# Patient Record
Sex: FEMALE | Race: WHITE | Employment: UNEMPLOYED | URBAN - METROPOLITAN AREA
[De-identification: names, ages, dates, MRNs, and addresses within clinical notes are randomized per-mention and may not be internally consistent; named-entity substitution may affect disease eponyms.]

---

## 2019-10-31 ENCOUNTER — OFFICE VISIT (OUTPATIENT)
Dept: INTERNAL MEDICINE CLINIC | Age: 39
End: 2019-10-31
Payer: MEDICAID

## 2019-10-31 VITALS
BODY MASS INDEX: 38.93 KG/M2 | DIASTOLIC BLOOD PRESSURE: 88 MMHG | SYSTOLIC BLOOD PRESSURE: 182 MMHG | RESPIRATION RATE: 16 BRPM | WEIGHT: 228 LBS | HEART RATE: 76 BPM | HEIGHT: 64 IN

## 2019-10-31 DIAGNOSIS — Z90.722 H/O OF HYSTERECTOMY WITH BILATERAL OOPHORECTOMY: ICD-10-CM

## 2019-10-31 DIAGNOSIS — G47.9 SLEEP DISTURBANCE: ICD-10-CM

## 2019-10-31 DIAGNOSIS — M99.08 SOMATIC DYSFUNCTION OF RIB: ICD-10-CM

## 2019-10-31 DIAGNOSIS — R03.0 ELEVATED BLOOD PRESSURE READING: ICD-10-CM

## 2019-10-31 DIAGNOSIS — M99.00 SOMATIC DYSFUNCTION OF HEAD REGION: ICD-10-CM

## 2019-10-31 DIAGNOSIS — F40.01 PANIC DISORDER WITH AGORAPHOBIA: ICD-10-CM

## 2019-10-31 DIAGNOSIS — Z90.710 H/O OF HYSTERECTOMY WITH BILATERAL OOPHORECTOMY: ICD-10-CM

## 2019-10-31 DIAGNOSIS — M99.01 SOMATIC DYSFUNCTION OF CERVICAL REGION: ICD-10-CM

## 2019-10-31 DIAGNOSIS — Z98.890 H/O BILATERAL BREAST REDUCTION SURGERY: ICD-10-CM

## 2019-10-31 DIAGNOSIS — E28.319 EARLY MENOPAUSE: ICD-10-CM

## 2019-10-31 DIAGNOSIS — B96.89 ACUTE BACTERIAL SINUSITIS: ICD-10-CM

## 2019-10-31 DIAGNOSIS — J01.90 ACUTE BACTERIAL SINUSITIS: ICD-10-CM

## 2019-10-31 DIAGNOSIS — Z76.89 ENCOUNTER TO ESTABLISH CARE WITH NEW DOCTOR: ICD-10-CM

## 2019-10-31 PROCEDURE — 36415 COLL VENOUS BLD VENIPUNCTURE: CPT | Performed by: FAMILY MEDICINE

## 2019-10-31 PROCEDURE — 99203 OFFICE O/P NEW LOW 30 MIN: CPT | Performed by: FAMILY MEDICINE

## 2019-10-31 PROCEDURE — 98926 OSTEOPATH MANJ 3-4 REGIONS: CPT | Performed by: FAMILY MEDICINE

## 2019-10-31 PROCEDURE — 81003 URINALYSIS AUTO W/O SCOPE: CPT | Performed by: FAMILY MEDICINE

## 2019-10-31 RX ORDER — AMOXICILLIN AND CLAVULANATE POTASSIUM 875; 125 MG/1; MG/1
1 TABLET, FILM COATED ORAL 2 TIMES DAILY
Qty: 20 TABLET | Refills: 0 | Status: SHIPPED | OUTPATIENT
Start: 2019-10-31 | End: 2019-11-10

## 2019-10-31 RX ORDER — LANOLIN ALCOHOL/MO/W.PET/CERES
1000 CREAM (GRAM) TOPICAL DAILY
Qty: 30 TABLET | Refills: 3 | Status: SHIPPED | OUTPATIENT
Start: 2019-10-31 | End: 2020-01-20 | Stop reason: SDUPTHER

## 2019-10-31 RX ORDER — AZELASTINE 1 MG/ML
1 SPRAY, METERED NASAL 2 TIMES DAILY
Qty: 1 BOTTLE | Refills: 1 | Status: SHIPPED | OUTPATIENT
Start: 2019-10-31 | End: 2020-06-12

## 2019-10-31 RX ORDER — CHOLECALCIFEROL (VITAMIN D3) 125 MCG
CAPSULE ORAL
Qty: 30 TABLET | Refills: 3 | Status: SHIPPED | OUTPATIENT
Start: 2019-10-31 | End: 2020-01-20 | Stop reason: SDUPTHER

## 2019-10-31 RX ORDER — PHENOL 1.4 %
1 AEROSOL, SPRAY (ML) MUCOUS MEMBRANE DAILY
Qty: 30 TABLET | Refills: 3 | Status: SHIPPED | OUTPATIENT
Start: 2019-10-31 | End: 2020-06-12

## 2019-10-31 RX ORDER — PREDNISONE 10 MG/1
10 TABLET ORAL DAILY
Qty: 10 TABLET | Refills: 0 | Status: SHIPPED | OUTPATIENT
Start: 2019-10-31 | End: 2019-11-10

## 2019-10-31 ASSESSMENT — ENCOUNTER SYMPTOMS
NAUSEA: 0
RHINORRHEA: 1
WHEEZING: 0
DIARRHEA: 0
ABDOMINAL DISTENTION: 0
SINUS PAIN: 1
EYE ITCHING: 0
EYE REDNESS: 0
CHEST TIGHTNESS: 0
TROUBLE SWALLOWING: 0
VOMITING: 0
ABDOMINAL PAIN: 0
SHORTNESS OF BREATH: 0
COUGH: 1
SORE THROAT: 1
CONSTIPATION: 0
BACK PAIN: 0

## 2019-10-31 ASSESSMENT — PATIENT HEALTH QUESTIONNAIRE - PHQ9
2. FEELING DOWN, DEPRESSED OR HOPELESS: 1
1. LITTLE INTEREST OR PLEASURE IN DOING THINGS: 1
SUM OF ALL RESPONSES TO PHQ QUESTIONS 1-9: 2
SUM OF ALL RESPONSES TO PHQ9 QUESTIONS 1 & 2: 2
SUM OF ALL RESPONSES TO PHQ QUESTIONS 1-9: 2

## 2019-11-01 ENCOUNTER — TELEPHONE (OUTPATIENT)
Dept: INTERNAL MEDICINE CLINIC | Age: 39
End: 2019-11-01

## 2019-11-01 DIAGNOSIS — J01.90 ACUTE BACTERIAL SINUSITIS: Primary | ICD-10-CM

## 2019-11-01 DIAGNOSIS — B96.89 ACUTE BACTERIAL SINUSITIS: Primary | ICD-10-CM

## 2019-11-01 LAB
A/G RATIO: 1.9 (ref 1.1–2.2)
ALBUMIN SERPL-MCNC: 4.6 G/DL (ref 3.4–5)
ALP BLD-CCNC: 86 U/L (ref 40–129)
ALT SERPL-CCNC: 17 U/L (ref 10–40)
AMORPHOUS: ABNORMAL /HPF
ANION GAP SERPL CALCULATED.3IONS-SCNC: 13 MMOL/L (ref 3–16)
AST SERPL-CCNC: 23 U/L (ref 15–37)
BACTERIA: ABNORMAL /HPF
BASOPHILS ABSOLUTE: 0 K/UL (ref 0–0.2)
BASOPHILS RELATIVE PERCENT: 0.4 %
BILIRUB SERPL-MCNC: 0.5 MG/DL (ref 0–1)
BILIRUBIN URINE: NEGATIVE
BLOOD, URINE: ABNORMAL
BUN BLDV-MCNC: 11 MG/DL (ref 7–20)
CALCIUM SERPL-MCNC: 8.8 MG/DL (ref 8.3–10.6)
CASTS: ABNORMAL /LPF
CHLORIDE BLD-SCNC: 103 MMOL/L (ref 99–110)
CHOLESTEROL, FASTING: 170 MG/DL (ref 0–199)
CLARITY: CLEAR
CO2: 24 MMOL/L (ref 21–32)
COLOR: YELLOW
CREAT SERPL-MCNC: 0.9 MG/DL (ref 0.6–1.1)
CRYSTALS, UA: ABNORMAL /HPF
EOSINOPHILS ABSOLUTE: 0.4 K/UL (ref 0–0.6)
EOSINOPHILS RELATIVE PERCENT: 3.8 %
EPITHELIAL CELLS, UA: ABNORMAL /HPF
ESTIMATED AVERAGE GLUCOSE: 99.7 MG/DL
GFR AFRICAN AMERICAN: >60
GFR NON-AFRICAN AMERICAN: >60
GLOBULIN: 2.4 G/DL
GLUCOSE BLD-MCNC: 95 MG/DL (ref 70–99)
GLUCOSE URINE: NEGATIVE MG/DL
HBA1C MFR BLD: 5.1 %
HCT VFR BLD CALC: 39.5 % (ref 36–48)
HDLC SERPL-MCNC: 36 MG/DL (ref 40–60)
HEMOGLOBIN: 13.1 G/DL (ref 12–16)
KETONES, URINE: NEGATIVE MG/DL
LDL CHOLESTEROL CALCULATED: 100 MG/DL
LEUKOCYTE ESTERASE, URINE: ABNORMAL
LYMPHOCYTES ABSOLUTE: 3.2 K/UL (ref 1–5.1)
LYMPHOCYTES RELATIVE PERCENT: 29.6 %
MCH RBC QN AUTO: 30.1 PG (ref 26–34)
MCHC RBC AUTO-ENTMCNC: 33.3 G/DL (ref 31–36)
MCV RBC AUTO: 90.3 FL (ref 80–100)
MICROSCOPIC EXAMINATION: YES
MONOCYTES ABSOLUTE: 0.5 K/UL (ref 0–1.3)
MONOCYTES RELATIVE PERCENT: 4.8 %
NEUTROPHILS ABSOLUTE: 6.7 K/UL (ref 1.7–7.7)
NEUTROPHILS RELATIVE PERCENT: 61.4 %
NITRITE, URINE: NEGATIVE
PDW BLD-RTO: 14.6 % (ref 12.4–15.4)
PH UA: 8 (ref 5–8)
PLATELET # BLD: 278 K/UL (ref 135–450)
PMV BLD AUTO: 8.3 FL (ref 5–10.5)
POTASSIUM SERPL-SCNC: 4.2 MMOL/L (ref 3.5–5.1)
PROTEIN UA: NEGATIVE MG/DL
RBC # BLD: 4.37 M/UL (ref 4–5.2)
RBC UA: ABNORMAL /HPF (ref 0–2)
SODIUM BLD-SCNC: 140 MMOL/L (ref 136–145)
SPECIFIC GRAVITY UA: 1.01 (ref 1–1.03)
T4 FREE: 1.1 NG/DL (ref 0.9–1.8)
TOTAL PROTEIN: 7 G/DL (ref 6.4–8.2)
TRIGLYCERIDE, FASTING: 168 MG/DL (ref 0–150)
TSH SERPL DL<=0.05 MIU/L-ACNC: 1.02 UIU/ML (ref 0.27–4.2)
URINE REFLEX TO CULTURE: YES
URINE TYPE: ABNORMAL
UROBILINOGEN, URINE: 0.2 E.U./DL
VLDLC SERPL CALC-MCNC: 34 MG/DL
WBC # BLD: 11 K/UL (ref 4–11)
WBC UA: ABNORMAL /HPF (ref 0–5)

## 2019-11-01 RX ORDER — BENZONATATE 100 MG/1
100 CAPSULE ORAL 3 TIMES DAILY PRN
Qty: 30 CAPSULE | Refills: 0 | Status: SHIPPED | OUTPATIENT
Start: 2019-11-01 | End: 2019-11-08

## 2019-11-02 LAB — URINE CULTURE, ROUTINE: NORMAL

## 2019-11-07 ENCOUNTER — OFFICE VISIT (OUTPATIENT)
Dept: INTERNAL MEDICINE CLINIC | Age: 39
End: 2019-11-07
Payer: COMMERCIAL

## 2019-11-07 VITALS — HEART RATE: 92 BPM | RESPIRATION RATE: 16 BRPM | SYSTOLIC BLOOD PRESSURE: 126 MMHG | DIASTOLIC BLOOD PRESSURE: 82 MMHG

## 2019-11-07 DIAGNOSIS — F40.01 PANIC DISORDER WITH AGORAPHOBIA: ICD-10-CM

## 2019-11-07 DIAGNOSIS — M99.09 SOMATIC DYSFUNCTION OF ABDOMINAL REGION: ICD-10-CM

## 2019-11-07 DIAGNOSIS — R03.0 ELEVATED BLOOD PRESSURE READING: ICD-10-CM

## 2019-11-07 DIAGNOSIS — B96.89 ACUTE BACTERIAL SINUSITIS: ICD-10-CM

## 2019-11-07 DIAGNOSIS — M99.00 SOMATIC DYSFUNCTION OF HEAD REGION: ICD-10-CM

## 2019-11-07 DIAGNOSIS — M99.09 SOMATIC DYSFUNCTION OF BACK: ICD-10-CM

## 2019-11-07 DIAGNOSIS — G47.9 SLEEP DISTURBANCE: ICD-10-CM

## 2019-11-07 DIAGNOSIS — M99.01 SOMATIC DYSFUNCTION OF CERVICAL REGION: ICD-10-CM

## 2019-11-07 DIAGNOSIS — J01.90 ACUTE BACTERIAL SINUSITIS: ICD-10-CM

## 2019-11-07 DIAGNOSIS — M99.05 SOMATIC DYSFUNCTION OF PELVIC REGION: ICD-10-CM

## 2019-11-07 DIAGNOSIS — M79.18 MYOFASCIAL PAIN SYNDROME: ICD-10-CM

## 2019-11-07 PROBLEM — G89.29 CHRONIC MYOFASCIAL PAIN: Status: ACTIVE | Noted: 2019-11-07

## 2019-11-07 PROCEDURE — 99213 OFFICE O/P EST LOW 20 MIN: CPT | Performed by: FAMILY MEDICINE

## 2019-11-07 PROCEDURE — 98927 OSTEOPATH MANJ 5-6 REGIONS: CPT | Performed by: FAMILY MEDICINE

## 2019-11-07 RX ORDER — CYCLOBENZAPRINE HCL 5 MG
5 TABLET ORAL NIGHTLY
Qty: 30 TABLET | Refills: 1 | Status: SHIPPED | OUTPATIENT
Start: 2019-11-07 | End: 2019-12-07

## 2019-11-07 ASSESSMENT — ENCOUNTER SYMPTOMS
DIARRHEA: 0
BACK PAIN: 1
CHEST TIGHTNESS: 0
EYE REDNESS: 0
WHEEZING: 0
EYE ITCHING: 0
SINUS PAIN: 0
VOMITING: 0
ABDOMINAL PAIN: 0
NAUSEA: 0
TROUBLE SWALLOWING: 0
ABDOMINAL DISTENTION: 0
COUGH: 1
SHORTNESS OF BREATH: 0
SORE THROAT: 0
CONSTIPATION: 0

## 2019-11-21 ENCOUNTER — OFFICE VISIT (OUTPATIENT)
Dept: INTERNAL MEDICINE CLINIC | Age: 39
End: 2019-11-21
Payer: COMMERCIAL

## 2019-11-21 VITALS — RESPIRATION RATE: 16 BRPM | SYSTOLIC BLOOD PRESSURE: 136 MMHG | DIASTOLIC BLOOD PRESSURE: 76 MMHG | HEART RATE: 72 BPM

## 2019-11-21 DIAGNOSIS — M99.09 SOMATIC DYSFUNCTION OF BACK: ICD-10-CM

## 2019-11-21 DIAGNOSIS — M99.01 SOMATIC DYSFUNCTION OF CERVICAL REGION: ICD-10-CM

## 2019-11-21 DIAGNOSIS — M99.00 SOMATIC DYSFUNCTION OF HEAD REGION: ICD-10-CM

## 2019-11-21 DIAGNOSIS — M79.18 MYOFASCIAL PAIN SYNDROME: ICD-10-CM

## 2019-11-21 DIAGNOSIS — M99.09 SOMATIC DYSFUNCTION OF ABDOMINAL REGION: ICD-10-CM

## 2019-11-21 DIAGNOSIS — M99.05 SOMATIC DYSFUNCTION OF PELVIC REGION: ICD-10-CM

## 2019-11-21 DIAGNOSIS — F40.01 PANIC DISORDER WITH AGORAPHOBIA: ICD-10-CM

## 2019-11-21 DIAGNOSIS — G47.9 SLEEP DISTURBANCE: ICD-10-CM

## 2019-11-21 PROCEDURE — 99213 OFFICE O/P EST LOW 20 MIN: CPT | Performed by: FAMILY MEDICINE

## 2019-11-21 PROCEDURE — 98927 OSTEOPATH MANJ 5-6 REGIONS: CPT | Performed by: FAMILY MEDICINE

## 2019-11-21 RX ORDER — QUETIAPINE FUMARATE 25 MG/1
25 TABLET, FILM COATED ORAL DAILY
Qty: 30 TABLET | Refills: 0 | Status: SHIPPED | OUTPATIENT
Start: 2019-11-21 | End: 2020-01-07

## 2019-11-21 ASSESSMENT — ENCOUNTER SYMPTOMS
WHEEZING: 0
EYE REDNESS: 0
EYE ITCHING: 0
BACK PAIN: 1
TROUBLE SWALLOWING: 0
NAUSEA: 0
ABDOMINAL PAIN: 0
DIARRHEA: 0
SORE THROAT: 0
COUGH: 0
VOMITING: 0
ABDOMINAL DISTENTION: 0
SINUS PAIN: 0
CHEST TIGHTNESS: 0
SHORTNESS OF BREATH: 0
CONSTIPATION: 0

## 2019-12-03 ENCOUNTER — OFFICE VISIT (OUTPATIENT)
Dept: INTERNAL MEDICINE CLINIC | Age: 39
End: 2019-12-03
Payer: MEDICAID

## 2019-12-03 VITALS — DIASTOLIC BLOOD PRESSURE: 70 MMHG | RESPIRATION RATE: 16 BRPM | SYSTOLIC BLOOD PRESSURE: 116 MMHG | HEART RATE: 72 BPM

## 2019-12-03 DIAGNOSIS — M99.01 SOMATIC DYSFUNCTION OF CERVICAL REGION: ICD-10-CM

## 2019-12-03 DIAGNOSIS — M99.00 SOMATIC DYSFUNCTION OF HEAD REGION: ICD-10-CM

## 2019-12-03 DIAGNOSIS — M99.05 SOMATIC DYSFUNCTION OF PELVIC REGION: ICD-10-CM

## 2019-12-03 DIAGNOSIS — M99.09 SOMATIC DYSFUNCTION OF BACK: ICD-10-CM

## 2019-12-03 DIAGNOSIS — M79.18 MYOFASCIAL PAIN SYNDROME: Primary | ICD-10-CM

## 2019-12-03 DIAGNOSIS — M99.09 SOMATIC DYSFUNCTION OF ABDOMINAL REGION: ICD-10-CM

## 2019-12-03 DIAGNOSIS — G47.9 SLEEP DISTURBANCE: ICD-10-CM

## 2019-12-03 DIAGNOSIS — F40.01 PANIC DISORDER WITH AGORAPHOBIA: ICD-10-CM

## 2019-12-03 PROCEDURE — 98927 OSTEOPATH MANJ 5-6 REGIONS: CPT | Performed by: FAMILY MEDICINE

## 2019-12-03 PROCEDURE — 99213 OFFICE O/P EST LOW 20 MIN: CPT | Performed by: FAMILY MEDICINE

## 2019-12-04 ENCOUNTER — HOSPITAL ENCOUNTER (EMERGENCY)
Age: 39
Discharge: HOME | End: 2019-12-05
Attending: EMERGENCY MEDICINE
Payer: MEDICAID

## 2019-12-04 DIAGNOSIS — R19.7 DIARRHEA, UNSPECIFIED TYPE: ICD-10-CM

## 2019-12-04 DIAGNOSIS — R11.2 NON-INTRACTABLE VOMITING WITH NAUSEA, UNSPECIFIED VOMITING TYPE: Primary | ICD-10-CM

## 2019-12-04 PROCEDURE — 93005 ELECTROCARDIOGRAM TRACING: CPT | Performed by: EMERGENCY MEDICINE

## 2019-12-04 PROCEDURE — 99284 EMERGENCY DEPT VISIT MOD MDM: CPT

## 2019-12-04 ASSESSMENT — PAIN DESCRIPTION - ONSET: ONSET: ON-GOING

## 2019-12-04 ASSESSMENT — PAIN DESCRIPTION - FREQUENCY: FREQUENCY: CONTINUOUS

## 2019-12-04 ASSESSMENT — PAIN SCALES - GENERAL: PAINLEVEL_OUTOF10: 9

## 2019-12-04 ASSESSMENT — PAIN DESCRIPTION - LOCATION: LOCATION: HEAD;CHEST;ABDOMEN

## 2019-12-04 ASSESSMENT — PAIN DESCRIPTION - PAIN TYPE: TYPE: ACUTE PAIN

## 2019-12-05 VITALS
TEMPERATURE: 97.3 F | WEIGHT: 225 LBS | RESPIRATION RATE: 20 BRPM | DIASTOLIC BLOOD PRESSURE: 74 MMHG | HEART RATE: 99 BPM | HEIGHT: 63 IN | BODY MASS INDEX: 39.87 KG/M2 | OXYGEN SATURATION: 97 % | SYSTOLIC BLOOD PRESSURE: 112 MMHG

## 2019-12-05 LAB
ALBUMIN SERPL-MCNC: 3.9 GM/DL (ref 3.4–5)
ALP BLD-CCNC: 80 IU/L (ref 40–129)
ALT SERPL-CCNC: 29 U/L (ref 10–40)
ANION GAP SERPL CALCULATED.3IONS-SCNC: 14 MMOL/L (ref 4–16)
AST SERPL-CCNC: 43 IU/L (ref 15–37)
BASOPHILS ABSOLUTE: 0 K/CU MM
BASOPHILS RELATIVE PERCENT: 0.3 % (ref 0–1)
BILIRUB SERPL-MCNC: 1.1 MG/DL (ref 0–1)
BUN BLDV-MCNC: 12 MG/DL (ref 6–23)
CALCIUM SERPL-MCNC: 8.9 MG/DL (ref 8.3–10.6)
CHLORIDE BLD-SCNC: 100 MMOL/L (ref 99–110)
CO2: 22 MMOL/L (ref 21–32)
CREAT SERPL-MCNC: 1 MG/DL (ref 0.6–1.1)
DIFFERENTIAL TYPE: ABNORMAL
EOSINOPHILS ABSOLUTE: 0 K/CU MM
EOSINOPHILS RELATIVE PERCENT: 0.3 % (ref 0–3)
GFR AFRICAN AMERICAN: >60 ML/MIN/1.73M2
GFR NON-AFRICAN AMERICAN: >60 ML/MIN/1.73M2
GLUCOSE BLD-MCNC: 126 MG/DL (ref 70–99)
GONADOTROPIN, CHORIONIC (HCG) QUANT: NORMAL UIU/ML
HCT VFR BLD CALC: 43.4 % (ref 37–47)
HEMOGLOBIN: 14.3 GM/DL (ref 12.5–16)
IMMATURE NEUTROPHIL %: 0.4 % (ref 0–0.43)
LIPASE: 21 IU/L (ref 13–60)
LYMPHOCYTES ABSOLUTE: 0.8 K/CU MM
LYMPHOCYTES RELATIVE PERCENT: 10.6 % (ref 24–44)
MCH RBC QN AUTO: 29.5 PG (ref 27–31)
MCHC RBC AUTO-ENTMCNC: 32.9 % (ref 32–36)
MCV RBC AUTO: 89.5 FL (ref 78–100)
MONOCYTES ABSOLUTE: 0.3 K/CU MM
MONOCYTES RELATIVE PERCENT: 4 % (ref 0–4)
NUCLEATED RBC %: 0 %
PDW BLD-RTO: 13 % (ref 11.7–14.9)
PLATELET # BLD: 236 K/CU MM (ref 140–440)
PMV BLD AUTO: 9.2 FL (ref 7.5–11.1)
POTASSIUM SERPL-SCNC: 3.8 MMOL/L (ref 3.5–5.1)
RBC # BLD: 4.85 M/CU MM (ref 4.2–5.4)
SEGMENTED NEUTROPHILS ABSOLUTE COUNT: 6.6 K/CU MM
SEGMENTED NEUTROPHILS RELATIVE PERCENT: 84.4 % (ref 36–66)
SODIUM BLD-SCNC: 136 MMOL/L (ref 135–145)
TOTAL IMMATURE NEUTOROPHIL: 0.03 K/CU MM
TOTAL NUCLEATED RBC: 0 K/CU MM
TOTAL PROTEIN: 7.6 GM/DL (ref 6.4–8.2)
WBC # BLD: 7.8 K/CU MM (ref 4–10.5)

## 2019-12-05 PROCEDURE — 2580000003 HC RX 258: Performed by: EMERGENCY MEDICINE

## 2019-12-05 PROCEDURE — 6360000002 HC RX W HCPCS: Performed by: EMERGENCY MEDICINE

## 2019-12-05 PROCEDURE — 84702 CHORIONIC GONADOTROPIN TEST: CPT

## 2019-12-05 PROCEDURE — 80053 COMPREHEN METABOLIC PANEL: CPT

## 2019-12-05 PROCEDURE — 85025 COMPLETE CBC W/AUTO DIFF WBC: CPT

## 2019-12-05 PROCEDURE — 83690 ASSAY OF LIPASE: CPT

## 2019-12-05 RX ORDER — ONDANSETRON 2 MG/ML
4 INJECTION INTRAMUSCULAR; INTRAVENOUS EVERY 6 HOURS PRN
Status: DISCONTINUED | OUTPATIENT
Start: 2019-12-05 | End: 2019-12-05 | Stop reason: HOSPADM

## 2019-12-05 RX ORDER — ONDANSETRON 4 MG/1
4 TABLET, FILM COATED ORAL EVERY 8 HOURS PRN
Qty: 30 TABLET | Refills: 0 | Status: SHIPPED | OUTPATIENT
Start: 2019-12-05 | End: 2020-06-12

## 2019-12-05 RX ORDER — 0.9 % SODIUM CHLORIDE 0.9 %
1000 INTRAVENOUS SOLUTION INTRAVENOUS ONCE
Status: COMPLETED | OUTPATIENT
Start: 2019-12-05 | End: 2019-12-05

## 2019-12-05 RX ORDER — FENTANYL CITRATE 50 UG/ML
50 INJECTION, SOLUTION INTRAMUSCULAR; INTRAVENOUS ONCE
Status: COMPLETED | OUTPATIENT
Start: 2019-12-05 | End: 2019-12-05

## 2019-12-05 RX ADMIN — FENTANYL CITRATE 50 MCG: 50 INJECTION, SOLUTION INTRAMUSCULAR; INTRAVENOUS at 02:02

## 2019-12-05 RX ADMIN — SODIUM CHLORIDE 1000 ML: 9 INJECTION, SOLUTION INTRAVENOUS at 02:02

## 2019-12-05 RX ADMIN — ONDANSETRON 4 MG: 2 INJECTION INTRAMUSCULAR; INTRAVENOUS at 02:02

## 2019-12-05 ASSESSMENT — PAIN SCALES - GENERAL: PAINLEVEL_OUTOF10: 8

## 2019-12-06 PROCEDURE — 93010 ELECTROCARDIOGRAM REPORT: CPT | Performed by: INTERNAL MEDICINE

## 2019-12-07 ASSESSMENT — ENCOUNTER SYMPTOMS
CONSTIPATION: 0
CHEST TIGHTNESS: 0
SORE THROAT: 0
ABDOMINAL PAIN: 0
WHEEZING: 0
SHORTNESS OF BREATH: 0
VOMITING: 0
EYE ITCHING: 0
ABDOMINAL DISTENTION: 0
EYE REDNESS: 0
NAUSEA: 0
TROUBLE SWALLOWING: 0
DIARRHEA: 0
COUGH: 0
SINUS PAIN: 0
BACK PAIN: 1

## 2019-12-08 LAB
EKG ATRIAL RATE: 101 BPM
EKG DIAGNOSIS: NORMAL
EKG P AXIS: 17 DEGREES
EKG P-R INTERVAL: 146 MS
EKG Q-T INTERVAL: 362 MS
EKG QRS DURATION: 84 MS
EKG QTC CALCULATION (BAZETT): 469 MS
EKG R AXIS: 12 DEGREES
EKG T AXIS: 22 DEGREES
EKG VENTRICULAR RATE: 101 BPM

## 2019-12-20 ENCOUNTER — HOSPITAL ENCOUNTER (OUTPATIENT)
Dept: SLEEP CENTER | Age: 39
Discharge: HOME | End: 2019-12-20
Payer: MEDICAID

## 2019-12-20 VITALS
HEIGHT: 63 IN | HEART RATE: 85 BPM | BODY MASS INDEX: 40.72 KG/M2 | DIASTOLIC BLOOD PRESSURE: 83 MMHG | OXYGEN SATURATION: 96 % | SYSTOLIC BLOOD PRESSURE: 125 MMHG | WEIGHT: 229.8 LBS

## 2019-12-20 DIAGNOSIS — G47.10 HYPERSOMNOLENCE: Primary | ICD-10-CM

## 2019-12-20 DIAGNOSIS — R06.83 SNORING: ICD-10-CM

## 2019-12-20 PROCEDURE — 99211 OFF/OP EST MAY X REQ PHY/QHP: CPT | Performed by: INTERNAL MEDICINE

## 2019-12-20 ASSESSMENT — SLEEP AND FATIGUE QUESTIONNAIRES
HOW LIKELY ARE YOU TO NOD OFF OR FALL ASLEEP WHILE SITTING AND TALKING TO SOMEONE: 0
HOW LIKELY ARE YOU TO NOD OFF OR FALL ASLEEP WHILE SITTING AND READING: 3
HOW LIKELY ARE YOU TO NOD OFF OR FALL ASLEEP WHEN YOU ARE A PASSENGER IN A CAR FOR AN HOUR WITHOUT A BREAK: 3
HOW LIKELY ARE YOU TO NOD OFF OR FALL ASLEEP WHILE LYING DOWN TO REST IN THE AFTERNOON WHEN CIRCUMSTANCES PERMIT: 3
HOW LIKELY ARE YOU TO NOD OFF OR FALL ASLEEP WHILE WATCHING TV: 3
HOW LIKELY ARE YOU TO NOD OFF OR FALL ASLEEP IN A CAR, WHILE STOPPED FOR A FEW MINUTES IN TRAFFIC: 0
HOW LIKELY ARE YOU TO NOD OFF OR FALL ASLEEP WHILE SITTING QUIETLY AFTER LUNCH WITHOUT ALCOHOL: 2
HOW LIKELY ARE YOU TO NOD OFF OR FALL ASLEEP WHILE SITTING INACTIVE IN A PUBLIC PLACE: 1
ESS TOTAL SCORE: 15

## 2020-01-04 ENCOUNTER — HOSPITAL ENCOUNTER (EMERGENCY)
Age: 40
Discharge: HOME | End: 2020-01-04
Payer: MEDICAID

## 2020-01-04 VITALS
SYSTOLIC BLOOD PRESSURE: 137 MMHG | WEIGHT: 230 LBS | TEMPERATURE: 97.8 F | DIASTOLIC BLOOD PRESSURE: 97 MMHG | RESPIRATION RATE: 16 BRPM | BODY MASS INDEX: 39.27 KG/M2 | HEART RATE: 76 BPM | HEIGHT: 64 IN | OXYGEN SATURATION: 96 %

## 2020-01-04 PROCEDURE — 6370000000 HC RX 637 (ALT 250 FOR IP): Performed by: PHYSICIAN ASSISTANT

## 2020-01-04 PROCEDURE — 99282 EMERGENCY DEPT VISIT SF MDM: CPT

## 2020-01-04 RX ORDER — NEOMYCIN SULFATE, POLYMYXIN B SULFATE AND HYDROCORTISONE 10; 3.5; 1 MG/ML; MG/ML; [USP'U]/ML
4 SUSPENSION/ DROPS AURICULAR (OTIC) 4 TIMES DAILY
Qty: 1 BOTTLE | Refills: 0 | Status: SHIPPED | OUTPATIENT
Start: 2020-01-04 | End: 2020-01-11

## 2020-01-04 RX ORDER — NEOMYCIN SULFATE, POLYMYXIN B SULFATE AND HYDROCORTISONE 10; 3.5; 1 MG/ML; MG/ML; [USP'U]/ML
4 SUSPENSION/ DROPS AURICULAR (OTIC) ONCE
Status: COMPLETED | OUTPATIENT
Start: 2020-01-04 | End: 2020-01-04

## 2020-01-04 RX ADMIN — NEOMYCIN SULFATE, POLYMYXIN B SULFATE AND HYDROCORTISONE 4 DROP: 3.5; 10000; 1 SUSPENSION AURICULAR (OTIC) at 23:31

## 2020-01-04 ASSESSMENT — PAIN DESCRIPTION - PROGRESSION: CLINICAL_PROGRESSION: GRADUALLY WORSENING

## 2020-01-04 ASSESSMENT — PAIN DESCRIPTION - LOCATION: LOCATION: EAR

## 2020-01-04 ASSESSMENT — PAIN DESCRIPTION - DESCRIPTORS: DESCRIPTORS: SHARP

## 2020-01-04 ASSESSMENT — PAIN DESCRIPTION - PAIN TYPE: TYPE: ACUTE PAIN

## 2020-01-04 ASSESSMENT — PAIN DESCRIPTION - ORIENTATION: ORIENTATION: RIGHT

## 2020-01-04 ASSESSMENT — PAIN DESCRIPTION - FREQUENCY: FREQUENCY: CONTINUOUS

## 2020-01-04 ASSESSMENT — PAIN DESCRIPTION - ONSET: ONSET: PROGRESSIVE

## 2020-01-04 ASSESSMENT — PAIN SCALES - GENERAL: PAINLEVEL_OUTOF10: 10

## 2020-01-05 NOTE — ED PROVIDER NOTES
EMERGENCY DEPARTMENT ENCOUNTER      PCP: Godwin Keenan, [facility]    Chief Complaint   Patient presents with    Otalgia       This patient was not evaluated by the attending physician. I have independently evaluated this patient. HPI    Jeimy Bunch is a 35 y.o. female who presents with Right ear pain. Onset was 4 days ago. The duration has been increasing since onset. The context is states she started having right ear pain 4 days ago which is gradually progressed. Patient states she is having trouble hearing from the ear. Patient states she does occasionally use Q-tips, denies any obvious injury. Patient denies any sore throat, nasal congestion, fever, abdominal pain, vomiting or diarrhea. No known aggravating or alleviating factors. REVIEW OF SYSTEMS    General: No fevers or syncope  ENT:  See HPI. No sorethroat, sinus pressure. Pulmonary: No cough  GI: No abdominal pain, vomiting or diarrhea  Neurologic: No dizziness, lightheadedness, numbness/tingling, confusion.     All other review of systems are negative  See HPI and nursing notes for additional information     PAST MEDICAL AND SURGICAL HISTORY    Past Medical History:   Diagnosis Date    Migraine     Pulmonic stenosis      Past Surgical History:   Procedure Laterality Date    BREAST REDUCTION SURGERY      BREAST SURGERY      CHOLECYSTECTOMY      CYST REMOVAL      HAND SURGERY Right     4/2014    HYSTERECTOMY      INNER EAR SURGERY      OTHER SURGICAL HISTORY  4/16/2012    Repair vaginal cuff    PARTIAL NEPHRECTOMY      TOTAL NEPHRECTOMY      TUBAL LIGATION       CURRENT MEDICATIONS    Current Outpatient Rx   Medication Sig Dispense Refill    ondansetron (ZOFRAN) 4 MG tablet Take 1 tablet by mouth every 8 hours as needed for Nausea or Vomiting 30 tablet 0    QUEtiapine (SEROQUEL) 25 MG tablet Take 1 tablet by mouth daily 30 tablet 0    calcium carbonate (CALCIUM 600) 600 MG TABS tablet Take 1 tablet by mouth daily 30 tablet 3    Cholecalciferol (VITAMIN D3) 50 MCG (2000 UT) TABS Take one tablet by mouth once a day 30 tablet 3    vitamin B-12 (CYANOCOBALAMIN) 1000 MCG tablet Take 1 tablet by mouth daily 30 tablet 3    azelastine (ASTELIN) 0.1 % nasal spray 1 spray by Nasal route 2 times daily Use in each nostril as directed (Patient not taking: Reported on 12/20/2019) 1 Bottle 1     ALLERGIES    Allergies   Allergen Reactions    Codeine Anaphylaxis    Dilaudid [Hydromorphone Hcl] Anaphylaxis     FAMILY AND SOCIAL HISTORY    Family History   Problem Relation Age of Onset    Cancer Mother     Migraines Mother     Cancer Maternal Grandmother     Diabetes Maternal Grandmother     Allergies Son     Allergies Daughter     Bleeding Prob Daughter     Bleeding Prob Son     Asthma Daughter     Asthma Son      Social History     Socioeconomic History    Marital status:      Spouse name: None    Number of children: None    Years of education: None    Highest education level: None   Occupational History    None   Social Needs    Financial resource strain: None    Food insecurity:     Worry: None     Inability: None    Transportation needs:     Medical: None     Non-medical: None   Tobacco Use    Smoking status: Never Smoker    Smokeless tobacco: Never Used   Substance and Sexual Activity    Alcohol use: No    Drug use: No    Sexual activity: None   Lifestyle    Physical activity:     Days per week: None     Minutes per session: None    Stress: None   Relationships    Social connections:     Talks on phone: None     Gets together: None     Attends Yazidi service: None     Active member of club or organization: None     Attends meetings of clubs or organizations: None     Relationship status: None    Intimate partner violence:     Fear of current or ex partner: None     Emotionally abused: None     Physically abused: None     Forced sexual activity: None   Other Topics Concern    None   Social

## 2020-01-06 ENCOUNTER — HOSPITAL ENCOUNTER (OUTPATIENT)
Dept: SLEEP CENTER | Age: 40
Discharge: HOME | End: 2020-01-06
Payer: MEDICAID

## 2020-01-06 PROCEDURE — 95810 POLYSOM 6/> YRS 4/> PARAM: CPT

## 2020-01-07 ENCOUNTER — OFFICE VISIT (OUTPATIENT)
Dept: INTERNAL MEDICINE CLINIC | Age: 40
End: 2020-01-07
Payer: MEDICAID

## 2020-01-07 VITALS
SYSTOLIC BLOOD PRESSURE: 120 MMHG | WEIGHT: 228 LBS | BODY MASS INDEX: 39.75 KG/M2 | HEART RATE: 82 BPM | DIASTOLIC BLOOD PRESSURE: 76 MMHG

## 2020-01-07 PROCEDURE — 98926 OSTEOPATH MANJ 3-4 REGIONS: CPT | Performed by: FAMILY MEDICINE

## 2020-01-07 PROCEDURE — 99213 OFFICE O/P EST LOW 20 MIN: CPT | Performed by: FAMILY MEDICINE

## 2020-01-07 ASSESSMENT — PATIENT HEALTH QUESTIONNAIRE - PHQ9
SUM OF ALL RESPONSES TO PHQ QUESTIONS 1-9: 0
1. LITTLE INTEREST OR PLEASURE IN DOING THINGS: 0
SUM OF ALL RESPONSES TO PHQ QUESTIONS 1-9: 0
SUM OF ALL RESPONSES TO PHQ9 QUESTIONS 1 & 2: 0
2. FEELING DOWN, DEPRESSED OR HOPELESS: 0

## 2020-01-07 NOTE — PROGRESS NOTES
Behavior: Behavior normal.         Thought Content: Thought content normal.         Judgment: Judgment normal.       Somatic Findings:   Multiple sutural Restrictions. Tender occiput  Bilateral paracervical spinal tenderness worst on right side  Multiple mobile tender-points along cervical spine  Right- deltoid, trapezius, medial scapular  border tender points       Assessment / Plan:      1. Myofascial pain syndrome  - OMT resulted in pain relief. OMT session every 4 to 6-week. - ND OSTEOPATHIC MANIP,3-4 BODY REGN    Procedure Note:  The patient verbally consented to the application of OMT. Patient was positioned appropriately for the techniques and repositioned as needed. Appropriate hand positioning and monitoring technique was performed. Reassessment of the effectiveness of the techniques was performed.     2. Somatic dysfunction of head region  - V-spread technique is used to reduce skull sutural restriction.  - Occipital decompression for tender occiput.  - Reassessment: Improved and less tense occiput.    - Need f/u OMT  . Drink plenty of fluid and home stretching exercises.   - ND OSTEOPATHIC MANIP,3-4 BODY REGN    3. Somatic dysfunction of cervical region  - First Myofascial technique followed by  counterstrain techniques was applied to tender points along both side of posterior neck. - Reassessment:  Reduce neck tenderness.  Improvement and reduction in active tender points  - Need f/u OMT  . Drink plenty of fluid and home stretching exercise  - ND OSTEOPATHIC MANIP,3-4 BODY REGN    4. Somatic dysfunction of both upper extremities  -Myofascial followed by trigger point release was performed on multiple tender point.  - Reassessment: Resolution of shoulder pain  - Need f/u OMT for further improvement. Drink plenty of fluid and home stretching exercises. - ND OSTEOPATHIC MANIP,3-4 BODY REGN    5.  Panic disorder with agoraphobia  - Patient stopped taking medication, Seroquel, due to excessive

## 2020-01-10 LAB — STATUS: NORMAL

## 2020-01-12 PROBLEM — M99.07 SOMATIC DYSFUNCTION OF BOTH UPPER EXTREMITIES: Status: ACTIVE | Noted: 2020-01-12

## 2020-01-12 PROBLEM — G47.33 OBSTRUCTIVE SLEEP APNEA ON CPAP: Status: ACTIVE | Noted: 2020-01-12

## 2020-01-12 PROBLEM — Z99.89 OBSTRUCTIVE SLEEP APNEA ON CPAP: Status: ACTIVE | Noted: 2020-01-12

## 2020-01-12 ASSESSMENT — ENCOUNTER SYMPTOMS
SHORTNESS OF BREATH: 0
ABDOMINAL DISTENTION: 0
ABDOMINAL PAIN: 0
WHEEZING: 0
NAUSEA: 0
CONSTIPATION: 0
EYE ITCHING: 0
BACK PAIN: 1
COUGH: 0
EYE REDNESS: 0
TROUBLE SWALLOWING: 0
SORE THROAT: 0
VOMITING: 0
DIARRHEA: 0
CHEST TIGHTNESS: 0
SINUS PAIN: 0

## 2020-01-20 RX ORDER — LANOLIN ALCOHOL/MO/W.PET/CERES
1000 CREAM (GRAM) TOPICAL DAILY
Qty: 30 TABLET | Refills: 3 | Status: SHIPPED | OUTPATIENT
Start: 2020-01-20 | End: 2020-03-09 | Stop reason: SDUPTHER

## 2020-01-20 RX ORDER — CHOLECALCIFEROL (VITAMIN D3) 125 MCG
CAPSULE ORAL
Qty: 30 TABLET | Refills: 3 | Status: SHIPPED | OUTPATIENT
Start: 2020-01-20 | End: 2020-03-09 | Stop reason: SDUPTHER

## 2020-01-24 ENCOUNTER — HOSPITAL ENCOUNTER (OUTPATIENT)
Dept: SLEEP CENTER | Age: 40
Discharge: HOME | End: 2020-01-24
Payer: MEDICAID

## 2020-01-24 PROCEDURE — 9990000010 HC NO CHARGE VISIT: Performed by: INTERNAL MEDICINE

## 2020-01-24 NOTE — PROGRESS NOTES
results found for: Frank Castro  No results found for: [facility]  Radiology Review:  Pertinent images / reports were reviewed as a part of this visit. Assessment:     Patient Active Problem List   Diagnosis    Vaginal bleeding    H/O of hysterectomy with bilateral oophorectomy    BMI 39.0-39.9,adult    Elevated blood pressure reading    Panic disorder with agoraphobia    Early menopause    Sleep disturbance    H/O bilateral breast reduction surgery    Myofascial pain syndrome    Somatic dysfunction of head region    Somatic dysfunction of cervical region    Somatic dysfunction of back    Somatic dysfunction of abdominal region    Somatic dysfunction of pelvic region    Snoring    Obstructive sleep apnea on CPAP    Somatic dysfunction of both upper extremities       Plan:   1. Cpap titration. 2. RTO 2 mths.       Omar Shoemaker MD  1/24/2020  3:20 PM

## 2020-02-11 ENCOUNTER — HOSPITAL ENCOUNTER (OUTPATIENT)
Dept: SLEEP CENTER | Age: 40
Discharge: HOME | End: 2020-02-11
Payer: MEDICAID

## 2020-02-11 PROCEDURE — 95811 POLYSOM 6/>YRS CPAP 4/> PARM: CPT

## 2020-02-12 ENCOUNTER — HOSPITAL ENCOUNTER (EMERGENCY)
Age: 40
Discharge: HOME | End: 2020-02-12
Payer: MEDICAID

## 2020-02-12 ENCOUNTER — APPOINTMENT (OUTPATIENT)
Dept: GENERAL RADIOLOGY | Age: 40
End: 2020-02-12
Payer: MEDICAID

## 2020-02-12 VITALS
DIASTOLIC BLOOD PRESSURE: 100 MMHG | HEART RATE: 88 BPM | HEIGHT: 63 IN | WEIGHT: 216 LBS | SYSTOLIC BLOOD PRESSURE: 144 MMHG | BODY MASS INDEX: 38.27 KG/M2 | OXYGEN SATURATION: 95 % | RESPIRATION RATE: 18 BRPM | TEMPERATURE: 98.4 F

## 2020-02-12 PROCEDURE — 73610 X-RAY EXAM OF ANKLE: CPT

## 2020-02-12 PROCEDURE — 6360000002 HC RX W HCPCS: Performed by: PHYSICIAN ASSISTANT

## 2020-02-12 PROCEDURE — 96372 THER/PROPH/DIAG INJ SC/IM: CPT

## 2020-02-12 PROCEDURE — 99283 EMERGENCY DEPT VISIT LOW MDM: CPT

## 2020-02-12 RX ORDER — KETOROLAC TROMETHAMINE 30 MG/ML
30 INJECTION, SOLUTION INTRAMUSCULAR; INTRAVENOUS ONCE
Status: COMPLETED | OUTPATIENT
Start: 2020-02-12 | End: 2020-02-12

## 2020-02-12 RX ADMIN — KETOROLAC TROMETHAMINE 30 MG: 30 INJECTION, SOLUTION INTRAMUSCULAR; INTRAVENOUS at 20:33

## 2020-02-12 ASSESSMENT — PAIN SCALES - GENERAL
PAINLEVEL_OUTOF10: 10
PAINLEVEL_OUTOF10: 2
PAINLEVEL_OUTOF10: 10

## 2020-02-12 ASSESSMENT — PAIN DESCRIPTION - LOCATION: LOCATION: ANKLE

## 2020-02-12 ASSESSMENT — PAIN DESCRIPTION - ORIENTATION: ORIENTATION: LEFT

## 2020-02-12 ASSESSMENT — PAIN DESCRIPTION - PAIN TYPE: TYPE: ACUTE PAIN

## 2020-02-13 NOTE — ED PROVIDER NOTES
 Bleeding Prob Daughter     Bleeding Prob Son     Asthma Daughter     Asthma Son          PHYSICAL EXAM    VITAL SIGNS: BP (!) 144/100   Pulse 88   Temp 98.4 °F (36.9 °C) (Oral)   Resp 18   Ht 5' 3\" (1.6 m)   Wt 216 lb (98 kg)   LMP 02/15/2012   SpO2 95%   BMI 38.26 kg/m²    Constitutional:  Well developed, Well nourished  HENT:  Normocephalic, Atraumatic, PERRL. EOMI. Sclera clear. Conjunctiva normal, No discharge. Neck/Lymphatics: supple, no JVD, no swollen nodes  Cardiovascular: Regular rate and rhythm, no murmurs/rubs/gallops. Respiratory:  Nonlabored breathing. Normal breath sounds, No wheezing  Abdomen: Bowel sounds normal, Soft, No tenderness, no masses. Musculoskeletal:    There is no edema, asymmetry, or calf / thigh tenderness bilaterally. No cyanosis. No cool or pale-appearing limb. Distal cap refill and pulses intact bilateral upper and lower extremities  Bilateral upper  extremity ROM intact without pain or obvious deficit. Left ankle with tenderness to palpation. Right lower extremity ROM intact without pain or obvious defect  Integument:  Warm, Dry  Neurologic: Alert & oriented , No focal deficits noted. Cranial nerves II through XII grossly intact. Normal gross motor coordination & motor strength bilateral upper and lower extremities  Sensation intact. Psychiatric:  Affect normal, Mood normal.       Labs:        EKG        RADIOLOGY      XR ANKLE LEFT (MIN 3 VIEWS)   Final Result   No acute osseous abnormality. ED COURSE & MEDICAL DECISION MAKING       Condition throughout her ED course. X-ray of the left ankle revealed no acute osseous abnormality. She is an excellent candidate for discharge at this time. She is to [facility] in 2 to 3 days. She will be placed in ankle splint and given crutches. I discussed discharge plans with the patient she understood and agreed.       Patient agrees to return emergency department if symptoms worsen or any new symptoms develop. Vital signs and nursing notes reviewed during ED course. Clinical  IMPRESSION    1. Acute left ankle pain              Comment: Please note this report has been produced using speech recognition software and may contain errors related to that system including errors in grammar, punctuation, and spelling, as well as words and phrases that may be inappropriate. If there are any questions or concerns please feel free to contact the dictating provider for clarification.        Yady Zhu  02/12/20 5525

## 2020-02-14 LAB — STATUS: NORMAL

## 2020-02-16 ENCOUNTER — APPOINTMENT (OUTPATIENT)
Dept: GENERAL RADIOLOGY | Age: 40
End: 2020-02-16
Payer: MEDICAID

## 2020-02-16 ENCOUNTER — HOSPITAL ENCOUNTER (EMERGENCY)
Age: 40
Discharge: HOME | End: 2020-02-16
Payer: MEDICAID

## 2020-02-16 VITALS
SYSTOLIC BLOOD PRESSURE: 140 MMHG | RESPIRATION RATE: 18 BRPM | WEIGHT: 216 LBS | DIASTOLIC BLOOD PRESSURE: 107 MMHG | BODY MASS INDEX: 38.27 KG/M2 | HEART RATE: 90 BPM | HEIGHT: 63 IN | TEMPERATURE: 98.5 F | OXYGEN SATURATION: 96 %

## 2020-02-16 PROCEDURE — 6370000000 HC RX 637 (ALT 250 FOR IP): Performed by: PHYSICIAN ASSISTANT

## 2020-02-16 PROCEDURE — 99283 EMERGENCY DEPT VISIT LOW MDM: CPT

## 2020-02-16 PROCEDURE — 73630 X-RAY EXAM OF FOOT: CPT

## 2020-02-16 RX ORDER — IBUPROFEN 800 MG/1
800 TABLET ORAL ONCE
Status: COMPLETED | OUTPATIENT
Start: 2020-02-16 | End: 2020-02-16

## 2020-02-16 RX ORDER — IBUPROFEN 800 MG/1
800 TABLET ORAL EVERY 6 HOURS PRN
Qty: 30 TABLET | Refills: 0 | Status: SHIPPED | OUTPATIENT
Start: 2020-02-16 | End: 2020-06-12

## 2020-02-16 RX ADMIN — IBUPROFEN 800 MG: 800 TABLET, FILM COATED ORAL at 21:46

## 2020-02-16 ASSESSMENT — PAIN DESCRIPTION - FREQUENCY: FREQUENCY: CONTINUOUS

## 2020-02-16 ASSESSMENT — PAIN SCALES - GENERAL
PAINLEVEL_OUTOF10: 10
PAINLEVEL_OUTOF10: 10

## 2020-02-16 ASSESSMENT — PAIN DESCRIPTION - LOCATION: LOCATION: FOOT

## 2020-02-16 ASSESSMENT — PAIN DESCRIPTION - ORIENTATION: ORIENTATION: RIGHT

## 2020-02-16 ASSESSMENT — PAIN DESCRIPTION - PAIN TYPE: TYPE: ACUTE PAIN

## 2020-02-17 NOTE — ED PROVIDER NOTES
As provider-in-triage, I performed a medical screening history and physical exam on this patient. HISTORY OF PRESENT ILLNESS  María Reza is a 35 y.o. female who presents for left foot pin for the past 5 days after injury at work. PHYSICAL EXAM  BP (!) 140/107   Pulse 90   Temp 98.5 °F (36.9 °C) (Oral)   Resp 18   Ht 5' 3\" (1.6 m)   Wt 216 lb (98 kg)   LMP 02/15/2012   SpO2 96%   BMI 38.26 kg/m²     On exam, the patient appears well-hydrated, well-nourished, and in no acute distress. Mucous membranes are moist. Speech is clear. Breathing is unlabored. Skin is dry. Mental status is normal. Moves all extremities, and is without facial droop. Comment: Please note this report has been produced using speech recognition software and may contain errors related to that system including errors in grammar, punctuation, and spelling, as well as words and phrases that may be inappropriate. If there are any questions or concerns please feel free to contact the dictating provider for clarification.        Qi Grossman PA-C  02/16/20 2008

## 2020-02-17 NOTE — ED NOTES
Discharge instructions reviewed. Pt verbalized understanding.        Denver Silvius, RN  02/16/20 4723

## 2020-02-17 NOTE — ED PROVIDER NOTES
eMERGENCY dEPARTMENT eNCOUnter        [address] [facility]    Chief Complaint   Patient presents with    Foot Injury     right foot injured while working and now pain continues, worsened and foot is cool to the touch       HPI    Dottie Castano is a 35 y.o. female who presents with left foot pain. Onset was about 5 days ago. Context:  Patient states she was at work and was standing with her foot in between the gap from the floor to a skid and as she turned, her foot caught and she twisted it. She was seen here following the injury and had negative XRs. [facility] and they want to schedule her for an MRI. She states the pain is worsening, so she came back to the ED. Pain is localized to the the lateral ankle and dorsum of the foot. Aching. Worse with palpation, weightbearing. Severity 10/10. Has been using crutches to assist with weightbearing. Has not been taking anything for the pain at home. She is concerned because the foot feels cool to touch at times. REVIEW OF SYSTEMS    See HPI for further details. Review of systems otherwise negative. Musculoskeletal:  + foot pain. Integument:  Denies skin changes. Neurologic:  Denies numbness, tingling.       PAST MEDICAL HISTORY    Past Medical History:   Diagnosis Date    Migraine     Pulmonic stenosis     Sleep apnea        SURGICAL HISTORY    Past Surgical History:   Procedure Laterality Date    BREAST REDUCTION SURGERY      BREAST SURGERY      CHOLECYSTECTOMY      CYST REMOVAL      HAND SURGERY Right     4/2014    HYSTERECTOMY      INNER EAR SURGERY      OTHER SURGICAL HISTORY  4/16/2012    Repair vaginal cuff    PARTIAL NEPHRECTOMY      TOTAL NEPHRECTOMY      TUBAL LIGATION         CURRENT MEDICATIONS    Current Outpatient Rx   Medication Sig Dispense Refill    ibuprofen (ADVIL;MOTRIN) 800 MG tablet Take 1 tablet by mouth every 6 hours as needed for Pain 30 tablet 0    Cholecalciferol (VITAMIN D3) 50 MCG (2000 UT) TABS Attends meetings of clubs or organizations: Not on file     Relationship status: Not on file    Intimate partner violence:     Fear of current or ex partner: Not on file     Emotionally abused: Not on file     Physically abused: Not on file     Forced sexual activity: Not on file   Other Topics Concern    Not on file   Social History Narrative    ** Merged History Encounter **            PHYSICAL EXAM    VITAL SIGNS: BP (!) 140/107   Pulse 90   Temp 98.5 °F (36.9 °C) (Oral)   Resp 18   Ht 5' 3\" (1.6 m)   Wt 216 lb (98 kg)   LMP 02/15/2012   SpO2 96%   BMI 38.26 kg/m²   Constitutional:  Well developed, well nourished, no acute distress, non-toxic appearance   HENT:  NC/AT. Ears, nose, mouth normal.  Respiratory:  Normal respiratory effort. Musculoskeletal:  Mild soft tissue swelling to the dorsal left foot with diffuse tenderness to palpation. No gross deformity. Able to wiggle toes. Skin is warm and dry. No cyanosis. Calf is soft, non-tender. Dorsalis pedis and posterior tib pulses intact. Integument:  Warm and dry. Neurologic:  Sensation intact. Jj Sanchez RADIOLOGY/PROCEDURES    Xr Ankle Left (min 3 Views)    Result Date: 2/12/2020  EXAMINATION: THREE XRAY VIEWS OF THE LEFT ANKLE 2/12/2020 7:26 pm COMPARISON: None HISTORY: ORDERING SYSTEM PROVIDED HISTORY: trauma TECHNOLOGIST PROVIDED HISTORY: Reason for exam:->trauma Reason for Exam: pain Acuity: Acute Type of Exam: Initial Mechanism of Injury: twisted ankle Relevant Medical/Surgical History: none FINDINGS: No fracture or dislocation is present. There is moderate non-specific soft tissue swelling. No evidence of osteochondral lesion. Joint alignment and joint spaces are maintained. No erosions are present. There is mild calcaneal enthesopathy at the Achilles tendon insertion. No acute osseous abnormality. Xr Foot Left (min 3 Views)    Result Date: 2/16/2020  EXAMINATION: THREE XRAY VIEWS OF THE LEFT FOOT 2/16/2020 8:10 pm COMPARISON: None. HISTORY: ORDERING SYSTEM PROVIDED HISTORY: pain TECHNOLOGIST PROVIDED HISTORY: Reason for exam:->pain Reason for Exam: pain Acuity: Acute Type of Exam: Initial Mechanism of Injury: pain Relevant Medical/Surgical History: pain FINDINGS: Three views of the left foot were reviewed. No acute fracture or dislocation is identified. Mild-to-moderate osteoarthritic changes of the midfoot articulations. Small posterior and plantar calcaneal enthesophytes. Nonspecific soft tissue edema. 1. No acute fracture identified. 2. Mild-to-moderate degenerative changes of the midfoot. 3. Nonspecific soft tissue edema. ED COURSE & MEDICAL DECISION MAKING    Pertinent Labs & Imaging studies reviewed. (See chart for details)  -  Patient seen and evaluated in the emergency department. -  Triage and nursing notes reviewed and incorporated. -  Old chart records reviewed and incorporated. -  Supervising physician was Dr. Janis Howard. Patient was seen independently. -  Differential diagnosis includes: fracture, dislocation, sprain, osteoarthritis, gout, tendonitis, others  -  Work-up included: see above  -  Patient treated with Ibuprofen in the ED.  -  Results discussed with patient. Repeat XR was performed from triage. Still no bony abnormality. Skin is warm, dry, no cyanosis. We will ACE wrap, continue crutches PRN, RICE. Rx Ibuprofen. Continue FU with [address], return here as needed. She is agreeable with plan of care and disposition.   -  Patient dc home. FINAL IMPRESSION    1.  Left foot pain                  Bryan Hernandez PA-C  02/16/20 5898

## 2020-02-20 ENCOUNTER — HOSPITAL ENCOUNTER (OUTPATIENT)
Dept: MRI IMAGING | Age: 40
Discharge: HOME | End: 2020-02-20
Payer: COMMERCIAL

## 2020-02-20 PROCEDURE — 73721 MRI JNT OF LWR EXTRE W/O DYE: CPT

## 2020-03-06 ENCOUNTER — HOSPITAL ENCOUNTER (OUTPATIENT)
Dept: PHYSICAL THERAPY | Age: 40
Setting detail: THERAPIES SERIES
Discharge: HOME | End: 2020-03-06
Payer: COMMERCIAL

## 2020-03-06 PROCEDURE — 97110 THERAPEUTIC EXERCISES: CPT

## 2020-03-06 PROCEDURE — 97161 PT EVAL LOW COMPLEX 20 MIN: CPT

## 2020-03-06 PROCEDURE — 97530 THERAPEUTIC ACTIVITIES: CPT

## 2020-03-06 ASSESSMENT — PAIN SCALES - GENERAL: PAINLEVEL_OUTOF10: 7

## 2020-03-06 NOTE — FLOWSHEET NOTE
Outpatient Physical Therapy  [place]           [x] Phone: [phone]   Fax: [phone]  [place]           [] Phone: [phone]   Fax: [phone]        Physical Therapy Daily Treatment Note  Date:  3/6/2020    Patient Name:  Ria Arriola    :  1980  MRN: [id number]  Restrictions/Precautions: Other position/activity restrictions: WBAT  Diagnosis:   Lt ankle sprain  Date of Injury/Surgery: 2020  Treatment Diagnosis: : L lateral ankle pain    Insurance/Certification information: [facility]- 6 visits approved   Referring Physician:   Susan Echavarria DO  Next Doctor Visit:  n/a  Plan of care signed (Y/N):    Outcome Measure: LEFS   Visit# / total visits:    Pain level: 5-9/10   Goals:       Short term goals  Time Frame for Short term goals: 6 visits  Short term goal 1: Pt will demo full AROM L ankle with pain < 3/10. Short term goal 2: Pt will ambulate without AD x IND with pain < 3/10 x 10 minutes. Short term goal 3: Pt will demo improved function evidenced by LEFS of > 40/80. Short term goal 4: Pt will report 80% PLOF  Short term goal 5: IND in HEP     Assessment: Pt presents with L ankle pain with minimal edema noted about the inferior border of lateral malleolus following fall at work 2020 in which she twisted her ankle. AROM of L ankle WNL with patient reporting increased pain with end range eversion and dorsiflexion. Antalgic gait noted, able to ambulate with unilateral crutch however shifts most weight to RLE. Gait without AD increased L ankle pain from 5/10 at baseline to 7/10. PLOF: IND with gait unlimited distances. CLOF: gait x crutches short distances due to pain. Pain likely to improve as a result of skilled PT with progression from isometric to eccentric to concentric exercises in closed and open chain, based upon MRI findings of \"Mild tenosynovitis of the peroneus longus and brevis tendons and flexor digitorum longus tendon.  3. No acute ankle sprain findings of \"Mild tenosynovitis of the peroneus longus and brevis tendons and flexor digitorum longus tendon. 3. No acute ankle sprain identified. \"       Plan for Next Session:  review HEP; advance as indicated; gait training       Time In / Time Out:     [id number]= 40 minutes; 23' timed tx      If BWC Please Indicate Time In/Out  CPT Code Time in Time out    [phone] (TE)  [phone] (TA)  [id number]                                    Timed Code/Total Treatment Minutes:  10' TE, 13' TA      Next Progress Note due: 6th visit      Plan of Care Interventions:  [x] Therapeutic Exercise  [] Modalities:  [x] Therapeutic Activity     [] Ultrasound  [] Estim  [x] Gait Training      [] Cervical Traction [] Lumbar Traction  [x] Neuromuscular Re-education    [] Cold/hotpack [] Iontophoresis   [x] Instruction in HEP      [x] Vasopneumatic   [] Dry Needling    [x] Manual Therapy               [] Aquatic Therapy              Electronically signed by:  Adriano Sargent, MOISES 3/6/2020, 3:26 PM

## 2020-03-06 NOTE — PLAN OF CARE
[]? Modalities:  [x]? Therapeutic Activity                                   []? Ultrasound              []? Estim  [x]? Gait Training                                              []? Cervical Traction    []? Lumbar Traction  [x]? Neuromuscular Re-education                    []? Cold/hotpack          []? Iontophoresis           [x]? Instruction in HEP                                      [x]? Vasopneumatic      []? Dry Needling    [x]? Manual Therapy                                                  []? Aquatic Therapy          ? Frequency/Duration: Approved for 6 visits from [address] [place] [address] at this time    # Days per week: [x] 1 day # Weeks: [] 1 week [] 5 weeks     [x] 2 days? [] 2 weeks [] 6 weeks     [] 3 days   [] 3 weeks [] 7 weeks     [] 4 days   [x] 4 weeks [] 8 weeks         [] 9 weeks [] 10 weeks         [] 11 weeks [] 12 weeks    Rehab Potential/Progress: [] Excellent [x] Good [] Fair  [] Poor     Goals:      Short term goals  Time Frame for Short term goals: 6 visits  Short term goal 1: Pt will demo full AROM L ankle with pain < 3/10. Short term goal 2: Pt will ambulate without AD x IND with pain < 3/10 x 10 minutes. Short term goal 3: Pt will demo improved function evidenced by LEFS of > 40/80. Short term goal 4: Pt will report 80% PLOF  Short term goal 5: IND in HEP         Electronically signed by:  Kalina Barraza PT, 3/6/2020, 3:32 PM        If you have any questions or concerns, please don't hesitate to call.   Thank you for your referral.      Physician Signature:________________________________Date:_________ TIME: _____  By signing above, therapists plan is approved by physician

## 2020-03-06 NOTE — PROGRESS NOTES
Physical Therapy  Initial Assessment  Date: 3/6/2020  Patient Name: Gael Rodriguez  MRN: [id number]  : 1980     Treatment Diagnosis: L lateral ankle pain    Restrictions  Position Activity Restriction  Other position/activity restrictions: WBAT    Subjective   General  Chart Reviewed: Yes  Patient assessed for rehabilitation services?: Yes  Referring Practitioner: Kassie Shi DO  Diagnosis: Lt ankle sprain  PT Visit Information  PT Insurance Information: [facility]- 6 visits approved  Subjective  Subjective: Pt reports improving symptoms since initial injury 2020 while twisting ankle at work. 2 children. Patient has ambulating with ELEANOR crutches in the community, with one crutch in the home. Pain preventing patient from ambulating PLOF distances and limits standing tolerance.    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, 1 turkey, 1 goat    Objective     Observation/Palpation  Posture: Fair  Palpation: TTP lateral L ankle     PROM RLE (degrees)  RLE PROM: WNL  AROM RLE (degrees)  RLE AROM: WNL  PROM LLE (degrees)  LLE PROM: WNL  AROM LLE (degrees)  LLE AROM : WNL    Strength RLE  Strength RLE: WNL  Strength LLE  Strength LLE: Exception  L Ankle Dorsiflexion: 3+/5  L Ankle Plantar Flexion: 4-/5  L Ankle Inversion: 4-/5  L Ankle Eversion: 3+/5       Ambulation  Ambulation?: Yes  WB Status: WBAT  Ambulation 1  Quality of Gait: antalgic        Assessment   Conditions Requiring Skilled Therapeutic Intervention  Body structures, Functions, Activity limitations: Decreased functional mobility \"To get my foot back together\". \"To get off these crutches\".         Therapy Time   Individual Concurrent Group Co-treatment   Time In 1435         Time Out 1515         Minutes 40         Timed Code Treatment Minutes: 451 Angel Verdugo, PT

## 2020-03-09 ENCOUNTER — OFFICE VISIT (OUTPATIENT)
Dept: INTERNAL MEDICINE CLINIC | Age: 40
End: 2020-03-09
Payer: MEDICAID

## 2020-03-09 VITALS
OXYGEN SATURATION: 96 % | SYSTOLIC BLOOD PRESSURE: 130 MMHG | BODY MASS INDEX: 38.26 KG/M2 | HEART RATE: 91 BPM | WEIGHT: 216 LBS | DIASTOLIC BLOOD PRESSURE: 98 MMHG

## 2020-03-09 PROCEDURE — G8427 DOCREV CUR MEDS BY ELIG CLIN: HCPCS | Performed by: FAMILY MEDICINE

## 2020-03-09 PROCEDURE — G8417 CALC BMI ABV UP PARAM F/U: HCPCS | Performed by: FAMILY MEDICINE

## 2020-03-09 PROCEDURE — 99213 OFFICE O/P EST LOW 20 MIN: CPT | Performed by: FAMILY MEDICINE

## 2020-03-09 PROCEDURE — 1036F TOBACCO NON-USER: CPT | Performed by: FAMILY MEDICINE

## 2020-03-09 PROCEDURE — G8484 FLU IMMUNIZE NO ADMIN: HCPCS | Performed by: FAMILY MEDICINE

## 2020-03-09 RX ORDER — LANOLIN ALCOHOL/MO/W.PET/CERES
1000 CREAM (GRAM) TOPICAL DAILY
Qty: 30 TABLET | Refills: 3 | Status: SHIPPED | OUTPATIENT
Start: 2020-03-09 | End: 2020-06-12

## 2020-03-09 RX ORDER — CYCLOBENZAPRINE HCL 5 MG
5 TABLET ORAL NIGHTLY PRN
Qty: 30 TABLET | Refills: 3 | Status: SHIPPED | OUTPATIENT
Start: 2020-03-09 | End: 2020-08-19 | Stop reason: SDUPTHER

## 2020-03-09 RX ORDER — CHOLECALCIFEROL (VITAMIN D3) 125 MCG
CAPSULE ORAL
Qty: 30 TABLET | Refills: 3 | Status: SHIPPED | OUTPATIENT
Start: 2020-03-09 | End: 2020-06-12

## 2020-03-09 ASSESSMENT — ENCOUNTER SYMPTOMS
SORE THROAT: 0
VOMITING: 0
SHORTNESS OF BREATH: 0
ABDOMINAL PAIN: 0
BACK PAIN: 0
WHEEZING: 0
TROUBLE SWALLOWING: 0
SINUS PAIN: 0
EYE REDNESS: 0
CONSTIPATION: 0
EYE ITCHING: 0
COUGH: 0
NAUSEA: 0
DIARRHEA: 0
ABDOMINAL DISTENTION: 0
CHEST TIGHTNESS: 0

## 2020-03-09 NOTE — PROGRESS NOTES
Subjective:      Chief Complaint: Follow-up on pain syndrome    HPI:  Arcelia Felix is a 35 y.o. female who presents today for 2 months follow-up. Myofascial pain syndrome: Stable and has only mild aching like pain in different parts of the body. No longer need OMT at this time as pain is fairly under control. Sleep apnea: Sleep apnea study was completed in 2/11/2020 and CPAP was suggested. Patient has moderate sleep apnea and CPAP machine was suggested. Anxiety: Patient stopped taking medication Seroquel. Patient Active Problem List   Diagnosis    Vaginal bleeding    H/O of hysterectomy with bilateral oophorectomy    BMI 39.0-39.9,adult    Elevated blood pressure reading    Panic disorder with agoraphobia    Early menopause    Sleep disturbance    H/O bilateral breast reduction surgery    Myofascial pain syndrome    Somatic dysfunction of head region    Somatic dysfunction of cervical region    Somatic dysfunction of back    Somatic dysfunction of abdominal region    Somatic dysfunction of pelvic region    Snoring    Obstructive sleep apnea on CPAP    Somatic dysfunction of both upper extremities       Past Medical History:   Diagnosis Date    Migraine     Pulmonic stenosis     Sleep apnea         Social History     Tobacco Use    Smoking status: Never Smoker    Smokeless tobacco: Never Used   Substance Use Topics    Alcohol use: No        Family History   Problem Relation Age of Onset    Cancer Mother     Migraines Mother     Cancer Maternal Grandmother     Diabetes Maternal Grandmother     Allergies Son     Allergies Daughter     Bleeding Prob Daughter     Bleeding Prob Son     Asthma Daughter     Asthma Son        Review of Systems   Constitutional: Negative for appetite change, chills, fatigue, fever and unexpected weight change. HENT: Negative for congestion, ear pain, sinus pain, sore throat and trouble swallowing. Eyes: Negative for redness and itching. Respiratory: Negative for cough, chest tightness, shortness of breath and wheezing. Cardiovascular: Negative for chest pain and palpitations. Gastrointestinal: Negative for abdominal distention, abdominal pain, constipation, diarrhea, nausea and vomiting. Endocrine: Negative for polyuria. Genitourinary: Negative for difficulty urinating, dysuria, frequency and urgency. Musculoskeletal: Positive for myalgias. Negative for back pain. Skin: Negative for rash. Neurological: Negative for dizziness and headaches. Psychiatric/Behavioral: Positive for sleep disturbance. Negative for behavioral problems, confusion and suicidal ideas. The patient is nervous/anxious. Objective:      BP (!) 130/98   Pulse 91   Wt 216 lb (98 kg)   LMP 02/15/2012   SpO2 96%   BMI 38.26 kg/m²      Physical Exam  Vitals signs reviewed. Constitutional:       Appearance: Normal appearance. She is well-developed. HENT:      Head: Normocephalic and atraumatic. Right Ear: External ear normal.      Left Ear: External ear normal.      Mouth/Throat:      Mouth: Mucous membranes are moist.      Pharynx: Oropharynx is clear. Eyes:      Extraocular Movements: Extraocular movements intact. Conjunctiva/sclera: Conjunctivae normal.      Pupils: Pupils are equal, round, and reactive to light. Neck:      Musculoskeletal: Normal range of motion and neck supple. Thyroid: No thyromegaly or thyroid tenderness. Vascular: No carotid bruit. Cardiovascular:      Rate and Rhythm: Normal rate and regular rhythm. Pulses: Normal pulses. Heart sounds: Normal heart sounds, S1 normal and S2 normal. No murmur. Pulmonary:      Effort: Pulmonary effort is normal.      Breath sounds: Normal breath sounds. Abdominal:      General: Bowel sounds are normal. There is no distension. Palpations: Abdomen is soft. Tenderness: There is no abdominal tenderness. There is no guarding.       Hernia: No hernia is present. Comments: Soft, no hepatosplenomegaly   Musculoskeletal: Normal range of motion. Right lower leg: No edema. Left lower leg: No edema. Comments: 5-5 muscular strength throughout and bilateral.   Lymphadenopathy:      Cervical: No cervical adenopathy. Skin:     General: Skin is warm and dry. Findings: No rash. Neurological:      General: No focal deficit present. Mental Status: She is alert and oriented to person, place, and time. Sensory: No sensory deficit. Motor: No weakness. Psychiatric:         Mood and Affect: Mood normal.         Behavior: Behavior normal.         Thought Content: Thought content normal.         Judgment: Judgment normal.            Assessment / Plan:      1. Myofascial pain syndrome  -Stable and does not require OMT treatment at this time. 2. Obstructive sleep apnea on CPAP  -Patient has moderate sleep apnea and CPAP was recommended. Sleep Study was completed on 2/11/2020    3. Panic disorder with agoraphobia  - Patient has stopped taking Seroquel.    - vitamin B-12 (CYANOCOBALAMIN) 1000 MCG tablet; Take 1 tablet by mouth daily  Dispense: 30 tablet; Refill: 3    4. Early menopause  - Continue taking multivitamins.  - Cholecalciferol (VITAMIN D3) 50 MCG (2000 UT) TABS; Take one tablet by mouth once a day  Dispense: 30 tablet; Refill: 3          Note:   Patient understand the assessment and agreed to the plan. Medication side effects was discussed during the encounter. Before discharging the patient, all questions and concern were addressed. After visit summary was provided. Advice to call clinic or me for any further questions or concern.        Godwin Billy DO

## 2020-03-11 ENCOUNTER — HOSPITAL ENCOUNTER (OUTPATIENT)
Dept: PHYSICAL THERAPY | Age: 40
Setting detail: THERAPIES SERIES
Discharge: HOME | End: 2020-03-11
Payer: COMMERCIAL

## 2020-03-11 PROCEDURE — 97016 VASOPNEUMATIC DEVICE THERAPY: CPT

## 2020-03-11 PROCEDURE — 97140 MANUAL THERAPY 1/> REGIONS: CPT

## 2020-03-11 PROCEDURE — 97110 THERAPEUTIC EXERCISES: CPT

## 2020-03-11 NOTE — FLOWSHEET NOTE
Outpatient Physical Therapy  [place]           [x] Phone: [phone]   Fax: [phone]  [facility]           [] Phone: [phone]   Fax: [phone]        Physical Therapy Daily Treatment Note  Date:  3/11/2020    Patient Name:  Lyndsey Knight    :  1980  MRN: [id number]  Restrictions/Precautions: Other position/activity restrictions: WBAT  Diagnosis:   Lt ankle sprain  Date of Injury/Surgery: 2020  Treatment Diagnosis: : L lateral ankle pain    Insurance/Certification information: [facility]- 6 visits approved   Referring Physician:   Marquis Hoda DO  Next Doctor Visit:  n/a  Plan of care signed (Y/N):    Outcome Measure: LEFS   Visit# / total visits:    Pain level: 8/10       Goals:       Short term goals  Time Frame for Short term goals: 6 visits  Short term goal 1: Pt will demo full AROM L ankle with pain < 3/10. Short term goal 2: Pt will ambulate without AD x IND with pain < 3/10 x 10 minutes. Short term goal 3: Pt will demo improved function evidenced by LEFS of > 40/80. Short term goal 4: Pt will report 80% PLOF  Short term goal 5: IND in HEP     Assessment: Pt presents with L ankle pain with minimal edema noted about the inferior border of lateral malleolus following fall at work 2020 in which she twisted her ankle. AROM of L ankle WNL with patient reporting increased pain with end range eversion and dorsiflexion. Antalgic gait noted, able to ambulate with unilateral crutch however shifts most weight to RLE. Gait without AD increased L ankle pain from 5/10 at baseline to 7/10. PLOF: IND with gait unlimited distances. CLOF: gait x crutches short distances due to pain. Pain likely to improve as a result of skilled PT with progression from isometric to eccentric to concentric exercises in closed and open chain, based upon MRI findings of \"Mild tenosynovitis of the peroneus longus and brevis tendons and flexor digitorum longus tendon.  3. No acute ankle sprain

## 2020-03-13 ENCOUNTER — HOSPITAL ENCOUNTER (OUTPATIENT)
Dept: PHYSICAL THERAPY | Age: 40
Setting detail: THERAPIES SERIES
Discharge: HOME | End: 2020-03-13
Payer: COMMERCIAL

## 2020-03-13 PROCEDURE — 97016 VASOPNEUMATIC DEVICE THERAPY: CPT

## 2020-03-13 PROCEDURE — 97110 THERAPEUTIC EXERCISES: CPT

## 2020-03-13 PROCEDURE — 97140 MANUAL THERAPY 1/> REGIONS: CPT

## 2020-03-13 NOTE — FLOWSHEET NOTE
Please Indicate Time In/Out  CPT Code Time in Time out    TE 1304 1325    Man 1325 1335    Vaso 1335 1350                                   Timed Code/Total Treatment Minutes:  31'/46' 1 vaso 13' 1 man 10' 1 TE 21'      Next Progress Note due: 6th visit      Plan of Care Interventions:  [x] Therapeutic Exercise  [] Modalities:  [x] Therapeutic Activity     [] Ultrasound  [] Estim  [x] Gait Training      [] Cervical Traction [] Lumbar Traction  [x] Neuromuscular Re-education    [] Cold/hotpack [] Iontophoresis   [x] Instruction in HEP      [x] Vasopneumatic   [] Dry Needling    [x] Manual Therapy               [] Aquatic Therapy              Electronically signed by:  Emily Parisi PTA     3/13/2020, 8:09 AM

## 2020-03-18 ENCOUNTER — HOSPITAL ENCOUNTER (OUTPATIENT)
Dept: PHYSICAL THERAPY | Age: 40
Setting detail: THERAPIES SERIES
Discharge: HOME | End: 2020-03-18
Payer: COMMERCIAL

## 2020-03-18 PROCEDURE — 97140 MANUAL THERAPY 1/> REGIONS: CPT

## 2020-03-18 PROCEDURE — 97110 THERAPEUTIC EXERCISES: CPT

## 2020-03-18 PROCEDURE — 97016 VASOPNEUMATIC DEVICE THERAPY: CPT

## 2020-03-18 NOTE — FLOWSHEET NOTE
sprain identified. \"         Subjective:  Jp Solis arrives to therapy stating that the ankle is okay, a little more painful and swollen. Went back to work Sunday, she was supposed to be sitting down but the carts were coming too fast and she ended up standing. Worked 4 hours and her foot got really swollen and painful. Went to see her worker's comp doc and he took her back off work and is sending her to see an orthopedic surgeon. Waiting for a call to schedule the appointment with the ortho doc. Had to use her crutch Sunday because her ankle felt so bad. [place] fine after her last session here. Any changes in Ambulatory Summary Sheet? None        Objective:   Ambulates with antalgic gait and decreased toe off. More painful with exercises this date, was unable to do a/p weight shifts due to pain and had to modify the ROM for some of the AROM activities. Painful with isometric eversion so discontinued.           Exercises: (No more than 4 columns)   Exercise/Equipment 3/11/2020 3/13/2020 3/18/2020           WARM UP      Nu-Step  L4 5' S7 no UE 5' 5'         TABLE      Ankle iso's all directions 10*5\" ea dir  10*5\" ea dir  10*5\" ea no eversion today    Ankle AROM all directions to tolerance  2*10 ea dir to yoly 20* ea 20* ea   Heel/Toe raises seated with feet on floor 2*10 20* ea 20* ea   BAPS a/p L2  20* ea  20* ea   AP heel slides for DF  20* 20*            STANDING      Weight shifts a/p, m/l  20* ea  20* m/l                                            PROPRIOCEPTION                                    MODALITIES                      Other Therapeutic Activities/Education:  None       Home Exercise Program:    Date: 03/06/2020   Prepared by: Markell Mayer     Exercises    Isometric Ankle Eversion at Wall - 10 reps - 3 sets - 2x daily - 7x weekly    Isometric Ankle Inversion at Wall - 10 reps - 3 sets - 2x daily - 7x weekly    Long Sitting Isometric Ankle Plantarflexion with Ball at [address] 10 reps - 3 sets - 2x daily - 7x weekly    Long Sitting Isometric Ankle Dorsiflexion with Ball at [place] - 10 reps - 3 sets - 2x daily - 7x weekly       Manual Treatments:  Edema massage and light massage to ATF ligament 10'      Modalities:  Vaso medium pressure to L ankle 15' with patient in long sit for pain and swelling       Communication with other providers:  None       Assessment: Umberto was in more pain today so held off on progressing exercises, and even had to hold on a few of the exercises that she has been tolerating thus far. She will continue to benefit from skilled PT services in order to gradually progress AROM/strengthening and stability exercises.      End session pain: 8/10    Plan for Next Session: review HEP; advance as indicated; gait training       Time In / Time Out:   1300/1355      If [facility] Please Indicate Time In/Out  CPT Code Time in Time out    TE 1300 1330    Man 1330 1340    Vaso 1340 1355                                   Timed Code/Total Treatment Minutes:  40'/55' 1 vaso 13' 1 man 10' 2 TE 30'      Next Progress Note due: 6th visit      Plan of Care Interventions:  [x] Therapeutic Exercise  [] Modalities:  [x] Therapeutic Activity     [] Ultrasound  [] Estim  [x] Gait Training      [] Cervical Traction [] Lumbar Traction  [x] Neuromuscular Re-education    [] Cold/hotpack [] Iontophoresis   [x] Instruction in HEP      [x] Vasopneumatic   [] Dry Needling    [x] Manual Therapy               [] Aquatic Therapy              Electronically signed by:  Easton Fink, BRADLEY     3/18/2020, 11:33 AM

## 2020-03-20 ENCOUNTER — HOSPITAL ENCOUNTER (OUTPATIENT)
Dept: PHYSICAL THERAPY | Age: 40
Discharge: HOME | End: 2020-03-20

## 2020-03-24 NOTE — FLOWSHEET NOTE
Patients Plan of Care was received and signed. Signed POC was scanned and placed in the patients chart.     Bianca Lawrence

## 2020-06-12 ENCOUNTER — OFFICE VISIT (OUTPATIENT)
Dept: FAMILY MEDICINE CLINIC | Age: 40
End: 2020-06-12
Payer: MEDICAID

## 2020-06-12 VITALS
WEIGHT: 238 LBS | BODY MASS INDEX: 42.16 KG/M2 | DIASTOLIC BLOOD PRESSURE: 68 MMHG | TEMPERATURE: 97.3 F | OXYGEN SATURATION: 98 % | HEART RATE: 71 BPM | SYSTOLIC BLOOD PRESSURE: 122 MMHG

## 2020-06-12 PROCEDURE — G8417 CALC BMI ABV UP PARAM F/U: HCPCS | Performed by: NURSE PRACTITIONER

## 2020-06-12 PROCEDURE — 99213 OFFICE O/P EST LOW 20 MIN: CPT | Performed by: NURSE PRACTITIONER

## 2020-06-12 PROCEDURE — G8427 DOCREV CUR MEDS BY ELIG CLIN: HCPCS | Performed by: NURSE PRACTITIONER

## 2020-06-12 PROCEDURE — 1036F TOBACCO NON-USER: CPT | Performed by: NURSE PRACTITIONER

## 2020-06-12 ASSESSMENT — ENCOUNTER SYMPTOMS
NAUSEA: 0
SHORTNESS OF BREATH: 0
VOMITING: 0
WHEEZING: 0
DIARRHEA: 0
COUGH: 0
CHEST TIGHTNESS: 0

## 2020-06-15 ENCOUNTER — HOSPITAL ENCOUNTER (OUTPATIENT)
Dept: ULTRASOUND IMAGING | Age: 40
Discharge: HOME | End: 2020-06-15
Payer: MEDICAID

## 2020-06-15 PROCEDURE — 76882 US LMTD JT/FCL EVL NVASC XTR: CPT

## 2020-06-22 ENCOUNTER — TELEPHONE (OUTPATIENT)
Dept: FAMILY MEDICINE CLINIC | Age: 40
End: 2020-06-22

## 2020-07-02 ENCOUNTER — HOSPITAL ENCOUNTER (OUTPATIENT)
Dept: MRI IMAGING | Age: 40
Discharge: HOME | End: 2020-07-02
Payer: MEDICAID

## 2020-07-02 PROCEDURE — 73221 MRI JOINT UPR EXTREM W/O DYE: CPT

## 2020-07-07 ENCOUNTER — TELEPHONE (OUTPATIENT)
Dept: FAMILY MEDICINE CLINIC | Age: 40
End: 2020-07-07

## 2020-07-15 ENCOUNTER — OFFICE VISIT (OUTPATIENT)
Dept: ORTHOPEDIC SURGERY | Age: 40
End: 2020-07-15
Payer: MEDICAID

## 2020-07-15 VITALS
HEART RATE: 74 BPM | WEIGHT: 286 LBS | HEIGHT: 63 IN | RESPIRATION RATE: 16 BRPM | OXYGEN SATURATION: 96 % | BODY MASS INDEX: 50.68 KG/M2

## 2020-07-15 PROCEDURE — G8417 CALC BMI ABV UP PARAM F/U: HCPCS | Performed by: PHYSICIAN ASSISTANT

## 2020-07-15 PROCEDURE — G8427 DOCREV CUR MEDS BY ELIG CLIN: HCPCS | Performed by: PHYSICIAN ASSISTANT

## 2020-07-15 PROCEDURE — 99202 OFFICE O/P NEW SF 15 MIN: CPT | Performed by: PHYSICIAN ASSISTANT

## 2020-07-15 ASSESSMENT — ENCOUNTER SYMPTOMS
GASTROINTESTINAL NEGATIVE: 1
EYES NEGATIVE: 1
RESPIRATORY NEGATIVE: 1

## 2020-07-15 NOTE — PROGRESS NOTES
I reviewed and agree with the portions of the HPI, review of systems, vital documentation and plan performed by my staff and have added/addended where appropriate. Review of Systems   Constitutional: Negative. HENT: Negative. Eyes: Negative. Respiratory: Negative. Cardiovascular: Negative. Gastrointestinal: Negative. Genitourinary: Negative. Musculoskeletal: Positive for arthralgias. Skin: Negative. Negative for rash and wound. Neurological: Positive for numbness. Psychiatric/Behavioral: Negative. Marilyn Wood is a 35 y.o. female that presents the office today as a consult from primary care on a mass around her radial head that was seen on MRI. Patient states that she started noticing this 1 to 2 months ago and then it started getting bigger so she mentioned to family practitioner which ordered an MRI. She denies any injury to the elbow and states that it is not extremely painful but she does have some pain when she presses on the area. She does get occasional paresthesia into the fingers but not on a regular basis. She has not noticed any loss of motion or strength.     Patient was referred by VIOLET Welch - *  For left elbow mass  Past Medical History:   Diagnosis Date    Migraine     Pulmonic stenosis     Sleep apnea        Past Surgical History:   Procedure Laterality Date    BREAST REDUCTION SURGERY      BREAST SURGERY      CHOLECYSTECTOMY      CYST REMOVAL      HAND SURGERY Right     4/2014    HYSTERECTOMY      INNER EAR SURGERY      OTHER SURGICAL HISTORY  4/16/2012    Repair vaginal cuff    PARTIAL NEPHRECTOMY      TOTAL NEPHRECTOMY      TUBAL LIGATION         Family History   Problem Relation Age of Onset    Cancer Mother     Migraines Mother     Cancer Maternal Grandmother     Diabetes Maternal Grandmother     Allergies Son     Allergies Daughter     Bleeding Prob Daughter     Bleeding Prob Son     Asthma Daughter     Asthma Son Social History     Socioeconomic History    Marital status:      Spouse name: None    Number of children: None    Years of education: None    Highest education level: None   Occupational History    None   Social Needs    Financial resource strain: None    Food insecurity     Worry: None     Inability: None    Transportation needs     Medical: None     Non-medical: None   Tobacco Use    Smoking status: Never Smoker    Smokeless tobacco: Never Used   Substance and Sexual Activity    Alcohol use: No    Drug use: No    Sexual activity: None   Lifestyle    Physical activity     Days per week: None     Minutes per session: None    Stress: None   Relationships    Social connections     Talks on phone: None     Gets together: None     Attends L[employer] service: None     Active member of club or organization: None     Attends meetings of clubs or organizations: None     Relationship status: None    Intimate partner violence     Fear of current or ex partner: None     Emotionally abused: None     Physically abused: None     Forced sexual activity: None   Other Topics Concern    None   Social History Narrative    ** Merged History Encounter **            No current outpatient medications on file. No current facility-administered medications for this visit. Allergies   Allergen Reactions    Codeine Anaphylaxis    Dilaudid [Hydromorphone Hcl] Anaphylaxis    Hydromorphone Anaphylaxis       Review of Systems:  See above      Physical Exam:   Pulse 74   Resp 16   Ht 5' 3\" (1.6 m)   Wt 286 lb (129.7 kg)   LMP 02/15/2012   SpO2 96%   BMI 50.66 kg/m²        Gait is Normal.     Gen/Psych:Examination reveals a pleasant individual in no acute distress. The patient is oriented to time, place and person. The patient's mood and affect are appropriate. Patient appears well nourished.  Body habitus is obese    Head: Head is atraumatic normocephalic,  ears are symmetric,     Eyes: Eyes show equal pupils bilaterally, extraocular muscles intact    Ears:  Hearing is intact to normal voice at 5 feet    Nose: Nares are patent bilaterally, no epistaxis,no rhinorrhea     Lymph: no obvious lymphedema in bilateral upper extremities     Skin intact in bilateral upper extremities with no ulcerations, lesions, rash, erythema. Vascular: There are no varicosities in bilateral upper  extremities, sensation intact to light touch over bilateral upper extremities. elbow exam: Left  Inspection: No erythema, edema, or ecchymosis, no obvious soft tissue mass seen on visual exam   Carrying angle: 3 degrees valgus  Palpation: Mild tenderness to palpation along the extensor aspect of the proximal forearm around the radial head.   Range of motion:   Extension: 0 degrees   Flexion: 150 degrees   Supination: 80 degrees   Pronation: 80 degrees    Imaging studies:  MRI elbow: 7/2/2020  FINDINGS:    MEDIAL EPICONDYLE:  The common flexor origin from the medial epicondyle is    normal in appearance.  No evidence of tendinosis or tear to suggest medial    epicondylitis.         LATERAL EPICONDYLE:  The common extensor origin from the lateral epicondyle    is normal in appearance.  No evidence of tendinosis or tear to suggest    lateral epicondylitis.         MEDIAL COLLATERAL LIGAMENT:  The ulnar collateral ligament, including the    anterior and posterior bands, is intact and unremarkable in appearance.         LATERAL COLLATERAL LIGAMENT:  The lateral collateral ligaments including the    lateral ulnar collateral ligament are intact and unremarkable in appearance.         MUSCLES / TENDONS:  The biceps and brachialis tendon insertions are normal in    appearance.  No evidence of tear or strain.  The triceps tendon insertion on    the olecranon is also unremarkable in appearance.         ULNAR NERVE:  The ulnar nerve is normally located in the ulnar sulcus and is    unremarkable in appearance.         JOINT SPACES: Fusion.  No subchondral change or osteochondral lesion.  No    osteophytosis. Lesleigh Hans is a locular fluid signal lesion along the anterior    aspect of the radial head and neck measuring approximately 11 x 8 x 5 mm on    image 12 of the sagittal T2 fat sat sequence and image 9 of the axial T2 fat    sat sequence.         BONE MARROW:  Bone marrow is normal in signal without evidence of fracture,    marrow contusion or marrow occupying lesion.         SOFT TISSUES: A fiducial marker is present over the anterior radial aspect of    the elbow.  The underlying skin and subcutaneous fat are normal in appearance.              Impression    1. Small 11 x 8 x 5 mm locular fluid signal lesion along the anterior aspect    of the radial head and neck likely representing a ganglion. 2. No significant degenerative changes.  No other soft tissue mass or fluid    collection. Impression: Ganglion cyst of elbow (possibly coming from the joint capsule)      Plan:    The most likely impression, expected course, diagnostic and treatment options were discussed, will proceed with:  Natural history and expected course discussed. Questions answered. Patient Instructions   Follow up as needed.   Call the office with any concerns

## 2020-08-19 ENCOUNTER — OFFICE VISIT (OUTPATIENT)
Dept: INTERNAL MEDICINE CLINIC | Age: 40
End: 2020-08-19
Payer: MEDICAID

## 2020-08-19 VITALS
SYSTOLIC BLOOD PRESSURE: 126 MMHG | TEMPERATURE: 97.2 F | OXYGEN SATURATION: 97 % | DIASTOLIC BLOOD PRESSURE: 84 MMHG | HEART RATE: 71 BPM

## 2020-08-19 PROBLEM — E66.01 MORBIDLY OBESE (HCC): Status: ACTIVE | Noted: 2020-08-19

## 2020-08-19 LAB
A/G RATIO: 1.4 (ref 1.1–2.2)
ALBUMIN SERPL-MCNC: 4.2 G/DL (ref 3.4–5)
ALP BLD-CCNC: 80 U/L (ref 40–129)
ALT SERPL-CCNC: 27 U/L (ref 10–40)
ANION GAP SERPL CALCULATED.3IONS-SCNC: 13 MMOL/L (ref 3–16)
AST SERPL-CCNC: 36 U/L (ref 15–37)
BACTERIA: ABNORMAL /HPF
BASOPHILS ABSOLUTE: 0.1 K/UL (ref 0–0.2)
BASOPHILS RELATIVE PERCENT: 1 %
BILIRUB SERPL-MCNC: 0.7 MG/DL (ref 0–1)
BILIRUBIN URINE: NEGATIVE
BLOOD, URINE: NEGATIVE
BUN BLDV-MCNC: 10 MG/DL (ref 7–20)
CALCIUM SERPL-MCNC: 9 MG/DL (ref 8.3–10.6)
CHLORIDE BLD-SCNC: 104 MMOL/L (ref 99–110)
CHOLESTEROL, FASTING: 196 MG/DL (ref 0–199)
CLARITY: ABNORMAL
CO2: 24 MMOL/L (ref 21–32)
COLOR: YELLOW
CREAT SERPL-MCNC: 1.1 MG/DL (ref 0.6–1.1)
EOSINOPHILS ABSOLUTE: 0.4 K/UL (ref 0–0.6)
EOSINOPHILS RELATIVE PERCENT: 4.9 %
EPITHELIAL CELLS, UA: 10 /HPF (ref 0–5)
GFR AFRICAN AMERICAN: >60
GFR NON-AFRICAN AMERICAN: 55
GLOBULIN: 2.9 G/DL
GLUCOSE BLD-MCNC: 108 MG/DL (ref 70–99)
GLUCOSE URINE: NEGATIVE MG/DL
HCT VFR BLD CALC: 39.7 % (ref 36–48)
HDLC SERPL-MCNC: 43 MG/DL (ref 40–60)
HEMOGLOBIN: 13.4 G/DL (ref 12–16)
HYALINE CASTS: 8 /LPF (ref 0–8)
KETONES, URINE: NEGATIVE MG/DL
LDL CHOLESTEROL CALCULATED: 122 MG/DL
LEUKOCYTE ESTERASE, URINE: NEGATIVE
LYMPHOCYTES ABSOLUTE: 2.3 K/UL (ref 1–5.1)
LYMPHOCYTES RELATIVE PERCENT: 29.3 %
MCH RBC QN AUTO: 29.6 PG (ref 26–34)
MCHC RBC AUTO-ENTMCNC: 33.7 G/DL (ref 31–36)
MCV RBC AUTO: 87.8 FL (ref 80–100)
MICROSCOPIC EXAMINATION: YES
MONOCYTES ABSOLUTE: 0.3 K/UL (ref 0–1.3)
MONOCYTES RELATIVE PERCENT: 3.5 %
NEUTROPHILS ABSOLUTE: 4.8 K/UL (ref 1.7–7.7)
NEUTROPHILS RELATIVE PERCENT: 61.3 %
NITRITE, URINE: NEGATIVE
PDW BLD-RTO: 14.8 % (ref 12.4–15.4)
PH UA: 6.5 (ref 5–8)
PLATELET # BLD: 233 K/UL (ref 135–450)
PMV BLD AUTO: 8.5 FL (ref 5–10.5)
POTASSIUM SERPL-SCNC: 4.8 MMOL/L (ref 3.5–5.1)
PROTEIN UA: NEGATIVE MG/DL
RBC # BLD: 4.52 M/UL (ref 4–5.2)
RBC UA: 8 /HPF (ref 0–4)
SODIUM BLD-SCNC: 141 MMOL/L (ref 136–145)
SPECIFIC GRAVITY UA: 1.02 (ref 1–1.03)
T4 FREE: 1 NG/DL (ref 0.9–1.8)
TOTAL PROTEIN: 7.1 G/DL (ref 6.4–8.2)
TRIGLYCERIDE, FASTING: 153 MG/DL (ref 0–150)
TSH SERPL DL<=0.05 MIU/L-ACNC: 1.33 UIU/ML (ref 0.27–4.2)
URINE REFLEX TO CULTURE: ABNORMAL
URINE TYPE: ABNORMAL
UROBILINOGEN, URINE: 0.2 E.U./DL
VLDLC SERPL CALC-MCNC: 31 MG/DL
WBC # BLD: 7.8 K/UL (ref 4–11)
WBC UA: 5 /HPF (ref 0–5)

## 2020-08-19 PROCEDURE — 36415 COLL VENOUS BLD VENIPUNCTURE: CPT | Performed by: FAMILY MEDICINE

## 2020-08-19 PROCEDURE — 99396 PREV VISIT EST AGE 40-64: CPT | Performed by: FAMILY MEDICINE

## 2020-08-19 PROCEDURE — 93000 ELECTROCARDIOGRAM COMPLETE: CPT | Performed by: FAMILY MEDICINE

## 2020-08-19 RX ORDER — ONDANSETRON 4 MG/1
4 TABLET, FILM COATED ORAL EVERY 8 HOURS PRN
Qty: 15 TABLET | Refills: 0 | Status: SHIPPED | OUTPATIENT
Start: 2020-08-19 | End: 2021-01-27

## 2020-08-19 RX ORDER — CYCLOBENZAPRINE HCL 5 MG
5 TABLET ORAL NIGHTLY PRN
Qty: 30 TABLET | Refills: 3 | Status: SHIPPED | OUTPATIENT
Start: 2020-08-19 | End: 2020-09-18

## 2020-08-19 ASSESSMENT — ENCOUNTER SYMPTOMS
SINUS PAIN: 0
NAUSEA: 0
CONSTIPATION: 0
SHORTNESS OF BREATH: 0
VOMITING: 0
SORE THROAT: 0
BACK PAIN: 0
ABDOMINAL DISTENTION: 0
DIARRHEA: 0
WHEEZING: 0
TROUBLE SWALLOWING: 0
EYE REDNESS: 0
COUGH: 0
EYE ITCHING: 0
CHEST TIGHTNESS: 0
ABDOMINAL PAIN: 0

## 2020-08-19 NOTE — PROGRESS NOTES
Subjective:      Chief Complaint: Annual physical examination    HPI:  Jet Caban is a 38 y.o. female who presents today for annual physical exam.  Patient has a history of myofascial syndrome and currently stable. Screenin. Breast cancer screening: Order breast mammogram    2. Bone density screening: Not applicable at this time    3. Colon cancer screening: Mother was diagnosed with colon cancer at age 48. Ordering colonoscopy for the patient. 4.  Pap-Cervical cancer screening: Completed 2 years ago. Due next year. 5. Prostate cancer screening: Not applicable    6. Lung cancer screening: Non-smoker. Not applicable    7. Abdominal aortic aneurysm screening: Not applicable    8.  Stroke-carotid Doppler screening: No carotid bruit      Patient Active Problem List   Diagnosis    Vaginal bleeding    H/O of hysterectomy with bilateral oophorectomy    BMI 39.0-39.9,adult    Elevated blood pressure reading    Panic disorder with agoraphobia    Early menopause    Sleep disturbance    H/O bilateral breast reduction surgery    Myofascial pain syndrome    Somatic dysfunction of head region    Somatic dysfunction of cervical region    Somatic dysfunction of back    Somatic dysfunction of abdominal region    Somatic dysfunction of pelvic region    Snoring    Obstructive sleep apnea on CPAP    Somatic dysfunction of both upper extremities    Morbidly obese (HCC)       Past Medical History:   Diagnosis Date    Migraine     Pulmonic stenosis     Sleep apnea         Social History     Tobacco Use    Smoking status: Never Smoker    Smokeless tobacco: Never Used   Substance Use Topics    Alcohol use: No        Family History   Problem Relation Age of Onset    Cancer Mother     Migraines Mother     Cancer Maternal Grandmother     Diabetes Maternal Grandmother     Allergies Son     Allergies Daughter     Bleeding Prob Daughter     Bleeding Prob Son     Asthma Daughter     Asthma Son        Review of Systems   Constitutional: Negative for appetite change, chills, fatigue, fever and unexpected weight change. HENT: Negative for congestion, ear pain, sinus pain, sore throat and trouble swallowing. Eyes: Negative for redness and itching. Respiratory: Negative for cough, chest tightness, shortness of breath and wheezing. Cardiovascular: Negative for chest pain and palpitations. Gastrointestinal: Negative for abdominal distention, abdominal pain, constipation, diarrhea, nausea and vomiting. Endocrine: Negative for polyuria. Genitourinary: Negative for difficulty urinating, dysuria, frequency and urgency. Musculoskeletal: Positive for myalgias. Negative for arthralgias and back pain. Skin: Negative for rash. Neurological: Negative for dizziness and headaches. Psychiatric/Behavioral: Negative for behavioral problems, confusion and suicidal ideas. Objective:      /84   Pulse 71   Temp 97.2 °F (36.2 °C)   LMP 02/15/2012   SpO2 97%      Physical Exam  Vitals signs reviewed. Constitutional:       Appearance: Normal appearance. She is well-developed. HENT:      Head: Normocephalic and atraumatic. Right Ear: External ear normal.      Left Ear: External ear normal.      Mouth/Throat:      Mouth: Mucous membranes are moist.      Pharynx: Oropharynx is clear. Eyes:      Extraocular Movements: Extraocular movements intact. Conjunctiva/sclera: Conjunctivae normal.      Pupils: Pupils are equal, round, and reactive to light. Neck:      Musculoskeletal: Normal range of motion and neck supple. Thyroid: No thyromegaly or thyroid tenderness. Vascular: No carotid bruit. Cardiovascular:      Rate and Rhythm: Normal rate and regular rhythm. Pulses: Normal pulses. Heart sounds: Normal heart sounds, S1 normal and S2 normal. No murmur. Pulmonary:      Effort: Pulmonary effort is normal.      Breath sounds: Normal breath sounds.    Chest: Breasts:         Right: Inverted nipple present. No swelling, bleeding, mass, nipple discharge, skin change or tenderness. Left: Inverted nipple present. No swelling, bleeding, mass or tenderness. Comments: Bilateral surgical reduction of  Abdominal:      General: Bowel sounds are normal. There is no distension. Palpations: Abdomen is soft. Tenderness: There is no abdominal tenderness. There is no guarding. Hernia: No hernia is present. Comments: Soft, no hepatosplenomegaly   Musculoskeletal: Normal range of motion. Right lower leg: No edema. Left lower leg: No edema. Comments: 5-5 muscular strength throughout and bilateral.   Lymphadenopathy:      Cervical: No cervical adenopathy. Upper Body:      Right upper body: No supraclavicular, axillary or pectoral adenopathy. Left upper body: No supraclavicular, axillary or pectoral adenopathy. Skin:     General: Skin is warm and dry. Findings: No rash. Neurological:      General: No focal deficit present. Mental Status: She is alert and oriented to person, place, and time. Sensory: No sensory deficit. Motor: No weakness. Psychiatric:         Mood and Affect: Mood normal.         Behavior: Behavior normal.         Thought Content: Thought content normal.         Judgment: Judgment normal.            Assessment / Plan:      1. Myofascial pain syndrome  -Stable. - cyclobenzaprine (FLEXERIL) 5 MG tablet; Take 1 tablet by mouth nightly as needed for Muscle spasms  Dispense: 30 tablet; Refill: 3  - ondansetron (ZOFRAN) 4 MG tablet; Take 1 tablet by mouth every 8 hours as needed for Nausea or Vomiting  Dispense: 15 tablet; Refill: 0    2. Fatigue, unspecified type  - CBC Auto Differential  - T4, Free  - TSH without Reflex    3. Mixed hyperlipidemia  - Lipid, Fasting    4. Vitamin D deficiency  - Vitamin D 25 Hydroxy    5. Morbidly obese (HCC)  - Recommended keto diet    6.  Breast cancer screening  - ZAID Screening Bilateral    7. Colon cancer screening  - Patient mother was diagnosed with colon cancer at age 48. - Lida Christina CNP, Gastroenterology, [facility]    8. Encounter for annual physical exam  - Patient normal physical examination without any abnormal finding. Patient most important screening is colon cancer due to strong family history. - Comprehensive Metabolic Panel  - Hemoglobin A1C  - Urinalysis Reflex to Culture  - EKG 12 Lead          Note:   Patient understand the assessment and agreed to the plan. Medication side effects was discussed during the encounter. Before discharging the patient, all questions and concern were addressed. After visit summary was provided. Advice to call clinic or me for any further questions or concern.        Godwin Patiño, DO

## 2020-08-20 LAB
ESTIMATED AVERAGE GLUCOSE: 111.2 MG/DL
HBA1C MFR BLD: 5.5 %
VITAMIN D 25-HYDROXY: 25 NG/ML

## 2020-09-09 ENCOUNTER — OFFICE VISIT (OUTPATIENT)
Dept: INTERNAL MEDICINE CLINIC | Age: 40
End: 2020-09-09
Payer: MEDICAID

## 2020-09-09 VITALS
OXYGEN SATURATION: 99 % | DIASTOLIC BLOOD PRESSURE: 74 MMHG | HEART RATE: 78 BPM | WEIGHT: 252 LBS | TEMPERATURE: 97.8 F | SYSTOLIC BLOOD PRESSURE: 130 MMHG | BODY MASS INDEX: 44.64 KG/M2

## 2020-09-09 PROCEDURE — 1036F TOBACCO NON-USER: CPT | Performed by: FAMILY MEDICINE

## 2020-09-09 PROCEDURE — 95992 CANALITH REPOSITIONING PROC: CPT | Performed by: FAMILY MEDICINE

## 2020-09-09 PROCEDURE — G8427 DOCREV CUR MEDS BY ELIG CLIN: HCPCS | Performed by: FAMILY MEDICINE

## 2020-09-09 PROCEDURE — 99214 OFFICE O/P EST MOD 30 MIN: CPT | Performed by: FAMILY MEDICINE

## 2020-09-09 PROCEDURE — G8417 CALC BMI ABV UP PARAM F/U: HCPCS | Performed by: FAMILY MEDICINE

## 2020-09-09 RX ORDER — PREDNISONE 10 MG/1
10 TABLET ORAL DAILY
Qty: 10 TABLET | Refills: 0 | Status: SHIPPED | OUTPATIENT
Start: 2020-09-09 | End: 2020-09-19

## 2020-09-09 RX ORDER — LANOLIN ALCOHOL/MO/W.PET/CERES
1000 CREAM (GRAM) TOPICAL DAILY
Qty: 30 TABLET | Refills: 3 | Status: SHIPPED | OUTPATIENT
Start: 2020-09-09 | End: 2021-01-27

## 2020-09-09 RX ORDER — CHOLECALCIFEROL (VITAMIN D3) 125 MCG
CAPSULE ORAL
Qty: 30 TABLET | Refills: 3 | Status: SHIPPED | OUTPATIENT
Start: 2020-09-09 | End: 2020-11-19

## 2020-09-09 ASSESSMENT — ENCOUNTER SYMPTOMS
CONSTIPATION: 0
NAUSEA: 0
SHORTNESS OF BREATH: 0
ABDOMINAL PAIN: 0
TROUBLE SWALLOWING: 0
CHEST TIGHTNESS: 0
COUGH: 0
WHEEZING: 0
ABDOMINAL DISTENTION: 0
SINUS PAIN: 0
EYE REDNESS: 0
EYE ITCHING: 0
BACK PAIN: 0
SORE THROAT: 0
VOMITING: 0
DIARRHEA: 0

## 2020-09-09 NOTE — PROGRESS NOTES
Subjective:      Chief Complaint: Right-sided chest pain and dizziness    HPI:  Praveen Arceo is a 38 y.o. female who presents today for below complaint:    Left-sided chest wall pain: Patient presented with  3 to 4-day history of left-sided chest wall pain near her breast.  Pain started suddenly without any injury. Pain is mild, sharp, localized to left chest anterior chest wall, reproducible, worse with deep inspiration and cough. Patient denies have any sweating. Patient also refused EKG. Dizziness: Patient presented with 2 weeks history of dizziness from standing to sitting position. Dizziness usually resolve within few seconds. Patient also has problem in focusing. Dizziness also. When she lays down in the bed. Dizziness is worse with turning of head. Patient also sees stars sometimes.   Patient denies having any nausea or vomiting or recent upper respiratory infection or cough      Patient Active Problem List   Diagnosis    Vaginal bleeding    H/O of hysterectomy with bilateral oophorectomy    BMI 39.0-39.9,adult    Elevated blood pressure reading    Panic disorder with agoraphobia    Early menopause    Sleep disturbance    H/O bilateral breast reduction surgery    Myofascial pain syndrome    Somatic dysfunction of head region    Somatic dysfunction of cervical region    Somatic dysfunction of back    Somatic dysfunction of abdominal region    Somatic dysfunction of pelvic region    Snoring    Obstructive sleep apnea on CPAP    Somatic dysfunction of both upper extremities    Morbidly obese (HCC)       Past Medical History:   Diagnosis Date    Migraine     Pulmonic stenosis     Sleep apnea         Social History     Tobacco Use    Smoking status: Never Smoker    Smokeless tobacco: Never Used   Substance Use Topics    Alcohol use: No        Family History   Problem Relation Age of Onset    Cancer Mother     Migraines Mother     Cancer Maternal Grandmother     Diabetes Maternal Grandmother     Allergies Son     Allergies Daughter     Bleeding Prob Daughter     Bleeding Prob Son     Asthma Daughter     Asthma Son        Review of Systems   Constitutional: Negative for appetite change, chills, fatigue, fever and unexpected weight change. HENT: Negative for congestion, ear pain, sinus pain, sore throat and trouble swallowing. Eyes: Negative for redness and itching. Respiratory: Negative for cough, chest tightness, shortness of breath and wheezing. Cardiovascular: Positive for chest pain. Negative for palpitations. Gastrointestinal: Negative for abdominal distention, abdominal pain, constipation, diarrhea, nausea and vomiting. Endocrine: Negative for polyuria. Genitourinary: Negative for difficulty urinating, dysuria, frequency and urgency. Musculoskeletal: Positive for myalgias. Negative for arthralgias and back pain. Skin: Negative for rash. Neurological: Positive for dizziness. Negative for headaches. Psychiatric/Behavioral: Negative for behavioral problems, confusion and suicidal ideas. Objective:      /74 (Site: Left Upper Arm, Position: Sitting, Cuff Size: Large Adult)   Pulse 78   Temp 97.8 °F (36.6 °C)   Wt 252 lb (114.3 kg)   LMP 02/15/2012   SpO2 99%   BMI 44.64 kg/m²      Physical Exam  Vitals signs reviewed. Constitutional:       Appearance: Normal appearance. She is well-developed. HENT:      Head: Normocephalic and atraumatic. Comments: Bilateral horizontal nystagmus     Right Ear: External ear normal.      Left Ear: External ear normal.      Mouth/Throat:      Mouth: Mucous membranes are moist.      Pharynx: Oropharynx is clear. Eyes:      Extraocular Movements: Extraocular movements intact. Conjunctiva/sclera: Conjunctivae normal.      Pupils: Pupils are equal, round, and reactive to light. Neck:      Musculoskeletal: Normal range of motion and neck supple.       Thyroid: No thyromegaly or thyroid tenderness. Vascular: No carotid bruit. Cardiovascular:      Rate and Rhythm: Normal rate and regular rhythm. Pulses: Normal pulses. Heart sounds: Normal heart sounds, S1 normal and S2 normal. No murmur. Pulmonary:      Effort: Pulmonary effort is normal.      Breath sounds: Normal breath sounds. Chest:      Chest wall: Tenderness present. Abdominal:      General: Bowel sounds are normal. There is no distension. Palpations: Abdomen is soft. Tenderness: There is no abdominal tenderness. There is no guarding. Hernia: No hernia is present. Comments: Soft, no hepatosplenomegaly   Musculoskeletal: Normal range of motion. Right lower leg: No edema. Left lower leg: No edema. Comments: 5-5 muscular strength throughout and bilateral.   Lymphadenopathy:      Cervical: No cervical adenopathy. Skin:     General: Skin is warm and dry. Findings: No rash. Neurological:      General: No focal deficit present. Mental Status: She is alert and oriented to person, place, and time. Sensory: No sensory deficit. Motor: No weakness. Psychiatric:         Mood and Affect: Mood normal.         Behavior: Behavior normal.         Thought Content: Thought content normal.         Judgment: Judgment normal.            Assessment / Plan:      1. Left-sided chest wall pain  - Stable. Patient has tenderness on anterior chest wall on left side. Chest pain most prominent on rib 3, 4 and 5 near costochondral junction.  - Recommended patient to apply nonsteroidal cream Voltaren, ice massage, and short course of oral prednisone.    - diclofenac sodium (VOLTAREN) 1 % GEL; Apply 2 g topically 4 times daily  Dispense: 1 Tube; Refill: 5  - predniSONE (DELTASONE) 10 MG tablet; Take 1 tablet by mouth daily for 10 days  Dispense: 10 tablet; Refill: 0    2.  Benign paroxysmal positional vertigo due to bilateral vestibular disorder  - - Based on patient history and physical examination patient has positive bilateral nystagmus with positive Anatoly-Hallpike maneuver. I offered Epley's maneuver or Canalith repositioning procedure to the patient to correct nystagmus. Patient accepted the offer and verbal consent was received for the procedure. Procedure was explained to the patient. Procedure was performed bilaterally at least 3 times on each side. Post Epley's maneuver, patient has minimal bilateral nystagmus.  - Patient was advised to use 2 pillows at nighttime for sleep. - Patient was advised to look downward when walking.  - If symptoms worsen, return return to clinic or call. - [place] CANALITH REPOSITIONING PROCEDURE, PER DAY          Note:   Patient understand the assessment and agreed to the plan. Medication side effects was discussed during the encounter. Before discharging the patient, all questions and concern were addressed. After visit summary was provided. Advice to call clinic or me for any further questions or concern.        Godwin Mcdonald, DO

## 2020-09-11 ENCOUNTER — OFFICE VISIT (OUTPATIENT)
Dept: GASTROENTEROLOGY | Age: 40
End: 2020-09-11

## 2020-09-11 ENCOUNTER — PREP FOR PROCEDURE (OUTPATIENT)
Dept: GASTROENTEROLOGY | Age: 40
End: 2020-09-11

## 2020-09-11 VITALS
TEMPERATURE: 97.8 F | WEIGHT: 244.6 LBS | SYSTOLIC BLOOD PRESSURE: 128 MMHG | HEART RATE: 78 BPM | HEIGHT: 63 IN | BODY MASS INDEX: 43.34 KG/M2 | DIASTOLIC BLOOD PRESSURE: 76 MMHG

## 2020-09-11 DIAGNOSIS — Z01.818 PREOP TESTING: Primary | ICD-10-CM

## 2020-09-11 PROCEDURE — 99999 PR OFFICE/OUTPT VISIT,PROCEDURE ONLY: CPT | Performed by: NURSE PRACTITIONER

## 2020-09-11 RX ORDER — SODIUM CHLORIDE 0.9 % (FLUSH) 0.9 %
10 SYRINGE (ML) INJECTION EVERY 12 HOURS SCHEDULED
Status: CANCELLED | OUTPATIENT
Start: 2020-09-11

## 2020-09-11 RX ORDER — SODIUM CHLORIDE 0.9 % (FLUSH) 0.9 %
10 SYRINGE (ML) INJECTION PRN
Status: CANCELLED | OUTPATIENT
Start: 2020-09-11

## 2020-09-11 RX ORDER — SODIUM CHLORIDE, SODIUM LACTATE, POTASSIUM CHLORIDE, CALCIUM CHLORIDE 600; 310; 30; 20 MG/100ML; MG/100ML; MG/100ML; MG/100ML
INJECTION, SOLUTION INTRAVENOUS CONTINUOUS
Status: CANCELLED | OUTPATIENT
Start: 2020-09-11

## 2020-09-11 RX ORDER — BISACODYL 5 MG
TABLET, DELAYED RELEASE (ENTERIC COATED) ORAL
Qty: 4 TABLET | Refills: 0 | Status: SHIPPED | OUTPATIENT
Start: 2020-09-11 | End: 2020-11-19

## 2020-09-11 RX ORDER — POLYETHYLENE GLYCOL 3350 17 G/17G
238 POWDER, FOR SOLUTION ORAL ONCE
Qty: 1 BOTTLE | Refills: 0 | Status: SHIPPED | OUTPATIENT
Start: 2020-09-11 | End: 2020-09-11

## 2020-09-11 ASSESSMENT — ENCOUNTER SYMPTOMS
ABDOMINAL PAIN: 0
COLOR CHANGE: 0
VOMITING: 0
SHORTNESS OF BREATH: 0
WHEEZING: 0
BLOOD IN STOOL: 0
NAUSEA: 0
BACK PAIN: 1
CONSTIPATION: 0
COUGH: 0
DIARRHEA: 0
EYE PAIN: 0
PHOTOPHOBIA: 0

## 2020-09-11 NOTE — PROGRESS NOTES
Umberto Hanson 38 y.o. female was seen by SINDHU Levin on 9/11/2020     Wt Readings from Last 3 Encounters:   09/11/20 244 lb 9.6 oz (110.9 kg)   09/09/20 252 lb (114.3 kg)   07/15/20 286 lb (129.7 kg)       HPI  Umberto Hanson is a pleasant 38 y.o.  female who presents today to schedule a colonoscopy. She has a past medical history of migraine, morbid obesity, pulmonic stenosis, and sleep apnea. She denies changes in her bowel pattern. Her typical bowel pattern is daily to every other day with soft brown formed stools. She has hard stools twice a week. Since her gallbladder was removed she will have occasional diarrhea once every couple weeks. No blood in her stools or black tarry stools. No excess belching or flatulence. Her appetite is good without early satiety. Her weight is stable. She is working on weight loss with eating less and plans to start Garcinia cambogia herbal diet pills. No abdominal pain, bloating or distention. No nausea or vomiting. No heartburn or acid reflux. No nocturnal awakenings with acid reflux. No dysphagia or pain with swallowing. Her mother had colon cancer at age 48. No family history of stomach cancer. ROS  Review of Systems   Constitutional: Negative for appetite change, chills, diaphoresis, fatigue, fever and unexpected weight change. HENT: Negative for ear pain, hearing loss and tinnitus. Eyes: Negative for photophobia, pain and visual disturbance. Respiratory: Negative for cough, shortness of breath and wheezing. Sleep apnea uses CPAP   Cardiovascular: Negative for chest pain, palpitations and leg swelling. Gastrointestinal: Negative for abdominal pain, blood in stool, constipation, diarrhea, nausea and vomiting. Endocrine: Negative for cold intolerance, heat intolerance and polydipsia. Genitourinary: Negative for dysuria, frequency and urgency. Musculoskeletal: Positive for back pain.  Negative for myalgias and neck pain.   Skin: Negative for color change, pallor and rash. Allergic/Immunologic: Negative for environmental allergies and food allergies. Neurological: Positive for headaches. Negative for dizziness, seizures and weakness. Hematological: Does not bruise/bleed easily. Psychiatric/Behavioral: Positive for dysphoric mood. Negative for sleep disturbance and suicidal ideas. The patient is not nervous/anxious. Allergies  Allergies   Allergen Reactions    Codeine Anaphylaxis    Dilaudid [Hydromorphone Hcl] Anaphylaxis    Hydromorphone Anaphylaxis       Medications  Current Outpatient Medications   Medication Sig Dispense Refill    Cholecalciferol (VITAMIN D3) 50 MCG (2000 UT) TABS Take one tablet by mouth once a day 30 tablet 3    vitamin B-12 (CYANOCOBALAMIN) 1000 MCG tablet Take 1 tablet by mouth daily 30 tablet 3    diclofenac sodium (VOLTAREN) 1 % GEL Apply 2 g topically 4 times daily 1 Tube 5    predniSONE (DELTASONE) 10 MG tablet Take 1 tablet by mouth daily for 10 days 10 tablet 0    cyclobenzaprine (FLEXERIL) 5 MG tablet Take 1 tablet by mouth nightly as needed for Muscle spasms 30 tablet 3    ondansetron (ZOFRAN) 4 MG tablet Take 1 tablet by mouth every 8 hours as needed for Nausea or Vomiting 15 tablet 0     No current facility-administered medications for this visit. Past medical history:   She has a past medical history of Migraine, Pulmonic stenosis, and Sleep apnea. Past surgical history:  She has a past surgical history that includes Inner ear surgery; total nephrectomy; Breast surgery; partial nephrectomy; Tubal ligation; Cholecystectomy; Breast reduction surgery; cyst removal; Hysterectomy; other surgical history (4/16/2012); and Hand surgery (Right). Social History:  She reports that she has never smoked. She has never used smokeless tobacco. She reports that she does not drink alcohol or use drugs.     Family history:  Her family history includes Allergies in her daughter and son; Asthma in her daughter and son; Bleeding Prob in her daughter and son; Cancer in her maternal grandmother and mother; Diabetes in her maternal grandmother; Migraines in her mother. Objective    Vitals:    09/11/20 0939   BP: 128/76   Pulse: 78   Temp: 97.8 °F (36.6 °C)        Physical exam    Physical Exam  Vitals signs reviewed. Constitutional:       General: She is not in acute distress. Appearance: She is well-developed. She is obese. She is not ill-appearing, toxic-appearing or diaphoretic. HENT:      Head: Normocephalic and atraumatic. Eyes:      Conjunctiva/sclera: Conjunctivae normal.      Pupils: Pupils are equal, round, and reactive to light. Neck:      Musculoskeletal: Normal range of motion and neck supple. Thyroid: No thyromegaly. Vascular: No JVD. Trachea: No tracheal deviation. Cardiovascular:      Rate and Rhythm: Normal rate and regular rhythm. Pulses: Normal pulses. Heart sounds: Normal heart sounds. No murmur. No friction rub. No gallop. Pulmonary:      Effort: Pulmonary effort is normal. No respiratory distress. Breath sounds: Normal breath sounds. No stridor. No wheezing, rhonchi or rales. Chest:      Chest wall: No tenderness. Abdominal:      General: Bowel sounds are normal. There is no distension. Palpations: Abdomen is soft. There is no mass. Tenderness: There is no abdominal tenderness. There is no guarding or rebound. Hernia: No hernia is present. Musculoskeletal: Normal range of motion. Lymphadenopathy:      Cervical: No cervical adenopathy. Skin:     General: Skin is warm and dry. Neurological:      Mental Status: She is alert and oriented to person, place, and time.    Psychiatric:         Mood and Affect: Mood normal.         Office Visit on 08/19/2020   Component Date Value Ref Range Status    WBC 08/19/2020 7.8  4.0 - 11.0 K/uL Final    RBC 08/19/2020 4.52  4.00 - 5.20 M/uL Final    Hemoglobin 08/19/2020 13.4  12.0 - 16.0 g/dL Final    Hematocrit 08/19/2020 39.7  36.0 - 48.0 % Final    MCV 08/19/2020 87.8  80.0 - 100.0 fL Final    MCH 08/19/2020 29.6  26.0 - 34.0 pg Final    MCHC 08/19/2020 33.7  31.0 - 36.0 g/dL Final    RDW 08/19/2020 14.8  12.4 - 15.4 % Final    Platelets 46/07/0381 233  135 - 450 K/uL Final    MPV 08/19/2020 8.5  5.0 - 10.5 fL Final    Neutrophils % 08/19/2020 61.3  % Final    Lymphocytes % 08/19/2020 29.3  % Final    Monocytes % 08/19/2020 3.5  % Final    Eosinophils % 08/19/2020 4.9  % Final    Basophils % 08/19/2020 1.0  % Final    Neutrophils Absolute 08/19/2020 4.8  1.7 - 7.7 K/uL Final    Lymphocytes Absolute 08/19/2020 2.3  1.0 - 5.1 K/uL Final    Monocytes Absolute 08/19/2020 0.3  0.0 - 1.3 K/uL Final    Eosinophils Absolute 08/19/2020 0.4  0.0 - 0.6 K/uL Final    Basophils Absolute 08/19/2020 0.1  0.0 - 0.2 K/uL Final    Sodium 08/19/2020 141  136 - 145 mmol/L Final    Potassium 08/19/2020 4.8  3.5 - 5.1 mmol/L Final    Chloride 08/19/2020 104  99 - 110 mmol/L Final    CO2 08/19/2020 24  21 - 32 mmol/L Final    Anion Gap 08/19/2020 13  3 - 16 Final    Glucose 08/19/2020 108* 70 - 99 mg/dL Final    BUN 08/19/2020 10  7 - 20 mg/dL Final    CREATININE 08/19/2020 1.1  0.6 - 1.1 mg/dL Final    GFR Non- 08/19/2020 55* >60 Final    Comment: >60 mL/min/1.73m2 EGFR, calc. for ages 25 and older using the  MDRD formula (not corrected for weight), is valid for stable  renal function.  GFR  08/19/2020 >60  >60 Final    Comment: Chronic Kidney Disease: less than 60 ml/min/1.73 sq.m. Kidney Failure: less than 15 ml/min/1.73 sq.m. Results valid for patients 18 years and older.       Calcium 08/19/2020 9.0  8.3 - 10.6 mg/dL Final    Total Protein 08/19/2020 7.1  6.4 - 8.2 g/dL Final    Alb 08/19/2020 4.2  3.4 - 5.0 g/dL Final    Albumin/Globulin Ratio 08/19/2020 1.4  1.1 - 2.2 Final    Total Bilirubin 08/19/2020 0.7  0.0 - 1.0 mg/dL Final    Alkaline Phosphatase 08/19/2020 80  40 - 129 U/L Final    ALT 08/19/2020 27  10 - 40 U/L Final    AST 08/19/2020 36  15 - 37 U/L Final    Globulin 08/19/2020 2.9  g/dL Final    Cholesterol, Fasting 08/19/2020 196  0 - 199 mg/dL Final    Triglyceride, Fasting 08/19/2020 153* 0 - 150 mg/dL Final    HDL 08/19/2020 43  40 - 60 mg/dL Final    LDL Calculated 08/19/2020 122* <100 mg/dL Final    VLDL Cholesterol Calculated 08/19/2020 31  Not Established mg/dL Final    Hemoglobin A1C 08/19/2020 5.5  See comment % Final    Comment: Comment:  Diagnosis of Diabetes: > or = 6.5%  Increased risk of diabetes (Prediabetes): 5.7-6.4%  Glycemic Control: Nonpregnant Adults: <7.0%                    Pregnant: <6.0%        eAG 08/19/2020 111.2  mg/dL Final    Color, UA 08/19/2020 YELLOW  Straw/Yellow Final    Clarity, UA 08/19/2020 CLOUDY* Clear Final    Glucose, Ur 08/19/2020 Negative  Negative mg/dL Final    Bilirubin Urine 08/19/2020 Negative  Negative Final    Ketones, Urine 08/19/2020 Negative  Negative mg/dL Final    Specific [place], UA 08/19/2020 1.019  1.005 - 1.030 Final    Blood, Urine 08/19/2020 Negative  Negative Final    pH, UA 08/19/2020 6.5  5.0 - 8.0 Final    Protein, UA 08/19/2020 Negative  Negative mg/dL Final    Urobilinogen, Urine 08/19/2020 0.2  <2.0 E.U./dL Final    Nitrite, Urine 08/19/2020 Negative  Negative Final    Leukocyte Esterase, Urine 08/19/2020 Negative  Negative Final    Microscopic Examination 08/19/2020 YES   Final    Urine Type 08/19/2020 Voided   Final    Urine Reflex to Culture 08/19/2020 Not Indicated   Final    Vit D, 25-Hydroxy 08/19/2020 25.0* >=30 ng/mL Final    Comment: <=20 ng/mL. ........... Joe Cantor Deficient  21-29 ng/mL. ......... Joe Cantor Insufficient  >=30 ng/mL. ........ Joe Cantor Sufficient      T4 Free 08/19/2020 1.0  0.9 - 1.8 ng/dL Final    TSH 08/19/2020 1.33  0.27 - 4.20 uIU/mL Final    Bacteria, UA 08/19/2020 RARE* None Seen /HPF Final  Hyaline Casts, UA 08/19/2020 8  0 - 8 /LPF Final    WBC, UA 08/19/2020 5  0 - 5 /HPF Final    RBC, UA 08/19/2020 8* 0 - 4 /HPF Final    Epithelial Cells, UA 08/19/2020 10* 0 - 5 /HPF Final    Comment: Urinalysis microscopic performed using the  automated methodology (AUWI analyzer). Assessment and Plan:  1. Will plan for a colonoscopy with MAC anesthesia. The patient was informed of the risks and benefits of the procedure. 2.  Further recommendations for follow-up will be determined after the colonoscopy has been completed.

## 2020-09-23 ENCOUNTER — TELEPHONE (OUTPATIENT)
Dept: GASTROENTEROLOGY | Age: 40
End: 2020-09-23

## 2020-09-23 NOTE — TELEPHONE ENCOUNTER
SPOKE TO / LEFT MESSAGE FOR Umberto Hanson REGARDING SURGERY ([address]) SCHEDULED @ [facility].  NOTIFIED OF DATES, TIMES AND LOCATION    COVID - 10/12/20 @ 10  SURGERY -  10/19/20 @ 1:30  P/O - 10/27/20 @ 9 *VIDEO    NPO AFTER MIDNIGHT  (MIRALAX/DULCOLAX)  HOLD BLOOD THINNERS - N/A   SENT

## 2020-09-23 NOTE — LETTER
Procedure Scheduling Request   [phone] Fax [phone]  Location: [facility]     Patient Name:  Roland Hernandez   Birthday:   1980    Social Security:   Gender:  female   Home Phone:  [phone]  Cell Phone: [phone] (home)    Address:  [address] [place] [address]      Primary Insurance: Jason Mina      ID#: [id number]   Secondary Ins:             ID#:       Patient Type:  SAME DAY SURGERY  Practitioner: Dr. Curt Maldonado  Procedure: COLONOSCOPY   Anesthesia: MAC   Diagnosis: SCREENING  Procedure Date: 10/19/20    Time: 10  Length of procedure:  Testing: [] Phone Assessment     []   Pre-Admission testing    COVID Date/Time: 10/12/20 @ 1:30  Special needs for Procedure: N/A     Weight:   827  Is the Patient on Blood Thinner? [] Yes     [x] No   If YES told to stop on: Allergy: [] LATEX   [] Iodine   [] DYE  Other Allergies: SEE Epic CHART  Will the patient have a  the day of procedure?   [x] Yes   [] No  H&P:                                                                 Consent:   [] Will be completed day of Procedure               []   Completed consent faxed  [] Surgeon will bring                                           [x]   Will be completed day of procedure  [] Will be completed by                                  []   Gloria Beebe in EPIC   [x] In EPIC/Chart

## 2020-10-05 ENCOUNTER — TELEPHONE (OUTPATIENT)
Dept: SURGERY | Age: 40
End: 2020-10-05

## 2020-10-05 NOTE — TELEPHONE ENCOUNTER
AUTH# [id number]  10/19/2020-1/17/2021    CPT: 60765  ICD-10: Z80.0, Z12.11    [facility] 10/19/2020

## 2020-11-19 ENCOUNTER — OFFICE VISIT (OUTPATIENT)
Dept: ORTHOPEDIC SURGERY | Age: 40
End: 2020-11-19
Payer: MEDICAID

## 2020-11-19 VITALS
BODY MASS INDEX: 43.23 KG/M2 | HEIGHT: 63 IN | OXYGEN SATURATION: 98 % | HEART RATE: 78 BPM | RESPIRATION RATE: 14 BRPM | WEIGHT: 244 LBS

## 2020-11-19 PROCEDURE — G8417 CALC BMI ABV UP PARAM F/U: HCPCS | Performed by: PHYSICIAN ASSISTANT

## 2020-11-19 PROCEDURE — 1036F TOBACCO NON-USER: CPT | Performed by: PHYSICIAN ASSISTANT

## 2020-11-19 PROCEDURE — 99213 OFFICE O/P EST LOW 20 MIN: CPT | Performed by: PHYSICIAN ASSISTANT

## 2020-11-19 PROCEDURE — G8484 FLU IMMUNIZE NO ADMIN: HCPCS | Performed by: PHYSICIAN ASSISTANT

## 2020-11-19 PROCEDURE — G8428 CUR MEDS NOT DOCUMENT: HCPCS | Performed by: PHYSICIAN ASSISTANT

## 2020-11-19 ASSESSMENT — ENCOUNTER SYMPTOMS
GASTROINTESTINAL NEGATIVE: 1
RESPIRATORY NEGATIVE: 1
EYES NEGATIVE: 1

## 2020-11-19 NOTE — PATIENT INSTRUCTIONS
Will obtain updated MRI of left elbow. Will obtain EMG of bilateral upper extremities. Call for follow up after testing is completed.    Central scheduling phone: [phone]

## 2020-11-19 NOTE — PROGRESS NOTES
I reviewed and agree with the portions of the HPI, review of systems, vital documentation and plan performed by my staff and have added/addended where appropriate. Review of Systems   Constitutional: Negative. HENT: Negative. Eyes: Negative. Respiratory: Negative. Cardiovascular: Negative. Gastrointestinal: Negative. Genitourinary: Negative. Musculoskeletal: Positive for arthralgias. Negative for neck pain and neck stiffness. Skin: Negative. Negative for rash and wound. Neurological: Positive for numbness. Psychiatric/Behavioral: Negative. Billy Galvan is a 38 y.o. female that presents for re evaluation of left elbow cyst. Reports it has increased in size to the size of a golf ball. C/o numbness and tingles that wakes her up at night and decreased strength in hand and fingers on left. Reports pain is 8/10. She states that she has had previous carpal tunnel release on the right hand. She does state that this feels similar but worse than the right hand. Prior EMG in [place] as reported by the patient said that she had carpal tunnel syndrome on the left. EMG was done several years back.         Past Medical History:   Diagnosis Date    Migraine     Pulmonic stenosis     Sleep apnea        Past Surgical History:   Procedure Laterality Date    BREAST REDUCTION SURGERY      BREAST SURGERY      CHOLECYSTECTOMY      CYST REMOVAL      HAND SURGERY Right     4/2014    HYSTERECTOMY      INNER EAR SURGERY      OTHER SURGICAL HISTORY  4/16/2012    Repair vaginal cuff    PARTIAL NEPHRECTOMY      TOTAL NEPHRECTOMY      TUBAL LIGATION         Family History   Problem Relation Age of Onset    Cancer Mother     Migraines Mother     Cancer Maternal Grandmother     Diabetes Maternal Grandmother     Allergies Son     Allergies Daughter     Bleeding Prob Daughter     Bleeding Prob Son     Asthma Daughter     Asthma Son        Social History     Socioeconomic History Dilaudid [Hydromorphone Hcl] Anaphylaxis    Hydromorphone Anaphylaxis       Review of Systems:  See above      Physical Exam:   Pulse 78   Resp 14   Ht 5' 3\" (1.6 m)   Wt 244 lb (110.7 kg)   LMP 02/15/2012   SpO2 98%   BMI 43.22 kg/m²        Gait is Normal.     Gen/Psych:Examination reveals a pleasant individual in no acute distress. The patient is oriented to time, place and person. The patient's mood and affect are appropriate. Patient appears well nourished. Body habitus is obese    Head: Head is atraumatic normocephalic,  ears are symmetric,     Eyes: Eyes show equal pupils bilaterally, extraocular muscles intact    Ears:  Hearing is intact to normal voice at 5 feet    Nose: Nares are patent bilaterally, no epistaxis,no rhinorrhea     Lymph:  No obvious lymphedema in bilateral upper extremities     Skin intact in bilateral upper extremities with no ulcerations, lesions, rash, erythema. Vascular: There are no varicosities in bilateral upper  extremities, sensation intact to light touch over bilateral upper extremities. elbow exam: Left  Inspection: No edema, no erythema, no ecchymosis   Carrying angle: Neutral  Palpation: No obvious soft tissue mass able to be palpated over the dorsal proximal forearm.   Range of motion:   Extension: 0 degrees full extension   Flexion: 150 degrees full flexion   Supination/pronation: 90/90        Left Wrist exam  Inspection: No ecchymosis, edema or erythema, no obvious gross deformity noted  Palpation: No tenderness to palpation  Range of motion:   Extension: 50 degrees   Flexion: 50 degrees   Radial deviation: 10 degrees   Ulnar deviation: 10 degrees  Median nerve distribution sensation: Decreased to light touch, ulnar nerve distribution sensation and motor function: Intact, radial nerve sensation and motor function: Intact  Capillary refill: Less than 3 seconds, radial pulse plus    Finkelstein test: Negative  Phalen's test: Negative  Tinel sign at the wrist: Negative        Imaging studies:  Previous imaging studies of the elbow did show a small cystic structure on the proximal volar forearm that was adjacent to the anterior portion of the radial head. Impression:    Diagnosis Orders   1. Mass of elbow region, left  MRI ELBOW LEFT WO CONTRAST   2. Paresthesia of left upper extremity  EMG           Plan:    Patient Instructions   Will obtain updated MRI of left elbow. Will obtain EMG of bilateral upper extremities. Call for follow up after testing is completed.    Central scheduling phone: [phone]

## 2020-12-10 ENCOUNTER — TELEPHONE (OUTPATIENT)
Dept: ORTHOPEDIC SURGERY | Age: 40
End: 2020-12-10

## 2020-12-10 NOTE — TELEPHONE ENCOUNTER
Patient called stating that her MRI was denied and needs appealed. Pt states that this was denied due to the fact that the previous MRI was not available for review.     MRI CPT [id number]    Appeals: [phone]  Reference # [id number]

## 2021-01-11 ENCOUNTER — HOSPITAL ENCOUNTER (OUTPATIENT)
Dept: NEUROLOGY | Age: 41
Discharge: HOME | End: 2021-01-11
Payer: MEDICAID

## 2021-01-11 DIAGNOSIS — R20.2 PARESTHESIA OF LEFT UPPER EXTREMITY: ICD-10-CM

## 2021-01-11 PROCEDURE — 95910 NRV CNDJ TEST 7-8 STUDIES: CPT | Performed by: PHYSICAL MEDICINE & REHABILITATION

## 2021-01-11 PROCEDURE — 95910 NRV CNDJ TEST 7-8 STUDIES: CPT

## 2021-01-11 PROCEDURE — 95886 MUSC TEST DONE W/N TEST COMP: CPT | Performed by: PHYSICAL MEDICINE & REHABILITATION

## 2021-01-11 PROCEDURE — 95886 MUSC TEST DONE W/N TEST COMP: CPT

## 2021-01-11 NOTE — PROCEDURES
Risks and benefits of study discussed. Specific and common risks of pain and bleeding, as well as uncommon side effects of infection, hematoma, vasovagal episodes. Patient agreeable to testing and consents to such. Clinical: Left hand numbness, for years, previous right carpal tunnel surgery. Motor NCS:  Median amplitudes normal bilateral; latency on right is normal on the left is moderate to severely prolonged; velocity on the left is mildly slow. Ulnar amplitude, latency, velocity is normal on the left    Sensory NCS:  Median responses mildly depressed on the right with a normal latency; left side is moderately depressed with a severely prolonged latency  Left ulnar and radial sensory responses are both normal for amplitude and latency. Needle EMG:  Normal findings throughout left upper limb    Impression:    #1 moderate to severe median neuropathy at the left wrist, with features of severe sensory slowing, moderate sensory axon loss, and moderate to severe motor fiber slowing. Clinical correlate of carpal tunnel syndrome. #2 no evidence to suggest radiculopathy, plexopathy, generalized peripheral neuropathy.

## 2021-01-15 ENCOUNTER — VIRTUAL VISIT (OUTPATIENT)
Dept: ORTHOPEDIC SURGERY | Age: 41
End: 2021-01-15
Payer: MEDICAID

## 2021-01-15 DIAGNOSIS — G56.02 CARPAL TUNNEL SYNDROME OF LEFT WRIST: Primary | ICD-10-CM

## 2021-01-15 PROCEDURE — 99212 OFFICE O/P EST SF 10 MIN: CPT | Performed by: PHYSICIAN ASSISTANT

## 2021-01-15 NOTE — PROGRESS NOTES
Joel Newberry is a 38 y.o. female evaluated via telephone on 1/15/2021. She states she is still having numbness in the hand and up the arm. She states that she still has a lump in her forearm which was seen on an MRI as a possible ganglion cyst in the forearm but was not seen on physical exam the last time I saw her. She did not get a repeat MRI on the elbow as it was denied. EMG did show the following:    #1 moderate to severe median neuropathy at the left wrist, with features of severe sensory slowing, moderate sensory axon loss, and moderate to severe motor fiber slowing. Clinical correlate of carpal tunnel syndrome.     Consent:  She and/or health care decision maker is aware that that she may receive a bill for this telephone service, depending on her insurance coverage, and has provided verbal consent to proceed: Yes      Documentation:  I communicated with the patient and/or health care decision maker about the EMG. Details of this discussion including any medical advice provided: I recommended to follow-up with Dr. Severo Calamity to discuss carpal tunnel release. I explained to her that I thought her symptoms of the numbness were related to the findings on EMG of median neuropathy and not anything related to this supposed cyst on her forearm. I affirm this is a Patient Initiated Episode with a Patient who has not had a related appointment within my department in the past 7 days or scheduled within the next 24 hours.     Patient identification was verified at the start of the visit: Yes    Total Time: minutes: 5-10 minutes    Note: not billable if this call serves to triage the patient into an appointment for the relevant concern      Letitia Cruz           Impression:       #2 no evidence to suggest radiculopathy, plexopathy, generalized peripheral neuropathy.

## 2021-01-15 NOTE — PATIENT INSTRUCTIONS
Continue to weight bear as tolerated  Continue range of motion  Ice and elevate as needed  Tylenol or Motrin for pain  Follow up with Dr. Yamile Tariq in [facility] to discuss left wrist carpal tunnel surgery

## 2021-01-27 ENCOUNTER — OFFICE VISIT (OUTPATIENT)
Dept: INTERNAL MEDICINE CLINIC | Age: 41
End: 2021-01-27
Payer: MEDICAID

## 2021-01-27 VITALS
TEMPERATURE: 96.8 F | DIASTOLIC BLOOD PRESSURE: 76 MMHG | BODY MASS INDEX: 43.75 KG/M2 | WEIGHT: 247 LBS | SYSTOLIC BLOOD PRESSURE: 130 MMHG | OXYGEN SATURATION: 96 % | HEART RATE: 85 BPM

## 2021-01-27 DIAGNOSIS — G56.02 CARPAL TUNNEL SYNDROME OF LEFT WRIST: ICD-10-CM

## 2021-01-27 DIAGNOSIS — E66.01 MORBIDLY OBESE (HCC): Primary | ICD-10-CM

## 2021-01-27 DIAGNOSIS — E55.9 VITAMIN D DEFICIENCY: ICD-10-CM

## 2021-01-27 DIAGNOSIS — Z11.59 NEED FOR HEPATITIS C SCREENING TEST: ICD-10-CM

## 2021-01-27 DIAGNOSIS — M79.7 FIBROMYALGIA: ICD-10-CM

## 2021-01-27 DIAGNOSIS — Z11.4 ENCOUNTER FOR SCREENING FOR HIV: ICD-10-CM

## 2021-01-27 DIAGNOSIS — E78.2 MIXED HYPERLIPIDEMIA: ICD-10-CM

## 2021-01-27 DIAGNOSIS — Z12.31 ENCOUNTER FOR SCREENING MAMMOGRAM FOR MALIGNANT NEOPLASM OF BREAST: ICD-10-CM

## 2021-01-27 LAB
BASOPHILS ABSOLUTE: 0.1 K/UL (ref 0–0.2)
BASOPHILS RELATIVE PERCENT: 1.1 %
EOSINOPHILS ABSOLUTE: 0.3 K/UL (ref 0–0.6)
EOSINOPHILS RELATIVE PERCENT: 3.3 %
HCT VFR BLD CALC: 41.5 % (ref 36–48)
HEMOGLOBIN: 13.8 G/DL (ref 12–16)
LYMPHOCYTES ABSOLUTE: 2.8 K/UL (ref 1–5.1)
LYMPHOCYTES RELATIVE PERCENT: 33.2 %
MCH RBC QN AUTO: 29.8 PG (ref 26–34)
MCHC RBC AUTO-ENTMCNC: 33.2 G/DL (ref 31–36)
MCV RBC AUTO: 89.5 FL (ref 80–100)
MONOCYTES ABSOLUTE: 0.4 K/UL (ref 0–1.3)
MONOCYTES RELATIVE PERCENT: 4.4 %
NEUTROPHILS ABSOLUTE: 4.8 K/UL (ref 1.7–7.7)
NEUTROPHILS RELATIVE PERCENT: 58 %
PDW BLD-RTO: 15.1 % (ref 12.4–15.4)
PLATELET # BLD: 295 K/UL (ref 135–450)
PMV BLD AUTO: 8.2 FL (ref 5–10.5)
RBC # BLD: 4.63 M/UL (ref 4–5.2)
WBC # BLD: 8.3 K/UL (ref 4–11)

## 2021-01-27 PROCEDURE — 36415 COLL VENOUS BLD VENIPUNCTURE: CPT | Performed by: INTERNAL MEDICINE

## 2021-01-27 PROCEDURE — 99214 OFFICE O/P EST MOD 30 MIN: CPT | Performed by: INTERNAL MEDICINE

## 2021-01-27 PROCEDURE — G8417 CALC BMI ABV UP PARAM F/U: HCPCS | Performed by: INTERNAL MEDICINE

## 2021-01-27 PROCEDURE — 1036F TOBACCO NON-USER: CPT | Performed by: INTERNAL MEDICINE

## 2021-01-27 PROCEDURE — G8427 DOCREV CUR MEDS BY ELIG CLIN: HCPCS | Performed by: INTERNAL MEDICINE

## 2021-01-27 PROCEDURE — G8484 FLU IMMUNIZE NO ADMIN: HCPCS | Performed by: INTERNAL MEDICINE

## 2021-01-27 RX ORDER — NAPROXEN SODIUM 220 MG
220 TABLET ORAL 2 TIMES DAILY WITH MEALS
COMMUNITY
End: 2021-07-09

## 2021-01-27 RX ORDER — CHOLECALCIFEROL (VITAMIN D3) 50 MCG
2000 TABLET ORAL DAILY
Qty: 30 TABLET | Refills: 3 | Status: SHIPPED | OUTPATIENT
Start: 2021-01-27 | End: 2021-02-26 | Stop reason: SDUPTHER

## 2021-01-27 RX ORDER — PHENTERMINE HYDROCHLORIDE 37.5 MG/1
37.5 TABLET ORAL
Qty: 30 TABLET | Refills: 0 | Status: SHIPPED | OUTPATIENT
Start: 2021-01-27 | End: 2021-02-26 | Stop reason: SDUPTHER

## 2021-01-27 ASSESSMENT — PATIENT HEALTH QUESTIONNAIRE - PHQ9
SUM OF ALL RESPONSES TO PHQ9 QUESTIONS 1 & 2: 0
SUM OF ALL RESPONSES TO PHQ QUESTIONS 1-9: 0
1. LITTLE INTEREST OR PLEASURE IN DOING THINGS: 0

## 2021-01-27 NOTE — PROGRESS NOTES
Name: Zaina Molina  [id number]  Age: 38 y.o. YOB: 1980  Sex: female    CHIEF COMPLAINT:    Chief Complaint   Patient presents with   Luc Alberts Patient     Dr. Juanis Mcnair Hypertension    Hyperlipidemia    Headache       HISTORY OF PRESENT ILLNESS:     This is a pleasant  38 y.o. female  is seen today to establish care and for management of chronic medical problems and medications refills. Previous records reviewed . Patient claims that she is not taking any medications at this time except for Aleve over-the-counter. She recently had a EMG done and it was positive for left carpal tunnel syndrome. She is going to see Dr. Aj Strong for possible left carpal tunnel release. She had a right carpal tunnel release in the past.  Doing OK . Denies CP or SOB. No fever , sore throat or cough or congestion. Denies any abdominal pain. Appetite OK. Bowels moving [address] Unable to lose weight despite diet and exercise. She is not interested in bariatric surgery at this time. She wants some pills for weight loss. No urinary symptoms. Denies any significant arthritis except for pain in her left wrist.  Hearing is ok. Vision Ok with glasses. Denies  any significant skin lesions. Denies any significant depression or anxiety. No other complaints. She had a hysterectomy in the past as says she does not see any gynecologist.  She did not have a mammogram for over 10 years. She refused a flu shot.         Past Medical History:    Patient Active Problem List   Diagnosis    Vaginal bleeding    H/O of hysterectomy with bilateral oophorectomy    BMI 39.0-39.9,adult    Elevated blood pressure reading    Panic disorder with agoraphobia    Early menopause    Sleep disturbance    H/O bilateral breast reduction surgery    Myofascial pain syndrome    Somatic dysfunction of head region    Somatic dysfunction of cervical region    Somatic dysfunction of back    Somatic dysfunction of abdominal region    Somatic dysfunction of pelvic region    Snoring    Obstructive sleep apnea on CPAP    Somatic dysfunction of both upper extremities    Morbidly obese (HCC)    Paresthesia of left upper extremity    Fibromyalgia    Mixed hyperlipidemia    Carpal tunnel syndrome of left wrist    Vitamin D deficiency        Past Surgical History:        Procedure Laterality Date    BREAST REDUCTION SURGERY      BREAST SURGERY      CHOLECYSTECTOMY      CYST REMOVAL      HAND SURGERY Right     4/2014    HYSTERECTOMY      INNER EAR SURGERY      OTHER SURGICAL HISTORY  4/16/2012    Repair vaginal cuff    PARTIAL NEPHRECTOMY      TOTAL NEPHRECTOMY      TUBAL LIGATION         Social History:   Social History     Tobacco Use    Smoking status: Never Smoker    Smokeless tobacco: Never Used   Substance Use Topics    Alcohol use: No       Family History:       Problem Relation Age of Onset    Cancer Mother    Etheleen Daunt Migraines Mother     Cancer Maternal Grandmother     Diabetes Maternal Grandmother     Allergies Son     Allergies Daughter     Bleeding Prob Daughter     Bleeding Prob Son     Asthma Daughter     Asthma Son        Allergies:  Codeine, Dilaudid [hydromorphone hcl], and Hydromorphone    Current Medications :      Prior to Admission medications    Medication Sig Start Date End Date Taking? Authorizing Provider   naproxen sodium (ALEVE) 220 MG tablet Take 220 mg by mouth 2 times daily (with meals)   Yes Historical Provider, MD   phentermine (ADIPEX-P) 37.5 MG tablet Take 1 tablet by mouth every morning (before breakfast) for 30 days. 1/27/21 2/26/21 Yes Silvia Valdez MD   vitamin D (CHOLECALCIFEROL) 50 MCG (2000 UT) TABS tablet Take 1 tablet by mouth daily 1/27/21  Yes Silvia Valdez MD       LAB DATA: Reviewed. REVIEW OF SYSTEMS:   see HPI/ Comprehensive review of systems negative except for the ones mentioned in HPI.     PHYSICAL EXAMINATION:   /76   Pulse 85   Temp 96.8 °F (36 °C)   Wt 247 lb (112 kg) LMP 02/15/2012   SpO2 96%   BMI 43.75 kg/m²      GENERAL APPEARANCE:    Alert, oriented x 3, well developed, cooperative, not in any distress, appears stated age. HEAD:   Normocephalic, atraumatic   EYES:   PERRLA, EOMI, lids normal, conjuctivea clear, sclera anicteric. NECK:    Supple, symmetrical,  trachea midline, no thyromegaly, no JVD, no lymphadenopathy. LUNGS:    Clear to auscultation bilaterally, respirations unlabored, accessory muscles are not used. HEART:     Regular rate and rhythm, S1 and S2 normal, no murmur, rub or gallop. PMI in MCL. ABDOMEN:    Soft, non-tender, bowel sounds are normoactive, no masses, no hepatospleenomegaly. .  Patient is morbidly obese  EXTREMITY:   no bipedal edema, mild tenderness left wrist  NEURO:  Alert, oriented to person, place and time. Grossly intact except paraesthesia  left hand and wrist.  Musculoskeletal:         No kyphosis or scoliosis, no deformity in any extremity noted, muscle strength and tone are normal.  Skin:                            Warm and dry. No rash or obvious suspicious lesions. PSYCH:  Mood euthymic, insight and judgement good. ASSESSMENT/PLAN:    Morbidly obese ([address]):  Extensively discussed about diet and exercise. Start on Adipex-P. Pros and cons were discussed with the patient. Advised patient to discontinue if there are significant side effects. -     phentermine (ADIPEX-P) 37.5 MG tablet; Take 1 tablet by mouth every morning (before breakfast) for 30 days. -     Comprehensive Metabolic Panel; Future  -     CBC Auto Differential; Future    Fibromyalgia. Tylenol and Aleve as needed. Mixed hyperlipidemia. Has mild hyperlipidemia. Will recheck cholesterol. Advised low-cholesterol diet and exercise and weight loss. -     Lipid Panel; Future    Carpal tunnel syndrome of left wrist.  Continue Aleve and Tylenol at this time. Advised to follow with her orthopedic doctor. Vitamin D deficiency.   Start on vitamin D.  -     Vitamin D 25 Hydroxy  -     vitamin D (CHOLECALCIFEROL) 50 MCG (2000 UT) TABS tablet; Take 1 tablet by mouth daily    Encounter for screening mammogram for malignant neoplasm of breast  -     ZAID DIGITAL SCREEN W OR WO CAD BILATERAL; Future    Need for hepatitis C screening test  -     Hepatitis C Antibody; Future    Encounter for screening for HIV  -     HIV-1 AND HIV-2 ANTIBODIES; Future    I have recommended that the patient follow CDC guidelines for prevention of COVID-19 infection. I also discussed Coronavirus precaution including wearing face mask, handwashing practice, wiping items touched in public such as gas pumps, door handles, shopping carts, etc. Also Self monitoring for infection - fever, chills, cough, SOB. Should he/she develop symptoms he/she should call office or go to ER for further instructions. Care discussed with patient. Questions answered and patient verbalizes understanding and agrees with plan. Medications reviewed and reconciled. Continue current medications. Appropriate prescriptions are ordered. Risks and benefits of meds are discussed. After visit summary provided. Advised to call for any problems, questions, or concerns. If symptoms worsen or don't improve as expected, to call us or go to ER. Follow up as directed, sooner if needed. Return in about 3 months (around 4/27/2021). This dictation was performed with a verbal recognition program and it was checked for errors. It is possible that there are still dictated errors within this office note. Any errors should be brought immediately to my attention for correction. All efforts were made to ensure that this office note is accurate.      Edelmira Chinchilla MD

## 2021-01-28 LAB
A/G RATIO: 1.4 (ref 1.1–2.2)
ALBUMIN SERPL-MCNC: 4.4 G/DL (ref 3.4–5)
ALP BLD-CCNC: 92 U/L (ref 40–129)
ALT SERPL-CCNC: 23 U/L (ref 10–40)
ANION GAP SERPL CALCULATED.3IONS-SCNC: 14 MMOL/L (ref 3–16)
AST SERPL-CCNC: 31 U/L (ref 15–37)
BILIRUB SERPL-MCNC: 0.8 MG/DL (ref 0–1)
BUN BLDV-MCNC: 14 MG/DL (ref 7–20)
CALCIUM SERPL-MCNC: 9.6 MG/DL (ref 8.3–10.6)
CHLORIDE BLD-SCNC: 98 MMOL/L (ref 99–110)
CO2: 24 MMOL/L (ref 21–32)
CREAT SERPL-MCNC: 0.9 MG/DL (ref 0.6–1.1)
GFR AFRICAN AMERICAN: >60
GFR NON-AFRICAN AMERICAN: >60
GLOBULIN: 3.1 G/DL
GLUCOSE BLD-MCNC: 98 MG/DL (ref 70–99)
HEPATITIS C ANTIBODY INTERPRETATION: NORMAL
HIV AG/AB: NORMAL
HIV ANTIGEN: NORMAL
HIV-1 ANTIBODY: NORMAL
HIV-2 AB: NORMAL
POTASSIUM SERPL-SCNC: 4.4 MMOL/L (ref 3.5–5.1)
SODIUM BLD-SCNC: 136 MMOL/L (ref 136–145)
TOTAL PROTEIN: 7.5 G/DL (ref 6.4–8.2)
VITAMIN D 25-HYDROXY: 18.7 NG/ML

## 2021-02-01 ENCOUNTER — OFFICE VISIT (OUTPATIENT)
Dept: ORTHOPEDIC SURGERY | Age: 41
End: 2021-02-01
Payer: MEDICAID

## 2021-02-01 VITALS
OXYGEN SATURATION: 93 % | RESPIRATION RATE: 16 BRPM | BODY MASS INDEX: 43.41 KG/M2 | WEIGHT: 245 LBS | HEIGHT: 63 IN | HEART RATE: 76 BPM

## 2021-02-01 DIAGNOSIS — G56.02 LEFT CARPAL TUNNEL SYNDROME: Primary | ICD-10-CM

## 2021-02-01 PROCEDURE — G8427 DOCREV CUR MEDS BY ELIG CLIN: HCPCS | Performed by: ORTHOPAEDIC SURGERY

## 2021-02-01 PROCEDURE — G8484 FLU IMMUNIZE NO ADMIN: HCPCS | Performed by: ORTHOPAEDIC SURGERY

## 2021-02-01 PROCEDURE — 1036F TOBACCO NON-USER: CPT | Performed by: ORTHOPAEDIC SURGERY

## 2021-02-01 PROCEDURE — G8417 CALC BMI ABV UP PARAM F/U: HCPCS | Performed by: ORTHOPAEDIC SURGERY

## 2021-02-01 PROCEDURE — 99203 OFFICE O/P NEW LOW 30 MIN: CPT | Performed by: ORTHOPAEDIC SURGERY

## 2021-02-01 ASSESSMENT — ENCOUNTER SYMPTOMS
SHORTNESS OF BREATH: 0
COLOR CHANGE: 0

## 2021-02-01 NOTE — PROGRESS NOTES
Patient returns to the office today to discuss left carpal tunnel release surgery. Patient reports her symptoms beginning about 3 years ago with her symptoms gradually getting worse. Associated sx: loss of  or grasp, tingling and numbness throughout all fingers with pain described as dull, stabbing pain radiating up the arm towards the elbow. Not actively treating her symptoms conservatively other than avoiding aggravating activities which trigger her symptoms. No previous injury, surgery, or conservative therapies. Hx of R- CTR performed about 4-5 years ago. Recent EMG performed on 1/22/21 revealed moderate to severe median neuropathy at the left wrist with features of severe sensory slowing, moderate sensory axon loss, and moderated to severe motor fiber slowing which correlates to carpal tunnel syndrome. Narrative & Impression    Risks and benefits of study discussed. Specific and common risks of pain and bleeding, as well as uncommon side effects of infection, hematoma, vasovagal episodes. Patient agreeable to testing and consents to such.     Clinical: Left hand numbness, for years, previous right carpal tunnel surgery.     Motor NCS:  Median amplitudes normal bilateral; latency on right is normal on the left is moderate to severely prolonged; velocity on the left is mildly slow. Ulnar amplitude, latency, velocity is normal on the left     Sensory NCS:  Median responses mildly depressed on the right with a normal latency; left side is moderately depressed with a severely prolonged latency  Left ulnar and radial sensory responses are both normal for amplitude and latency.     Needle EMG:  Normal findings throughout left upper limb     Impression:     #1 moderate to severe median neuropathy at the left wrist, with features of severe sensory slowing, moderate sensory axon loss, and moderate to severe motor fiber slowing.   Clinical correlate of carpal tunnel syndrome.    #2 no evidence to suggest radiculopathy, plexopathy, generalized peripheral neuropathy.

## 2021-02-01 NOTE — PATIENT INSTRUCTIONS
Surgery discussed  Consent signed in office today  Follow up for surgery as scheduled  Call [address] [place], Surgery Scheduler with any further questions or concerns regarding surgery at [phone]

## 2021-02-01 NOTE — PROGRESS NOTES
Subjective:      Patient ID: Maranda Nj is a 38 y.o. female. Patient returns to the office today to discuss left carpal tunnel release surgery. Patient reports her symptoms beginning about 3 years ago with her symptoms gradually getting worse. Associated sx: loss of  or grasp, tingling and numbness throughout all fingers with pain described as dull, stabbing pain radiating up the arm towards the elbow. Not actively treating her symptoms conservatively other than avoiding aggravating activities which trigger her symptoms. No previous injury, surgery, or conservative therapies. Hx of R- CTR performed about 4-5 years ago. Recent EMG performed on 1/22/21 revealed moderate to severe median neuropathy at the left wrist with features of severe sensory slowing, moderate sensory axon loss, and moderated to severe motor fiber slowing which correlates to carpal tunnel syndrome. Narrative & Impression    Risks and benefits of study discussed. Specific and common risks of pain and bleeding, as well as uncommon side effects of infection, hematoma, vasovagal episodes. Patient agreeable to testing and consents to such.     Clinical: Left hand numbness, for years, previous right carpal tunnel surgery.     Motor NCS:  Median amplitudes normal bilateral; latency on right is normal on the left is moderate to severely prolonged; velocity on the left is mildly slow. Ulnar amplitude, latency, velocity is normal on the left     Sensory NCS:  Median responses mildly depressed on the right with a normal latency; left side is moderately depressed with a severely prolonged latency  Left ulnar and radial sensory responses are both normal for amplitude and latency.     Needle EMG:  Normal findings throughout left upper limb     Impression:     #1 moderate to severe median neuropathy at the left wrist, with features of severe sensory slowing, moderate sensory axon loss, and moderate to severe motor fiber slowing. Clinical correlate of carpal tunnel syndrome.     #2 no evidence to suggest radiculopathy, plexopathy, generalized peripheral neuropathy. She comes in today for her first visit with me in regards to her left hand. She states that she has been having progressively worsening burning, numbness and tingling in the pain in her left hand for the past couple of years. She states that now she is getting weak with lifting things and the pain is keeping her up at night. She denies any recent injury to the left hand and denies any fever or chills. Review of Systems   Constitutional: Negative for activity change, chills and fever. Respiratory: Negative for shortness of breath. Cardiovascular: Negative for chest pain. Musculoskeletal: Positive for myalgias. Negative for arthralgias, gait problem and joint swelling. Skin: Negative for color change, pallor, rash and wound. Neurological: Positive for numbness. Negative for weakness. Past Medical History:   Diagnosis Date    Migraine     Mixed hyperlipidemia 1/27/2021    Pulmonic stenosis     Sleep apnea        Objective:   Physical Exam  Constitutional:       Appearance: She is well-developed. HENT:      Head: Normocephalic and atraumatic. Eyes:      Pupils: Pupils are equal, round, and reactive to light. Neck:      Musculoskeletal: Normal range of motion. Pulmonary:      Effort: Pulmonary effort is normal.   Musculoskeletal: Normal range of motion. General: No tenderness or deformity. Right wrist: Normal.      Left wrist: Normal.      Right hand: She exhibits normal range of motion, no tenderness, no bony tenderness, normal two-point discrimination, normal capillary refill, no deformity, no laceration and no swelling. Normal sensation noted. Decreased sensation is not present in the ulnar distribution, is not present in the medial distribution and is not present in the radial distribution. Normal strength noted. Left hand: She exhibits normal range of motion, no tenderness, no bony tenderness, normal two-point discrimination, normal capillary refill, no deformity, no laceration and no swelling. Normal sensation noted. Decreased sensation is not present in the ulnar distribution, is not present in the medial redistribution and is not present in the radial distribution. Normal strength noted. Skin:     General: Skin is warm and dry. Capillary Refill: Capillary refill takes less than 2 seconds. Coloration: Skin is not pale. Findings: No erythema or rash. Neurological:      Mental Status: She is alert and oriented to person, place, and time. Sensory: Sensory deficit present. Left hand-Skin intact with no erythema, ecchymosis or lacerations present.  strength 5/5. Xr Hand Left (min 3 Views)    Result Date: 2/1/2021  XRAY X-ray 3 views of the left hand obtained and reviewed by me today in the office demonstrates age appropriate bone density throughout with mild to moderate narrowing of the left thumb CMC joint with otherwise normal spacing and normal overall alignment, no subluxation or dislocation noted, no acute osseous abnormalities. Impression: Mild to moderate degenerative changes of the left thumb CMC joint with no acute process. Assessment:      Left carpal tunnel syndrome, severe  Left thumb CMC DJD, mild to moderate      Plan:      I discussed with her today her x-ray and EMG findings. I explained her that she does have severe carpal tunnel syndrome on the left. At this point given her persistent and worsening symptoms and with her EMG findings I recommend surgical treatment. I discussed with her today performing left carpal tunnel release. I explained risks, benefits, possible complications of the procedure and answered all questions for the patient. I explained postoperative rehabilitation protocol and expectations with the patient today. The patient understands and consents to the procedure. Patient will follow up with their primary care physician prior to surgical treatment for preoperative clearance. We will schedule surgery at soonest convenience. Continue weight-bearing as tolerated. Continue range of motion exercises as instructed. Ice and elevate as needed. Tylenol or Motrin for pain. Follow up in 2 weeks postop.         Mike 97, DO

## 2021-02-05 ENCOUNTER — HOSPITAL ENCOUNTER (OUTPATIENT)
Dept: WOMENS IMAGING | Age: 41
Discharge: HOME | End: 2021-02-05
Payer: MEDICAID

## 2021-02-05 DIAGNOSIS — Z12.31 ENCOUNTER FOR SCREENING MAMMOGRAM FOR MALIGNANT NEOPLASM OF BREAST: ICD-10-CM

## 2021-02-05 DIAGNOSIS — Z12.31 VISIT FOR SCREENING MAMMOGRAM: ICD-10-CM

## 2021-02-05 PROCEDURE — 77063 BREAST TOMOSYNTHESIS BI: CPT

## 2021-02-08 ENCOUNTER — TELEPHONE (OUTPATIENT)
Dept: ORTHOPEDIC SURGERY | Age: 41
End: 2021-02-08

## 2021-02-08 DIAGNOSIS — Z01.818 PRE-OP TESTING: Primary | ICD-10-CM

## 2021-02-08 DIAGNOSIS — Z01.818 PREOP TESTING: Primary | ICD-10-CM

## 2021-02-08 NOTE — TELEPHONE ENCOUNTER
Per my call to [facility] at [facility]; No PA necessary for Left CTR. Patient is active since 12/1/19. [id number].

## 2021-02-09 ENCOUNTER — HOSPITAL ENCOUNTER (OUTPATIENT)
Age: 41
Setting detail: SPECIMEN
Discharge: HOME | End: 2021-02-09
Payer: MEDICAID

## 2021-02-09 PROCEDURE — U0002 COVID-19 LAB TEST NON-CDC: HCPCS

## 2021-02-09 NOTE — PROGRESS NOTES
Surgery:  [email or web address]                        Arrival time: 0830  Nothing to eat or drink after midnight unless instructed to take certain medications by the doctor or the nurse the am of surgery  Arrive at the front information desk -1st floor /[facility] is on [address] [facility]  Please leave money and all other valuables at home. Wear comfortable clothing. If you wear contacts please bring a case. No make up. You may be asked to remove rings or body piercing. Please bring insurance cards and picture ID am of procedure. Please bring any consent or paper work from your doctor. If you become ill,such as a cold, sore throat or fever contact your doctor. Please bathe or shower am of procedure.   Medications to take AM of procedure:     Any questions call [facility]  [phone]     Pt having COVID done 2/9/21

## 2021-02-10 ENCOUNTER — ANESTHESIA EVENT (OUTPATIENT)
Dept: OPERATING ROOM | Age: 41
End: 2021-02-10
Payer: MEDICAID

## 2021-02-10 ENCOUNTER — TELEPHONE (OUTPATIENT)
Dept: ORTHOPEDIC SURGERY | Age: 41
End: 2021-02-10

## 2021-02-10 DIAGNOSIS — G56.02 LEFT CARPAL TUNNEL SYNDROME: Primary | ICD-10-CM

## 2021-02-10 RX ORDER — HYDROCODONE BITARTRATE AND ACETAMINOPHEN 5; 325 MG/1; MG/1
1 TABLET ORAL EVERY 6 HOURS PRN
Qty: 5 TABLET | Refills: 0 | Status: SHIPPED | OUTPATIENT
Start: 2021-02-10 | End: 2021-02-17

## 2021-02-10 RX ORDER — CEPHALEXIN 250 MG/1
250 CAPSULE ORAL 4 TIMES DAILY
Qty: 4 CAPSULE | Refills: 0 | Status: SHIPPED | OUTPATIENT
Start: 2021-02-10 | End: 2021-02-11

## 2021-02-10 NOTE — ANESTHESIA PRE PROCEDURE
Department of Anesthesiology  Preprocedure Note       Name:  Sonal Navarro   Age:  38 y.o.  :  1980                                          MRN:  [id number]         Date:  2/10/2021      Surgeon: Andria Handler):  Betzy Boone DO    Procedure: Procedure(s):  LEFT CARPAL TUNNEL RELEASE    Medications prior to admission:   Prior to Admission medications    Medication Sig Start Date End Date Taking? Authorizing Provider   HYDROcodone-acetaminophen (NORCO) 5-325 MG per tablet Take 1 tablet by mouth every 6 hours as needed for Pain for up to 7 days. Intended supply: 7 days. Take lowest dose possible to manage pain 2/10/21 2/17/21  Betzy Boone DO   cephALEXin [facility]) 250 MG capsule Take 1 capsule by mouth 4 times daily for 1 day 2/10/21 2/11/21  Betzy Boone DO   naproxen sodium (ALEVE) 220 MG tablet Take 220 mg by mouth 2 times daily (with meals)    Historical Provider, MD   phentermine (ADIPEX-P) 37.5 MG tablet Take 1 tablet by mouth every morning (before breakfast) for 30 days. 21  Sangeeta Jewell MD   vitamin D (CHOLECALCIFEROL) 50 MCG (2000 UT) TABS tablet Take 1 tablet by mouth daily 21   Sangeeta Jewell MD       Current medications:    No current facility-administered medications for this encounter. Current Outpatient Medications   Medication Sig Dispense Refill    HYDROcodone-acetaminophen (NORCO) 5-325 MG per tablet Take 1 tablet by mouth every 6 hours as needed for Pain for up to 7 days. Intended supply: 7 days. Take lowest dose possible to manage pain 5 tablet 0    cephALEXin (KEFLEX) 250 MG capsule Take 1 capsule by mouth 4 times daily for 1 day 4 capsule 0    naproxen sodium (ALEVE) 220 MG tablet Take 220 mg by mouth 2 times daily (with meals)      phentermine (ADIPEX-P) 37.5 MG tablet Take 1 tablet by mouth every morning (before breakfast) for 30 days.  30 tablet 0    vitamin D (CHOLECALCIFEROL) 50 MCG (2000 UT) TABS tablet Take 1 tablet by mouth daily 30 tablet 3 Allergies:     Allergies   Allergen Reactions    Codeine Anaphylaxis    Dilaudid [Hydromorphone Hcl] Anaphylaxis    Hydromorphone Anaphylaxis       Problem List:    Patient Active Problem List   Diagnosis Code    Vaginal bleeding N93.9    H/O of hysterectomy with bilateral oophorectomy Z90.710, Z90.722    BMI 39.0-39.9,adult Z68.39    Elevated blood pressure reading R03.0    Panic disorder with agoraphobia F40.01    Early menopause E28.319    Sleep disturbance G47.9    H/O bilateral breast reduction surgery Z98.890    Myofascial pain syndrome M79.18    Somatic dysfunction of head region M99.00    Somatic dysfunction of cervical region M99.01    Somatic dysfunction of back M99.09    Somatic dysfunction of abdominal region M99.09    Somatic dysfunction of pelvic region M99.05    Snoring R06.83    Obstructive sleep apnea on CPAP G47.33, Z99.89    Somatic dysfunction of both upper extremities M99.07    Morbidly obese (HCC) E66.01    Paresthesia of left upper extremity R20.2    Fibromyalgia M79.7    Mixed hyperlipidemia E78.2    Carpal tunnel syndrome of left wrist G56.02    Vitamin D deficiency E55.9       Past Medical History:        Diagnosis Date    Migraine     Mixed hyperlipidemia 1/27/2021    PONV (postoperative nausea and vomiting)     Prolonged emergence from general anesthesia     Pulmonic stenosis     Sleep apnea     Sleep apnea        Past Surgical History:        Procedure Laterality Date    BREAST BIOPSY Left 2010    benign    BREAST REDUCTION SURGERY Bilateral 2003    BREAST SURGERY      CHOLECYSTECTOMY      CYST REMOVAL      HAND SURGERY Right     4/2014    HYSTERECTOMY      INNER EAR SURGERY      OTHER SURGICAL HISTORY  4/16/2012    Repair vaginal cuff    OVARY REMOVAL      PARTIAL NEPHRECTOMY      TOTAL NEPHRECTOMY      TUBAL LIGATION         Social History:    Social History     Tobacco Use    Smoking status: Never Smoker  Smokeless tobacco: Never Used   Substance Use Topics    Alcohol use: No                                Counseling given: Not Answered      Vital Signs (Current):   Vitals:    02/09/21 1321   Weight: 238 lb (108 kg)   Height: 5' 3\" (1.6 m)                                              BP Readings from Last 3 Encounters:   01/27/21 130/76   09/11/20 128/76   09/09/20 130/74       NPO Status:                                                                                 BMI:   Wt Readings from Last 3 Encounters:   02/09/21 238 lb (108 kg)   02/01/21 245 lb (111.1 kg)   01/27/21 247 lb (112 kg)     Body mass index is 42.16 kg/m². CBC:   Lab Results   Component Value Date    WBC 8.3 01/27/2021    RBC 4.63 01/27/2021    HGB 13.8 01/27/2021    HCT 41.5 01/27/2021    MCV 89.5 01/27/2021    RDW 15.1 01/27/2021     01/27/2021       CMP:   Lab Results   Component Value Date     01/27/2021    K 4.4 01/27/2021    CL 98 01/27/2021    CO2 24 01/27/2021    BUN 14 01/27/2021    CREATININE 0.9 01/27/2021    GFRAA >60 01/27/2021    AGRATIO 1.4 01/27/2021    LABGLOM >60 01/27/2021    GLUCOSE 98 01/27/2021    PROT 7.5 01/27/2021    PROT 7.7 04/15/2012    CALCIUM 9.6 01/27/2021    BILITOT 0.8 01/27/2021    ALKPHOS 92 01/27/2021    AST 31 01/27/2021    ALT 23 01/27/2021       POC Tests: No results for input(s): POCGLU, POCNA, POCK, POCCL, POCBUN, POCHEMO, POCHCT in the last 72 hours.     Coags:   Lab Results   Component Value Date    PROTIME 11.2 04/15/2012    INR 1.03 04/15/2012    APTT 29.7 04/15/2012       HCG (If Applicable):   Lab Results   Component Value Date    PREGTESTUR NEGATIVE 03/24/2014        ABGs: No results found for: PHART, PO2ART, JYH9HIR, OBZ3THA, BEART, U5EOUITX     Type & Screen (If Applicable):  No results found for: LABABO, LABRH    Drug/Infectious Status (If Applicable):  No results found for: HIV, HEPCAB    COVID-19 Screening (If Applicable): No results found for: COVID19

## 2021-02-11 LAB
SARS-COV-2: NOT DETECTED
SOURCE: NORMAL

## 2021-02-16 ENCOUNTER — ANESTHESIA (OUTPATIENT)
Dept: OPERATING ROOM | Age: 41
End: 2021-02-16
Payer: MEDICAID

## 2021-02-16 ENCOUNTER — HOSPITAL ENCOUNTER (OUTPATIENT)
Age: 41
Setting detail: OUTPATIENT SURGERY
Discharge: HOME | End: 2021-02-16
Attending: ORTHOPAEDIC SURGERY | Admitting: ORTHOPAEDIC SURGERY
Payer: MEDICAID

## 2021-02-16 VITALS
HEART RATE: 68 BPM | SYSTOLIC BLOOD PRESSURE: 116 MMHG | TEMPERATURE: 97 F | WEIGHT: 238 LBS | DIASTOLIC BLOOD PRESSURE: 75 MMHG | HEIGHT: 63 IN | RESPIRATION RATE: 16 BRPM | OXYGEN SATURATION: 99 % | BODY MASS INDEX: 42.17 KG/M2

## 2021-02-16 VITALS
RESPIRATION RATE: 16 BRPM | SYSTOLIC BLOOD PRESSURE: 111 MMHG | OXYGEN SATURATION: 98 % | DIASTOLIC BLOOD PRESSURE: 65 MMHG

## 2021-02-16 PROCEDURE — 2500000003 HC RX 250 WO HCPCS: Performed by: ORTHOPAEDIC SURGERY

## 2021-02-16 PROCEDURE — 64721 CARPAL TUNNEL SURGERY: CPT | Performed by: ORTHOPAEDIC SURGERY

## 2021-02-16 PROCEDURE — 2709999900 HC NON-CHARGEABLE SUPPLY: Performed by: ORTHOPAEDIC SURGERY

## 2021-02-16 PROCEDURE — 2580000003 HC RX 258: Performed by: ORTHOPAEDIC SURGERY

## 2021-02-16 PROCEDURE — 3600000012 HC SURGERY LEVEL 2 ADDTL 15MIN: Performed by: ORTHOPAEDIC SURGERY

## 2021-02-16 PROCEDURE — 3600000002 HC SURGERY LEVEL 2 BASE: Performed by: ORTHOPAEDIC SURGERY

## 2021-02-16 PROCEDURE — 7100000011 HC PHASE II RECOVERY - ADDTL 15 MIN: Performed by: ORTHOPAEDIC SURGERY

## 2021-02-16 PROCEDURE — 3700000001 HC ADD 15 MINUTES (ANESTHESIA): Performed by: ORTHOPAEDIC SURGERY

## 2021-02-16 PROCEDURE — 6360000002 HC RX W HCPCS: Performed by: ORTHOPAEDIC SURGERY

## 2021-02-16 PROCEDURE — 7100000010 HC PHASE II RECOVERY - FIRST 15 MIN: Performed by: ORTHOPAEDIC SURGERY

## 2021-02-16 PROCEDURE — 3700000000 HC ANESTHESIA ATTENDED CARE: Performed by: ORTHOPAEDIC SURGERY

## 2021-02-16 PROCEDURE — 6370000000 HC RX 637 (ALT 250 FOR IP): Performed by: ORTHOPAEDIC SURGERY

## 2021-02-16 PROCEDURE — 6360000002 HC RX W HCPCS: Performed by: NURSE ANESTHETIST, CERTIFIED REGISTERED

## 2021-02-16 RX ORDER — MIDAZOLAM HYDROCHLORIDE 1 MG/ML
INJECTION INTRAMUSCULAR; INTRAVENOUS PRN
Status: DISCONTINUED | OUTPATIENT
Start: 2021-02-16 | End: 2021-02-16 | Stop reason: SDUPTHER

## 2021-02-16 RX ORDER — SODIUM CHLORIDE 0.9 % (FLUSH) 0.9 %
10 SYRINGE (ML) INJECTION EVERY 12 HOURS SCHEDULED
Status: DISCONTINUED | OUTPATIENT
Start: 2021-02-16 | End: 2021-02-16 | Stop reason: HOSPADM

## 2021-02-16 RX ORDER — OXYCODONE HYDROCHLORIDE 5 MG/1
10 TABLET ORAL EVERY 4 HOURS PRN
Status: DISCONTINUED | OUTPATIENT
Start: 2021-02-16 | End: 2021-02-16 | Stop reason: HOSPADM

## 2021-02-16 RX ORDER — SODIUM CHLORIDE, SODIUM LACTATE, POTASSIUM CHLORIDE, CALCIUM CHLORIDE 600; 310; 30; 20 MG/100ML; MG/100ML; MG/100ML; MG/100ML
INJECTION, SOLUTION INTRAVENOUS CONTINUOUS
Status: DISCONTINUED | OUTPATIENT
Start: 2021-02-16 | End: 2021-02-16 | Stop reason: HOSPADM

## 2021-02-16 RX ORDER — LIDOCAINE HYDROCHLORIDE 10 MG/ML
INJECTION, SOLUTION INFILTRATION; PERINEURAL
Status: COMPLETED | OUTPATIENT
Start: 2021-02-16 | End: 2021-02-16

## 2021-02-16 RX ORDER — ACETAMINOPHEN 500 MG
1000 TABLET ORAL ONCE
Status: COMPLETED | OUTPATIENT
Start: 2021-02-16 | End: 2021-02-16

## 2021-02-16 RX ORDER — OXYCODONE HYDROCHLORIDE 5 MG/1
5 TABLET ORAL EVERY 4 HOURS PRN
Status: DISCONTINUED | OUTPATIENT
Start: 2021-02-16 | End: 2021-02-16 | Stop reason: HOSPADM

## 2021-02-16 RX ORDER — SODIUM CHLORIDE 0.9 % (FLUSH) 0.9 %
10 SYRINGE (ML) INJECTION PRN
Status: DISCONTINUED | OUTPATIENT
Start: 2021-02-16 | End: 2021-02-16 | Stop reason: HOSPADM

## 2021-02-16 RX ORDER — LIDOCAINE HYDROCHLORIDE 20 MG/ML
INJECTION, SOLUTION INTRAVENOUS PRN
Status: DISCONTINUED | OUTPATIENT
Start: 2021-02-16 | End: 2021-02-16 | Stop reason: SDUPTHER

## 2021-02-16 RX ORDER — FENTANYL CITRATE 50 UG/ML
INJECTION, SOLUTION INTRAMUSCULAR; INTRAVENOUS PRN
Status: DISCONTINUED | OUTPATIENT
Start: 2021-02-16 | End: 2021-02-16 | Stop reason: SDUPTHER

## 2021-02-16 RX ORDER — PROPOFOL 10 MG/ML
INJECTION, EMULSION INTRAVENOUS PRN
Status: DISCONTINUED | OUTPATIENT
Start: 2021-02-16 | End: 2021-02-16 | Stop reason: SDUPTHER

## 2021-02-16 RX ADMIN — SODIUM CHLORIDE, POTASSIUM CHLORIDE, SODIUM LACTATE AND CALCIUM CHLORIDE: 600; 310; 30; 20 INJECTION, SOLUTION INTRAVENOUS at 07:01

## 2021-02-16 RX ADMIN — PROPOFOL 40 MG: 10 INJECTION, EMULSION INTRAVENOUS at 07:35

## 2021-02-16 RX ADMIN — MIDAZOLAM 2 MG: 1 INJECTION INTRAMUSCULAR; INTRAVENOUS at 07:25

## 2021-02-16 RX ADMIN — LIDOCAINE HYDROCHLORIDE 100 MG: 20 INJECTION, SOLUTION INTRAVENOUS at 07:28

## 2021-02-16 RX ADMIN — CEFAZOLIN 2 G: 10 INJECTION, POWDER, FOR SOLUTION INTRAVENOUS at 07:28

## 2021-02-16 RX ADMIN — ACETAMINOPHEN 1000 MG: 500 TABLET ORAL at 07:01

## 2021-02-16 RX ADMIN — PROPOFOL 40 MG: 10 INJECTION, EMULSION INTRAVENOUS at 07:39

## 2021-02-16 RX ADMIN — FENTANYL CITRATE 100 MCG: 50 INJECTION INTRAMUSCULAR; INTRAVENOUS at 07:28

## 2021-02-16 RX ADMIN — SODIUM CHLORIDE, POTASSIUM CHLORIDE, SODIUM LACTATE AND CALCIUM CHLORIDE: 600; 310; 30; 20 INJECTION, SOLUTION INTRAVENOUS at 07:25

## 2021-02-16 RX ADMIN — PROPOFOL 40 MG: 10 INJECTION, EMULSION INTRAVENOUS at 07:28

## 2021-02-16 ASSESSMENT — PAIN SCALES - GENERAL
PAINLEVEL_OUTOF10: 0
PAINLEVEL_OUTOF10: 0

## 2021-02-16 ASSESSMENT — PULMONARY FUNCTION TESTS
PIF_VALUE: 0
PIF_VALUE: 1
PIF_VALUE: 0

## 2021-02-16 NOTE — H&P
Subjective:      Patient ID: Clara Zacarias is a 38 y.o. female.     Patient returns to the office today to discuss left carpal tunnel release surgery. Patient reports her symptoms beginning about 3 years ago with her symptoms gradually getting worse. Associated sx: loss of  or grasp, tingling and numbness throughout all fingers with pain described as dull, stabbing pain radiating up the arm towards the elbow. Not actively treating her symptoms conservatively other than avoiding aggravating activities which trigger her symptoms. No previous injury, surgery, or conservative therapies. Hx of R- CTR performed about 4-5 years ago. Recent EMG performed on 1/22/21 revealed moderate to severe median neuropathy at the left wrist with features of severe sensory slowing, moderate sensory axon loss, and moderated to severe motor fiber slowing which correlates to carpal tunnel syndrome.            Narrative & Impression     Risks and benefits of study discussed. Specific and common risks of pain and bleeding, as well as uncommon side effects of infection, hematoma, vasovagal episodes. Patient agreeable to testing and consents to such.     Clinical: Left hand numbness, for years, previous right carpal tunnel surgery.     Motor NCS:  Median amplitudes normal bilateral; latency on right is normal on the left is moderate to severely prolonged; velocity on the left is mildly slow.   Ulnar amplitude, latency, velocity is normal on the left     Sensory NCS:  Median responses mildly depressed on the right with a normal latency; left side is moderately depressed with a severely prolonged latency  Left ulnar and radial sensory responses are both normal for amplitude and latency.     Needle EMG:  Normal findings throughout left upper limb     Impression:     #1 moderate to severe median neuropathy at the left wrist, with features of severe sensory slowing, moderate sensory axon loss, and moderate to severe motor fiber slowing. Clinical correlate of carpal tunnel syndrome.     #2 no evidence to suggest radiculopathy, plexopathy, generalized peripheral neuropathy.     She comes in today for her first visit with me in regards to her left hand. She states that she has been having progressively worsening burning, numbness and tingling in the pain in her left hand for the past couple of years. She states that now she is getting weak with lifting things and the pain is keeping her up at night. She denies any recent injury to the left hand and denies any fever or chills.           Review of Systems   Constitutional: Negative for activity change, chills and fever. Respiratory: Negative for shortness of breath. Cardiovascular: Negative for chest pain. Musculoskeletal: Positive for myalgias. Negative for arthralgias, gait problem and joint swelling. Skin: Negative for color change, pallor, rash and wound. Neurological: Positive for numbness. Negative for weakness.         Past Medical History        Past Medical History:   Diagnosis Date    Migraine      Mixed hyperlipidemia 1/27/2021    Pulmonic stenosis      Sleep apnea              No current facility-administered medications on file prior to encounter. Current Outpatient Medications on File Prior to Encounter   Medication Sig Dispense Refill    phentermine (ADIPEX-P) 37.5 MG tablet Take 1 tablet by mouth every morning (before breakfast) for 30 days.  30 tablet 0    vitamin D (CHOLECALCIFEROL) 50 MCG (2000 UT) TABS tablet Take 1 tablet by mouth daily 30 tablet 3    naproxen sodium (ALEVE) 220 MG tablet Take 220 mg by mouth 2 times daily (with meals)       Allergies   Allergen Reactions    Codeine Anaphylaxis    Dilaudid [Hydromorphone Hcl] Anaphylaxis    Hydromorphone Anaphylaxis     Past Surgical History:   Procedure Laterality Date    BREAST BIOPSY Left 2010    benign    BREAST REDUCTION SURGERY Bilateral 2003    BREAST SURGERY      CHOLECYSTECTOMY  CYST REMOVAL      HAND SURGERY Right     4/2014    HYSTERECTOMY      INNER EAR SURGERY      OTHER SURGICAL HISTORY  4/16/2012    Repair vaginal cuff    OVARY REMOVAL      PARTIAL NEPHRECTOMY      TOTAL NEPHRECTOMY      TUBAL LIGATION       Social History     Tobacco Use    Smoking status: Never Smoker    Smokeless tobacco: Never Used   Substance Use Topics    Alcohol use: No    Drug use: No     Family History   Problem Relation Age of Onset    Cancer Mother     Migraines Mother     Cancer Maternal Grandmother     Diabetes Maternal Grandmother     Allergies Son     Allergies Daughter     Bleeding Prob Daughter     Bleeding Prob Son     Asthma Daughter     Asthma Son        Objective:   Physical Exam  Constitutional:       Appearance: She is well-developed. HENT:      Head: Normocephalic and atraumatic. Eyes:      Pupils: Pupils are equal, round, and reactive to light. Neck:      Musculoskeletal: Normal range of motion. Pulmonary:      Effort: Pulmonary effort is normal.   Musculoskeletal: Normal range of motion. General: No tenderness or deformity. Right wrist: Normal.      Left wrist: Normal.      Right hand: She exhibits normal range of motion, no tenderness, no bony tenderness, normal two-point discrimination, normal capillary refill, no deformity, no laceration and no swelling. Normal sensation noted. Decreased sensation is not present in the ulnar distribution, is not present in the medial distribution and is not present in the radial distribution. Normal strength noted. Left hand: She exhibits normal range of motion, no tenderness, no bony tenderness, normal two-point discrimination, normal capillary refill, no deformity, no laceration and no swelling. Normal sensation noted. Decreased sensation is not present in the ulnar distribution, is not present in the medial redistribution and is not present in the radial distribution. Normal strength noted.    Skin: General: Skin is warm and dry. Capillary Refill: Capillary refill takes less than 2 seconds. Coloration: Skin is not pale. Findings: No erythema or rash. Neurological:      Mental Status: She is alert and oriented to person, place, and time. Sensory: Sensory deficit present. Left hand-Skin intact with no erythema, ecchymosis or lacerations present.  strength 5/5.     Xr Hand Left (min 3 Views)     Result Date: 2/1/2021  XRAY X-ray 3 views of the left hand obtained and reviewed by me today in the office demonstrates age appropriate bone density throughout with mild to moderate narrowing of the left thumb CMC joint with otherwise normal spacing and normal overall alignment, no subluxation or dislocation noted, no acute osseous abnormalities. Impression: Mild to moderate degenerative changes of the left thumb CMC joint with no acute process.      /77   Pulse 81   Temp 96.8 °F (36 °C) (Temporal)   Resp 16   Ht 5' 3\" (1.6 m)   Wt 238 lb (108 kg)   LMP 02/15/2012   SpO2 96%   BMI 42.16 kg/m²     Assessment:   Left carpal tunnel syndrome, severe  Left thumb CMC DJD, mild to moderate                Plan:   I discussed with her today her x-ray and EMG findings. I explained her that she does have severe carpal tunnel syndrome on the left. At this point given her persistent and worsening symptoms and with her EMG findings I recommend surgical treatment. I discussed with her today performing left carpal tunnel release. I explained risks, benefits, possible complications of the procedure and answered all questions for the patient. I explained postoperative rehabilitation protocol and expectations with the patient today. The patient understands and consents to the procedure. Patient will follow up with their primary care physician prior to surgical treatment for preoperative clearance. We will schedule surgery at soonest convenience.   Continue weight-bearing as tolerated. Continue range of motion exercises as instructed. Ice and elevate as needed. Tylenol or Motrin for pain. Follow up in 2 weeks postop.                 The patient was counseled at length about the risks of campos Covid-19 during their perioperative period and any recovery window from their procedure. The patient was made aware that campos Covid-19  may worsen their prognosis for recovering from their procedure  and lend to a higher morbidity and/or mortality risk. All material risks, benefits, and reasonable alternatives including postponing the procedure were discussed. The patient does wish to proceed with the procedure at this time. Pt seen and examined, No change in H+P.     Mike 97, DO

## 2021-02-16 NOTE — ANESTHESIA PRE PROCEDURE
Department of Anesthesiology  Preprocedure Note       Name:  Kelin Dykes   Age:  38 y.o.  :  1980                                          MRN:  [id number]         Date:  2021      Surgeon: Cat Gross):  Osmar Xiao DO    Procedure: Procedure(s):  LEFT CARPAL TUNNEL RELEASE    Medications prior to admission:   Prior to Admission medications    Medication Sig Start Date End Date Taking? Authorizing Provider   phentermine (ADIPEX-P) 37.5 MG tablet Take 1 tablet by mouth every morning (before breakfast) for 30 days. 21 Yes Alejandra Campos MD   vitamin D (CHOLECALCIFEROL) 50 MCG ( UT) TABS tablet Take 1 tablet by mouth daily 21  Yes Alejandra Campos MD   HYDROcodone-acetaminophen (NORCO) 5-325 MG per tablet Take 1 tablet by mouth every 6 hours as needed for Pain for up to 7 days. Intended supply: 7 days. Take lowest dose possible to manage pain 2/10/21 2/17/21  Osmar Xiao DO   naproxen sodium (ALEVE) 220 MG tablet Take 220 mg by mouth 2 times daily (with meals)    Historical Provider, MD       Current medications:    Current Facility-Administered Medications   Medication Dose Route Frequency Provider Last Rate Last Admin    lactated ringers infusion   Intravenous Continuous Osmar Ninoska  mL/hr at 21 0701 New Bag at 21 0701    sodium chloride flush 0.9 % injection 10 mL  10 mL Intravenous 2 times per day Osmar Xiao, DO        sodium chloride flush 0.9 % injection 10 mL  10 mL Intravenous PRN Osmar Ninoska, DO        ceFAZolin (ANCEF) 2000 mg in dextrose 5 % 100 mL IVPB  2,000 mg Intravenous On Call to Katie Jose Raul 10, DO           Allergies:     Allergies   Allergen Reactions    Codeine Anaphylaxis    Dilaudid [Hydromorphone Hcl] Anaphylaxis    Hydromorphone Anaphylaxis       Problem List:    Patient Active Problem List   Diagnosis Code    Vaginal bleeding N93.9    H/O of hysterectomy with bilateral oophorectomy Z90.710, Z44.274  BMI 39.0-39.9,adult Z68.39    Elevated blood pressure reading R03.0    Panic disorder with agoraphobia F40.01    Early menopause E28.319    Sleep disturbance G47.9    H/O bilateral breast reduction surgery Z98.890    Myofascial pain syndrome M79.18    Somatic dysfunction of head region M99.00    Somatic dysfunction of cervical region M99.01    Somatic dysfunction of back M99.09    Somatic dysfunction of abdominal region M99.09    Somatic dysfunction of pelvic region M99.05    Snoring R06.83    Obstructive sleep apnea on CPAP G47.33, Z99.89    Somatic dysfunction of both upper extremities M99.07    Morbidly obese (HCC) E66.01    Paresthesia of left upper extremity R20.2    Fibromyalgia M79.7    Mixed hyperlipidemia E78.2    Carpal tunnel syndrome of left wrist G56.02    Vitamin D deficiency E55.9       Past Medical History:        Diagnosis Date    Migraine     Mixed hyperlipidemia 1/27/2021    PONV (postoperative nausea and vomiting)     Prolonged emergence from general anesthesia     Pulmonic stenosis     Sleep apnea     Sleep apnea        Past Surgical History:        Procedure Laterality Date    BREAST BIOPSY Left 2010    benign    BREAST REDUCTION SURGERY Bilateral 2003    BREAST SURGERY      CHOLECYSTECTOMY      CYST REMOVAL      HAND SURGERY Right     4/2014    HYSTERECTOMY      INNER EAR SURGERY      OTHER SURGICAL HISTORY  4/16/2012    Repair vaginal cuff    OVARY REMOVAL      PARTIAL NEPHRECTOMY      TOTAL NEPHRECTOMY      TUBAL LIGATION         Social History:    Social History     Tobacco Use    Smoking status: Never Smoker    Smokeless tobacco: Never Used   Substance Use Topics    Alcohol use:  No                                Counseling given: Not Answered      Vital Signs (Current):   Vitals:    02/09/21 1321 02/16/21 0631   BP:  135/77   Pulse:  81   Resp:  16   Temp:  36 °C (96.8 °F)   TempSrc:  Temporal   SpO2:  96% Weight: 238 lb (108 kg) 238 lb (108 kg)   Height: 5' 3\" (1.6 m) 5' 3\" (1.6 m)                                              BP Readings from Last 3 Encounters:   02/16/21 135/77   01/27/21 130/76   09/11/20 128/76       NPO Status: Time of last liquid consumption: 0701(sip of water with Tylenol)                        Time of last solid consumption: 2100                        Date of last liquid consumption: 02/16/21                        Date of last solid food consumption: 02/15/21    BMI:   Wt Readings from Last 3 Encounters:   02/16/21 238 lb (108 kg)   02/01/21 245 lb (111.1 kg)   01/27/21 247 lb (112 kg)     Body mass index is 42.16 kg/m². CBC:   Lab Results   Component Value Date    WBC 8.3 01/27/2021    RBC 4.63 01/27/2021    HGB 13.8 01/27/2021    HCT 41.5 01/27/2021    MCV 89.5 01/27/2021    RDW 15.1 01/27/2021     01/27/2021       CMP:   Lab Results   Component Value Date     01/27/2021    K 4.4 01/27/2021    CL 98 01/27/2021    CO2 24 01/27/2021    BUN 14 01/27/2021    CREATININE 0.9 01/27/2021    GFRAA >60 01/27/2021    AGRATIO 1.4 01/27/2021    LABGLOM >60 01/27/2021    GLUCOSE 98 01/27/2021    PROT 7.5 01/27/2021    PROT 7.7 04/15/2012    CALCIUM 9.6 01/27/2021    BILITOT 0.8 01/27/2021    ALKPHOS 92 01/27/2021    AST 31 01/27/2021    ALT 23 01/27/2021       POC Tests: No results for input(s): POCGLU, POCNA, POCK, POCCL, POCBUN, POCHEMO, POCHCT in the last 72 hours.     Coags:   Lab Results   Component Value Date    PROTIME 11.2 04/15/2012    INR 1.03 04/15/2012    APTT 29.7 04/15/2012       HCG (If Applicable):   Lab Results   Component Value Date    PREGTESTUR NEGATIVE 03/24/2014        ABGs: No results found for: PHART, PO2ART, OJV2RVO, SPU3OHV, BEART, I4MQMHQZ     Type & Screen (If Applicable):  No results found for: LABABO, LABRH    Drug/Infectious Status (If Applicable):  No results found for: HIV, HEPCAB    COVID-19 Screening (If Applicable):   Lab Results Component Value Date    COVID19 NOT DETECTED 02/09/2021         Anesthesia Evaluation  Patient summary reviewed  Airway: Mallampati: II  TM distance: >3 FB   Neck ROM: full  Mouth opening: > = 3 FB Dental:    (+) edentulous      Pulmonary:normal exam    (+) sleep apnea: on noncompliant,            Patient did not smoke on day of surgery. Cardiovascular:  Exercise tolerance: good (>4 METS),   (+) hyperlipidemia      ECG reviewed               Beta Blocker:  Not on Beta Blocker      ROS comment: Sinus tachycardia   Nonspecific T wave abnormality   Abnormal ECG   When compared with ECG of 19-APR-2017 14:00,   No significant change was found   Confirmed by [facility] Janette MARTIN ([id number]) on 12/6/2019 11:54:55 AM   Testing Performed By         Neuro/Psych:   (+) neuromuscular disease:, headaches: migraine headaches, psychiatric history:depression/anxiety             GI/Hepatic/Renal:   (+) morbid obesity          Endo/Other:    (+) electrolyte abnormalities, . Pt had no PAT visit       Abdominal:           Vascular:                                        Anesthesia Plan      MAC     ASA 3     (  covid - 2/9)  Induction: intravenous. MIPS: Prophylactic antiemetics administered. Anesthetic plan and risks discussed with patient. Plan discussed with CRNA.     Attending anesthesiologist reviewed and agrees with Pre Eval content              VIOLET Sy - CRNA   2/16/2021

## 2021-02-16 NOTE — OP NOTE
DATE OF PROCEDURE:  2/16/2021    PREOPERATIVE DIAGNOSES:  1. Left carpal tunnel syndrome. POSTOPERATIVE DIAGNOSES:  1. Left carpal tunnel syndrome. PROCEDURES:  1. Left carpal tunnel release. ATTENDING SURGEON:  Christelle Pastrana DO    FIRST ASSISTANT: JING Cruz    ANESTHESIA:  MAC with local.    ESTIMATED BLOOD LOSS:  1 mL. TOTAL TOURNIQUET TIME:  5 minutes. FLUIDS:  400 mL of crystalloids. INDICATIONS FOR PROCEDURE:  The patient is a 69-year-old female with a  long-standing history of worsening, painful numbness and tingling in her  left hand. For that, she underwent conservative treatment with no relief of  symptoms. EMG was subsequently obtained, which showed evidence of  carpal tunnel syndrome. Given her persistent symptoms despite conservative treatment and with her EMG findings, I   recommended surgical treatment. I explained the risks, benefits and possible complications of the procedure  to the patient and after answering all of her questions, she consented to  undergo the above procedure. DESCRIPTION OF PROCEDURE:  The patient was seen and evaluated in the  preoperative holding area where the left upper extremity was signed in her  presence. At this point, care of the patient was turned over to anesthesia  team, was transported back to the operative suite. She was placed supine  on the operating table with the left upper extremity on an arm board. MAC  anesthesia was applied and once adequate anesthesia was obtained, the left  upper extremity was prepped and draped in usual sterile fashion. Preoperative antibiotics were administered. At this point, a time-out was  performed and all in attendance were in agreement. I marked out the planned surgical incision overlying the volar aspect of  the left wrist along the radial border of the ring finger proximal to  Jesica Rafi cardinal line.     I then injected approximately 2 mL of 1% plain lidocaine around the carpal tunnel incision. I then exsanguinated the left upper extremity using Esmarch and tourniquet was inflated to 200 mmHg. I made a longitudinal incision overlying the previously marked location. I carried  sharp dissection down to the level of the transcarpal ligament. I then  placed a Weitlaner retractor for further visualization. I then incised  through the transcarpal ligament exposing the median nerve. I then placed  a retractor at the distal aspect of the incision. I then bluntly dissected deep to the ligament. I then positioned the tip of the scissors along the ligament and applied gentle pressure distally incising through the distal aspect of the transverse carpal ligament. I then bluntly probed with scissors to ensure complete release had been obtained. I then placed a retractor at the proximal aspect of the incision. I bluntly dissected deep to the ligament. I then positioned the tip of the scissors along the ligament and applied gentle pressure proximally incising the ligament in that direction. I then bluntly probed to ensure complete release had been obtained. I then closed the skin incision using 3-0 nylon suture in horizontal mattress fashion. The tourniquet was then deflated for a total of 5 minutes and adequate hemostasis was maintained. I then applied a sterile  soft dressing to the left upper extremity. The patient was then awakened from anesthesia and transported to PACU in stable condition. She appeared to have tolerated the procedure well. PROGNOSIS:  At this point, she will be discharged to home with a short  course of oral antibiotics as well as pain medication. She can have  immediate range of motion of the left hand, but is to avoid heavy lifting,  pushing or pulling. I will see her back in the office in 2 weeks for suture removal and will continue to monitor her progress in the outpatient setting for resolution of her symptoms.         Mike Sanders, DO

## 2021-02-16 NOTE — PROGRESS NOTES
5492 Arrive to room 20 per cart, drowsy, arousable when name called. Side rails up x2. Call light in reach. 0801 Gave soda. 8028 Resting w eyes closed. Resp even, unlabored. 9793  Pt belongings to room. Drowsy, arouses easily. 0695 Waking up more. Denies pain. Wiggles left fingers, warm. [address] 22 mother, Veltyra Patches, gave discharge instructions per phone and gave discharge instructions to pt, verbalizes understanding.  coming to pick pt up. Pt dressing. 0694 Transfer to car per WC.  driving pt home.

## 2021-02-16 NOTE — BRIEF OP NOTE
Brief Postoperative Note      Patient: Jaziel Clemens  YOB: 1980  MRN: [id number]    Date of Procedure: 2/16/2021    Pre-Op Diagnosis: LEFT CARPAL TUNNEL SYNDROME    Post-Op Diagnosis: Same       Procedure(s):  LEFT CARPAL TUNNEL RELEASE    Surgeon(s):  Kylie Ramos DO    Assistant:  * No surgical staff found *    Anesthesia: Monitor Anesthesia Care    Estimated Blood Loss (mL): Minimal    Complications: None    Specimens:   * No specimens in log *    Implants:  * No implants in log *      Drains: * No LDAs found *    Findings: L CTS    Electronically signed by Mike Sanders DO on 2/16/2021 at 7:43 AM

## 2021-02-17 ENCOUNTER — TELEPHONE (OUTPATIENT)
Dept: ORTHOPEDIC SURGERY | Age: 41
End: 2021-02-17

## 2021-02-17 ENCOUNTER — HOSPITAL ENCOUNTER (EMERGENCY)
Age: 41
Discharge: HOME | End: 2021-02-17
Payer: MEDICAID

## 2021-02-17 VITALS
TEMPERATURE: 97.6 F | WEIGHT: 238 LBS | RESPIRATION RATE: 16 BRPM | HEIGHT: 63 IN | DIASTOLIC BLOOD PRESSURE: 87 MMHG | HEART RATE: 61 BPM | OXYGEN SATURATION: 99 % | SYSTOLIC BLOOD PRESSURE: 128 MMHG | BODY MASS INDEX: 42.17 KG/M2

## 2021-02-17 DIAGNOSIS — R11.2 NON-INTRACTABLE VOMITING WITH NAUSEA, UNSPECIFIED VOMITING TYPE: ICD-10-CM

## 2021-02-17 DIAGNOSIS — Z98.890 S/P CARPAL TUNNEL RELEASE: Primary | ICD-10-CM

## 2021-02-17 DIAGNOSIS — G89.18 POST-OP PAIN: ICD-10-CM

## 2021-02-17 DIAGNOSIS — R42 DIZZINESS: ICD-10-CM

## 2021-02-17 LAB
ALBUMIN SERPL-MCNC: 4 GM/DL (ref 3.4–5)
ALP BLD-CCNC: 75 IU/L (ref 40–128)
ALT SERPL-CCNC: 19 U/L (ref 10–40)
ANION GAP SERPL CALCULATED.3IONS-SCNC: 9 MMOL/L (ref 4–16)
AST SERPL-CCNC: 25 IU/L (ref 15–37)
BASOPHILS ABSOLUTE: 0.1 K/CU MM
BASOPHILS RELATIVE PERCENT: 0.6 % (ref 0–1)
BILIRUB SERPL-MCNC: 0.8 MG/DL (ref 0–1)
BUN BLDV-MCNC: 13 MG/DL (ref 6–23)
CALCIUM SERPL-MCNC: 8.8 MG/DL (ref 8.3–10.6)
CHLORIDE BLD-SCNC: 104 MMOL/L (ref 99–110)
CO2: 27 MMOL/L (ref 21–32)
CREAT SERPL-MCNC: 1.1 MG/DL (ref 0.6–1.1)
DIFFERENTIAL TYPE: ABNORMAL
EOSINOPHILS ABSOLUTE: 0.2 K/CU MM
EOSINOPHILS RELATIVE PERCENT: 2.4 % (ref 0–3)
GFR AFRICAN AMERICAN: >60 ML/MIN/1.73M2
GFR NON-AFRICAN AMERICAN: 55 ML/MIN/1.73M2
GLUCOSE BLD-MCNC: 105 MG/DL (ref 70–99)
HCT VFR BLD CALC: 40.7 % (ref 37–47)
HEMOGLOBIN: 13.5 GM/DL (ref 12.5–16)
IMMATURE NEUTROPHIL %: 0.3 % (ref 0–0.43)
LYMPHOCYTES ABSOLUTE: 1.9 K/CU MM
LYMPHOCYTES RELATIVE PERCENT: 24.8 % (ref 24–44)
MCH RBC QN AUTO: 29.1 PG (ref 27–31)
MCHC RBC AUTO-ENTMCNC: 33.2 % (ref 32–36)
MCV RBC AUTO: 87.7 FL (ref 78–100)
MONOCYTES ABSOLUTE: 0.3 K/CU MM
MONOCYTES RELATIVE PERCENT: 3.7 % (ref 0–4)
NUCLEATED RBC %: 0 %
PDW BLD-RTO: 13.1 % (ref 11.7–14.9)
PLATELET # BLD: 220 K/CU MM (ref 140–440)
PMV BLD AUTO: 10 FL (ref 7.5–11.1)
POTASSIUM SERPL-SCNC: 4.5 MMOL/L (ref 3.5–5.1)
RBC # BLD: 4.64 M/CU MM (ref 4.2–5.4)
SEGMENTED NEUTROPHILS ABSOLUTE COUNT: 5.3 K/CU MM
SEGMENTED NEUTROPHILS RELATIVE PERCENT: 68.2 % (ref 36–66)
SODIUM BLD-SCNC: 140 MMOL/L (ref 135–145)
TOTAL IMMATURE NEUTOROPHIL: 0.02 K/CU MM
TOTAL NUCLEATED RBC: 0 K/CU MM
TOTAL PROTEIN: 7.4 GM/DL (ref 6.4–8.2)
WBC # BLD: 7.8 K/CU MM (ref 4–10.5)

## 2021-02-17 PROCEDURE — 85025 COMPLETE CBC W/AUTO DIFF WBC: CPT

## 2021-02-17 PROCEDURE — 80053 COMPREHEN METABOLIC PANEL: CPT

## 2021-02-17 PROCEDURE — 99285 EMERGENCY DEPT VISIT HI MDM: CPT

## 2021-02-17 PROCEDURE — 6360000002 HC RX W HCPCS: Performed by: PHYSICIAN ASSISTANT

## 2021-02-17 PROCEDURE — 96372 THER/PROPH/DIAG INJ SC/IM: CPT

## 2021-02-17 PROCEDURE — 93010 ELECTROCARDIOGRAM REPORT: CPT | Performed by: INTERNAL MEDICINE

## 2021-02-17 PROCEDURE — 93005 ELECTROCARDIOGRAM TRACING: CPT | Performed by: PHYSICIAN ASSISTANT

## 2021-02-17 RX ORDER — ONDANSETRON 4 MG/1
4 TABLET, FILM COATED ORAL EVERY 8 HOURS PRN
Qty: 10 TABLET | Refills: 0 | Status: SHIPPED | OUTPATIENT
Start: 2021-02-17 | End: 2021-07-09 | Stop reason: SDUPTHER

## 2021-02-17 RX ORDER — MORPHINE SULFATE 4 MG/ML
4 INJECTION, SOLUTION INTRAMUSCULAR; INTRAVENOUS ONCE
Status: COMPLETED | OUTPATIENT
Start: 2021-02-17 | End: 2021-02-17

## 2021-02-17 RX ORDER — OXYCODONE HYDROCHLORIDE AND ACETAMINOPHEN 5; 325 MG/1; MG/1
1-2 TABLET ORAL EVERY 4 HOURS PRN
Qty: 8 TABLET | Refills: 0 | Status: SHIPPED | OUTPATIENT
Start: 2021-02-17 | End: 2021-02-20

## 2021-02-17 RX ADMIN — MORPHINE SULFATE 4 MG: 4 INJECTION, SOLUTION INTRAMUSCULAR; INTRAVENOUS at 14:04

## 2021-02-17 ASSESSMENT — PAIN SCALES - GENERAL
PAINLEVEL_OUTOF10: 10
PAINLEVEL_OUTOF10: 8

## 2021-02-17 NOTE — TELEPHONE ENCOUNTER
Patient's mother called in stating patient is having a lot of pain after CTR yesterday. She is taking the vicodin that was given. [address]

## 2021-02-17 NOTE — ED NOTES
Updated on plan of care. Remains on blood pressure and pulses ox. Monitoring. Denies any further needs. Resps even and unlabored.       Maile Ponce RN  02/17/21 8454

## 2021-02-17 NOTE — ED PROVIDER NOTES
normal.     Mouth: Membrane mucosa moist and pink. No posterior oropharynx erythema or tonsillar edema  Eyes: Anicteric sclera. No discharge, PERRL  Neck: Supple. Trachea midline. Cardiovascular: RRR, no murmurs, rubs, or gallops, radial pulses 2+ bilaterally. Pulmonary/Chest: Effort normal. No respiratory distress. CTAB. No stridor. No wheezes. No rales. Abdominal: Soft. Nontender to palpation. No distension. No guarding, rebound tenderness, or evidence of ascites. : No CVA tenderness. Musculoskeletal: Moves all extremities. No gross deformity. Left wrist wound dressing was unwrapped, sutures are still in place, wound appears clean without overlying erythema. Patient has diffuse tenderness to palpation of the left wrist joint and left palm. Compartments are soft. NEUROLOGICAL: Awake and alert. GCS 15. Cranial nerves 2-12 grossly intact. Strength 5/5 throughout. Light touch sensation intact throughout. Finger to nose WNL. Skin: Warm and dry. No rash. Psychiatric: Normal mood and affect. Behavior is normal.      EKG   Please see Dr. Yovana Guardado note for EKG read.       Radiographs (if obtained):  [] The following radiograph was interpreted by myself in the absence of a radiologist:   [x] Radiologist's Report Reviewed:  No orders to display          Labs  Results for orders placed or performed during the hospital encounter of 02/17/21   CBC Auto Differential   Result Value Ref Range    WBC 7.8 4.0 - 10.5 K/CU MM    RBC 4.64 4.2 - 5.4 M/CU MM    Hemoglobin 13.5 12.5 - 16.0 GM/DL    Hematocrit 40.7 37 - 47 %    MCV 87.7 78 - 100 FL    MCH 29.1 27 - 31 PG    MCHC 33.2 32.0 - 36.0 %    RDW 13.1 11.7 - 14.9 %    Platelets 652 420 - 885 K/CU MM    MPV 10.0 7.5 - 11.1 FL    Differential Type AUTOMATED DIFFERENTIAL     Segs Relative 68.2 (H) 36 - 66 %    Lymphocytes % 24.8 24 - 44 %    Monocytes % 3.7 0 - 4 %    Eosinophils % 2.4 0 - 3 %    Basophils % 0.6 0 - 1 %    Segs Absolute 5.3 K/CU MM    Lymphocytes with oxycodone and Zofran, symptoms likely related to uncontrolled postoperative pain, discussed cryotherapy, wound was redressed, plan is to discharge. Recommended returning to ED for any new or worsening symptoms including worsening pain, numbness or weakness, pallor cyanosis, syncope, chest pain, continued vomiting. Assessment and plan discussed with patient who understands and agrees. I have independently evaluated this patient. Final Impression  1. S/P carpal tunnel release    2. Non-intractable vomiting with nausea, unspecified vomiting type    3. Dizziness    4. Post-op pain        Blood pressure 128/87, pulse 61, temperature 97.6 °F (36.4 °C), temperature source Oral, resp. rate 16, height 5' 3\" (1.6 m), weight 238 lb (108 kg), last menstrual period 02/15/2012, SpO2 99 %, not currently breastfeeding. Disposition:  Discharge to home in stable condition. Patient was given scripts for the following medications. I counseled patient how to take these medications. Discharge Medication List as of 2/17/2021  3:15 PM      START taking these medications    Details   oxyCODONE-acetaminophen (PERCOCET) 5-325 MG per tablet Take 1-2 tablets by mouth every 4 hours as needed for Pain for up to 3 days. , Disp-8 tablet, R-0Normal      ondansetron (ZOFRAN) 4 MG tablet Take 1 tablet by mouth every 8 hours as needed for Nausea, Disp-10 tablet, R-0Normal             This chart was generated using the [address] [place] [address] [address]  dictation system. I created this record but it may contain dictation errors given the limitations of this technology.        Jordan Marie PA-C  02/17/21 6862

## 2021-02-17 NOTE — ED NOTES
Updated on plan of care. Remains on blood pressure and pulses ox. Monitoring. Denies any further needs. Resps even and unlabored.       Blanca Crouch RN  02/17/21 7101

## 2021-02-17 NOTE — TELEPHONE ENCOUNTER
Patient mother Abbie Gil called in stating that the patient feeling nauseous and feeling like she is going to pass out. Please advise.  [phone]

## 2021-02-17 NOTE — ED PROVIDER NOTES
12 lead EKG per my interpretation:  Normal Sinus Rhythm at 64  Axis is   Normal  QTc is  within an acceptable range  There is no specific T wave changes appreciated. There is no specific ST wave changes appreciated. No STEMI    Prior EKG to compare with was available and no clinically significant change over morphology when compared to prior.     Judie Preston MD  02/17/21 3960

## 2021-02-17 NOTE — ED NOTES
Pt to waiting room via wheelchair to wait on ride to arrive. Pt denies further needs at this time.  No distress noted     Steve Watts RN  02/17/21 2034

## 2021-02-23 LAB
EKG ATRIAL RATE: 64 BPM
EKG DIAGNOSIS: NORMAL
EKG P AXIS: 39 DEGREES
EKG P-R INTERVAL: 182 MS
EKG Q-T INTERVAL: 430 MS
EKG QRS DURATION: 94 MS
EKG QTC CALCULATION (BAZETT): 443 MS
EKG R AXIS: 6 DEGREES
EKG T AXIS: 19 DEGREES
EKG VENTRICULAR RATE: 64 BPM

## 2021-02-26 ENCOUNTER — OFFICE VISIT (OUTPATIENT)
Dept: INTERNAL MEDICINE CLINIC | Age: 41
End: 2021-02-26
Payer: MEDICAID

## 2021-02-26 VITALS
BODY MASS INDEX: 41.56 KG/M2 | WEIGHT: 234.6 LBS | TEMPERATURE: 97.4 F | SYSTOLIC BLOOD PRESSURE: 128 MMHG | DIASTOLIC BLOOD PRESSURE: 68 MMHG | HEART RATE: 85 BPM | OXYGEN SATURATION: 95 %

## 2021-02-26 DIAGNOSIS — G56.02 CARPAL TUNNEL SYNDROME OF LEFT WRIST: ICD-10-CM

## 2021-02-26 DIAGNOSIS — E66.01 MORBIDLY OBESE (HCC): Primary | ICD-10-CM

## 2021-02-26 DIAGNOSIS — M79.7 FIBROMYALGIA: ICD-10-CM

## 2021-02-26 DIAGNOSIS — E55.9 VITAMIN D DEFICIENCY: ICD-10-CM

## 2021-02-26 PROCEDURE — G8427 DOCREV CUR MEDS BY ELIG CLIN: HCPCS | Performed by: INTERNAL MEDICINE

## 2021-02-26 PROCEDURE — G8484 FLU IMMUNIZE NO ADMIN: HCPCS | Performed by: INTERNAL MEDICINE

## 2021-02-26 PROCEDURE — 99213 OFFICE O/P EST LOW 20 MIN: CPT | Performed by: INTERNAL MEDICINE

## 2021-02-26 PROCEDURE — G8417 CALC BMI ABV UP PARAM F/U: HCPCS | Performed by: INTERNAL MEDICINE

## 2021-02-26 PROCEDURE — 1036F TOBACCO NON-USER: CPT | Performed by: INTERNAL MEDICINE

## 2021-02-26 RX ORDER — PHENTERMINE HYDROCHLORIDE 37.5 MG/1
37.5 TABLET ORAL
Qty: 30 TABLET | Refills: 0 | Status: SHIPPED | OUTPATIENT
Start: 2021-02-26 | End: 2021-03-28

## 2021-02-26 RX ORDER — CHOLECALCIFEROL (VITAMIN D3) 50 MCG
2000 TABLET ORAL DAILY
Qty: 30 TABLET | Refills: 3 | Status: SHIPPED | OUTPATIENT
Start: 2021-02-26 | End: 2021-07-09 | Stop reason: SDUPTHER

## 2021-02-26 NOTE — PROGRESS NOTES
Name: Tara Sears  [id number]  Age: 38 y.o. YOB: 1980  Sex: female    CHIEF COMPLAINT:    Chief Complaint   Patient presents with    Obesity     Adipex refill number 2       HISTORY OF PRESENT ILLNESS:     This is a pleasant  38 y.o. female  is seen today for management of chronic medical problems and medications refills. Previous records reviewed . She did loose weight on Adipex-p  @ 13 lbs. Denies any side effect except mild dry mouth. Wants to continue on Adipex. Doing OK . Denies CP or SOB. No fever , sore throat or cough or congestion. Denies any abdominal pain. Appetite OK. Bowels moving [id number] [address] No urinary symptoms. C/O lower back pain and generalized muscle aches. .  Takes Tylenol and Aleve which helps her pain. Dr. Kp Truong did left CTS surgery on 2/16/2021 and her pain and numbness left wrist is better. Hearing is ok. Vision Ok with glasses. Denies  any significant skin lesions. Denies any significant depression or anxiety. No other complaints.       Past Medical History:    Patient Active Problem List   Diagnosis    Vaginal bleeding    H/O of hysterectomy with bilateral oophorectomy    BMI 39.0-39.9,adult    Elevated blood pressure reading    Panic disorder with agoraphobia    Early menopause    Sleep disturbance    H/O bilateral breast reduction surgery    Myofascial pain syndrome    Somatic dysfunction of head region    Somatic dysfunction of cervical region    Somatic dysfunction of back    Somatic dysfunction of abdominal region    Somatic dysfunction of pelvic region    Snoring    Obstructive sleep apnea on CPAP    Somatic dysfunction of both upper extremities    Morbidly obese (HCC)    Paresthesia of left upper extremity    Fibromyalgia    Mixed hyperlipidemia    Carpal tunnel syndrome of left wrist    Vitamin D deficiency        Past Surgical History:        Procedure Laterality Date    BREAST BIOPSY Left 2010    benign  BREAST REDUCTION SURGERY Bilateral 2003    BREAST SURGERY      CARPAL TUNNEL RELEASE Left 2/16/2021    LEFT CARPAL TUNNEL RELEASE performed by Anaid Mike DO at [address] CYST REMOVAL      HAND SURGERY Right     4/2014    HYSTERECTOMY      INNER EAR SURGERY      OTHER SURGICAL HISTORY  4/16/2012    Repair vaginal cuff    OVARY REMOVAL      PARTIAL NEPHRECTOMY      TOTAL NEPHRECTOMY      TUBAL LIGATION         Social History:   Social History     Tobacco Use    Smoking status: Never Smoker    Smokeless tobacco: Never Used   Substance Use Topics    Alcohol use: No       Family History:       Problem Relation Age of Onset    Cancer Mother    [facility] Migraines Mother     Cancer Maternal Grandmother     Diabetes Maternal Grandmother     Allergies Son     Allergies Daughter     Bleeding Prob Daughter     Bleeding Prob Son     Asthma Daughter     Asthma Son        Allergies:  Codeine, Dilaudid [hydromorphone hcl], and Hydromorphone    Current Medications :      Prior to Admission medications    Medication Sig Start Date End Date Taking? Authorizing Provider   phentermine (ADIPEX-P) 37.5 MG tablet Take 1 tablet by mouth every morning (before breakfast) for 30 days. 2/26/21 3/28/21 Yes Benson Noe MD   vitamin D (CHOLECALCIFEROL) 50 MCG (2000 UT) TABS tablet Take 1 tablet by mouth daily 2/26/21  Yes Benson Noe MD   ondansetron (ZOFRAN) 4 MG tablet Take 1 tablet by mouth every 8 hours as needed for Nausea 2/17/21  Yes Tan Stallings PA-C   naproxen sodium (ALEVE) 220 MG tablet Take 220 mg by mouth 2 times daily (with meals)   Yes Historical Provider, MD       LAB DATA: Reviewed. REVIEW OF SYSTEMS:   see HPI/ Comprehensive review of systems negative except for the ones mentioned in HPI.     PHYSICAL EXAMINATION:   /68   Pulse 85   Temp 97.4 °F (36.3 °C)   Wt 234 lb 9.6 oz (106.4 kg)   LMP 02/15/2012   SpO2 95%   BMI 41.56 kg/m² GENERAL APPEARANCE:    Alert, oriented x 3, well developed, cooperative, not in any distress, appears stated age. HEAD:   Normocephalic, atraumatic   EYES:   PERRLA, EOMI, lids normal, conjuctivea clear, sclera anicteric. NECK:    Supple, symmetrical,  trachea midline, no thyromegaly, no JVD, no lymphadenopathy. LUNGS:    Clear to auscultation bilaterally, respirations unlabored, accessory muscles are not used. HEART:     Regular rate and rhythm, S1 and S2 normal, no murmur, rub or gallop. PMI in MCL. ABDOMEN:    Soft, non-tender, bowel sounds are normoactive, no masses, no hepatospleenomegaly. .  Patient is morbidly obese. EXTREMITY:   no bipedal edema  NEURO:  Alert, oriented to person, place and time. Grossly intact. Musculoskeletal:         No kyphosis or scoliosis, no deformity in any extremity noted, muscle strength and tone are normal.  Skin:                            Warm and dry. No rash or obvious suspicious lesions, Sutures left wrist   PSYCH:  Mood euthymic, insight and judgement good. ASSESSMENT/PLAN:    1. Morbidly obese ([address]  Continue Adipex-P. Advised continued diet, exercise and weight loss. - phentermine (ADIPEX-P) 37.5 MG tablet; Take 1 tablet by mouth every morning (before breakfast) for 30 days. Dispense: 30 tablet; Refill: 0    2. Fibromyalgia  Advised take Tylenol and also Aleve as needed. Advised stretching exercises. 3. Vitamin D deficiency  Continue vitamin D3.  - vitamin D (CHOLECALCIFEROL) 50 MCG (2000 UT) TABS tablet; Take 1 tablet by mouth daily  Dispense: 30 tablet; Refill: 3    4. Left carpal tunnel syndrome. Status post left carpal tunnel release recently. Advised to follow with Dr. Mike Zimmerman. I have recommended that the patient follow CDC guidelines for prevention of COVID-19 infection.

## 2021-03-01 ENCOUNTER — OFFICE VISIT (OUTPATIENT)
Dept: ORTHOPEDIC SURGERY | Age: 41
End: 2021-03-01

## 2021-03-01 VITALS
RESPIRATION RATE: 16 BRPM | WEIGHT: 245 LBS | HEART RATE: 98 BPM | OXYGEN SATURATION: 99 % | HEIGHT: 63 IN | BODY MASS INDEX: 43.41 KG/M2

## 2021-03-01 DIAGNOSIS — Z98.890 S/P CARPAL TUNNEL RELEASE: Primary | ICD-10-CM

## 2021-03-01 PROCEDURE — 99024 POSTOP FOLLOW-UP VISIT: CPT | Performed by: ORTHOPAEDIC SURGERY

## 2021-03-01 NOTE — PATIENT INSTRUCTIONS
Sutures removed  Avoid heavy lifting, pushing, or pulling with the left hand  May take Ibuprofen or Motrin as needed  May wash incision but not allow the hand to soak in water  Follow up in one month

## 2021-03-01 NOTE — PROGRESS NOTES
Patient returns to the office for a 2 week post operative follow up on a left carpal tunnel release that was performed on 2/16/21. Pain is rated at a 6/10 which she describes as discomfort along the base of the hand. Denies SOB, chest or calf muscle pain with no numbness or tingling reported. Sutures remain dry, clean,and intact with no signs of infection.

## 2021-03-02 ASSESSMENT — ENCOUNTER SYMPTOMS
SHORTNESS OF BREATH: 0
COLOR CHANGE: 0

## 2021-03-02 NOTE — PROGRESS NOTES
Subjective:      Patient ID: Sarah Olmedo is a 38 y.o. female. Patient returns to the office for a 2 week post operative follow up on a left carpal tunnel release that was performed on 2/16/21. Pain is rated at a 6/10 which she describes as discomfort along the base of the hand. Denies SOB, chest or calf muscle pain with no numbness or tingling reported. Sutures remain dry, clean,and intact with no signs of infection. She comes in today for her 2-week postop recheck. She states that overall the numbness and tingling in her hand is improved but she does continue to have soreness and tightness in the palm of her hand and around the incision site. She denies any new injury to the left hand and denies any fever or chills. Review of Systems   Constitutional: Negative for activity change, chills and fever. Respiratory: Negative for shortness of breath. Cardiovascular: Negative for chest pain. Musculoskeletal: Positive for joint swelling and myalgias. Negative for arthralgias and gait problem. Skin: Negative for color change, pallor, rash and wound. Neurological: Negative for weakness and numbness. Past Medical History:   Diagnosis Date    Migraine     Mixed hyperlipidemia 1/27/2021    PONV (postoperative nausea and vomiting)     Prolonged emergence from general anesthesia     Pulmonic stenosis     Sleep apnea     Sleep apnea        Objective:   Physical Exam  Constitutional:       Appearance: She is well-developed. HENT:      Head: Normocephalic and atraumatic. Eyes:      Pupils: Pupils are equal, round, and reactive to light. Neck:      Musculoskeletal: Normal range of motion. Pulmonary:      Effort: Pulmonary effort is normal.   Musculoskeletal: Normal range of motion. General: Swelling and tenderness present. No deformity.       Right wrist: Normal.      Left wrist: Normal.      Right hand: She exhibits normal range of motion, no tenderness, no bony tenderness, normal two-point discrimination, normal capillary refill, no deformity, no laceration and no swelling. Normal sensation noted. Decreased sensation is not present in the ulnar distribution, is not present in the medial distribution and is not present in the radial distribution. Normal strength noted. Left hand: She exhibits tenderness and swelling. She exhibits normal range of motion, no bony tenderness, normal two-point discrimination, normal capillary refill, no deformity and no laceration. Normal sensation noted. Decreased sensation is not present in the ulnar distribution, is not present in the medial redistribution and is not present in the radial distribution. Normal strength noted. Skin:     General: Skin is warm and dry. Capillary Refill: Capillary refill takes less than 2 seconds. Coloration: Skin is not pale. Findings: No erythema or rash. Neurological:      Mental Status: She is alert and oriented to person, place, and time. Sensory: No sensory deficit. Left hand-Incision clean, dry, intact, with no erythema, no drainage, and no signs of infection. Sutures present and removed today. Assessment:      Left carpal tunnel release, 2 weeks  Left thumb CMC DJD, mild to moderate      Plan:       I discussed with her today that she is progressing very well. I discussed with the patient today that their are symptoms are normal and should improve with time. At this point she can resume lifting, pushing, pulling as tolerated with the left hand. I did discuss scar massage with her today. Continue weight-bearing as tolerated. Continue range of motion exercises as instructed. Ice and elevate as needed. Tylenol or Motrin for pain. Follow up in 1 month for recheck.           Mike Sanders, DO

## 2021-03-03 ENCOUNTER — TELEPHONE (OUTPATIENT)
Dept: ORTHOPEDIC SURGERY | Age: 41
End: 2021-03-03

## 2021-03-03 RX ORDER — CEPHALEXIN 250 MG/1
250 CAPSULE ORAL 4 TIMES DAILY
Qty: 40 CAPSULE | Refills: 0 | Status: SHIPPED | OUTPATIENT
Start: 2021-03-03 | End: 2021-03-13

## 2021-03-03 NOTE — TELEPHONE ENCOUNTER
Patient states that she is having drainage that is green and some brown. Please advise    Kenneth Brumfield 1999  Please call once it is sent in.

## 2021-03-08 ENCOUNTER — TELEPHONE (OUTPATIENT)
Dept: ORTHOPEDIC SURGERY | Age: 41
End: 2021-03-08

## 2021-03-08 NOTE — TELEPHONE ENCOUNTER
Patient presented to the office today for wound check. Pt states she stopped taking her antibotic do to it causing her to have a headache and being dizzy. Pt states she has noticed drainage coming from her incision site daily.

## 2021-03-10 ENCOUNTER — TELEPHONE (OUTPATIENT)
Dept: ORTHOPEDIC SURGERY | Age: 41
End: 2021-03-10

## 2021-03-10 NOTE — TELEPHONE ENCOUNTER
Please call bactrim in for patients wound. Pt states show now has a hole in her hand.  Pt will follow up Monday in the office

## 2021-03-11 RX ORDER — SULFAMETHOXAZOLE AND TRIMETHOPRIM 800; 160 MG/1; MG/1
1 TABLET ORAL 2 TIMES DAILY
Qty: 14 TABLET | Refills: 0 | Status: SHIPPED | OUTPATIENT
Start: 2021-03-11 | End: 2021-03-18

## 2021-03-15 ENCOUNTER — OFFICE VISIT (OUTPATIENT)
Dept: ORTHOPEDIC SURGERY | Age: 41
End: 2021-03-15

## 2021-03-15 VITALS — WEIGHT: 245 LBS | BODY MASS INDEX: 43.41 KG/M2 | RESPIRATION RATE: 15 BRPM | HEIGHT: 63 IN

## 2021-03-15 DIAGNOSIS — Z98.890 S/P CARPAL TUNNEL RELEASE: Primary | ICD-10-CM

## 2021-03-15 PROCEDURE — 99024 POSTOP FOLLOW-UP VISIT: CPT | Performed by: ORTHOPAEDIC SURGERY

## 2021-03-15 NOTE — PATIENT INSTRUCTIONS
Continue weight-bearing as tolerated. Continue range of motion exercises as instructed. Ice and elevate as needed. Tylenol or Motrin for pain.   Start to take Bactrim as prescribed  Follow up in 2 weeks

## 2021-03-15 NOTE — PROGRESS NOTES
Patient presents to the office today for post op check of the left CTR DOS 2/16/21 wound check. Pt states that pain today is a 5/10 will increase with lifting anything or when she hits her hand on something. Pt states she is having constant burning in the left hand.  Pt states she has not started taking her antibiotic bactrim she picked them up last night

## 2021-03-16 ASSESSMENT — ENCOUNTER SYMPTOMS
COLOR CHANGE: 0
SHORTNESS OF BREATH: 0

## 2021-03-16 NOTE — PROGRESS NOTES
deformity. Right wrist: Normal.      Left wrist: Normal.      Right hand: She exhibits normal range of motion, no tenderness, no bony tenderness, normal two-point discrimination, normal capillary refill, no deformity, no laceration and no swelling. Normal sensation noted. Decreased sensation is not present in the ulnar distribution, is not present in the medial distribution and is not present in the radial distribution. Normal strength noted. Left hand: She exhibits tenderness and swelling. She exhibits normal range of motion, no bony tenderness, normal two-point discrimination, normal capillary refill, no deformity and no laceration. Normal sensation noted. Decreased sensation is not present in the ulnar distribution, is not present in the medial redistribution and is not present in the radial distribution. Normal strength noted. Skin:     General: Skin is warm and dry. Capillary Refill: Capillary refill takes less than 2 seconds. Coloration: Skin is not pale. Findings: No erythema or rash. Neurological:      Mental Status: She is alert and oriented to person, place, and time. Sensory: No sensory deficit. Left hand-Incision clean, dry, intact, with no erythema, no drainage, and no signs of infection. Large dry scab present over the area of wound dehiscence. Active flexion extension present all fingers, no tenderness to palpation along the flexor tendons.  strength 4/5. Assessment:      Left carpal tunnel release, 4 weeks  Left thumb CMC DJD, mild to moderate      Plan:       I discussed with her today that she is progressing slowly but well. I reassured her that the infection is resolving but she does need to take her Bactrim. Continue work on range of motion exercises. Continue weight-bearing as tolerated. Continue range of motion exercises as instructed. Ice and elevate as needed. Tylenol or Motrin for pain.   Follow up in 2 weeks for recheck and once her scab has healed over we will discuss formal occupational therapy.             Mike Sanders, DO

## 2021-04-05 ENCOUNTER — OFFICE VISIT (OUTPATIENT)
Dept: ORTHOPEDIC SURGERY | Age: 41
End: 2021-04-05

## 2021-04-05 VITALS
HEIGHT: 63 IN | HEART RATE: 102 BPM | WEIGHT: 245 LBS | BODY MASS INDEX: 43.41 KG/M2 | RESPIRATION RATE: 15 BRPM | OXYGEN SATURATION: 94 %

## 2021-04-05 DIAGNOSIS — Z09 POSTOPERATIVE EXAMINATION: ICD-10-CM

## 2021-04-05 DIAGNOSIS — Z98.890 S/P CARPAL TUNNEL RELEASE: Primary | ICD-10-CM

## 2021-04-05 PROCEDURE — 99024 POSTOP FOLLOW-UP VISIT: CPT | Performed by: ORTHOPAEDIC SURGERY

## 2021-04-05 NOTE — PROGRESS NOTES
Patient presents to the office today for post op check of the left carpal tunnel release DOS 2/16/21. Pt states pain today is a 5/10. Pt states her wrist is very tender to the touch and sensitive. Pt states that if she tries to push herself up out of the chair she will have a sharp stabbing pain in the left wrist. Pt states she completed her antibiotic.

## 2021-04-05 NOTE — PATIENT INSTRUCTIONS
Continue weight-bearing as tolerated. Continue range of motion exercises as instructed. Ice and elevate as needed. Tylenol or Motrin for pain.   Therapy ordered today they will call to schedule your appointments   Follow up as needed  Massage incision site

## 2021-04-06 ASSESSMENT — ENCOUNTER SYMPTOMS
SHORTNESS OF BREATH: 0
COLOR CHANGE: 0

## 2021-04-06 NOTE — PROGRESS NOTES
Subjective:      Patient ID: Julia England is a 38 y.o. female. Patient presents to the office today for post op check of the left carpal tunnel release DOS 2/16/21. Pt states pain today is a 5/10. Pt states her wrist is very tender to the touch and sensitive. Pt states that if she tries to push herself up out of the chair she will have a sharp stabbing pain in the left wrist. Pt states she completed her antibiotic. She comes in today for her 7 in the scab and incision -week postop recheck. She states that overall she is no longer having any numbness or tingling in her left hand has begun healing well and she is no longer having any signs of infection. She does continue to have some tenderness over the scar as well as weakness with  strength in the left hand. She denies any new injury to the left hand and denies any fever or chills. Review of Systems   Constitutional: Negative for activity change, chills and fever. Respiratory: Negative for shortness of breath. Cardiovascular: Negative for chest pain. Musculoskeletal: Negative for arthralgias, gait problem, joint swelling and myalgias. Skin: Negative for color change, pallor, rash and wound. Neurological: Negative for weakness and numbness. Past Medical History:   Diagnosis Date    Migraine     Mixed hyperlipidemia 1/27/2021    PONV (postoperative nausea and vomiting)     Prolonged emergence from general anesthesia     Pulmonic stenosis     Sleep apnea     Sleep apnea        Objective:   Physical Exam  Constitutional:       Appearance: She is well-developed. HENT:      Head: Normocephalic and atraumatic. Eyes:      Pupils: Pupils are equal, round, and reactive to light. Neck:      Musculoskeletal: Normal range of motion. Pulmonary:      Effort: Pulmonary effort is normal.   Musculoskeletal: Normal range of motion. General: No swelling, tenderness or deformity.       Right wrist: Normal.      Left wrist: Normal.      Right hand: She exhibits normal range of motion, no tenderness, no bony tenderness, normal two-point discrimination, normal capillary refill, no deformity, no laceration and no swelling. Normal sensation noted. Decreased sensation is not present in the ulnar distribution, is not present in the medial distribution and is not present in the radial distribution. Normal strength noted. Left hand: She exhibits normal range of motion, no bony tenderness, normal two-point discrimination, normal capillary refill, no deformity, no laceration and no swelling. Normal sensation noted. Decreased sensation is not present in the ulnar distribution, is not present in the medial redistribution and is not present in the radial distribution. Decreased strength noted. Skin:     General: Skin is warm and dry. Capillary Refill: Capillary refill takes less than 2 seconds. Coloration: Skin is not pale. Findings: No erythema or rash. Neurological:      Mental Status: She is alert and oriented to person, place, and time. Sensory: No sensory deficit. Left hand-Incision clean, dry, intact, with no erythema, no drainage, and no signs of infection.  strength 4/5. Assessment:      Left carpal tunnel release, 7 weeks  Left thumb CMC DJD, mild to moderate      Plan:       I discussed with her today that she is progressing well and her incision has healed well at this point. I will get her started in formal occupational therapy for strengthening of the left hand. Continue weight-bearing as tolerated. Continue range of motion exercises as instructed. Ice and elevate as needed. Tylenol or Motrin for pain. Follow up as needed.               Mike Sanders, DO

## 2021-04-08 ENCOUNTER — OFFICE VISIT (OUTPATIENT)
Dept: INTERNAL MEDICINE CLINIC | Age: 41
End: 2021-04-08
Payer: MEDICAID

## 2021-04-08 VITALS
HEART RATE: 102 BPM | DIASTOLIC BLOOD PRESSURE: 64 MMHG | OXYGEN SATURATION: 97 % | TEMPERATURE: 97.8 F | BODY MASS INDEX: 40.4 KG/M2 | SYSTOLIC BLOOD PRESSURE: 124 MMHG | WEIGHT: 228.08 LBS

## 2021-04-08 DIAGNOSIS — G56.02 CARPAL TUNNEL SYNDROME OF LEFT WRIST: ICD-10-CM

## 2021-04-08 DIAGNOSIS — E66.01 MORBID OBESITY (HCC): Primary | ICD-10-CM

## 2021-04-08 DIAGNOSIS — E55.9 VITAMIN D DEFICIENCY: ICD-10-CM

## 2021-04-08 PROCEDURE — G8417 CALC BMI ABV UP PARAM F/U: HCPCS | Performed by: INTERNAL MEDICINE

## 2021-04-08 PROCEDURE — G8427 DOCREV CUR MEDS BY ELIG CLIN: HCPCS | Performed by: INTERNAL MEDICINE

## 2021-04-08 PROCEDURE — 99213 OFFICE O/P EST LOW 20 MIN: CPT | Performed by: INTERNAL MEDICINE

## 2021-04-08 PROCEDURE — 1036F TOBACCO NON-USER: CPT | Performed by: INTERNAL MEDICINE

## 2021-04-08 RX ORDER — PHENTERMINE HYDROCHLORIDE 37.5 MG/1
37.5 TABLET ORAL
COMMUNITY
End: 2021-04-08 | Stop reason: SDUPTHER

## 2021-04-08 RX ORDER — PHENTERMINE HYDROCHLORIDE 37.5 MG/1
37.5 TABLET ORAL
Qty: 30 TABLET | Refills: 0 | Status: SHIPPED | OUTPATIENT
Start: 2021-04-08 | End: 2021-05-08

## 2021-04-08 SDOH — ECONOMIC STABILITY: TRANSPORTATION INSECURITY
IN THE PAST 12 MONTHS, HAS THE LACK OF TRANSPORTATION KEPT YOU FROM MEDICAL APPOINTMENTS OR FROM GETTING MEDICATIONS?: NOT ASKED

## 2021-04-08 SDOH — ECONOMIC STABILITY: INCOME INSECURITY: HOW HARD IS IT FOR YOU TO PAY FOR THE VERY BASICS LIKE FOOD, HOUSING, MEDICAL CARE, AND HEATING?: NOT VERY HARD

## 2021-04-08 NOTE — PROGRESS NOTES
Name: Maricarmen Valerio  [id number]  Age: 38 y.o. YOB: 1980  Sex: female    CHIEF COMPLAINT:    Chief Complaint   Patient presents with    Obesity     3rd refill of Adipex       HISTORY OF PRESENT ILLNESS:     This is a pleasant  38 y.o. female  is seen today for management of chronic medical problems and medications refills. Previous records reviewed . Had left CTR on 2/16/2021. Had some complications with infection later but improved. Doing OK . Denies CP or SOB. No fever , sore throat or cough or congestion. Denies any abdominal pain. Appetite OK. Using Adipex -p and has lost 20 lbs since started it . No signficant side effects. Wants refilled for 3 month  Bowels moving [place] . No urinary symptoms. Denies any significant arthritis. Hearing is ok. Vision Ok with glasses. Denies  any significant skin lesions. Denies any significant depression or anxiety. No other complaints. Refuses to have Covid vaccine.       Past Medical History:    Patient Active Problem List   Diagnosis    Vaginal bleeding    H/O of hysterectomy with bilateral oophorectomy    BMI 39.0-39.9,adult    Elevated blood pressure reading    Panic disorder with agoraphobia    Early menopause    Sleep disturbance    H/O bilateral breast reduction surgery    Myofascial pain syndrome    Somatic dysfunction of head region    Somatic dysfunction of cervical region    Somatic dysfunction of back    Somatic dysfunction of abdominal region    Somatic dysfunction of pelvic region    Snoring    Obstructive sleep apnea on CPAP    Somatic dysfunction of both upper extremities    Morbid obesity ([address]    Paresthesia of left upper extremity    Fibromyalgia    Mixed hyperlipidemia    Carpal tunnel syndrome of left wrist    Vitamin D deficiency        Past Surgical History:        Procedure Laterality Date    BREAST BIOPSY Left 2010    benign    BREAST REDUCTION SURGERY Bilateral 2003    BREAST SURGERY      CARPAL TUNNEL RELEASE Left 2/16/2021    LEFT CARPAL TUNNEL RELEASE performed by Anu Gomez DO at [address] CYST REMOVAL      HAND SURGERY Right     4/2014    HYSTERECTOMY      INNER EAR SURGERY      OTHER SURGICAL HISTORY  4/16/2012    Repair vaginal cuff    OVARY REMOVAL      PARTIAL NEPHRECTOMY      TOTAL NEPHRECTOMY      TUBAL LIGATION         Social History:   Social History     Tobacco Use    Smoking status: Never Smoker    Smokeless tobacco: Never Used   Substance Use Topics    Alcohol use: No       Family History:       Problem Relation Age of Onset    Cancer Mother    [facility] Migraines Mother     Cancer Maternal Grandmother     Diabetes Maternal Grandmother     Allergies Son     Allergies Daughter     Bleeding Prob Daughter     Bleeding Prob Son     Asthma Daughter     Asthma Son        Allergies:  Codeine, Dilaudid [hydromorphone hcl], and Hydromorphone    Current Medications :      Prior to Admission medications    Medication Sig Start Date End Date Taking? Authorizing Provider   phentermine (ADIPEX-P) 37.5 MG tablet Take 1 tablet by mouth every morning (before breakfast) for 30 days. 4/8/21 5/8/21 Yes Clifton Wagner MD   vitamin D (CHOLECALCIFEROL) 50 MCG (2000 UT) TABS tablet Take 1 tablet by mouth daily 2/26/21  Yes Clifton Wagner MD   ondansetron (ZOFRAN) 4 MG tablet Take 1 tablet by mouth every 8 hours as needed for Nausea 2/17/21  Yes Tan Stallings PA-C   naproxen sodium (ALEVE) 220 MG tablet Take 220 mg by mouth 2 times daily (with meals)   Yes Historical Provider, MD       LAB DATA: Reviewed. REVIEW OF SYSTEMS:   see HPI/ Comprehensive review of systems negative except for the ones mentioned in HPI.     PHYSICAL EXAMINATION:   /64   Pulse 102   Temp 97.8 °F (36.6 °C)   Wt 228 lb 1.3 oz (103.5 kg)   LMP 02/15/2012   SpO2 97%   BMI 40.40 kg/m²      GENERAL APPEARANCE:    Alert, oriented x 3, well developed, cooperative, not in any distress, current medications. Appropriate prescriptions are ordered. Risks and benefits of meds are discussed. After visit summary provided. Advised to call for any problems, questions, or concerns. If symptoms worsen or don't improve as expected, to call us or go to ER. Follow up as directed, sooner if needed. Return in about 3 months (around 7/8/2021). This dictation was performed with a verbal recognition program and it was checked for errors. It is possible that there are still dictated errors within this office note. Any errors should be brought immediately to my attention for correction. All efforts were made to ensure that this office note is accurate.      Ben Garcia MD

## 2021-04-28 ENCOUNTER — TELEPHONE (OUTPATIENT)
Dept: ORTHOPEDIC SURGERY | Age: 41
End: 2021-04-28

## 2021-04-28 NOTE — TELEPHONE ENCOUNTER
I called [address] [place] [address] and spoke with Mamta Webb.  I started the prior auth for OT [place] CPT [id number] with Tax ID [id number]    Ref # [id number]

## 2021-05-17 ENCOUNTER — HOSPITAL ENCOUNTER (OUTPATIENT)
Dept: OCCUPATIONAL THERAPY | Age: 41
Setting detail: THERAPIES SERIES
Discharge: HOME | End: 2021-05-17
Payer: MEDICAID

## 2021-05-17 PROCEDURE — 97166 OT EVAL MOD COMPLEX 45 MIN: CPT

## 2021-05-17 PROCEDURE — 97140 MANUAL THERAPY 1/> REGIONS: CPT

## 2021-05-17 PROCEDURE — 97110 THERAPEUTIC EXERCISES: CPT

## 2021-05-17 PROCEDURE — 97530 THERAPEUTIC ACTIVITIES: CPT

## 2021-05-17 NOTE — FLOWSHEET NOTE
Occupational Therapy Out Patient Daily Treatment Note     [x][place] [address]      [facility]     [address] [facility] [address]  [address]       [facility] [address]       [address]     [phone]  [phone] [phone]  ______________________________________________________________________  Date:  2021  Patient Name:  Matthew Allen    :  1980  Restrictions/Precautions:  General  Diagnosis:    L CTR  Treatment Diagnosis:  L hand pain  Insurance/Certification information:  [facility]  Referring Physician:   Dr Jeet Gonzalez of care signed (Y/N):    Visit# / total visits: -  COVID screening questions were asked and patient attested that there had been no contact or symptoms  Pain level: 5-6/10     Subjective:   Prior Level of Function:  Pain since L CTR at incision  Patient Goals:  Decrease pain to increase function    Treatment Flowsheet   Right Left     US L palm  x   Scar massage  x   Nerve gliding  x     Tendon gliding  x   digiflex 5#  x   Wrist flex and ext 2#  x   Pinch pin 4# lateral and palmar  x   Issued and instructed in HEP  x                                                                                  Interventions/Modalities used:  [x] Therapeutic Exercise   [x] Modalities:  [x] Therapeutic Activity    [x] Ultrasound [] Elec Stimulation   [] Total Motion Release    [] Fluido [] Kinesiotaping  [] Neuromuscular Re-education   [] Ionto [] Coldpack/hotpack   [x] Instruction in HEP    Other:scar management  Objective Findings: See eval    Communication with other providers: POC to physician    Education provided to patient:  Issued and instructed in HEP    Adverse Reactions to treatment: none noted    Time in: 1640  Time out:  1725  Timed treatment minutes:  20  Total treatment time:  45      If [facility] Please Indicate Time In/Out  CPT Code Time In Time Out Total Min Treatment/Activity Tolerance:     [x]  Patient tolerated treatment well []  Patient limited by fatique    []  Patient limited by pain []  Patient limited by other medical complications   []  Other:   Goals   Pt will decrease pain 2-3/10 to increase functional activity tolerance   Pt will increase L  10-20# to increase functional grasp   Pt will follow thru and be independent with HEP   Scar management     LTG:  Pt will decrease quick dash below 30 for significant functional performance improvement      Patient Requires Follow-up:  [x]  Yes  []  No    Plan: []  Continue per plan of care []  Alter current plan (see comments)   [x]  Plan of care initiated []  Hold pending MD visit []  Discharge    Plan for Next Session:      Electronically signed by:  Erlin Beck OT,OTR/L, 5/17/2021, 5:57 PM

## 2021-05-17 NOTE — PROGRESS NOTES
Occupational Therapy  Occupational Therapy Initial Assessment  Date:  2021    Patient Name: Fernie Higuera  MRN: [id number]     :  1980     Treatment Diagnosis: hand pain M79.642    Restrictions  Restrictions/Precautions  Restrictions/Precautions: General Precautions  Subjective   General  Additional Pertinent Hx: Pt had L CTR in February and is having pain at incision with increased pain with pressure or bumping it. Referring Practitioner: Dr Miesha De La Fuente  Diagnosis: L CTR G56.02  Z98.890  Subjective  Subjective: States Had R hand surgery and did not have this difficulty afterward. Home Living: independent in self care          Date of surgery: 2021  Hand Dominance: Right  Chief complaint:    L hand pain  Pain:  5-6/10   10/10 when bumps scar     Sensation: intact                                      RIGHT                                                                LEFT                             MMT PROM AROM Shoulder AROM PROM MMT      Flexion         Extension         Abduction         Internal Rot. Ext. Rot. Elbow & Forearm        WNL Flex / Ext WNL       WNL Supination WNL       WNL Pronation WNL        Wrist        WNL Flexion WNL       WNL Extension WNL       WNL Ulnar Dev. WNL       WNL Radial Dev. WNL        Thumb         WNL CMC Radial Abd.   WNL       WNL CMC Palmar Abd WNL       WNL MP WNL       WNL IP WNL       Finger Extension/Flexion   RIGHT=WNL    Index  Long Ring Small    MPs    (    ) (    ) (    ) (    )   PIPs    (    ) (    ) (    ) (    )   DIPs (    ) (    ) (    ) (    )          LEFT=WNL    Index  Long Ring Small    MPs    (    ) (    ) (    ) (    )   PIPs    (    ) (    ) (    ) (    )   DIPs (    ) (    ) (    ) (    )       Hand Strength # Right Left     75.2 31.3   Lateral Pinch 15 6   Hurtado Pinch     Tip Pinch          Edema Right  Left    Hand Volumeter     Circumference: Palm      Wrist Crease     Base of Index     Base of Long

## 2021-05-21 ENCOUNTER — HOSPITAL ENCOUNTER (OUTPATIENT)
Dept: OCCUPATIONAL THERAPY | Age: 41
Discharge: HOME | End: 2021-05-21

## 2021-05-21 NOTE — FLOWSHEET NOTE
Occupational Therapy  Cancellation/No-show Note  Patient Name:  Jenae Yee  :  1980   Date:  2021  Cancelled visits to date: 0  No-shows to date: 0    For today's appointment patient:  [x]    Cancelled  []    Rescheduled appointment  []    No-show     Reason given by patient:  []    Patient ill  []    Conflicting appointment  []    No transportation    []    Conflict with work  []    No reason given  [x]    Other:     Comments:  Going out of town  Electronically signed by:  Alexandro Van OT, OTR/L, 2021, 2:49 PM

## 2021-05-24 ENCOUNTER — HOSPITAL ENCOUNTER (OUTPATIENT)
Dept: OCCUPATIONAL THERAPY | Age: 41
Setting detail: THERAPIES SERIES
Discharge: HOME | End: 2021-05-24
Payer: MEDICAID

## 2021-05-24 PROCEDURE — 97140 MANUAL THERAPY 1/> REGIONS: CPT

## 2021-05-24 PROCEDURE — 97035 APP MDLTY 1+ULTRASOUND EA 15: CPT

## 2021-05-24 PROCEDURE — 97110 THERAPEUTIC EXERCISES: CPT

## 2021-05-24 NOTE — FLOWSHEET NOTE
Occupational Therapy Out Patient Daily Treatment Note     [x][place] Kenneth Guerreroutrodrick Hinds [address]      MADYSON [facility]     [address] [facility] [address]       [facility], [address], [address]       [address]ang[address]     [phone]  [phone] [phone]  ______________________________________________________________________  Date:  2021  Patient Name:  Govind Rodriguez    :  1980  Restrictions/Precautions:  General  Diagnosis:    L CTR  Treatment Diagnosis:  L hand pain  Insurance/Certification information:  [facility]  Referring Physician:   Dr Alfredo Rudolph of care signed (Y/N):  Approved for 12 by 2021  Visit# / total visits: -  COVID screening questions were asked and patient attested that there had been no contact or symptoms  Pain level: 5-6/10     Subjective: States went out of town for graduation last appt.  States HEP has helped  Prior Level of Function:  Pain since L CTR at incision  Patient Goals:  Decrease pain to increase function    Treatment Flowsheet   Right Left     US L palm  x   Scar massage  x   Nerve gliding  x     Tendon gliding  x   digiflex 5#  x   Wrist flex and ext 2#  x   Pinch pin 4# lateral and palmar  x   Issued and instructed in HEP  x                                                                                  Interventions/Modalities used:  [x] Therapeutic Exercise   [x] Modalities:  [x] Therapeutic Activity    [x] Ultrasound [] Elec Stimulation   [] Total Motion Release    [] Fluido [] Kinesiotaping  [] Neuromuscular Re-education   [] Ionto [] Coldpack/hotpack   [x] Instruction in HEP    Other:scar management  Objective Findings: L  51.2#    Communication with other providers: POC to physician    Education provided to patient:  Issued and instructed in HEP    Adverse Reactions to treatment: none noted    Time in: 1345  Time out:  1425  Timed treatment minutes:  30  Total treatment time:  40      If BWC Please Indicate Time In/Out  CPT Code Time In Time Out Total Min                                                                    Treatment/Activity Tolerance:     [x]  Patient tolerated treatment well []  Patient limited by fatique    []  Patient limited by pain []  Patient limited by other medical complications   []  Other:   Goals   Pt will decrease pain 2-3/10 to increase functional activity tolerance   Pt will increase L  10-20# to increase functional grasp   Pt will follow thru and be independent with HEP   Scar management     LTG:  Pt will decrease quick dash below 30 for significant functional performance improvement      Patient Requires Follow-up:  [x]  Yes  []  No    Plan: [x]  Continue per plan of care []  Alter current plan (see comments)   [x]  Plan of care initiated []  Hold pending MD visit []  Discharge    Plan for Next Session:      Electronically signed by:  Vinny Casillas OT,OTR/L, 5/24/2021, 3:27 PM

## 2021-05-26 ENCOUNTER — HOSPITAL ENCOUNTER (OUTPATIENT)
Dept: OCCUPATIONAL THERAPY | Age: 41
Discharge: HOME | End: 2021-05-26

## 2021-05-26 NOTE — FLOWSHEET NOTE
Occupational Therapy  Cancellation/No-show Note  Patient Name:  Bailee Gentile  :  1980   Date:  2021  Cancelled visits to date: 2  No-shows to date: 0    For today's appointment patient:  [x]    Cancelled  [x]    Rescheduled appointment  For Friday  []    No-show     Reason given by patient:  []    Patient ill  []    Conflicting appointment  []    No transportation    []    Conflict with work  []    No reason given  [x]    Other:     Comments:    Electronically signed by:  Cari Lewis OT, OTR/L, 2021, 4:04 PM

## 2021-05-28 ENCOUNTER — HOSPITAL ENCOUNTER (OUTPATIENT)
Dept: OCCUPATIONAL THERAPY | Age: 41
Setting detail: THERAPIES SERIES
Discharge: HOME | End: 2021-05-28
Payer: MEDICAID

## 2021-05-28 PROCEDURE — 97035 APP MDLTY 1+ULTRASOUND EA 15: CPT

## 2021-05-28 PROCEDURE — 97140 MANUAL THERAPY 1/> REGIONS: CPT

## 2021-05-28 PROCEDURE — 97110 THERAPEUTIC EXERCISES: CPT

## 2021-05-28 NOTE — FLOWSHEET NOTE
Occupational Therapy Out Patient Daily Treatment Note     [x][place] Kenneth Hinds [address]      M[facility]YSON [facility]     [address] [facility] [address] [facility] 23       [facility] 218, [address]       Sangita Desai [address]     [phone]  [phone] [phone]  ______________________________________________________________________  Date:  2021  Patient Name:  Sade Sage    :  1980  Restrictions/Precautions:  General  Diagnosis:    L CTR  Treatment Diagnosis:  L hand pain  Insurance/Certification information:  [facility]  Referring Physician:   Dr Kimmie Doshi of care signed (Y/N):  Approved for 12 by 2021  Visit# / total visits: 3 /8-  COVID screening questions were asked and patient attested that there had been no contact or symptoms  Pain level: 0/10     Subjective: States is feeling much better.  Lost putty  Prior Level of Function:  Pain since L CTR at incision  Patient Goals:  Decrease pain to increase function    Treatment Flowsheet   Right Left     US L palm  x   Scar massage  x   Nerve gliding  x     Tendon gliding  x   digiflex 5#  x   Wrist flex and ext 2#  x   Pinch pin 4# lateral and palmar  x   Issued and instructed in HEP  x                                                                                  Interventions/Modalities used:  [x] Therapeutic Exercise   [x] Modalities:  [x] Therapeutic Activity    [x] Ultrasound [] Elec Stimulation   [] Total Motion Release    [] Fluido [] Kinesiotaping  [] Neuromuscular Re-education   [] Ionto [] Coldpack/hotpack   [x] Instruction in HEP    Other:scar management  Objective Findings: L  54.1#    Communication with other providers: POC to physician    Education provided to patient:  Issued and instructed in HEP    Adverse Reactions to treatment: none noted    Time in: 1045  Time out:  1125  Timed treatment minutes:  30  Total treatment time:  40      If BWC Please Indicate Time In/Out  CPT Code Time In Time Out Total Min                                                                    Treatment/Activity Tolerance:     [x]  Patient tolerated treatment well []  Patient limited by fatique    []  Patient limited by pain []  Patient limited by other medical complications   []  Other:   Goals   Pt will decrease pain 2-3/10 to increase functional activity tolerance   Pt will increase L  10-20# to increase functional grasp   Pt will follow thru and be independent with HEP   Scar management     LTG:  Pt will decrease quick dash below 30 for significant functional performance improvement      Patient Requires Follow-up:  [x]  Yes  []  No    Plan: [x]  Continue per plan of care []  Alter current plan (see comments)   [x]  Plan of care initiated []  Hold pending MD visit []  Discharge    Plan for Next Session:      Electronically signed by:  José Miguel Castellanos OT,OTR/L, 5/28/2021, 11:28 AM

## 2021-06-09 ENCOUNTER — HOSPITAL ENCOUNTER (OUTPATIENT)
Dept: OCCUPATIONAL THERAPY | Age: 41
Setting detail: THERAPIES SERIES
Discharge: HOME | End: 2021-06-09
Payer: MEDICAID

## 2021-06-09 PROCEDURE — 97110 THERAPEUTIC EXERCISES: CPT

## 2021-06-09 PROCEDURE — 97035 APP MDLTY 1+ULTRASOUND EA 15: CPT

## 2021-06-09 PROCEDURE — 97140 MANUAL THERAPY 1/> REGIONS: CPT

## 2021-06-09 NOTE — FLOWSHEET NOTE
Occupational Therapy Out Patient Daily Treatment Note     [x][place] Kenneth Hinds [address]      M[facility]YSON [facility]     [address] [facility] [address]       [facility], [address], [address]       Sangita Sweeney [address]     [phone]  [phone] [phone]  ______________________________________________________________________  Date:  2021  Patient Name:  Jerry Gonzales    :  1980  Restrictions/Precautions:  General  Diagnosis:    L CTR  Treatment Diagnosis:  L hand pain  Insurance/Certification information:  [facility]  Referring Physician:   Dr Sandi Sung of care signed (Y/N): Y    Approved for 12 by 2021  Visit# / total visits: -  COVID screening questions were asked and patient attested that there had been no contact or symptoms  Pain level: 5/10 With cramping    Subjective: States is feeling much better. Lost putty  Prior Level of Function:  Pain since L CTR at incision  Patient Goals:  Decrease pain to increase function    Treatment Flowsheet   Right Left     US L palm  x   Scar massage  x   Nerve gliding  x     Tendon gliding  x   digiflex 5#  x   Wrist flex and ext 2#  x   Pinch pin 4# lateral and palmar  x   Issued and instructed in HEP  x     Massage to FA muscle dorsal  x                                                                           Interventions/Modalities used:  [x] Therapeutic Exercise   [x] Modalities:  [x] Therapeutic Activity    [x] Ultrasound [] Elec Stimulation   [] Total Motion Release    [] Fluido [] Kinesiotaping  [] Neuromuscular Re-education   [] Ionto [] Coldpack/hotpack   [x] Instruction in HEP    Other:scar management  Objective Findings: L  56.3# Pt kept having muscle spasm in FA when exercised. Wrist extensor ECRL would knot up and pull on wrist. Fingers go into fist but no knotting of muscles on volar surface.     Communication with other providers: POC to physician    Education provided to patient:  Issued and instructed in HEP    Adverse Reactions to treatment: none noted    Time in: 1345  Time out:  1425  Timed treatment minutes:  30  Total treatment time:  40      If BWC Please Indicate Time In/Out  CPT Code Time In Time Out Total Min                                                                    Treatment/Activity Tolerance:     [x]  Patient tolerated treatment well []  Patient limited by fatique    []  Patient limited by pain []  Patient limited by other medical complications   []  Other:   Goals   Pt will decrease pain 2-3/10 to increase functional activity tolerance   Pt will increase L  10-20# to increase functional grasp   Pt will follow thru and be independent with HEP   Scar management     LTG:  Pt will decrease quick dash below 30 for significant functional performance improvement      Patient Requires Follow-up:  [x]  Yes  []  No    Plan: [x]  Continue per plan of care []  Alter current plan (see comments)   [x]  Plan of care initiated []  Hold pending MD visit []  Discharge    Plan for Next Session:      Electronically signed by:  Nirmala Nelson OT, OTR/L, 6/9/2021, 2:23 PM

## 2021-06-14 ENCOUNTER — HOSPITAL ENCOUNTER (OUTPATIENT)
Dept: OCCUPATIONAL THERAPY | Age: 41
Setting detail: THERAPIES SERIES
Discharge: HOME | End: 2021-06-14
Payer: MEDICAID

## 2021-06-14 PROCEDURE — 97140 MANUAL THERAPY 1/> REGIONS: CPT

## 2021-06-14 PROCEDURE — 97035 APP MDLTY 1+ULTRASOUND EA 15: CPT

## 2021-06-14 PROCEDURE — 97110 THERAPEUTIC EXERCISES: CPT

## 2021-06-14 NOTE — FLOWSHEET NOTE
Occupational Therapy Out Patient Daily Treatment Note     [x][place] Kenneth Hinds [address]      M[facility]ON [facility]     [address] [facility] [address]       [facility] 218, [address], [address]       [address]     [phone]  [phone] [phone]  ______________________________________________________________________  Date:  2021  Patient Name:  Edmar Corbett    :  1980  Restrictions/Precautions:  General  Diagnosis:    L CTR  Treatment Diagnosis:  L hand pain  Insurance/Certification information:  [facility]  Referring Physician:   Dr Lucius Rodriguez of care signed (Y/N): Y    Approved for 12 by 2021  Visit# / total visits: -  COVID screening questions were asked and patient attested that there had been no contact or symptoms  Pain level: 3/10    Subjective: States is has not done putty exercise lately  Prior Level of Function:  Pain since L CTR at incision  Patient Goals:  Decrease pain to increase function    Treatment Flowsheet   Right Left     US L palm  x   Scar massage  x   Nerve gliding  x     Tendon gliding  x   digiflex 5#  x   Wrist flex and ext 2#  x   Pinch pin 4# lateral and palmar  x   Issued and instructed in HEP  x     Massage to FA muscle dorsal  x                                                                           Interventions/Modalities used:  [x] Therapeutic Exercise   [x] Modalities:  [x] Therapeutic Activity    [x] Ultrasound [] Elec Stimulation   [] Total Motion Release    [] Fluido [] Kinesiotaping  [] Neuromuscular Re-education   [] Ionto [] Coldpack/hotpack   [x] Instruction in HEP    Other:scar management  Objective Findings: L  39.1#  States feels weak but no muscle spasms today at all.  ROM WNL    Communication with other providers: POC to physician    Education provided to patient:  Issued and instructed in HEP    Adverse Reactions to treatment: none noted    Time in: 1345  Time out:  1425  Timed treatment minutes:  30  Total treatment time:  40      If BWC Please Indicate Time In/Out  CPT Code Time In Time Out Total Min                                                                    Treatment/Activity Tolerance:     [x]  Patient tolerated treatment well []  Patient limited by fatique    []  Patient limited by pain []  Patient limited by other medical complications   []  Other:   Goals   Pt will decrease pain 2-3/10 to increase functional activity tolerance   Pt will increase L  10-20# to increase functional grasp   Pt will follow thru and be independent with HEP   Scar management     LTG:  Pt will decrease quick dash below 30 for significant functional performance improvement      Patient Requires Follow-up:  [x]  Yes  []  No    Plan: [x]  Continue per plan of care []  Alter current plan (see comments)   [x]  Plan of care initiated []  Hold pending MD visit []  Discharge    Plan for Next Session:      Electronically signed by:  Angel Oneal OT, OTR/L, 6/14/2021, 2:37 PM

## 2021-06-21 ENCOUNTER — HOSPITAL ENCOUNTER (OUTPATIENT)
Dept: OCCUPATIONAL THERAPY | Age: 41
Setting detail: THERAPIES SERIES
Discharge: HOME | End: 2021-06-21
Payer: MEDICAID

## 2021-06-21 PROCEDURE — 97035 APP MDLTY 1+ULTRASOUND EA 15: CPT

## 2021-06-21 PROCEDURE — 97140 MANUAL THERAPY 1/> REGIONS: CPT

## 2021-06-21 PROCEDURE — 97110 THERAPEUTIC EXERCISES: CPT

## 2021-06-21 NOTE — FLOWSHEET NOTE
1425  Timed treatment minutes:  30  Total treatment time:  40      If BWC Please Indicate Time In/Out  CPT Code Time In Time Out Total Min                                                                    Treatment/Activity Tolerance:     [x]  Patient tolerated treatment well []  Patient limited by fatique    []  Patient limited by pain []  Patient limited by other medical complications   []  Other:   Goals   Pt will decrease pain 2-3/10 to increase functional activity tolerance   Pt will increase L  10-20# to increase functional grasp   Pt will follow thru and be independent with HEP   Scar management     LTG:  Pt will decrease quick dash below 30 for significant functional performance improvement      Patient Requires Follow-up:  [x]  Yes  []  No    Plan: [x]  Continue per plan of care []  Alter current plan (see comments)   [x]  Plan of care initiated []  Hold pending MD visit []  Discharge    Plan for Next Session:      Electronically signed by:  Swetha Alatorre OT,OTR/L, 6/21/2021, 2:27 PM

## 2021-07-09 ENCOUNTER — OFFICE VISIT (OUTPATIENT)
Dept: INTERNAL MEDICINE CLINIC | Age: 41
End: 2021-07-09
Payer: MEDICAID

## 2021-07-09 VITALS
HEART RATE: 77 BPM | BODY MASS INDEX: 40.39 KG/M2 | OXYGEN SATURATION: 98 % | DIASTOLIC BLOOD PRESSURE: 66 MMHG | WEIGHT: 228 LBS | SYSTOLIC BLOOD PRESSURE: 126 MMHG

## 2021-07-09 DIAGNOSIS — E66.01 MORBID OBESITY (HCC): Primary | ICD-10-CM

## 2021-07-09 DIAGNOSIS — Z99.89 OBSTRUCTIVE SLEEP APNEA ON CPAP: ICD-10-CM

## 2021-07-09 DIAGNOSIS — R11.0 CHRONIC NAUSEA: ICD-10-CM

## 2021-07-09 DIAGNOSIS — F41.9 ANXIETY: ICD-10-CM

## 2021-07-09 DIAGNOSIS — E55.9 VITAMIN D DEFICIENCY: ICD-10-CM

## 2021-07-09 DIAGNOSIS — M79.7 FIBROMYALGIA: ICD-10-CM

## 2021-07-09 DIAGNOSIS — F32.A CHRONIC DEPRESSION: ICD-10-CM

## 2021-07-09 DIAGNOSIS — M25.562 CHRONIC PAIN OF LEFT KNEE: ICD-10-CM

## 2021-07-09 DIAGNOSIS — G89.29 CHRONIC PAIN OF LEFT KNEE: ICD-10-CM

## 2021-07-09 DIAGNOSIS — G47.33 OBSTRUCTIVE SLEEP APNEA ON CPAP: ICD-10-CM

## 2021-07-09 PROCEDURE — G8427 DOCREV CUR MEDS BY ELIG CLIN: HCPCS | Performed by: INTERNAL MEDICINE

## 2021-07-09 PROCEDURE — 99214 OFFICE O/P EST MOD 30 MIN: CPT | Performed by: INTERNAL MEDICINE

## 2021-07-09 PROCEDURE — 1036F TOBACCO NON-USER: CPT | Performed by: INTERNAL MEDICINE

## 2021-07-09 PROCEDURE — G8417 CALC BMI ABV UP PARAM F/U: HCPCS | Performed by: INTERNAL MEDICINE

## 2021-07-09 RX ORDER — MELOXICAM 15 MG/1
15 TABLET ORAL DAILY
Qty: 30 TABLET | Refills: 3 | Status: SHIPPED | OUTPATIENT
Start: 2021-07-09 | End: 2021-10-08

## 2021-07-09 RX ORDER — CHOLECALCIFEROL (VITAMIN D3) 50 MCG
2000 TABLET ORAL DAILY
Qty: 30 TABLET | Refills: 3 | Status: SHIPPED | OUTPATIENT
Start: 2021-07-09 | End: 2021-10-08 | Stop reason: SDUPTHER

## 2021-07-09 RX ORDER — ONDANSETRON 4 MG/1
4 TABLET, FILM COATED ORAL EVERY 8 HOURS PRN
Qty: 90 TABLET | Refills: 3 | Status: SHIPPED | OUTPATIENT
Start: 2021-07-09 | End: 2022-01-10

## 2021-07-09 RX ORDER — VENLAFAXINE HYDROCHLORIDE 75 MG/1
150 CAPSULE, EXTENDED RELEASE ORAL DAILY
Qty: 30 CAPSULE | Refills: 3 | Status: SHIPPED | OUTPATIENT
Start: 2021-07-09 | End: 2021-10-08

## 2021-07-09 NOTE — PROGRESS NOTES
Name: Bert Strong  [id number]  Age: 38 y.o. YOB: 1980  Sex: female    CHIEF COMPLAINT:    Chief Complaint   Patient presents with    Anxiety    Depression    Obesity    Knee Injury     fell last week- left    Ankle Problem     left ankle pain       HISTORY OF PRESENT ILLNESS:     This is a pleasant  38 y.o. female  is seen today for management of chronic medical problems and medications refills. Previous records reviewed . Patient with multiple complaints recently. She has significant anxiety and depression. She claims that she has tried Celexa, Lexapro, Paxil, Wellbutrin etc. in the past with significant side effects. She is not taking any medications for it now. She claimed that she also tried BuSpar in the past with significant side effects. Patient also complains of significant pain in her left knee. Tylenol is not helping her pain. Doing OK . Denies CP or SOB. No fever , sore throat or cough or congestion. She has sleep apnea and has CPAP machine at home but not using it regularly. Denies any abdominal pain. Appetite OK. Bowels moving Lynann Kamar. She has not lost any further weight since she completed Adipex-P course. No urinary symptoms. Denies any significant arthritis. Hearing is ok. Vision Ok with glasses. Denies  any significant skin lesions. Denies any significant depression or anxiety. No other complaints.         Past Medical History:    Patient Active Problem List   Diagnosis    Vaginal bleeding    H/O of hysterectomy with bilateral oophorectomy    BMI 39.0-39.9,adult    Elevated blood pressure reading    Panic disorder with agoraphobia    Early menopause    Sleep disturbance    H/O bilateral breast reduction surgery    Myofascial pain syndrome    Somatic dysfunction of head region    Somatic dysfunction of cervical region    Somatic dysfunction of back    Somatic dysfunction of abdominal region    Somatic dysfunction of pelvic region    Snoring    Obstructive sleep apnea on CPAP    Somatic dysfunction of both upper extremities    Morbid obesity (HCC)    Paresthesia of left upper extremity    Fibromyalgia    Mixed hyperlipidemia    Carpal tunnel syndrome of left wrist    Vitamin D deficiency    Chronic depression    Anxiety    Chronic pain of left knee        Past Surgical History:        Procedure Laterality Date    BREAST BIOPSY Left 2010    benign    BREAST REDUCTION SURGERY Bilateral 2003    BREAST SURGERY      CARPAL TUNNEL RELEASE Left 2/16/2021    LEFT CARPAL TUNNEL RELEASE performed by Lewis Benton DO at [address] CYST REMOVAL      HAND SURGERY Right     4/2014    HYSTERECTOMY      INNER EAR SURGERY      OTHER SURGICAL HISTORY  4/16/2012    Repair vaginal cuff    OVARY REMOVAL      PARTIAL NEPHRECTOMY      TOTAL NEPHRECTOMY      TUBAL LIGATION         Social History:   Social History     Tobacco Use    Smoking status: Never Smoker    Smokeless tobacco: Never Used   Substance Use Topics    Alcohol use: No       Family History:       Problem Relation Age of Onset    Cancer Mother    [facility] Migraines Mother     Cancer Maternal Grandmother     Diabetes Maternal Grandmother     Allergies Son     Allergies Daughter     Bleeding Prob Daughter     Bleeding Prob Son     Asthma Daughter     Asthma Son        Allergies:  Codeine, Dilaudid [hydromorphone hcl], and Hydromorphone    Current Medications :      Prior to Admission medications    Medication Sig Start Date End Date Taking?  Authorizing Provider   vitamin D (CHOLECALCIFEROL) 50 MCG (2000 UT) TABS tablet Take 1 tablet by mouth daily 7/9/21  Yes Fran Bañuelos MD   ondansetron (ZOFRAN) 4 MG tablet Take 1 tablet by mouth every 8 hours as needed for Nausea 7/9/21  Yes Fran Bañuelos MD   venlafaxine (EFFEXOR XR) 75 MG extended release capsule Take 2 capsules by mouth daily 7/9/21  Yes Fran Bañuelos MD   meloxicam (MOBIC) 15 MG tablet Take 1 tablet by mouth daily 7/9/21  Yes Lincoln Gerber MD       LAB DATA: Reviewed. REVIEW OF SYSTEMS:   see HPI/ Comprehensive review of systems negative except for the ones mentioned in HPI. PHYSICAL EXAMINATION:   /66   Pulse 77   Wt 228 lb (103.4 kg)   LMP 02/15/2012   SpO2 98%   BMI 40.39 kg/m²      GENERAL APPEARANCE:    Alert, oriented x 3, well developed, cooperative, not in any distress, appears stated age. HEAD:   Normocephalic, atraumatic   EYES:   PERRLA, EOMI, lids normal, conjuctivea clear, sclera anicteric. NECK:    Supple, symmetrical,  trachea midline, no thyromegaly, no JVD, no lymphadenopathy. LUNGS:    Clear to auscultation bilaterally, respirations unlabored, accessory muscles are not used. HEART:     Regular rate and rhythm, S1 and S2 normal, no murmur, rub or gallop. PMI in MCL. ABDOMEN:    Soft, non-tender, bowel sounds are normoactive, no masses, no hepatospleenomegaly. .  Patient is morbidly obese  EXTREMITY:   no bipedal edema, significant tenderness of her left knee. NEURO:  Alert, oriented to person, place and time. Grossly intact. Musculoskeletal:         No kyphosis or scoliosis, no deformity in any extremity noted, muscle strength and tone are normal.  Skin:                            Warm and dry. No rash or obvious suspicious lesions. PSYCH:  Mood euthymic, insight and judgement good. ASSESSMENT/PLAN:    1. Morbid obesity ([address]  Advised to continue diet, exercise and weight loss. Patient not interested in bariatric surgery. 2. Obstructive sleep apnea on CPAP  Advised to restart using CPAP on a regular basis. 3. Chronic depression  We will try Effexor and referred to psychiatry and psychologist.  - venlafaxine (EFFEXOR XR) 75 MG extended release capsule; Take 2 capsules by mouth daily  Dispense: 30 capsule; Refill: [address] [facility]  - External Referral To Psychiatry    4.  Anxiety  Refer to psychologist and psychiatrist  - Caridad Medina Lulu Fraire, Apple Hilliard, Stephanie De La Torre  - External Referral To Psychiatry    5. Chronic pain of left knee  Start her on Mobic and also advised to take Tylenol as needed  Refer to orthopedic doctor  - meloxicam (MOBIC) 15 MG tablet; Take 1 tablet by mouth daily  Dispense: 30 tablet; Refill: Vikram Conklin DO, Orthopedic Surgery, [place]    6. Fibromyalgia  Advised take Mobic and Tylenol as needed    7. Vitamin D deficiency  Continue vitamin D3  - vitamin D (CHOLECALCIFEROL) 50 MCG (2000 UT) TABS tablet; Take 1 tablet by mouth daily  Dispense: 30 tablet; Refill: 3    8. Chronic nausea  She has chronic nausea for which she uses Zofran as needed. - ondansetron (ZOFRAN) 4 MG tablet; Take 1 tablet by mouth every 8 hours as needed for Nausea  Dispense: 90 tablet; Refill: 3    Advised to follow COVID-19 precautions    Care discussed with patient. Questions answered and patient verbalizes understanding and agrees with plan. Medications reviewed and reconciled. Continue current medications. Appropriate prescriptions are ordered. Risks and benefits of meds are discussed. After visit summary provided. Advised to call for any problems, questions, or concerns. If symptoms worsen or don't improve as expected, to call us or go to ER. Follow up as directed, sooner if needed. Return in about 3 months (around 10/9/2021). This dictation was performed with a verbal recognition program and it was checked for errors. It is possible that there are still dictated errors within this office note. Any errors should be brought immediately to my attention for correction. All efforts were made to ensure that this office note is accurate.      Mey Hawk MD MD

## 2021-07-21 ENCOUNTER — HOSPITAL ENCOUNTER (EMERGENCY)
Age: 41
Discharge: HOME | End: 2021-07-21
Attending: EMERGENCY MEDICINE
Payer: MEDICAID

## 2021-07-21 ENCOUNTER — APPOINTMENT (OUTPATIENT)
Dept: GENERAL RADIOLOGY | Age: 41
End: 2021-07-21
Payer: MEDICAID

## 2021-07-21 VITALS
OXYGEN SATURATION: 100 % | TEMPERATURE: 97.8 F | HEART RATE: 86 BPM | HEIGHT: 63 IN | BODY MASS INDEX: 38.98 KG/M2 | SYSTOLIC BLOOD PRESSURE: 136 MMHG | WEIGHT: 220 LBS | DIASTOLIC BLOOD PRESSURE: 91 MMHG | RESPIRATION RATE: 17 BRPM

## 2021-07-21 DIAGNOSIS — M25.569 KNEE PAIN, UNSPECIFIED CHRONICITY, UNSPECIFIED LATERALITY: Primary | ICD-10-CM

## 2021-07-21 DIAGNOSIS — S83.92XA SPRAIN OF LEFT KNEE, UNSPECIFIED LIGAMENT, INITIAL ENCOUNTER: ICD-10-CM

## 2021-07-21 PROCEDURE — 6370000000 HC RX 637 (ALT 250 FOR IP): Performed by: EMERGENCY MEDICINE

## 2021-07-21 PROCEDURE — 99283 EMERGENCY DEPT VISIT LOW MDM: CPT

## 2021-07-21 PROCEDURE — 73564 X-RAY EXAM KNEE 4 OR MORE: CPT

## 2021-07-21 RX ORDER — NAPROXEN 250 MG/1
500 TABLET ORAL ONCE
Status: COMPLETED | OUTPATIENT
Start: 2021-07-21 | End: 2021-07-21

## 2021-07-21 RX ORDER — ACETAMINOPHEN 500 MG
1000 TABLET ORAL ONCE
Status: COMPLETED | OUTPATIENT
Start: 2021-07-21 | End: 2021-07-21

## 2021-07-21 RX ORDER — NAPROXEN 500 MG/1
500 TABLET ORAL 2 TIMES DAILY
Qty: 60 TABLET | Refills: 0 | Status: SHIPPED | OUTPATIENT
Start: 2021-07-21 | End: 2021-10-08

## 2021-07-21 RX ORDER — ACETAMINOPHEN 325 MG/1
650 TABLET ORAL EVERY 6 HOURS PRN
Qty: 120 TABLET | Refills: 3 | Status: SHIPPED | OUTPATIENT
Start: 2021-07-21 | End: 2022-03-14

## 2021-07-21 RX ADMIN — Medication 500 MG: at 15:20

## 2021-07-21 RX ADMIN — ACETAMINOPHEN 1000 MG: 500 TABLET ORAL at 15:19

## 2021-07-21 ASSESSMENT — ENCOUNTER SYMPTOMS
RESPIRATORY NEGATIVE: 1
ALLERGIC/IMMUNOLOGIC NEGATIVE: 1
EYES NEGATIVE: 1
GASTROINTESTINAL NEGATIVE: 1

## 2021-07-21 ASSESSMENT — PAIN SCALES - GENERAL
PAINLEVEL_OUTOF10: 10
PAINLEVEL_OUTOF10: 8
PAINLEVEL_OUTOF10: 8

## 2021-07-21 NOTE — LETTER
[facility] Emergency Department  [address] [id number]  Phone: [phone]  Fax: [phone]             July 21, 2021    Patient: Juni Beckman   YOB: 1980   Date of Visit: 7/21/2021       To Whom It May Concern:    Marisabel Pickett was seen and treated in our emergency department on 7/21/2021. She may return to work on 7/22/2021.       Sincerely,     nurse        Signature:__________________________________

## 2021-07-21 NOTE — ED PROVIDER NOTES
[facility]      TRIAGE CHIEF COMPLAINT:   Knee Injury (left knee injury while falling down steps, denies othter injury)      [place]:  Tong Harper is a 38 y.o. female that presents complaint of left knee injury. Patient states she was walking on the steps today and fell and injured her left knee. Landed on it. States it hurts to move hurts to touch hurts to walk. Denies any fevers nausea vomiting chest pain shortness of breath no weakness numbness or tingling denies other injuries or questions or concerns no blood thinners. Not pregnant. She had a hysterectomy. Did not take medicine for. She states she sprained it. She does follow-up with orthopedic surgery. No other questions or concerns    REVIEW OF SYSTEMS:  At least 10 systems reviewed and otherwise acutely negative except as in the [address] Review of Systems   Constitutional: Negative. HENT: Negative. Eyes: Negative. Respiratory: Negative. Cardiovascular: Negative. Gastrointestinal: Negative. Endocrine: Negative. Genitourinary: Negative. Musculoskeletal: Positive for arthralgias, joint swelling and myalgias. Skin: Negative. Allergic/Immunologic: Negative. Neurological: Negative. Hematological: Negative. Psychiatric/Behavioral: Negative. All other systems reviewed and are negative.       Past Medical History:   Diagnosis Date    Chronic depression 7/9/2021    Migraine     Mixed hyperlipidemia 1/27/2021    PONV (postoperative nausea and vomiting)     Prolonged emergence from general anesthesia     Pulmonic stenosis     Sleep apnea     Sleep apnea      Past Surgical History:   Procedure Laterality Date    BREAST BIOPSY Left 2010    benign    BREAST REDUCTION SURGERY Bilateral 2003    BREAST SURGERY      CARPAL TUNNEL RELEASE Left 2/16/2021    LEFT CARPAL TUNNEL RELEASE [facility] CYST REMOVAL      HAND SURGERY Right 4/2014    HYSTERECTOMY      INNER EAR SURGERY      OTHER SURGICAL HISTORY  4/16/2012    Repair vaginal cuff    OVARY REMOVAL      PARTIAL NEPHRECTOMY      TOTAL NEPHRECTOMY      TUBAL LIGATION       Family History   Problem Relation Age of Onset    Cancer Mother     Migraines Mother     Cancer Maternal Grandmother     Diabetes Maternal Grandmother     Allergies Son     Allergies Daughter     Bleeding Prob Daughter     Bleeding Prob Son     Asthma Daughter     Asthma Son      Social History     Socioeconomic History    Marital status:      Spouse name: Not on file    Number of children: Not on file    Years of education: Not on file    Highest education level: Not on file   Occupational History    Not on file   Tobacco Use    Smoking status: Never Smoker    Smokeless tobacco: Never Used   Vaping Use    Vaping Use: Never used   Substance and Sexual Activity    Alcohol use: No    Drug use: No    Sexual activity: Not on file   Other Topics Concern    Not on file   Social History Narrative    ** Merged History Encounter **          Social Determinants of Health     Financial Resource Strain: Low Risk     Difficulty of Paying Living Expenses: Not very hard   Food Insecurity: No Food Insecurity    Worried About Running Out of Food in the Last Year: Never true    [place] of Food in the Last Year: Never true   Transportation Needs:     Lack of Transportation (Medical):      Lack of Transportation (Non-Medical):    Physical Activity:     Days of Exercise per Week:     Minutes of Exercise per Session:    Stress:     Feeling of Stress :    Social Connections:     Frequency of Communication with Friends and Family:     Frequency of Social Gatherings with Friends and Family:     Attends Advent Services:     Active Member of Clubs or Organizations:     Attends Club or Organization Meetings:     Marital Status:    Intimate Partner Violence:     Fear of Current or Ex-Partner:     Emotionally Abused:     Physically Abused:     Sexually Abused:      Current Facility-Administered Medications   Medication Dose Route Frequency Provider Last Rate Last Admin    naproxen (NAPROSYN) tablet 500 mg  500 mg Oral Once Elle Siegel, DO        acetaminophen (TYLENOL) tablet 1,000 mg  1,000 mg Oral Once Elle Siegel, DO         Current Outpatient Medications   Medication Sig Dispense Refill    naproxen (NAPROSYN) 500 MG tablet Take 1 tablet by mouth 2 times daily 60 tablet 0    acetaminophen (TYLENOL) 325 MG tablet Take 2 tablets by mouth every 6 hours as needed for Pain 120 tablet 3    vitamin D (CHOLECALCIFEROL) 50 MCG (2000 UT) TABS tablet Take 1 tablet by mouth daily 30 tablet 3    ondansetron (ZOFRAN) 4 MG tablet Take 1 tablet by mouth every 8 hours as needed for Nausea 90 tablet 3    venlafaxine (EFFEXOR XR) 75 MG extended release capsule Take 2 capsules by mouth daily 30 capsule 3    meloxicam (MOBIC) 15 MG tablet Take 1 tablet by mouth daily 30 tablet 3      Allergies   Allergen Reactions    Codeine Anaphylaxis    Dilaudid [Hydromorphone Hcl] Anaphylaxis    Hydromorphone Anaphylaxis     Current Facility-Administered Medications   Medication Dose Route Frequency Provider Last Rate Last Admin    naproxen (NAPROSYN) tablet 500 mg  500 mg Oral Once Elle Siegel, DO        acetaminophen (TYLENOL) tablet 1,000 mg  1,000 mg Oral Once Elle Siegel, DO         Current Outpatient Medications   Medication Sig Dispense Refill    naproxen (NAPROSYN) 500 MG tablet Take 1 tablet by mouth 2 times daily 60 tablet 0    acetaminophen (TYLENOL) 325 MG tablet Take 2 tablets by mouth every 6 hours as needed for Pain 120 tablet 3    vitamin D (CHOLECALCIFEROL) 50 MCG (2000 UT) TABS tablet Take 1 tablet by mouth daily 30 tablet 3    ondansetron (ZOFRAN) 4 MG tablet Take 1 tablet by mouth every 8 hours as needed for Nausea 90 tablet 3    venlafaxine (EFFEXOR XR) 75 MG extended release capsule Take 2 capsules by mouth daily 30 capsule 3    meloxicam (MOBIC) 15 MG tablet Take 1 tablet by mouth daily 30 tablet 3       Nursing Notes Reviewed    VITAL SIGNS:  ED Triage Vitals [07/21/21 1254]   Enc Vitals Group      BP (!) 136/91      Pulse 86      Resp 17      Temp 97.8 °F (36.6 °C)      Temp Source Oral      SpO2 100 %      Weight 220 lb (99.8 kg)      Height 5' 3\" (1.6 m)      Head Circumference       Peak Flow       Pain Score       Pain Loc       Pain Edu? Excl. in [address]? PHYSICAL EXAM:  Physical Exam  Vitals and nursing note reviewed. Constitutional:       General: She is not in acute distress. Appearance: Normal appearance. She is well-developed and well-groomed. She is not ill-appearing, toxic-appearing or diaphoretic. HENT:      Head: Normocephalic and atraumatic. Right Ear: External ear normal.      Left Ear: External ear normal.      Nose: No congestion or rhinorrhea. Eyes:      General: No scleral icterus. Right eye: No discharge. Left eye: No discharge. Extraocular Movements: Extraocular movements intact. Conjunctiva/sclera: Conjunctivae normal.   Neck:      Vascular: No JVD. Trachea: Phonation normal.   Cardiovascular:      Rate and Rhythm: Normal rate and regular rhythm. Pulses: Normal pulses. Heart sounds: Normal heart sounds. No murmur heard. No friction rub. No gallop. Pulmonary:      Effort: Pulmonary effort is normal. No respiratory distress. Breath sounds: Normal breath sounds. No stridor. No wheezing, rhonchi or rales. Abdominal:      General: Bowel sounds are normal. There is no distension. Palpations: There is no mass. Tenderness: There is no abdominal tenderness. There is no guarding or rebound. Hernia: No hernia is present. Musculoskeletal:         General: Swelling and tenderness present. No deformity or signs of injury.       Cervical back: Full passive range of motion without pain and normal range of motion. No edema, erythema, signs of trauma, rigidity, torticollis or crepitus. No pain with movement. Normal range of motion. Left knee: Swelling and effusion present. No ecchymosis or lacerations. Decreased range of motion. Tenderness present. Normal patellar mobility. Normal pulse. Instability Tests: Anterior drawer test negative. Posterior drawer test negative. Medial Nyla test positive and lateral Nyla test positive. Right lower leg: No edema. Left lower leg: Normal. No edema. Left ankle: Normal.      Left Achilles Tendon: Normal. No tenderness. Abel's test negative. Legs:    Skin:     General: Skin is warm. Coloration: Skin is not jaundiced or pale. Findings: No bruising, erythema, lesion or rash. Neurological:      General: No focal deficit present. Mental Status: She is alert and oriented to person, place, and time. GCS: GCS eye subscore is 4. GCS verbal subscore is 5. GCS motor subscore is 6. Cranial Nerves: Cranial nerves are intact. No cranial nerve deficit, dysarthria or facial asymmetry. Sensory: Sensation is intact. No sensory deficit. Motor: Motor function is intact. No weakness, tremor, atrophy, abnormal muscle tone or seizure activity. Coordination: Coordination is intact. Coordination normal.   Psychiatric:         Mood and Affect: Mood normal.         Behavior: Behavior normal. Behavior is cooperative. Thought Content: Thought content normal.         Judgment: Judgment normal.           I have reviewed andinterpreted all of the currently available lab results from this visit (if applicable):    No results found for this visit on 07/21/21.      Radiographs (if obtained):  [] The following radiograph was interpreted by myself in the absence of a radiologist:  [x] Radiologist's Report Reviewed:    XR KNEE LEFT (MIN 4 VIEWS)    Result Date: 7/21/2021  EXAMINATION: FOUR XRAY VIEWS OF THE LEFT KNEE 7/21/2021 2:27 pm COMPARISON: 12/17/2014 HISTORY: ORDERING SYSTEM PROVIDED HISTORY: pain TECHNOLOGIST PROVIDED HISTORY: Reason for exam:->pain Reason for Exam:  left knee pain Acuity: Acute Type of Exam: Initial Mechanism of Injury: fall Relevant Medical/Surgical History: na FINDINGS: No fracture or dislocation is identified. No osseous destructive process is identified. Small suprapatellar joint effusion. Joint spaces appear preserved. Small suprapatellar joint effusion, though no acute osseous abnormality is identified. No significant joint space narrowing. EKG (if obtained): (All EKG's are interpreted by myself in the absence of a cardiologist)    MDM:    Patient here with left knee pain. Again she was walking on the stairs before arrival when she fell and landed on her left knee. She has hurt this before. She appears well she is no distress she denies any other injuries no other questions or concerns she is not pregnant she has had a hysterectomy she states. Denies any fevers nausea vomiting head injury neck or back pain or extremity pain. No other injuries. Did not take any medicine for. Hurts to move touch and walk but she can walk. She otherwise appears well she does have a knee effusion on x-ray small in nature x-ray performed in triage. Patient given Naprosyn and Tylenol she has good pulses good sensation compartments are soft there is no obvious infection or bruising. I will give patient a Ace wrap, crutches advised rice anti-inflammatories discharged home with return precautions and follow-up information including orthopedic surgery patient stable discharge.  Patient ok with plan, no signs of infection or compartment syndrome or fracture dislocation or ischemia or DVT on exam.    CLINICAL IMPRESSION:  Final diagnoses:   Knee pain, unspecified chronicity, unspecified laterality   Sprain of left knee, unspecified ligament, initial encounter       (Please note that portions of this note may have been completed with a voice recognition program. Efforts were made to edit the dictations but occasionally words aremis-transcribed.)    DISPOSITION REFERRAL (if applicable):  MD Leda Raymond  [place] [phone]    In 1 day      [facility] Emergency Department  [place] [address] [phone]    If symptoms worsen    Harvel Olszewski, DO  [address]  [phone]    Schedule an appointment as soon as possible for a visit in 1 day        DISPOSITION MEDICATIONS (if applicable):  New Prescriptions    ACETAMINOPHEN (TYLENOL) 325 MG TABLET    Take 2 tablets by mouth every 6 hours as needed for Pain    NAPROXEN (NAPROSYN) 500 MG TABLET    Take 1 tablet by mouth 2 times daily          DO Tylor Sigala DO  07/21/21 3416

## 2021-07-21 NOTE — ED NOTES
Discharge instructions reviewed with patient. Medications and follow up were discussed. Patient denies further questions and verbalizes understanding.  Education provided regarding crutches     VALERIA Darling  07/21/21 8298

## 2021-08-16 ENCOUNTER — OFFICE VISIT (OUTPATIENT)
Dept: ORTHOPEDIC SURGERY | Age: 41
End: 2021-08-16
Payer: MEDICAID

## 2021-08-16 VITALS
OXYGEN SATURATION: 98 % | RESPIRATION RATE: 16 BRPM | BODY MASS INDEX: 40.04 KG/M2 | HEIGHT: 63 IN | HEART RATE: 68 BPM | WEIGHT: 226 LBS

## 2021-08-16 DIAGNOSIS — S83.241A TEAR OF MEDIAL MENISCUS OF RIGHT KNEE, CURRENT, UNSPECIFIED TEAR TYPE, INITIAL ENCOUNTER: Primary | ICD-10-CM

## 2021-08-16 PROCEDURE — G8427 DOCREV CUR MEDS BY ELIG CLIN: HCPCS | Performed by: ORTHOPAEDIC SURGERY

## 2021-08-16 PROCEDURE — 1036F TOBACCO NON-USER: CPT | Performed by: ORTHOPAEDIC SURGERY

## 2021-08-16 PROCEDURE — 99214 OFFICE O/P EST MOD 30 MIN: CPT | Performed by: ORTHOPAEDIC SURGERY

## 2021-08-16 PROCEDURE — G8417 CALC BMI ABV UP PARAM F/U: HCPCS | Performed by: ORTHOPAEDIC SURGERY

## 2021-08-16 ASSESSMENT — ENCOUNTER SYMPTOMS
COLOR CHANGE: 0
CHEST TIGHTNESS: 0
BACK PAIN: 0

## 2021-08-16 NOTE — PROGRESS NOTES
Subjective:      Patient ID: Mauricio Cavanaugh is a 42 y.o. female. Patient returns to the office with complaints of left knee pain. Previous knee x-rays taken on 7/21/21 revealed a small suprapatellar joint effusion without acute osseous abnormality detected. Pain is rated at a 8/10 which patient states varies in sensation from a dull, achy sensation to a sharp pain globally throughout the left knee. Recent injury reported with patient stating that she fell down a flight of stairs about 2-3 weeks last month landing onto the extremity. Upon injury, patient had immediate swelling and slight bruising of the extremity which has subsided with pain remaining and patient states that she is having pain upon weightbearing onto the extremity, crossing the legs, and extension of the extremity. She has been elevating the extremity at night and taking Aleve using Biofreeze, alternating between ice and heat, and performing at home exercises with no relief. Aggravating factors included applied pressure or prolonged periods of ambulation. No recent injuries, surgeries, or other conservative therapies reported. Patient is concerned with possible ligament or tendon tear. XR KNEE LEFT (MIN 4 VIEWS)    Impression  Small suprapatellar joint effusion, though no acute osseous abnormality is  identified.  No significant joint space narrowing.     She comes in today for evaluation of her left knee pain. She states that over the past 6 months she sustained a fall down the stairs she has continued having intermittent swelling as well as dull and aching pain globally in the left knee and occasional sharp and stabbing pain along the medial aspect of the left knee. Patient denies any new injury to the involved extremity/ joint, denies numbness or tingling in the involved extremity and denies fever or chills. Review of Systems   Constitutional: Negative for activity change, chills and fever.    Respiratory: Negative for chest tightness. Cardiovascular: Negative for chest pain. Musculoskeletal: Positive for arthralgias, gait problem, joint swelling and myalgias. Negative for back pain. Skin: Negative for color change, pallor, rash and wound. Neurological: Negative for weakness and numbness. Past Medical History:   Diagnosis Date    Chronic depression 7/9/2021    Migraine     Mixed hyperlipidemia 1/27/2021    PONV (postoperative nausea and vomiting)     Prolonged emergence from general anesthesia     Pulmonic stenosis     Sleep apnea     Sleep apnea        Objective:   Physical Exam  Constitutional:       Appearance: She is well-developed. HENT:      Head: Normocephalic. Eyes:      Pupils: Pupils are equal, round, and reactive to light. Pulmonary:      Effort: Pulmonary effort is normal.   Musculoskeletal:         General: Swelling and tenderness present. No deformity. Normal range of motion. Cervical back: Normal range of motion. Right hip: Normal.      Left hip: Normal.      Right knee: No swelling, deformity, effusion, erythema, ecchymosis, lacerations or bony tenderness. Normal range of motion. No tenderness. No medial joint line, lateral joint line, MCL, LCL or patellar tendon tenderness. No LCL laxity or MCL laxity. Normal alignment and normal patellar mobility. Left knee: Swelling present. No deformity, effusion, erythema, ecchymosis, lacerations or bony tenderness. Normal range of motion. Tenderness present over the medial joint line and patellar tendon. No lateral joint line, MCL or LCL tenderness. No LCL laxity or MCL laxity. Abnormal meniscus. Normal alignment and normal patellar mobility. Instability Tests: Medial Nyla test positive. Skin:     General: Skin is warm and dry. Capillary Refill: Capillary refill takes less than 2 seconds. Coloration: Skin is not pale. Findings: No erythema or rash.    Neurological:      Mental Status: She is alert and oriented to person, place, and time. Left knee-Skin intact with no erythema, ecchymosis or lacerations present. 0-130  Mild knee effusion    Assessment:      Left knee possible medial meniscus tear      Plan:      I discussed with her today her x-ray findings which are normal.  I did offer her a cortisone injection today which she declined. Given her persistent symptoms despite conservative treatment I will order an MRI to evaluate for possible medial meniscus tear. Continue weight-bearing as tolerated. Continue range of motion exercises as instructed. Ice and elevate as needed. Tylenol or Motrin for pain. Follow up after MRI for further evaluation and treatment options including possible knee arthroscopy.         Mike 97, DO

## 2021-08-16 NOTE — PATIENT INSTRUCTIONS
Continue weight-bearing as tolerated. Continue range of motion exercises as instructed. Ice and elevate as needed. Tylenol or Motrin for pain. MRI of the left knee ordered today  Follow up once MRI is completed   Central scheduling [phone] will be calling you to schedule your MRI. If you do not hear from them with in a week give them a call.

## 2021-08-16 NOTE — PROGRESS NOTES
Patient returns to the office with complaints of left knee pain. Previous knee x-rays taken on 7/21/21 revealed a small suprapatellar joint effusion without acute osseous abnormality detected. Pain is rated at a 8/10 which patient states varies in sensation from a dull, achy sensation to a sharp pain globally throughout the left knee. Recent injury reported with patient stating that she fell down a flight of stairs about 2-3 weeks last month landing onto the extremity. Upon injury, patient had immediate swelling and slight bruising of the extremity which has subsided with pain remaining and patient states that she is having pain upon weightbearing onto the extremity, crossing the legs, and extension of the extremity. She has been elevating the extremity at night and taking Aleve using Biofreeze, alternating between ice and heat, and performing at home exercises with no relief. Aggravating factors included applied pressure or prolonged periods of ambulation. No recent injuries, surgeries, or other conservative therapies reported. Patient is concerned with possible ligament or tendon tear.            XR KNEE LEFT (MIN 4 VIEWS)     Impression   Small suprapatellar joint effusion, though no acute osseous abnormality is   identified.  No significant joint space narrowing.

## 2021-08-19 ENCOUNTER — OFFICE VISIT (OUTPATIENT)
Dept: PSYCHOLOGY | Age: 41
End: 2021-08-19
Payer: MEDICAID

## 2021-08-19 VITALS — TEMPERATURE: 97.3 F

## 2021-08-19 DIAGNOSIS — F41.9 ANXIETY: ICD-10-CM

## 2021-08-19 DIAGNOSIS — F32.A DEPRESSIVE DISORDER: Primary | ICD-10-CM

## 2021-08-19 PROCEDURE — 1036F TOBACCO NON-USER: CPT | Performed by: PSYCHOLOGIST

## 2021-08-19 PROCEDURE — 90791 PSYCH DIAGNOSTIC EVALUATION: CPT | Performed by: PSYCHOLOGIST

## 2021-08-19 ASSESSMENT — PATIENT HEALTH QUESTIONNAIRE - PHQ9
9. THOUGHTS THAT YOU WOULD BE BETTER OFF DEAD, OR OF HURTING YOURSELF: 0
1. LITTLE INTEREST OR PLEASURE IN DOING THINGS: 3
6. FEELING BAD ABOUT YOURSELF - OR THAT YOU ARE A FAILURE OR HAVE LET YOURSELF OR YOUR FAMILY DOWN: 2
2. FEELING DOWN, DEPRESSED OR HOPELESS: 3
7. TROUBLE CONCENTRATING ON THINGS, SUCH AS READING THE NEWSPAPER OR WATCHING TELEVISION: 3
10. IF YOU CHECKED OFF ANY PROBLEMS, HOW DIFFICULT HAVE THESE PROBLEMS MADE IT FOR YOU TO DO YOUR WORK, TAKE CARE OF THINGS AT HOME, OR GET ALONG WITH OTHER PEOPLE: 2
4. FEELING TIRED OR HAVING LITTLE ENERGY: 3
SUM OF ALL RESPONSES TO PHQ QUESTIONS 1-9: 20
SUM OF ALL RESPONSES TO PHQ QUESTIONS 1-9: 20
8. MOVING OR SPEAKING SO SLOWLY THAT OTHER PEOPLE COULD HAVE NOTICED. OR THE OPPOSITE, BEING SO FIGETY OR RESTLESS THAT YOU HAVE BEEN MOVING AROUND A LOT MORE THAN USUAL: 0
5. POOR APPETITE OR OVEREATING: 3
SUM OF ALL RESPONSES TO PHQ QUESTIONS 1-9: 20
3. TROUBLE FALLING OR STAYING ASLEEP: 3
SUM OF ALL RESPONSES TO PHQ9 QUESTIONS 1 & 2: 6

## 2021-08-19 NOTE — PROGRESS NOTES
Behavioral Health Consultation  Leyla Whyte Psy.D. Psychologist    Time spent with Patient: 30 minutes  Visit number: 1  Reason for Consult:  depression  Referring Provider: Carola Banuelos MD  [address] [place] [address]    Informed consent:  Pt provided informed consent for the behavioral health program. Discussed with patient model of service to include the limits of confidentiality (i.e. abuse reporting, suicide intervention, etc.) and focused intervention approach. Pt indicated understanding. S:  ----------------------------------------------------------------------------------------------------------------------    Presenting problem:  depression  \"I have anxiety and a lot of worry. \" Endorsed depressed mood, anhedonia, anergia, amotivation, poor sleep, fluctuating appetite, diminished concentration, and corrosive self-image. Endorsed nervousness, anxiety, worry, difficulty relaxing, restlessness, irritability, and fear. Son is in basic training and sx have worsened since he enlisted 2 mos ago. Psychosocial stressors include:  -son recently enlisted in [employer]  -mom caring for grandfather w serious medical concerns  -father  of suicide in 2007  -, grandmother  in [place]  -'s health     Social:   Lives w  and daughter. Works as  at [employer]. Has not worked in 3 weeks.  works as maintenance at [employer]. Daughter works as . Son is in [employer]. Youngest of two children born to her parents. Substance Use:   EtOH: denied  Cannabis: denied   Tobacco: denied   Other: denied     Psychiatric tx hx:    Psych meds: Effexor 75mg   Outpatient psychotherapy: denied    Inpatient psych hospitalizations: denied     Abuse hx:  Denied     Goals: \"Get me back on track. \"       O:  ----------------------------------------------------------------------------------------------------------------------  MSE:    Orientation:  oriented to person, place, time, and general circumstances  Appearance and behavior:  alert, cooperative  Speech:  spontaneous, normal rate and normal volume  Mood: anxious   Thought Content:  intact, hopelessness and helplessness  Thought Process:  linear, goal directed and coherent  Interest/Pleasure: Loss of Pleasure/Fun  Sleep disturbance: Yes  Motivation: Poor  Energy: Tired/Fatigued  Morbid ideation No  Suicide Assessment: no suicidal ideation    A:  ----------------------------------------------------------------------------------------------------------------------  Diagnosis:    1. Depressive disorder    2. Anxiety         PHQ Scores 8/19/2021 1/27/2021 1/7/2020 10/31/2019   PHQ2 Score 6 0 0 2   PHQ9 Score 20 0 0 2     Interpretation of Total Score Depression Severity: 1-4 = Minimal depression, 5-9 = Mild depression, 10-14 = Moderate depression, 15-19 = Moderately severe depression, 20-27 = Severe depression    P:  ----------------------------------------------------------------------------------------------------------------------     [x]Discussed potential treatments for   1. Depressive disorder    2. Anxiety       [x]Conducted functional assessment  [x]Established rapport  [x]Supportive interventions   [x][place]-setting to identify pt's primary goals for [facility] visit / overall health  [x]Provided psychoeducation/handout on   1. Depressive disorder    2. Anxiety            Other:   []     Pt Behavioral Change Plan:    1. Return to Dr. Terry Johnson in 2-4 week(s)    2.                 Feedback provided to pt's PCP via EPIC and/or oral report

## 2021-08-19 NOTE — PATIENT INSTRUCTIONS
Constructive Worry Instructions  When we have challenges, we tend to use our problem-solving skills to make our lives better and to relieve ourselves of anxiety. It is not surprising that some of us may use our problem-solving skills at the wrong time and place, namely bedtime. We may think about a problem, trying to solve it, but unfortunately this will oftentimes keep us awake. Constructive worry is a method for managing the tendency to worry during that quiet time when sleep is supposed to be taking over. Do this exercise early in the evening (at least 2 hours before bed). It should take only about 15 minutes to complete. Here is how it is done:  1. Write down the problem(s) facing you that has the greatest chance of keeping you awake a bedtime, and list them in the Concerns column of the [address]. 2. Then, think of the next step that might help fix it. Write it down in the Solutions column. This need not be the final solution to the problem, since most problems have to be solved by taking steps anyhow, and you will be doing this again tomorrow night and the night after until you finally get to the best solution.  If you know how to fix the problem completely, then write that down.  If you decide that this is not really a big problem, and you will just deal with it when the time comes, then write that down.  If you decide that you simply do not know what to do about it, and need to ask someone to help you, write that down.  If you decide that it is a problem, but there seems to be no good solution at all, and that you will just have to live with it, write that down, with a note to yourself that maybe sometime soon you or someone you speak with will give you a clue that will lead you to a solution. 3. Repeat this for any other concerns you have.     4. Fold the Constructive Worry Worksheet in half and place it on the nightstand next to your bed and forget about it

## 2021-08-26 ENCOUNTER — HOSPITAL ENCOUNTER (OUTPATIENT)
Dept: MRI IMAGING | Age: 41
Discharge: HOME | End: 2021-08-26
Payer: MEDICAID

## 2021-08-26 DIAGNOSIS — S83.241A TEAR OF MEDIAL MENISCUS OF RIGHT KNEE, CURRENT, UNSPECIFIED TEAR TYPE, INITIAL ENCOUNTER: ICD-10-CM

## 2021-08-26 PROCEDURE — 73721 MRI JNT OF LWR EXTRE W/O DYE: CPT

## 2021-09-20 ENCOUNTER — OFFICE VISIT (OUTPATIENT)
Dept: ORTHOPEDIC SURGERY | Age: 41
End: 2021-09-20
Payer: MEDICAID

## 2021-09-20 VITALS
BODY MASS INDEX: 40.04 KG/M2 | HEART RATE: 76 BPM | WEIGHT: 226 LBS | HEIGHT: 63 IN | OXYGEN SATURATION: 98 % | RESPIRATION RATE: 15 BRPM

## 2021-09-20 DIAGNOSIS — M22.42 CHONDROMALACIA OF LEFT PATELLOFEMORAL JOINT: ICD-10-CM

## 2021-09-20 DIAGNOSIS — M17.12 PRIMARY OSTEOARTHRITIS OF LEFT KNEE: Primary | ICD-10-CM

## 2021-09-20 PROCEDURE — 1036F TOBACCO NON-USER: CPT | Performed by: ORTHOPAEDIC SURGERY

## 2021-09-20 PROCEDURE — G8427 DOCREV CUR MEDS BY ELIG CLIN: HCPCS | Performed by: ORTHOPAEDIC SURGERY

## 2021-09-20 PROCEDURE — G8417 CALC BMI ABV UP PARAM F/U: HCPCS | Performed by: ORTHOPAEDIC SURGERY

## 2021-09-20 PROCEDURE — 99213 OFFICE O/P EST LOW 20 MIN: CPT | Performed by: ORTHOPAEDIC SURGERY

## 2021-09-20 NOTE — PATIENT INSTRUCTIONS
Continue weight-bearing as tolerated. Continue range of motion exercises as instructed. Ice and elevate as needed. Tylenol or Motrin for pain. Follow up as needed  Patient Education        Patellar Tendinitis (Jumper's Knee): Exercises  Introduction  Here are some examples of exercises for you to try. The exercises may be suggested for a condition or for rehabilitation. Start each exercise slowly. Ease off the exercises if you start to have pain. You will be told when to start these exercises and which ones will work best for you. How to do the exercises  Straight-leg raises to the front   1. Lie on your back with your good knee bent so that your foot rests flat on the floor. Your affected leg should be straight. Make sure that your low back has a normal curve. You should be able to slip your hand in between the floor and the small of your back, with your palm touching the floor and your back touching the back of your hand. 2. Tighten the thigh muscles in your affected leg by pressing the back of your knee flat down to the floor. Hold your knee straight. 3. Keeping the thigh muscles tight and your leg straight, lift your leg up so that your heel is about 12 inches off the floor. 4. Hold for about 6 seconds, then lower your leg slowly. Rest for up to 10 seconds between repetitions. 5. Repeat 8 to 12 times. Short-arc quad   1. Lie on your back with your knees bent over a foam roll or large rolled-up towel and your heels on the floor. 2. Lift the lower part of your affected leg until your leg is straight. Keep the back of your knee on the foam roll or towel. 3. Hold your leg straight for about 6 seconds, then slowly bend your knee and lower your heel back to the floor. Rest for up to 10 seconds between repetitions. 4. Repeat 8 to 12 times. Half-squat with knees and feet turned out to the side   1. Stand with your feet about shoulder-width apart and turned out to the side about 45 degrees.   2. Keep your back straight, and tighten your buttocks. 3. Slowly bend your knees to lower your body about one-quarter of the way down toward the floor. Try to keep your back straight at all times, and do not let your pelvis tilt forward or your knees extend beyond the tip of your toes. 4. Repeat 8 to 12 times. Step up   1. Place a single-step footstool on the floor. If you do not have a footstool, you can use a thick book, such as a phone book, a dictionary, or an encyclopedia. If you are not steady on your feet, hold on to a chair, counter, or wall while you do this exercise. 2. Keeping your back straight, step up with your affected leg. Try not to push off your back leg as you step up. Only use your affected leg to bring you up on to the step. Then lift your other leg on to the step. As you step up, try to keep your knee moving in a straight line with your middle toe. 3. Move back to the starting position, with both feet on the floor. 4. Repeat 8 to 12 times. Step down   1. Stand on a single-step footstool. If you do not have a footstool, you can use a thick book, such as a phone book, a dictionary, or an encyclopedia. If you are not steady on your feet, hold on to a chair, counter, or wall while you do this exercise. 2. Slowly step down with your good leg, allowing your heel to lightly touch the floor. As you step down, try to keep your affected knee moving in a straight line toward your middle toe. 3. Move back to the starting position, with both feet on the footstool or book. 4. Repeat 8 to 12 times. Terminal knee extension   1. Tie the ends of an exercise band together to form a loop. Attach one end of the loop to a secure object, or shut a door on it to hold it in place. (Or you can have someone hold one end of the loop to provide resistance.)  2. Loop the other end of the exercise band around the knee of your affected leg. Keep that leg somewhat bent at the knee.   3. Put your good leg about a step behind your affected leg. Then slowly straighten your affected leg by tightening the thigh muscles of that leg. 4. Hold for about 6 seconds, then return to the starting position, with your knee somewhat bent. 5. Rest for up to 10 seconds. 6. Repeat 8 to 12 times. Follow-up care is a key part of your treatment and safety. Be sure to make and go to all appointments, and call your doctor if you are having problems. It's also a good idea to know your test results and keep a list of the medicines you take. Where can you learn more? Go to https://[employer]. org and sign in to your [employer] account. Enter N620 in the flatev box to learn more about \"Patellar Tendinitis (Jumper's Knee): Exercises. \"     If you do not have an account, please click on the \"Sign Up Now\" link. Current as of: November 16, 2020               Content Version: 12.9  © 2006-2021 [employer]. Care instructions adapted under license by [facility] ([facility]). If you have questions about a medical condition or this instruction, always ask your healthcare professional. [place] [address] any warranty or liability for your use of this information.

## 2021-09-21 ASSESSMENT — ENCOUNTER SYMPTOMS
COLOR CHANGE: 0
BACK PAIN: 0
CHEST TIGHTNESS: 0

## 2021-09-21 NOTE — PROGRESS NOTES
Patient returns to the office today for FU of the left knee MRI completed on 8/26/21. Pt states pain today is a 3/10 will increase with prolong walking or walking on uneven ground. Pt states that she does notice pain globally in the knee and will travel into her leg. Pt states that she has been doing better.     Mri of the left knee completed on 8/26/21      Impression   No acute ligament or meniscal abnormality.       Mild osteoarthritis.  Patellofemoral chondromalacia
Pulmonary effort is normal.   Musculoskeletal:         General: Swelling and tenderness present. No deformity. Normal range of motion. Cervical back: Normal range of motion. Right hip: Normal.      Left hip: Normal.      Right knee: No swelling, deformity, effusion, erythema, ecchymosis, lacerations or bony tenderness. Normal range of motion. No tenderness. No medial joint line, lateral joint line, MCL, LCL or patellar tendon tenderness. No LCL laxity or MCL laxity. Normal alignment and normal patellar mobility. Left knee: Swelling present. No deformity, effusion, erythema, ecchymosis, lacerations or bony tenderness. Normal range of motion. Tenderness present over the medial joint line and patellar tendon. No lateral joint line, MCL or LCL tenderness. No LCL laxity or MCL laxity. Abnormal meniscus. Normal alignment and normal patellar mobility. Instability Tests: Medial Nyla test positive. Skin:     General: Skin is warm and dry. Capillary Refill: Capillary refill takes less than 2 seconds. Coloration: Skin is not pale. Findings: No erythema or rash. Neurological:      Mental Status: She is alert and oriented to person, place, and time. Left knee-Skin intact with no erythema, ecchymosis or lacerations present. 0-130  Mild knee effusion    Assessment:      Left knee patellar tendinitis      Plan:      I discussed with her today her MRI findings . I reassured her that there is no internal derangement and nothing that will require surgical treatment. Most of her pain appears to be coming from patellar tendinitis. I gave her some home strengthening and stretching exercises to be performed daily. If her symptoms persist the next step would be to consider cortisone injection which she would like to hold off on at this point. Continue taking over-the-counter anti-inflammatories. Continue weight-bearing as tolerated.   Continue range of motion exercises as

## 2021-10-08 ENCOUNTER — OFFICE VISIT (OUTPATIENT)
Dept: INTERNAL MEDICINE CLINIC | Age: 41
End: 2021-10-08
Payer: MEDICAID

## 2021-10-08 VITALS
OXYGEN SATURATION: 98 % | DIASTOLIC BLOOD PRESSURE: 76 MMHG | HEART RATE: 81 BPM | WEIGHT: 233 LBS | SYSTOLIC BLOOD PRESSURE: 130 MMHG | BODY MASS INDEX: 41.29 KG/M2 | TEMPERATURE: 97.1 F | HEIGHT: 63 IN

## 2021-10-08 DIAGNOSIS — F32.A CHRONIC DEPRESSION: ICD-10-CM

## 2021-10-08 DIAGNOSIS — Z99.89 OBSTRUCTIVE SLEEP APNEA ON CPAP: ICD-10-CM

## 2021-10-08 DIAGNOSIS — G47.33 OBSTRUCTIVE SLEEP APNEA ON CPAP: ICD-10-CM

## 2021-10-08 DIAGNOSIS — G89.29 CHRONIC PAIN OF LEFT KNEE: ICD-10-CM

## 2021-10-08 DIAGNOSIS — E66.01 MORBID OBESITY (HCC): Primary | ICD-10-CM

## 2021-10-08 DIAGNOSIS — M79.7 FIBROMYALGIA: ICD-10-CM

## 2021-10-08 DIAGNOSIS — F41.9 ANXIETY: ICD-10-CM

## 2021-10-08 DIAGNOSIS — M25.562 CHRONIC PAIN OF LEFT KNEE: ICD-10-CM

## 2021-10-08 DIAGNOSIS — E55.9 VITAMIN D DEFICIENCY: ICD-10-CM

## 2021-10-08 PROCEDURE — G8417 CALC BMI ABV UP PARAM F/U: HCPCS | Performed by: INTERNAL MEDICINE

## 2021-10-08 PROCEDURE — 1036F TOBACCO NON-USER: CPT | Performed by: INTERNAL MEDICINE

## 2021-10-08 PROCEDURE — G8484 FLU IMMUNIZE NO ADMIN: HCPCS | Performed by: INTERNAL MEDICINE

## 2021-10-08 PROCEDURE — G8427 DOCREV CUR MEDS BY ELIG CLIN: HCPCS | Performed by: INTERNAL MEDICINE

## 2021-10-08 PROCEDURE — 99214 OFFICE O/P EST MOD 30 MIN: CPT | Performed by: INTERNAL MEDICINE

## 2021-10-08 RX ORDER — NAPROXEN 375 MG/1
375 TABLET ORAL 2 TIMES DAILY PRN
Qty: 60 TABLET | Refills: 5 | Status: SHIPPED | OUTPATIENT
Start: 2021-10-08 | End: 2022-03-14

## 2021-10-08 RX ORDER — CHOLECALCIFEROL (VITAMIN D3) 50 MCG
2000 TABLET ORAL DAILY
Qty: 30 TABLET | Refills: 3 | Status: SHIPPED | OUTPATIENT
Start: 2021-10-08 | End: 2022-01-10 | Stop reason: SDUPTHER

## 2021-10-08 NOTE — PROGRESS NOTES
Name: Beverlie Dubin  [id number]  Age: 42 y.o. YOB: 1980  Sex: female    CHIEF COMPLAINT:    Chief Complaint   Patient presents with    Stress     increased lately with small chest pains    Obesity     wants to restart adipex    Other     other chronic conditions       HISTORY OF PRESENT ILLNESS:     This is a pleasant  42 y.o. female  is seen today for management of chronic medical problems and medications refills. Previous records reviewed . Patient complained that she is gaining weight again. She has lost some weight with Adipex-P a few months ago and wants to restart Adipex-P. Patient was informed that she has to wait at least 6 months before restarting Adipex-P. Patient is not interested in bariatric surgery. Will refer her to weight management solutions. Depression and anxiety both are better. She is not taking any medications for it    Patient also complains of significant pain in her left knee. She did see Dr. Luly Pike and he told her that she has patellar tendinitis and recommended nonsteroidals and he gave her some home strengthening and stretching exercises to be performed daily. She is taking OTC naproxen is not helping much. .  Denies CP or SOB. No fever , sore throat or cough or congestion. She has sleep apnea and has CPAP machine at home but not using it regularly. But she claims that she is going to restart using it soon. Denies any abdominal pain. Appetite OK. Bowels moving [id number] [address] No urinary symptoms. Hearing is ok. Vision Ok with glasses. Denies  any significant skin lesions. No other complaints. Has reservations regarding Covid vaccinations. Tried to reassure. Patient not convinced.   Also refuses to get flu vaccine    Past Medical History:    Patient Active Problem List   Diagnosis    Vaginal bleeding    H/O of hysterectomy with bilateral oophorectomy    BMI 39.0-39.9,adult    Elevated blood pressure reading    Panic disorder with agoraphobia    Early menopause    Sleep disturbance    H/O bilateral breast reduction surgery    Myofascial pain syndrome    Somatic dysfunction of head region    Somatic dysfunction of cervical region    Somatic dysfunction of back    Somatic dysfunction of abdominal region    Somatic dysfunction of pelvic region    Snoring    Obstructive sleep apnea on CPAP    Somatic dysfunction of both upper extremities    Morbid obesity (HCC)    Paresthesia of left upper extremity    Fibromyalgia    Mixed hyperlipidemia    Carpal tunnel syndrome of left wrist    Vitamin D deficiency    Chronic depression    Anxiety    Chronic pain of left knee        Past Surgical History:        Procedure Laterality Date    BREAST BIOPSY Left 2010    benign    BREAST REDUCTION SURGERY Bilateral 2003    BREAST SURGERY      CARPAL TUNNEL RELEASE Left 2/16/2021    LEFT CARPAL TUNNEL RELEASE performed by Allen Rivas DO at [id number] [address] CYST REMOVAL      HAND SURGERY Right     4/2014    HYSTERECTOMY      INNER EAR SURGERY      OTHER SURGICAL HISTORY  4/16/2012    Repair vaginal cuff    OVARY REMOVAL      PARTIAL NEPHRECTOMY      TOTAL NEPHRECTOMY      TUBAL LIGATION         Social History:   Social History     Tobacco Use    Smoking status: Never Smoker    Smokeless tobacco: Never Used   Substance Use Topics    Alcohol use: No       Family History:       Problem Relation Age of Onset    Cancer Mother     Migraines Mother     Cancer Maternal Grandmother     Diabetes Maternal Grandmother     Allergies Son     Allergies Daughter     Bleeding Prob Daughter     Bleeding Prob Son     Asthma Daughter     Asthma Son        Allergies:  Codeine, Dilaudid [hydromorphone hcl], and Hydromorphone    Current Medications :      Prior to Admission medications    Medication Sig Start Date End Date Taking?  Authorizing Provider   vitamin D (CHOLECALCIFEROL) 50 MCG (2000 UT) TABS tablet Take 1 tablet by mouth daily 10/8/21  Yes Michelle Corcoran MD   naproxen (NAPROSYN) 375 MG tablet Take 1 tablet by mouth 2 times daily as needed for Pain 10/8/21  Yes Michelle Corcoran MD   Naproxen Sodium (ALEVE PO) Take by mouth    Yes Historical Provider, MD   acetaminophen (TYLENOL) 325 MG tablet Take 2 tablets by mouth every 6 hours as needed for Pain 7/21/21  Yes [employer], DO   ondansetron (ZOFRAN) 4 MG tablet Take 1 tablet by mouth every 8 hours as needed for Nausea 7/9/21  Yes Michelle Corcoran MD       LAB DATA: Reviewed. REVIEW OF SYSTEMS:   see HPI/ Comprehensive review of systems negative except for the ones mentioned in HPI. PHYSICAL EXAMINATION:   /76 (Site: Right Upper Arm, Position: Sitting, Cuff Size: Medium Adult)   Pulse 81   Temp 97.1 °F (36.2 °C)   Ht 5' 3\" (1.6 m)   Wt 233 lb (105.7 kg)   LMP 02/15/2012   SpO2 98%   BMI 41.27 kg/m²      GENERAL APPEARANCE:    Alert, oriented x 3, well developed, cooperative, not in any distress, appears stated age. HEAD:   Normocephalic, atraumatic   EYES:   PERRLA, EOMI, lids normal, conjuctivea clear, sclera anicteric. NECK:    Supple, symmetrical,  trachea midline, no thyromegaly, no JVD, no lymphadenopathy. LUNGS:    Clear to auscultation bilaterally, respirations unlabored, accessory muscles are not used. HEART:     Regular rate and rhythm, S1 and S2 normal, no murmur, rub or gallop. PMI in MCL. ABDOMEN:    Soft, non-tender, bowel sounds are normoactive, no masses, no hepatospleenomegaly. Patient is morbidly obese. EXTREMITY:   no bipedal edema, mild tenderness in the left knee. NEURO:  Alert, oriented to person, place and time. Grossly intact. Musculoskeletal:         No kyphosis or scoliosis, no deformity in any extremity noted, muscle strength and tone are normal.  Skin:                            Warm and dry. No rash or obvious suspicious lesions. PSYCH:  Mood euthymic, insight and judgement good. ASSESSMENT/PLAN:    1.  Morbid obesity ([facility] [address]  Advised diet, exercise and weight loss. Refer to weight management solution.  - [employer] [employer], [place]    2. Obstructive sleep apnea on CPAP  Advised to continue to use CPAP regularly. 3. Chronic depression  Improved. 4. Anxiety  Improved. Not on any medications at this time. 5. Chronic pain of left knee  Advised to continue the exercises advised by Dr. Samantha Bird. Will start on naproxen 375 twice daily as needed. Also can take Tylenol as needed. - naproxen (NAPROSYN) 375 MG tablet; Take 1 tablet by mouth 2 times daily as needed for Pain  Dispense: 60 tablet; Refill: 5    6. Fibromyalgia  Continue naproxen and Tylenol as needed. 7. Vitamin D deficiency  Continue vitamin D3  - vitamin D (CHOLECALCIFEROL) 50 MCG (2000 UT) TABS tablet; Take 1 tablet by mouth daily  Dispense: 30 tablet; Refill: 3    Advised to follow COVID-19 precautions    Care discussed with patient. Questions answered and patient verbalizes understanding and agrees with plan. Medications reviewed and reconciled. Continue current medications. Appropriate prescriptions are ordered. Risks and benefits of meds are discussed. After visit summary provided. Advised to call for any problems, questions, or concerns. If symptoms worsen or don't improve as expected, to call us or go to ER. Follow up as directed, sooner if needed. Return in about 3 months (around 1/8/2022). This dictation was performed with a verbal recognition program and it was checked for errors. It is possible that there are still dictated errors within this office note. Any errors should be brought immediately to my attention for correction. All efforts were made to ensure that this office note is accurate.      Nat Arreaga MD MD

## 2021-12-20 DIAGNOSIS — R05.9 COUGH: Primary | ICD-10-CM

## 2021-12-20 RX ORDER — BENZONATATE 100 MG/1
100 CAPSULE ORAL 3 TIMES DAILY PRN
Qty: 30 CAPSULE | Refills: 0 | Status: SHIPPED | OUTPATIENT
Start: 2021-12-20 | End: 2021-12-27

## 2022-01-10 ENCOUNTER — OFFICE VISIT (OUTPATIENT)
Dept: INTERNAL MEDICINE CLINIC | Age: 42
End: 2022-01-10
Payer: MEDICAID

## 2022-01-10 VITALS
BODY MASS INDEX: 42.21 KG/M2 | DIASTOLIC BLOOD PRESSURE: 78 MMHG | TEMPERATURE: 97.3 F | SYSTOLIC BLOOD PRESSURE: 130 MMHG | HEART RATE: 85 BPM | WEIGHT: 238.2 LBS | OXYGEN SATURATION: 97 % | HEIGHT: 63 IN

## 2022-01-10 DIAGNOSIS — S99.912A INJURY OF LEFT ANKLE, INITIAL ENCOUNTER: Primary | ICD-10-CM

## 2022-01-10 DIAGNOSIS — M25.562 CHRONIC PAIN OF LEFT KNEE: ICD-10-CM

## 2022-01-10 DIAGNOSIS — G89.29 CHRONIC PAIN OF LEFT KNEE: ICD-10-CM

## 2022-01-10 DIAGNOSIS — E55.9 VITAMIN D DEFICIENCY: ICD-10-CM

## 2022-01-10 DIAGNOSIS — E66.01 MORBIDLY OBESE (HCC): ICD-10-CM

## 2022-01-10 DIAGNOSIS — G43.009 MIGRAINE WITHOUT AURA AND WITHOUT STATUS MIGRAINOSUS, NOT INTRACTABLE: ICD-10-CM

## 2022-01-10 PROCEDURE — 99214 OFFICE O/P EST MOD 30 MIN: CPT | Performed by: INTERNAL MEDICINE

## 2022-01-10 PROCEDURE — 1036F TOBACCO NON-USER: CPT | Performed by: INTERNAL MEDICINE

## 2022-01-10 PROCEDURE — G8484 FLU IMMUNIZE NO ADMIN: HCPCS | Performed by: INTERNAL MEDICINE

## 2022-01-10 PROCEDURE — G8427 DOCREV CUR MEDS BY ELIG CLIN: HCPCS | Performed by: INTERNAL MEDICINE

## 2022-01-10 PROCEDURE — G8417 CALC BMI ABV UP PARAM F/U: HCPCS | Performed by: INTERNAL MEDICINE

## 2022-01-10 RX ORDER — DICYCLOMINE HYDROCHLORIDE 10 MG/1
10 CAPSULE ORAL 3 TIMES DAILY
Qty: 90 CAPSULE | Refills: 3 | Status: SHIPPED | OUTPATIENT
Start: 2022-01-10 | End: 2022-01-17 | Stop reason: ALTCHOICE

## 2022-01-10 RX ORDER — PHENTERMINE HYDROCHLORIDE 37.5 MG/1
37.5 TABLET ORAL
Qty: 30 TABLET | Refills: 0 | Status: SHIPPED | OUTPATIENT
Start: 2022-01-10 | End: 2022-02-09

## 2022-01-10 RX ORDER — CHOLECALCIFEROL (VITAMIN D3) 50 MCG
2000 TABLET ORAL DAILY
Qty: 30 TABLET | Refills: 3 | Status: SHIPPED | OUTPATIENT
Start: 2022-01-10 | End: 2022-03-14

## 2022-01-10 RX ORDER — BUTALBITAL, ACETAMINOPHEN AND CAFFEINE 50; 325; 40 MG/1; MG/1; MG/1
1 TABLET ORAL EVERY 4 HOURS PRN
Qty: 180 TABLET | Refills: 3 | Status: SHIPPED | OUTPATIENT
Start: 2022-01-10 | End: 2022-01-17 | Stop reason: ALTCHOICE

## 2022-01-10 NOTE — PROGRESS NOTES
Name: Edmar Corbett  [id number]  Age: 42 y.o. YOB: 1980  Sex: female    CHIEF COMPLAINT:    Chief Complaint   Patient presents with    Ankle Pain     twisted it last Thursday    Obesity     wants back on adipex    Other     other chronic conditions       HISTORY OF PRESENT ILLNESS:     This is a pleasant  42 y.o. female  is seen today for management of chronic medical problems and medications refills. Previous records reviewed . She fell in her bathroom and twisted  Left ankle 4 days ago. Taking aleve and tylenol with little help. She wants to see Dr. Vishnu Holcomb. . she sees him for her left knee pain. C/O chronic head ache  on and off. Start in frontal area and goes back in  her neck. C/O blurred vision and nausea with HAs. She has tried Imitrex in the past but is allergic to it and does not want Imitrex. Unable to loose weight. Adipex helped her in the past and wants to restart on Adipex. Last time she used Adipex was in April 2021. PDMP reviewed: No signs of drug abuse. Denies CP or SOB. No fever , sore throat or cough or congestion. She has sleep apnea and has CPAP machine at home but not using it regularly. But she claims her mask does not fit her properly. Denies any abdominal pain. Appetite OK. Bowels moving Thora Aric. No urinary symptoms. Hearing is ok. Vision Ok with glasses. Denies  any significant skin lesions. No other complaints. Has reservations regarding Covid vaccinations. Tried to reassure. Patient not convinced.   Also refuses to get flu vaccine    Past Medical History:    Patient Active Problem List   Diagnosis    Vaginal bleeding    H/O of hysterectomy with bilateral oophorectomy    BMI 39.0-39.9,adult    Elevated blood pressure reading    Panic disorder with agoraphobia    Early menopause    Sleep disturbance    H/O bilateral breast reduction surgery    Myofascial pain syndrome    Somatic dysfunction of head region    Somatic dysfunction of cervical region    Somatic dysfunction of back    Somatic dysfunction of abdominal region    Somatic dysfunction of pelvic region    Snoring    Obstructive sleep apnea on CPAP    Somatic dysfunction of both upper extremities    Morbidly obese (HCC)    Paresthesia of left upper extremity    Fibromyalgia    Mixed hyperlipidemia    Carpal tunnel syndrome of left wrist    Vitamin D deficiency    Chronic depression    Anxiety    Chronic pain of left knee    Migraine without aura and without status migrainosus, not intractable    Injury of left ankle        Past Surgical History:        Procedure Laterality Date    BREAST BIOPSY Left 2010    benign    BREAST REDUCTION SURGERY Bilateral 2003    BREAST SURGERY      CARPAL TUNNEL RELEASE Left 2/16/2021    LEFT CARPAL TUNNEL RELEASE performed by Steph De Paz DO at [address] CYST REMOVAL      HAND SURGERY Right     4/2014    HYSTERECTOMY      INNER EAR SURGERY      OTHER SURGICAL HISTORY  4/16/2012    Repair vaginal cuff    OVARY REMOVAL      PARTIAL NEPHRECTOMY      TOTAL NEPHRECTOMY      TUBAL LIGATION         Social History:   Social History     Tobacco Use    Smoking status: Never Smoker    Smokeless tobacco: Never Used   Substance Use Topics    Alcohol use: No       Family History:       Problem Relation Age of Onset    Cancer Mother     Migraines Mother     Cancer Maternal Grandmother     Diabetes Maternal Grandmother     Allergies Son     Allergies Daughter     Bleeding Prob Daughter     Bleeding Prob Son     Asthma Daughter     Asthma Son        Allergies:  Codeine, Dilaudid [hydromorphone hcl], and Hydromorphone    Current Medications :      Prior to Admission medications    Medication Sig Start Date End Date Taking?  Authorizing Provider   vitamin D (CHOLECALCIFEROL) 50 MCG (2000 UT) TABS tablet Take 1 tablet by mouth daily 1/10/22  Yes Janey Dunn MD   butalbital-acetaminophen-caffeine (FIORICET, [address]) -40 MG per tablet Take 1 tablet by mouth every 4 hours as needed for Headaches 1/10/22  Yes Kemal Lombardo MD   phentermine (ADIPEX-P) 37.5 MG tablet Take 1 tablet by mouth every morning (before breakfast) for 30 days. 1/10/22 2/9/22 Yes Kemal Lombardo MD   dicyclomine (BENTYL) 10 MG capsule Take 1 capsule by mouth 3 times daily 1/10/22  Yes Kemal Lombardo MD   naproxen (NAPROSYN) 375 MG tablet Take 1 tablet by mouth 2 times daily as needed for Pain 10/8/21  Yes Kemal Lombardo MD   Naproxen Sodium (ALEVE PO) Take by mouth    Yes Historical Provider, MD   acetaminophen (TYLENOL) 325 MG tablet Take 2 tablets by mouth every 6 hours as needed for Pain 7/21/21  Yes Norma Sierra DO       LAB DATA: Reviewed. REVIEW OF SYSTEMS:   see HPI/ Comprehensive review of systems negative except for the ones mentioned in HPI. PHYSICAL EXAMINATION:   /78 (Site: Right Upper Arm, Position: Sitting, Cuff Size: Medium Adult)   Pulse 85   Temp 97.3 °F (36.3 °C)   Ht 5' 3\" (1.6 m)   Wt 238 lb 3.2 oz (108 kg)   LMP 02/15/2012   SpO2 97%   BMI 42.20 kg/m²      GENERAL APPEARANCE:    Alert, oriented x 3, well developed, cooperative, not in any distress, appears stated age. HEAD:   Normocephalic, atraumatic   EYES:   PERRLA, EOMI, lids normal, conjuctivea clear, sclera anicteric. NECK:    Supple, symmetrical,  trachea midline, no thyromegaly, no JVD, no lymphadenopathy. LUNGS:    Clear to auscultation bilaterally, respirations unlabored, accessory muscles are not used. HEART:     Regular rate and rhythm, S1 and S2 normal, no murmur, rub or gallop. PMI in MCL. ABDOMEN:    Soft, non-tender, bowel sounds are normoactive, no masses, no hepatospleenomegaly. .  Patient is morbidly obese  EXTREMITY:   no bipedal edema  NEURO:  Alert, oriented to person, place and time. Grossly intact. Musculoskeletal:         No kyphosis or scoliosis, mild tenderness in her left ankle and also  left knee.   Skin: Warm and dry. No rash or obvious suspicious lesions. PSYCH:  Mood euthymic, insight and judgement good. ASSESSMENT/PLAN:    1. Injury of left ankle, initial encounter  Continue Aleve and Tylenol as needed. Also diclofenac based cream was prescribed to apply 2-3 times a day. Refer to orthopedic doctor. - [address]    2. Chronic pain of left knee  Continue to follow with her orthopedic doctor. Continue Aleve and Tylenol as needed    3. Vitamin D deficiency  Continue vitamin D3  - vitamin D (CHOLECALCIFEROL) 50 MCG (2000 UT) TABS tablet; Take 1 tablet by mouth daily  Dispense: 30 tablet; Refill: 3    4. Migraine without aura and without status migrainosus, not intractable  Will just give Fioricet as needed for now. Migraine headaches are rare. - butalbital-acetaminophen-caffeine (FIORICET, ESGIC) -40 MG per tablet; Take 1 tablet by mouth every 4 hours as needed for Headaches  Dispense: 180 tablet; Refill: 3    5. Morbidly obese (HCC)  Advised diet, exercise and weight loss. Restart Adipex. .  Pros and cons again discussed with the patient. - phentermine (ADIPEX-P) 37.5 MG tablet; Take 1 tablet by mouth every morning (before breakfast) for 30 days. Dispense: 30 tablet; Refill: 0    Advised to follow COVID-19 precautions    Care discussed with patient. Questions answered and patient verbalizes understanding and agrees with plan. Medications reviewed and reconciled. Continue current medications. Appropriate prescriptions are ordered. Risks and benefits of meds are discussed. After visit summary provided. Advised to call for any problems, questions, or concerns. If symptoms worsen or don't improve as expected, to call us or go to ER. Follow up as directed, sooner if needed. Return in about 4 weeks (around 2/7/2022). This dictation was performed with a verbal recognition program and it was checked for errors.  It is possible that there are still dictated errors within this office note. Any errors should be brought immediately to my attention for correction. All efforts were made to ensure that this office note is accurate.      Miguel Tam MD MD

## 2022-01-17 ENCOUNTER — OFFICE VISIT (OUTPATIENT)
Dept: ORTHOPEDIC SURGERY | Age: 42
End: 2022-01-17
Payer: MEDICAID

## 2022-01-17 VITALS
OXYGEN SATURATION: 98 % | BODY MASS INDEX: 42.17 KG/M2 | RESPIRATION RATE: 16 BRPM | WEIGHT: 238 LBS | HEART RATE: 98 BPM | HEIGHT: 63 IN

## 2022-01-17 DIAGNOSIS — S93.402A SPRAIN OF LEFT ANKLE, UNSPECIFIED LIGAMENT, INITIAL ENCOUNTER: Primary | ICD-10-CM

## 2022-01-17 PROCEDURE — 1036F TOBACCO NON-USER: CPT | Performed by: PHYSICIAN ASSISTANT

## 2022-01-17 PROCEDURE — G8417 CALC BMI ABV UP PARAM F/U: HCPCS | Performed by: PHYSICIAN ASSISTANT

## 2022-01-17 PROCEDURE — 99213 OFFICE O/P EST LOW 20 MIN: CPT | Performed by: PHYSICIAN ASSISTANT

## 2022-01-17 PROCEDURE — G8484 FLU IMMUNIZE NO ADMIN: HCPCS | Performed by: PHYSICIAN ASSISTANT

## 2022-01-17 PROCEDURE — G8427 DOCREV CUR MEDS BY ELIG CLIN: HCPCS | Performed by: PHYSICIAN ASSISTANT

## 2022-01-17 ASSESSMENT — ENCOUNTER SYMPTOMS
RESPIRATORY NEGATIVE: 1
GASTROINTESTINAL NEGATIVE: 1
EYES NEGATIVE: 1

## 2022-01-17 NOTE — PATIENT INSTRUCTIONS
Lace up ankle brace given in office  Please wear for the next 2 weeks when walking  Try to avoid any high impact activities for the next 2-3 weeks  Continue to weight bear as tolerated  Continue range of motion  Ice and elevate as needed  Tylenol or Motrin for pain  Follow up as needed

## 2022-01-17 NOTE — PROGRESS NOTES
Review of Systems   Constitutional: Negative. HENT: Negative. Eyes: Negative. Respiratory: Negative. Cardiovascular: Negative. Gastrointestinal: Negative. Genitourinary: Negative. Musculoskeletal: Positive for arthralgias and gait problem. Skin: Negative. Psychiatric/Behavioral: Negative. Ellen Singleton is a 42 y.o. female who presents to the office today to have her left ankle evaluated. She states that recently she tripped going down a step and rolled her ankle. She is able to bear weight on the ankle. Pain is rated today at a 5/10, aching in nature.     Past Medical History:   Diagnosis Date    Chronic depression 7/9/2021    Migraine     Mixed hyperlipidemia 1/27/2021    PONV (postoperative nausea and vomiting)     Prolonged emergence from general anesthesia     Pulmonic stenosis     Sleep apnea     Sleep apnea        Past Surgical History:   Procedure Laterality Date    BREAST BIOPSY Left 2010    benign    BREAST REDUCTION SURGERY Bilateral 2003    BREAST SURGERY      CARPAL TUNNEL RELEASE Left 2/16/2021    LEFT CARPAL TUNNEL RELEASE performed by Daron Bunch DO at [address] CYST REMOVAL      HAND SURGERY Right     4/2014    HYSTERECTOMY      INNER EAR SURGERY      OTHER SURGICAL HISTORY  4/16/2012    Repair vaginal cuff    OVARY REMOVAL      PARTIAL NEPHRECTOMY      TOTAL NEPHRECTOMY      TUBAL LIGATION         Family History   Problem Relation Age of Onset    Cancer Mother     Migraines Mother     Cancer Maternal Grandmother     Diabetes Maternal Grandmother     Allergies Son     Allergies Daughter     Bleeding Prob Daughter     Bleeding Prob Son     Asthma Daughter     Asthma Son        Social History     Socioeconomic History    Marital status:      Spouse name: None    Number of children: None    Years of education: None    Highest education level: None   Occupational History    None   Tobacco Use  Smoking status: Never Smoker    Smokeless tobacco: Never Used   Vaping Use    Vaping Use: Never used   Substance and Sexual Activity    Alcohol use: No    Drug use: No    Sexual activity: None   Other Topics Concern    None   Social History Narrative    ** Merged History Encounter **          Social Determinants of Health     Financial Resource Strain: Low Risk     Difficulty of Paying Living Expenses: Not very hard   Food Insecurity: No Food Insecurity    Worried About Running Out of Food in the Last Year: Never true    [place] of Food in the Last Year: Never true   Transportation Needs:     Lack of Transportation (Medical): Not on file    Lack of Transportation (Non-Medical): Not on file   Physical Activity:     Days of Exercise per Week: Not on file    Minutes of Exercise per Session: Not on file   Stress:     Feeling of Stress : Not on file   Social Connections:     Frequency of Communication with Friends and Family: Not on file    Frequency of Social Gatherings with Friends and Family: Not on file    Attends Zoroastrian Services: Not on file    Active Member of [address] [place] [address] or Organizations: Not on file    Attends Club or Organization Meetings: Not on file    Marital Status: Not on file   Intimate Partner Violence:     Fear of Current or Ex-Partner: Not on file    Emotionally Abused: Not on file    Physically Abused: Not on file    Sexually Abused: Not on file   Housing Stability:     Unable to Pay for Housing in the Last Year: Not on file    Number of [place] in the Last Year: Not on file    Unstable Housing in the Last Year: Not on file       Current Outpatient Medications   Medication Sig Dispense Refill    vitamin D (CHOLECALCIFEROL) 50 MCG (2000 UT) TABS tablet Take 1 tablet by mouth daily 30 tablet 3    phentermine (ADIPEX-P) 37.5 MG tablet Take 1 tablet by mouth every morning (before breakfast) for 30 days.  30 tablet 0    naproxen (NAPROSYN) 375 MG tablet Take 1 tablet by mouth 2 times daily as needed for Pain 60 tablet 5    Naproxen Sodium (ALEVE PO) Take by mouth       acetaminophen (TYLENOL) 325 MG tablet Take 2 tablets by mouth every 6 hours as needed for Pain 120 tablet 3     No current facility-administered medications for this visit. Allergies   Allergen Reactions    Codeine Anaphylaxis    Dilaudid [Hydromorphone Hcl] Anaphylaxis    Hydromorphone Anaphylaxis       Review of Systems:  See above      Physical Exam:   Pulse 98   Resp 16   Ht 5' 3\" (1.6 m)   Wt 238 lb (108 kg)   LMP 02/15/2012   SpO2 98%   BMI 42.16 kg/m²        Gait is Antalgic. Gen/Psych:Examination reveals a pleasant individual in no acute distress. The patient is oriented to time, place and person. The patient's mood and affect are appropriate. Patient appears well nourished. Body habitus is overweight     Lymph:  no lymphedema in bilateral lower extremities     Skin intact in bilateral lower extremities with no ulcerations, lesions, rash, erythema. Vascular: There are no varicosities in bilateral lower extremities, sensation intact to light touch over bilateral lower extremities. left ankle exam:  Inspection: mild edema  Palpation: Tenderness to palpation over the anterior lateral ankle. Range of motion/Strength   Dorsiflexion 5 degrees, 5/5   Plantarflexion 40 degrees, 5/5   Eversion 5 degrees, 5/5   Inversion 10 degrees, 5/5        Outsiderecord review: Prior x-rays reviewed    Imaging studies:  3 views of the left ankle taken reviewed in the office today show intact ankle mortise, no increased medial clear space, no talar tilt, no evidence of fracture or dislocation. Ankle x-rays compared to films taken in 2020 and there is no change. 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 The official read and interpretation of these x-rays will be done by the the [facility]           Impression:     Diagnosis Orders   1.  Sprain of left ankle, unspecified ligament, initial encounter             Plan:    Patient Instructions   Lace up ankle brace given in office  Please wear for the next 2 weeks when walking  Try to avoid any high impact activities for the next 2-3 weeks  Continue to weight bear as tolerated  Continue range of motion  Ice and elevate as needed  Tylenol or Motrin for pain  Follow up as needed

## 2022-01-20 ENCOUNTER — OFFICE VISIT (OUTPATIENT)
Dept: PSYCHOLOGY | Age: 42
End: 2022-01-20
Payer: MEDICAID

## 2022-01-20 DIAGNOSIS — F41.9 ANXIETY: ICD-10-CM

## 2022-01-20 DIAGNOSIS — F32.A DEPRESSIVE DISORDER: Primary | ICD-10-CM

## 2022-01-20 PROCEDURE — 1036F TOBACCO NON-USER: CPT | Performed by: PSYCHOLOGIST

## 2022-01-20 PROCEDURE — 90832 PSYTX W PT 30 MINUTES: CPT | Performed by: PSYCHOLOGIST

## 2022-01-20 ASSESSMENT — PATIENT HEALTH QUESTIONNAIRE - PHQ9
SUM OF ALL RESPONSES TO PHQ9 QUESTIONS 1 & 2: 6
SUM OF ALL RESPONSES TO PHQ QUESTIONS 1-9: 21
3. TROUBLE FALLING OR STAYING ASLEEP: 3
SUM OF ALL RESPONSES TO PHQ QUESTIONS 1-9: 21
2. FEELING DOWN, DEPRESSED OR HOPELESS: 3
SUM OF ALL RESPONSES TO PHQ QUESTIONS 1-9: 21
1. LITTLE INTEREST OR PLEASURE IN DOING THINGS: 3
6. FEELING BAD ABOUT YOURSELF - OR THAT YOU ARE A FAILURE OR HAVE LET YOURSELF OR YOUR FAMILY DOWN: 3
7. TROUBLE CONCENTRATING ON THINGS, SUCH AS READING THE NEWSPAPER OR WATCHING TELEVISION: 3
SUM OF ALL RESPONSES TO PHQ QUESTIONS 1-9: 21
9. THOUGHTS THAT YOU WOULD BE BETTER OFF DEAD, OR OF HURTING YOURSELF: 0
4. FEELING TIRED OR HAVING LITTLE ENERGY: 3
8. MOVING OR SPEAKING SO SLOWLY THAT OTHER PEOPLE COULD HAVE NOTICED. OR THE OPPOSITE, BEING SO FIGETY OR RESTLESS THAT YOU HAVE BEEN MOVING AROUND A LOT MORE THAN USUAL: 0
5. POOR APPETITE OR OVEREATING: 3

## 2022-01-20 NOTE — PROGRESS NOTES
Behavioral Health Consultation  Leyla Tovar Psy.D. Psychologist      Time spent with Patient: 30 minutes  Visit number: 2  Reason for Consult:  depression  Referring Provider: Kemal Lombardo MD  [address] [place] [address]    S:  ----------------------------------------------------------------------------------------------------------------------  depression  \"There's been a whole lot going on. \"     Social stressors persist:  -son in [place] with army. Going to [place] in march.   -'s back and shoulder pain   -grandfather dying   -multiple family members w COVID   -searching for a job   -bills are behind     Additionally, \"stuff from childhood is getting in my mind and I'm not over my dad's death. \"     Endorsed depressed mood, anhedonia, anergia, amotivation, poor sleep, fluctuating appetite, diminished concentration, and corrosive self-image. Denied any SI/HI, planning, or intent. O:  ----------------------------------------------------------------------------------------------------------------------  MSE:  Orientation:  oriented to person, place, time, and general circumstances  Appearance and behavior:  alert, cooperative  Speech:  spontaneous, normal rate and normal volume  Mood: anxious   Thought Content:  intact, hopelessness and helplessness  Thought Process:  linear, goal directed and coherent  Interest/Pleasure: Loss of Pleasure/Fun  Sleep disturbance: Yes  Motivation: Poor  Energy: Tired/Fatigued  Morbid ideation No  Suicide Assessment: no suicidal ideation    A:  ----------------------------------------------------------------------------------------------------------------------  Diagnosis:    1. Depressive disorder    2.  Anxiety         PHQ Scores 1/20/2022 8/19/2021 1/27/2021 1/7/2020 10/31/2019   PHQ2 Score 6 6 0 0 2   PHQ9 Score 21 20 0 0 2     Interpretation of Total Score Depression Severity: 1-4 = Minimal depression, 5-9 = Mild depression, 10-14 = Moderate depression, 15-19 = Moderately severe depression, 20-27 = Severe depression    P:  ----------------------------------------------------------------------------------------------------------------------    General:   [x] [place]-setting to identify pt's primary goals for [facility] visit / overall health   [x] Provided psychoeducation/handout on:   1. Depressive disorder    2. Anxiety        [x]  Supportive interventions    Cognitive:   [x] Trained in strategies for increasing balanced thinking   [x] Cognitive strategies to target current mental health sx    [x] Identified and challenged maladaptive thoughts    Behavioral:   [x] Discussed and set plan for behavioral activation   [x] Discussed and problem-solved barriers in adhering to behavioral change plan   [x] Motivational Interviewing to increase patient confidence and compliance with       adhering to behavioral change plan   [x] Discussed potential barriers to change   [x] Discussed self-care (sleep, nutrition, rewarding activities, social support, exercise)    Other:   []   []   []   []    Recommendations to patient:    1. Return to Dr. Kim Plasencia in 4 week(s)    2.                   Feedback provided to pt's PCP via EPIC and/or oral report

## 2022-01-20 NOTE — PATIENT INSTRUCTIONS
Every morning write down:  3 things you are grateful for   1 thing you want to work on that day     At the end of the day write down:  3 things that went well   1 thing you want to do better tomorrow

## 2022-02-08 PROBLEM — M99.09 SOMATIC DYSFUNCTION OF BACK: Status: RESOLVED | Noted: 2019-11-07 | Resolved: 2022-02-08

## 2022-02-08 PROBLEM — G47.9 SLEEP DISTURBANCE: Status: RESOLVED | Noted: 2019-10-31 | Resolved: 2022-02-08

## 2022-02-08 PROBLEM — M99.09 SOMATIC DYSFUNCTION OF ABDOMINAL REGION: Status: RESOLVED | Noted: 2019-11-07 | Resolved: 2022-02-08

## 2022-02-08 PROBLEM — R03.0 ELEVATED BLOOD PRESSURE READING: Status: RESOLVED | Noted: 2019-10-31 | Resolved: 2022-02-08

## 2022-03-07 ENCOUNTER — OFFICE VISIT MH/BH (OUTPATIENT)
Dept: BARIATRICS/WEIGHT MGMT | Age: 42
End: 2022-03-07
Payer: MEDICAID

## 2022-03-07 VITALS
OXYGEN SATURATION: 96 % | WEIGHT: 238.8 LBS | BODY MASS INDEX: 42.31 KG/M2 | HEART RATE: 80 BPM | SYSTOLIC BLOOD PRESSURE: 130 MMHG | DIASTOLIC BLOOD PRESSURE: 84 MMHG | HEIGHT: 63 IN

## 2022-03-07 DIAGNOSIS — Z99.89 OBSTRUCTIVE SLEEP APNEA ON CPAP: ICD-10-CM

## 2022-03-07 DIAGNOSIS — Z01.818 PRE-OP EVALUATION: ICD-10-CM

## 2022-03-07 DIAGNOSIS — E66.01 MORBID OBESITY WITH BMI OF 40.0-44.9, ADULT (HCC): Primary | ICD-10-CM

## 2022-03-07 DIAGNOSIS — G47.33 OBSTRUCTIVE SLEEP APNEA ON CPAP: ICD-10-CM

## 2022-03-07 PROCEDURE — G8427 DOCREV CUR MEDS BY ELIG CLIN: HCPCS | Performed by: NURSE PRACTITIONER

## 2022-03-07 PROCEDURE — 99204 OFFICE O/P NEW MOD 45 MIN: CPT | Performed by: NURSE PRACTITIONER

## 2022-03-07 PROCEDURE — 1036F TOBACCO NON-USER: CPT | Performed by: NURSE PRACTITIONER

## 2022-03-07 PROCEDURE — G8417 CALC BMI ABV UP PARAM F/U: HCPCS | Performed by: NURSE PRACTITIONER

## 2022-03-07 PROCEDURE — G8484 FLU IMMUNIZE NO ADMIN: HCPCS | Performed by: NURSE PRACTITIONER

## 2022-03-07 ASSESSMENT — PATIENT HEALTH QUESTIONNAIRE - PHQ9
SUM OF ALL RESPONSES TO PHQ9 QUESTIONS 1 & 2: 0
2. FEELING DOWN, DEPRESSED OR HOPELESS: 0
SUM OF ALL RESPONSES TO PHQ QUESTIONS 1-9: 0
1. LITTLE INTEREST OR PLEASURE IN DOING THINGS: 0

## 2022-03-07 ASSESSMENT — ENCOUNTER SYMPTOMS
GASTROINTESTINAL NEGATIVE: 1
ALLERGIC/IMMUNOLOGIC NEGATIVE: 1
EYES NEGATIVE: 1
RESPIRATORY NEGATIVE: 1

## 2022-03-07 NOTE — PROGRESS NOTES
BARIATRIC SURGERY OFFICE PROGRESS NOTE    SUBJECTIVE:    Patient presenting today referred from Suraj Trinh MD, for   Chief Complaint   Patient presents with    New Patient     N/P MEDICATION WM HAS SURG BENEFITS   . Vitals:    03/07/22 0918   BP: 130/84   Pulse: 80   SpO2: 96%        BMI: Body mass index is 42.3 kg/m². Weight History: Wt Readings from Last 3 Encounters:   03/07/22 238 lb 12.8 oz (108.3 kg)   01/17/22 238 lb (108 kg)   01/10/22 238 lb 3.2 oz (108 kg)         Dario Padilla is a 42 y.o. female presenting in first bariatric PRE-OP visit    Diet Recall:    Patient here today to discuss weight loss options. Patient would like to start surgical program for sleeve gastrectomy. Health problems: history of sleep apnea and vitamin D deficiency. New medications: only takes tylenol as needed. No longer takes vit D supplements  Clearances:  -- Cardiology: referral sent  -- Pulmonology: referral sent  -- Psychology: referral sent    Thoroughly reviewed the patient's medical history, family history, social history and review of systems with the patient today in the office. Please see medical record for pertinent positives.       Past Medical History:   Diagnosis Date    Chronic depression 7/9/2021    Migraine     Mixed hyperlipidemia 1/27/2021    PONV (postoperative nausea and vomiting)     Prolonged emergence from general anesthesia     Pulmonic stenosis     Sleep apnea     Sleep apnea         Patient Active Problem List   Diagnosis    Vaginal bleeding    H/O of hysterectomy with bilateral oophorectomy    Panic disorder with agoraphobia    Early menopause    H/O bilateral breast reduction surgery    Myofascial pain syndrome    Somatic dysfunction of head region    Somatic dysfunction of cervical region    Somatic dysfunction of pelvic region    Snoring    Obstructive sleep apnea on CPAP    Somatic dysfunction of both upper extremities    Morbidly obese ([address]    Paresthesia of left upper extremity    Fibromyalgia    Mixed hyperlipidemia    Carpal tunnel syndrome of left wrist    Vitamin D deficiency    Chronic depression    Anxiety    Chronic pain of left knee    Migraine without aura and without status migrainosus, not intractable    Injury of left ankle       Past Surgical History:   Procedure Laterality Date    BREAST BIOPSY Left 2010    benign    BREAST REDUCTION SURGERY Bilateral 2003    BREAST SURGERY      CARPAL TUNNEL RELEASE Left 2/16/2021    LEFT CARPAL TUNNEL RELEASE performed by Karla Varner DO at [address] [facility]      HAND SURGERY Right     4/2014    HYSTERECTOMY      INNER EAR SURGERY      OTHER SURGICAL HISTORY  4/16/2012    Repair vaginal cuff    OVARY REMOVAL      PARTIAL NEPHRECTOMY      TOTAL NEPHRECTOMY      TUBAL LIGATION         Current Outpatient Medications   Medication Sig Dispense Refill    Naproxen Sodium (ALEVE PO) Take by mouth       acetaminophen (TYLENOL) 325 MG tablet Take 2 tablets by mouth every 6 hours as needed for Pain 120 tablet 3    vitamin D (CHOLECALCIFEROL) 50 MCG (2000 UT) TABS tablet Take 1 tablet by mouth daily (Patient not taking: Reported on 3/7/2022) 30 tablet 3    naproxen (NAPROSYN) 375 MG tablet Take 1 tablet by mouth 2 times daily as needed for Pain (Patient not taking: Reported on 3/7/2022) 60 tablet 5     No current facility-administered medications for this visit. Allergies   Allergen Reactions    Codeine Anaphylaxis    Dilaudid [Hydromorphone Hcl] Anaphylaxis    Hydromorphone Anaphylaxis         Review of Systems   Constitutional: Negative. HENT: Negative. Eyes: Negative. Respiratory: Negative. Cardiovascular: Negative. Gastrointestinal: Negative. Endocrine: Negative. Genitourinary: Negative. Musculoskeletal: Negative. Skin: Negative. Allergic/Immunologic: Negative. Neurological: Negative. Hematological: Negative. Psychiatric/Behavioral: Negative. OBJECTIVE:    /84 (Site: Right Upper Arm, Position: Sitting, Cuff Size: Medium Adult)   Pulse 80   Ht 5' 3\" (1.6 m)   Wt 238 lb 12.8 oz (108.3 kg)   LMP 02/15/2012   SpO2 96%   BMI 42.30 kg/m²      Physical Exam  Constitutional:       Appearance: Normal appearance. HENT:      Head: Normocephalic. Nose: Nose normal.      Mouth/Throat:      Pharynx: Oropharynx is clear. Eyes:      Conjunctiva/sclera: Conjunctivae normal.      Pupils: Pupils are equal, round, and reactive to light. Cardiovascular:      Rate and Rhythm: Normal rate. Pulses: Normal pulses. Pulmonary:      Effort: Pulmonary effort is normal.      Breath sounds: Normal breath sounds. Abdominal:      General: Bowel sounds are normal.      Palpations: Abdomen is soft. Comments: Larger ABD   Musculoskeletal:         General: Normal range of motion. Cervical back: Normal range of motion. Skin:     General: Skin is warm and dry. Capillary Refill: Capillary refill takes less than 2 seconds. Neurological:      General: No focal deficit present. Mental Status: She is alert and oriented to person, place, and time. Psychiatric:         Mood and Affect: Mood normal.         Behavior: Behavior normal.         ASSESSMENT & PLAN:    1. Morbid obesity with BMI of 40.0-44.9, adult ([facility] [address]  - wants to start surgical weight loss program  -Patient was encouraged to journal all food intake.   -Keep calorie level at approximately 1200, per discussion / plan with registered dietician.  -Protein intake is to be a minimum of 60 grams per day. -Water drinking was encouraged with a goal of 64oz-128oz daily. Beverages are to be calorie free except for milk. Avoid soda. -Continue to increase level of physical activity. - Amb Referral to Nutrition Services  - bariatric handbook and calorie counting books given to pt    2.  Obstructive sleep apnea on CPAP  - wear cpap as ordered  - 3/7/2023     Order Specific Question:   Is Patient Fasting? Answer:   yes     Order Specific Question:   No of Hours? Answer:   8    Lipid Panel     Standing Status:   Future     Standing Expiration Date:   3/7/2023     Order Specific Question:   Is Patient Fasting?/# of Hours     Answer:   8    Magnesium     Standing Status:   Future     Standing Expiration Date:   3/7/2023    Nicotine and Metabolites     Standing Status:   Future     Standing Expiration Date:   3/7/2023    Zinc     Standing Status:   Future     Standing Expiration Date:   3/7/2023    Vitamin D 25 Hydroxy     Standing Status:   Future     Standing Expiration Date:   3/7/2023    Vitamin B12 & Folate     Standing Status:   Future     Standing Expiration Date:   3/7/2023    Vitamin B1     Standing Status:   Future     Standing Expiration Date:   3/7/2023    TSH     Standing Status:   Future     Standing Expiration Date:   3/7/2023    PTH, Intact     Standing Status:   Future     Standing Expiration Date:   3/7/2023    Protime/INR & PTT     Standing Status:   Future     Standing Expiration Date:   3/7/2023     Order Specific Question:   Daily Coumadin & Heparin Dose?      Answer:   no    Amb Referral to Nutrition Services     Referral Priority:   Routine     Referral Type:   Eval and Treat     Referral Reason:   Specialty Services Required     Requested Specialty:   Nutrition     Number of Visits Requested:   1    Amb External Referral To Psychology     Referral Priority:   Routine     Referral Type:   Consult for Advice and Opinion     Referral Reason:   Specialty Services Required     Requested Specialty:   Psychology     Number of Visits Requested:   1    Ambulatory referral to Cardiology     Referral Priority:   Routine     Referral Type:   Consult for Advice and Opinion     Referred to Provider:   Amos Grant MD     Number of Visits Requested:   1    Ambulatory referral to Pulmonology     Referral Priority:   Routine Referral Type:   Consult for Advice and Opinion     Referral Reason:   Specialty Services Required     Referred to Provider:   Ariana Andersen MD     Number of Visits Requested:   1       Follow Up:  Return in about 1 month (around 4/7/2022) for with Lon Nunez.     Ilene Hamman, APRN - CNP

## 2022-03-10 ENCOUNTER — OFFICE VISIT (OUTPATIENT)
Dept: FAMILY MEDICINE CLINIC | Age: 42
End: 2022-03-10
Payer: MEDICAID

## 2022-03-10 VITALS
OXYGEN SATURATION: 98 % | BODY MASS INDEX: 42.38 KG/M2 | DIASTOLIC BLOOD PRESSURE: 88 MMHG | WEIGHT: 239.2 LBS | SYSTOLIC BLOOD PRESSURE: 136 MMHG | HEART RATE: 81 BPM | HEIGHT: 63 IN

## 2022-03-10 DIAGNOSIS — M54.41 CHRONIC BILATERAL LOW BACK PAIN WITH BILATERAL SCIATICA: ICD-10-CM

## 2022-03-10 DIAGNOSIS — E78.2 MIXED HYPERLIPIDEMIA: ICD-10-CM

## 2022-03-10 DIAGNOSIS — Z12.11 SCREEN FOR COLON CANCER: ICD-10-CM

## 2022-03-10 DIAGNOSIS — E66.01 MORBIDLY OBESE (HCC): ICD-10-CM

## 2022-03-10 DIAGNOSIS — M79.7 FIBROMYALGIA: ICD-10-CM

## 2022-03-10 DIAGNOSIS — M54.42 CHRONIC BILATERAL LOW BACK PAIN WITH BILATERAL SCIATICA: ICD-10-CM

## 2022-03-10 DIAGNOSIS — Z12.31 ENCOUNTER FOR SCREENING MAMMOGRAM FOR BREAST CANCER: ICD-10-CM

## 2022-03-10 DIAGNOSIS — Z99.89 OBSTRUCTIVE SLEEP APNEA ON CPAP: ICD-10-CM

## 2022-03-10 DIAGNOSIS — G56.02 CARPAL TUNNEL SYNDROME OF LEFT WRIST: ICD-10-CM

## 2022-03-10 DIAGNOSIS — R10.32 BILATERAL LOWER ABDOMINAL CRAMPING: ICD-10-CM

## 2022-03-10 DIAGNOSIS — G89.29 CHRONIC PAIN OF LEFT KNEE: ICD-10-CM

## 2022-03-10 DIAGNOSIS — F32.A CHRONIC DEPRESSION: ICD-10-CM

## 2022-03-10 DIAGNOSIS — G89.29 CHRONIC BILATERAL LOW BACK PAIN WITH BILATERAL SCIATICA: ICD-10-CM

## 2022-03-10 DIAGNOSIS — G43.009 MIGRAINE WITHOUT AURA AND WITHOUT STATUS MIGRAINOSUS, NOT INTRACTABLE: ICD-10-CM

## 2022-03-10 DIAGNOSIS — Z13.1 SCREENING FOR DIABETES MELLITUS: ICD-10-CM

## 2022-03-10 DIAGNOSIS — F41.9 ANXIETY: Primary | ICD-10-CM

## 2022-03-10 DIAGNOSIS — T30.0 BURN: ICD-10-CM

## 2022-03-10 DIAGNOSIS — M25.562 CHRONIC PAIN OF LEFT KNEE: ICD-10-CM

## 2022-03-10 DIAGNOSIS — Z80.0 FH: COLON CANCER: ICD-10-CM

## 2022-03-10 DIAGNOSIS — E55.9 VITAMIN D DEFICIENCY: ICD-10-CM

## 2022-03-10 DIAGNOSIS — R73.09 ELEVATED GLUCOSE: ICD-10-CM

## 2022-03-10 DIAGNOSIS — G47.33 OBSTRUCTIVE SLEEP APNEA ON CPAP: ICD-10-CM

## 2022-03-10 DIAGNOSIS — R10.31 BILATERAL LOWER ABDOMINAL CRAMPING: ICD-10-CM

## 2022-03-10 PROCEDURE — 1036F TOBACCO NON-USER: CPT | Performed by: NURSE PRACTITIONER

## 2022-03-10 PROCEDURE — G8427 DOCREV CUR MEDS BY ELIG CLIN: HCPCS | Performed by: NURSE PRACTITIONER

## 2022-03-10 PROCEDURE — 99204 OFFICE O/P NEW MOD 45 MIN: CPT | Performed by: NURSE PRACTITIONER

## 2022-03-10 PROCEDURE — G8484 FLU IMMUNIZE NO ADMIN: HCPCS | Performed by: NURSE PRACTITIONER

## 2022-03-10 PROCEDURE — G8417 CALC BMI ABV UP PARAM F/U: HCPCS | Performed by: NURSE PRACTITIONER
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
## 2022-03-11 ENCOUNTER — TELEPHONE (OUTPATIENT)
Dept: GASTROENTEROLOGY | Age: 42
End: 2022-03-11

## 2022-03-11 ENCOUNTER — HOSPITAL ENCOUNTER (OUTPATIENT)
Age: 42
Discharge: HOME | End: 2022-03-11
Payer: MEDICAID

## 2022-03-11 DIAGNOSIS — Z13.1 SCREENING FOR DIABETES MELLITUS: ICD-10-CM

## 2022-03-11 DIAGNOSIS — Z01.818 PRE-OP EVALUATION: ICD-10-CM

## 2022-03-11 DIAGNOSIS — E78.2 MIXED HYPERLIPIDEMIA: ICD-10-CM

## 2022-03-11 DIAGNOSIS — M79.7 FIBROMYALGIA: ICD-10-CM

## 2022-03-11 DIAGNOSIS — R73.09 ELEVATED GLUCOSE: ICD-10-CM

## 2022-03-11 DIAGNOSIS — G43.009 MIGRAINE WITHOUT AURA AND WITHOUT STATUS MIGRAINOSUS, NOT INTRACTABLE: ICD-10-CM

## 2022-03-11 DIAGNOSIS — E55.9 VITAMIN D DEFICIENCY: ICD-10-CM

## 2022-03-11 LAB
ALBUMIN SERPL-MCNC: 3.9 GM/DL (ref 3.4–5)
ALP BLD-CCNC: 96 IU/L (ref 40–129)
ALT SERPL-CCNC: 22 U/L (ref 10–40)
AMPHETAMINES: NEGATIVE
ANION GAP SERPL CALCULATED.3IONS-SCNC: 13 MMOL/L (ref 4–16)
APTT: 43.3 SECONDS (ref 25.1–37.1)
AST SERPL-CCNC: 28 IU/L (ref 15–37)
BARBITURATE SCREEN URINE: NEGATIVE
BASOPHILS ABSOLUTE: 0.1 K/CU MM
BASOPHILS RELATIVE PERCENT: 0.8 % (ref 0–1)
BENZODIAZEPINE SCREEN, URINE: NEGATIVE
BILIRUB SERPL-MCNC: 0.7 MG/DL (ref 0–1)
BUN BLDV-MCNC: 12 MG/DL (ref 6–23)
CALCIUM SERPL-MCNC: 8.9 MG/DL (ref 8.3–10.6)
CANNABINOID SCREEN URINE: NEGATIVE
CHLORIDE BLD-SCNC: 102 MMOL/L (ref 99–110)
CHOLESTEROL: 202 MG/DL
CO2: 24 MMOL/L (ref 21–32)
COCAINE METABOLITE: NEGATIVE
CREAT SERPL-MCNC: 1 MG/DL (ref 0.6–1.1)
DIFFERENTIAL TYPE: ABNORMAL
EOSINOPHILS ABSOLUTE: 0.3 K/CU MM
EOSINOPHILS RELATIVE PERCENT: 3.5 % (ref 0–3)
ESTIMATED AVERAGE GLUCOSE: 114 MG/DL
FOLATE: 6.6 NG/ML (ref 3.1–17.5)
GFR AFRICAN AMERICAN: >60 ML/MIN/1.73M2
GFR NON-AFRICAN AMERICAN: >60 ML/MIN/1.73M2
GLUCOSE BLD-MCNC: 97 MG/DL (ref 70–99)
HBA1C MFR BLD: 5.6 % (ref 4.2–6.3)
HCT VFR BLD CALC: 44.3 % (ref 37–47)
HDLC SERPL-MCNC: 46 MG/DL
HEMOGLOBIN: 14.4 GM/DL (ref 12.5–16)
IMMATURE NEUTROPHIL %: 0.4 % (ref 0–0.43)
INR BLD: 0.95 INDEX
IRON: 72 UG/DL (ref 37–145)
LDL CHOLESTEROL CALCULATED: 127 MG/DL
LYMPHOCYTES ABSOLUTE: 2.6 K/CU MM
LYMPHOCYTES RELATIVE PERCENT: 31.6 % (ref 24–44)
MAGNESIUM: 2.4 MG/DL (ref 1.8–2.4)
MCH RBC QN AUTO: 29.8 PG (ref 27–31)
MCHC RBC AUTO-ENTMCNC: 32.5 % (ref 32–36)
MCV RBC AUTO: 91.5 FL (ref 78–100)
MONOCYTES ABSOLUTE: 0.4 K/CU MM
MONOCYTES RELATIVE PERCENT: 5.1 % (ref 0–4)
NUCLEATED RBC %: 0 %
OPIATES, URINE: NEGATIVE
OXYCODONE: NEGATIVE
PCT TRANSFERRIN: 29 % (ref 10–44)
PDW BLD-RTO: 13.5 % (ref 11.7–14.9)
PHENCYCLIDINE, URINE: NEGATIVE
PLATELET # BLD: 266 K/CU MM (ref 140–440)
PMV BLD AUTO: 9.9 FL (ref 7.5–11.1)
POTASSIUM SERPL-SCNC: 4.4 MMOL/L (ref 3.5–5.1)
PROTHROMBIN TIME: 12.2 SECONDS (ref 11.7–14.5)
RBC # BLD: 4.84 M/CU MM (ref 4.2–5.4)
SEGMENTED NEUTROPHILS ABSOLUTE COUNT: 4.8 K/CU MM
SEGMENTED NEUTROPHILS RELATIVE PERCENT: 58.6 % (ref 36–66)
SODIUM BLD-SCNC: 139 MMOL/L (ref 135–145)
TOTAL IMMATURE NEUTOROPHIL: 0.03 K/CU MM
TOTAL IRON BINDING CAPACITY: 247 UG/DL (ref 250–450)
TOTAL NUCLEATED RBC: 0 K/CU MM
TOTAL PROTEIN: 6.7 GM/DL (ref 6.4–8.2)
TRIGL SERPL-MCNC: 147 MG/DL
TSH HIGH SENSITIVITY: 1.37 UIU/ML (ref 0.27–4.2)
UNSATURATED IRON BINDING CAPACITY: 175 UG/DL (ref 110–370)
VITAMIN B-12: 342.2 PG/ML (ref 211–911)
VITAMIN D 25-HYDROXY: 16.32 NG/ML
WBC # BLD: 8.2 K/CU MM (ref 4–10.5)

## 2022-03-11 PROCEDURE — 84630 ASSAY OF ZINC: CPT

## 2022-03-11 PROCEDURE — 82607 VITAMIN B-12: CPT

## 2022-03-11 PROCEDURE — 36415 COLL VENOUS BLD VENIPUNCTURE: CPT

## 2022-03-11 PROCEDURE — 83540 ASSAY OF IRON: CPT

## 2022-03-11 PROCEDURE — 80053 COMPREHEN METABOLIC PANEL: CPT

## 2022-03-11 PROCEDURE — 83970 ASSAY OF PARATHORMONE: CPT

## 2022-03-11 PROCEDURE — 83735 ASSAY OF MAGNESIUM: CPT

## 2022-03-11 PROCEDURE — 80061 LIPID PANEL: CPT

## 2022-03-11 PROCEDURE — 85610 PROTHROMBIN TIME: CPT

## 2022-03-11 PROCEDURE — 83036 HEMOGLOBIN GLYCOSYLATED A1C: CPT

## 2022-03-11 PROCEDURE — 84425 ASSAY OF VITAMIN B-1: CPT

## 2022-03-11 PROCEDURE — 82746 ASSAY OF FOLIC ACID SERUM: CPT

## 2022-03-11 PROCEDURE — 85730 THROMBOPLASTIN TIME PARTIAL: CPT

## 2022-03-11 PROCEDURE — 82306 VITAMIN D 25 HYDROXY: CPT

## 2022-03-11 PROCEDURE — 85025 COMPLETE CBC W/AUTO DIFF WBC: CPT

## 2022-03-11 PROCEDURE — G0480 DRUG TEST DEF 1-7 CLASSES: HCPCS

## 2022-03-11 PROCEDURE — 84443 ASSAY THYROID STIM HORMONE: CPT

## 2022-03-11 PROCEDURE — 83550 IRON BINDING TEST: CPT

## 2022-03-11 PROCEDURE — 80307 DRUG TEST PRSMV CHEM ANLYZR: CPT

## 2022-03-11 NOTE — TELEPHONE ENCOUNTER
Called pt. In regards to a referral fo a colon screening and abd cramping. LM for pt to call back and make an appt.

## 2022-03-12 LAB
ESTIMATED AVERAGE GLUCOSE: 114 MG/DL
HBA1C MFR BLD: 5.6 % (ref 4.2–6.3)

## 2022-03-16 LAB
3-OH-COTININE URINE: <50 NG/ML
ANABASINE URINE: <5 NG/ML
COTININE, URINE: <15 NG/ML
NICOTINE AND METABOLITES: <15 NG/ML
PARATHYROID HORMONE INTACT: 53 PG/ML (ref 15–65)
ZINC: 87.8 UG/DL (ref 60–120)

## 2022-03-19 LAB — VITAMIN B1, PLASMA: 112 NMOL/L (ref 70–180)

## 2022-03-22 ENCOUNTER — TELEPHONE (OUTPATIENT)
Dept: FAMILY MEDICINE CLINIC | Age: 42
End: 2022-03-22

## 2022-03-28 ENCOUNTER — OFFICE VISIT (OUTPATIENT)
Dept: GASTROENTEROLOGY | Age: 42
End: 2022-03-28
Payer: MEDICAID

## 2022-03-28 VITALS
DIASTOLIC BLOOD PRESSURE: 82 MMHG | HEIGHT: 63 IN | OXYGEN SATURATION: 95 % | SYSTOLIC BLOOD PRESSURE: 120 MMHG | TEMPERATURE: 97.5 F | WEIGHT: 241.6 LBS | BODY MASS INDEX: 42.81 KG/M2 | HEART RATE: 73 BPM

## 2022-03-28 DIAGNOSIS — R19.7 DIARRHEA, UNSPECIFIED TYPE: ICD-10-CM

## 2022-03-28 DIAGNOSIS — R19.4 ALTERED BOWEL HABITS: Primary | ICD-10-CM

## 2022-03-28 DIAGNOSIS — Z80.0 FAMILY HISTORY OF COLON CANCER IN MOTHER: ICD-10-CM

## 2022-03-28 PROCEDURE — 1036F TOBACCO NON-USER: CPT | Performed by: NURSE PRACTITIONER

## 2022-03-28 PROCEDURE — 99204 OFFICE O/P NEW MOD 45 MIN: CPT | Performed by: NURSE PRACTITIONER

## 2022-03-28 PROCEDURE — G8427 DOCREV CUR MEDS BY ELIG CLIN: HCPCS | Performed by: NURSE PRACTITIONER

## 2022-03-28 PROCEDURE — G8484 FLU IMMUNIZE NO ADMIN: HCPCS | Performed by: NURSE PRACTITIONER

## 2022-03-28 PROCEDURE — G8417 CALC BMI ABV UP PARAM F/U: HCPCS | Performed by: NURSE PRACTITIONER

## 2022-03-28 RX ORDER — BISACODYL 5 MG
TABLET, DELAYED RELEASE (ENTERIC COATED) ORAL
Qty: 4 TABLET | Refills: 0 | Status: SHIPPED | OUTPATIENT
Start: 2022-03-28 | End: 2022-04-06

## 2022-03-28 RX ORDER — POLYETHYLENE GLYCOL 3350 17 G/17G
238 POWDER, FOR SOLUTION ORAL ONCE
Qty: 238 G | Refills: 0 | Status: SHIPPED | OUTPATIENT
Start: 2022-03-28 | End: 2022-03-28

## 2022-03-28 ASSESSMENT — ENCOUNTER SYMPTOMS
VOMITING: 0
CONSTIPATION: 0
DIARRHEA: 1
EYE PAIN: 0
NAUSEA: 0
PHOTOPHOBIA: 0
BLOOD IN STOOL: 0
SHORTNESS OF BREATH: 1
BACK PAIN: 1
ABDOMINAL PAIN: 0
COLOR CHANGE: 0
COUGH: 0
WHEEZING: 0

## 2022-03-28 NOTE — PROGRESS NOTES
Renee Hanson 42 y.o. female was seen by SINDHU Alvarez on 3/28/2022     Wt Readings from Last 3 Encounters:   03/28/22 241 lb 9.6 oz (109.6 kg)   03/10/22 239 lb 3.2 oz (108.5 kg)   03/07/22 238 lb 12.8 oz (108.3 kg)       ESTHER  Umberto Hanson is a pleasant 42 y.o.  female who presents today for altered bowel habits with diarrhea. She takes Aleve two tablets once a week for headaches. She has a past medical history of chronic depression, migraine, mixed hyperlipidemia, obesity, PONV (postoperative nausea and vomiting), prolonged emergence from general anesthesia, pulmonic stenosis, and sleep apnea. She has never had a colonoscopy. Her diarrhea has been ongoing for five years. Her gallbladder was removed five years ago. Diarrhea occurs twenty to thirty minutes after eating. Her typical bowel pattern is eight to ten times daily with mostly liquid brown stools. Antidiarrheals help. She has hard stools once every couple of months. No constipation. No blood in her stools or melena. No excess belching or flatulence. Her appetite is fair without early satiety. Her weight is stable. She is in non-surgical bariatric program with plans for gastric sleeve with Dr. Inez Mir. She has intermittent nausea. No vomiting. No abdominal pain, bloating or distention. She has intermittent periumbilical cramping on most days. Her pain improves with bowel movement. No heartburn or acid reflux. No nocturnal awakenings with acid reflux. No dysphagia or pain with swallowing. Her mother had colon cancer at age 48years old. No family history of stomach cancer. ROS  Review of Systems   Constitutional: Positive for fatigue. Negative for appetite change, chills, diaphoresis, fever and unexpected weight change. HENT: Positive for tinnitus. Negative for ear pain and hearing loss. Eyes: Negative for photophobia, pain and visual disturbance. Respiratory: Positive for shortness of breath.  Negative for cough and wheezing. Cardiovascular: Positive for palpitations (intermittent). Negative for chest pain and leg swelling. Gastrointestinal: Positive for diarrhea. Negative for abdominal pain, blood in stool, constipation, nausea and vomiting. Endocrine: Negative for cold intolerance, heat intolerance, polydipsia and polyuria. Genitourinary: Negative for dysuria, frequency and urgency. Musculoskeletal: Positive for back pain, myalgias and neck pain. Skin: Negative for color change, pallor and rash. Allergic/Immunologic: Negative for environmental allergies and food allergies. Neurological: Positive for headaches. Negative for dizziness, seizures and weakness. Hematological: Bruises/bleeds easily. Psychiatric/Behavioral: Positive for dysphoric mood and sleep disturbance. Negative for suicidal ideas. The patient is nervous/anxious. Allergies  Allergies   Allergen Reactions    Codeine Anaphylaxis    Dilaudid [Hydromorphone Hcl] Anaphylaxis    Hydromorphone Anaphylaxis       Medications  Current Outpatient Medications   Medication Sig Dispense Refill    Naproxen Sodium (ALEVE PO) Take by mouth       No current facility-administered medications for this visit. Past medical history:   She has a past medical history of Chronic depression, Migraine, Mixed hyperlipidemia, PONV (postoperative nausea and vomiting), Prolonged emergence from general anesthesia, Pulmonic stenosis, Sleep apnea, and Sleep apnea. Past surgical history:  She has a past surgical history that includes Inner ear surgery; total nephrectomy; Breast surgery; partial nephrectomy; Tubal ligation; Cholecystectomy; cyst removal; Hysterectomy; other surgical history (4/16/2012); Hand surgery (Right); Breast biopsy (Left, 2010); Ovary removal; Breast reduction surgery (Bilateral, 2003); and Carpal tunnel release (Left, 2/16/2021). Social History:  She reports that she has never smoked.  She has never used smokeless tobacco. She reports that she does not drink alcohol and does not use drugs. Family history:  Her family history includes Allergies in her daughter and son; Asthma in her daughter and son; Bleeding Prob in her daughter and son; Cancer in her maternal grandmother and mother; Diabetes in her maternal grandmother; Migraines in her mother. Objective    Vitals:    03/28/22 1416   BP: 120/82   Pulse: 73   Temp: 97.5 °F (36.4 °C)   SpO2: 95%        Physical exam    Physical Exam  Vitals reviewed. Constitutional:       General: She is not in acute distress. Appearance: She is well-developed. She is obese. She is not ill-appearing, toxic-appearing or diaphoretic. HENT:      Head: Normocephalic and atraumatic. Nose: Nose normal.      Mouth/Throat:      Mouth: Mucous membranes are moist.   Eyes:      Conjunctiva/sclera: Conjunctivae normal.      Pupils: Pupils are equal, round, and reactive to light. Neck:      Thyroid: No thyromegaly. Vascular: No JVD. Trachea: No tracheal deviation. Cardiovascular:      Rate and Rhythm: Normal rate and regular rhythm. Pulses: Normal pulses. Heart sounds: Normal heart sounds. No murmur heard. No friction rub. No gallop. Pulmonary:      Effort: Pulmonary effort is normal. No respiratory distress. Breath sounds: Normal breath sounds. No stridor. No wheezing or rales. Chest:      Chest wall: No tenderness. Abdominal:      General: Bowel sounds are normal. There is no distension. Palpations: Abdomen is soft. There is no mass. Tenderness: There is no abdominal tenderness. There is no guarding or rebound. Hernia: No hernia is present. Musculoskeletal:         General: Normal range of motion. Cervical back: Normal range of motion and neck supple. Lymphadenopathy:      Cervical: No cervical adenopathy. Skin:     General: Skin is warm and dry.    Neurological:      Mental Status: She is alert and oriented to person, place, and time.    Psychiatric:         Mood and Affect: Mood normal.         Hospital Outpatient Visit on 03/11/2022   Component Date Value Ref Range Status    WBC 03/11/2022 8.2  4.0 - 10.5 K/CU MM Final    RBC 03/11/2022 4.84  4.2 - 5.4 M/CU MM Final    Hemoglobin 03/11/2022 14.4  12.5 - 16.0 GM/DL Final    Hematocrit 03/11/2022 44.3  37 - 47 % Final    MCV 03/11/2022 91.5  78 - 100 FL Final    MCH 03/11/2022 29.8  27 - 31 PG Final    MCHC 03/11/2022 32.5  32.0 - 36.0 % Final    RDW 03/11/2022 13.5  11.7 - 14.9 % Final    Platelets 96/16/5692 266  140 - 440 K/CU MM Final    MPV 03/11/2022 9.9  7.5 - 11.1 FL Final    Differential Type 03/11/2022 AUTOMATED DIFFERENTIAL   Final    Segs Relative 03/11/2022 58.6  36 - 66 % Final    Lymphocytes % 03/11/2022 31.6  24 - 44 % Final    Monocytes % 03/11/2022 5.1* 0 - 4 % Final    Eosinophils % 03/11/2022 3.5* 0 - 3 % Final    Basophils % 03/11/2022 0.8  0 - 1 % Final    Segs Absolute 03/11/2022 4.8  K/CU MM Final    Lymphocytes Absolute 03/11/2022 2.6  K/CU MM Final    Monocytes Absolute 03/11/2022 0.4  K/CU MM Final    Eosinophils Absolute 03/11/2022 0.3  K/CU MM Final    Basophils Absolute 03/11/2022 0.1  K/CU MM Final    Nucleated RBC % 03/11/2022 0.0  % Final    Total Nucleated RBC 03/11/2022 0.0  K/CU MM Final    Total Immature Neutrophil 03/11/2022 0.03  K/CU MM Final    Immature Neutrophil % 03/11/2022 0.4  0 - 0.43 % Final    Sodium 03/11/2022 139  135 - 145 MMOL/L Final    Potassium 03/11/2022 4.4  3.5 - 5.1 MMOL/L Final    Chloride 03/11/2022 102  99 - 110 mMol/L Final    CO2 03/11/2022 24  21 - 32 MMOL/L Final    BUN 03/11/2022 12  6 - 23 MG/DL Final    CREATININE 03/11/2022 1.0  0.6 - 1.1 MG/DL Final    Glucose 03/11/2022 97  70 - 99 MG/DL Final    Calcium 03/11/2022 8.9  8.3 - 10.6 MG/DL Final    Albumin 03/11/2022 3.9  3.4 - 5.0 GM/DL Final    Total Protein 03/11/2022 6.7  6.4 - 8.2 GM/DL Final    Total Bilirubin 03/11/2022 0.7  0.0 - 1.0 MG/DL Final    ALT 03/11/2022 22  10 - 40 U/L Final    AST 03/11/2022 28  15 - 37 IU/L Final    Alkaline Phosphatase 03/11/2022 96  40 - 129 IU/L Final    GFR Non- 03/11/2022 >60  >60 mL/min/1.73m2 Final    GFR  03/11/2022 >60  >60 mL/min/1.73m2 Final    Anion Gap 03/11/2022 13  4 - 16 Final    Cannabinoid Scrn, Ur 03/11/2022 NEGATIVE  NEGATIVE Final    Amphetamines 03/11/2022 NEGATIVE  NEGATIVE Final    Cocaine Metabolite 03/11/2022 NEGATIVE  NEGATIVE Final    Benzodiazepine Screen, Urine 03/11/2022 NEGATIVE  NEGATIVE Final    Barbiturate Screen, Ur 03/11/2022 NEGATIVE  NEGATIVE Final    Opiates, Urine 03/11/2022 NEGATIVE  NEGATIVE Final    Phencyclidine, Urine 03/11/2022 NEGATIVE  NEGATIVE Final    Oxycodone 03/11/2022 NEGATIVE  NEGATIVE Final    Comment:         THRESHOLD CONCENTRATIONS (mg/dL)  AMPHT               1000  FIDEL,OPIA             300  BZO,BAR              200  PCP                   25  THC                   50  OXY                  100          * UNCONFIRMED POSITIVES MAY  NOT MEET FORENSIC REQUIREMENTS.       Hemoglobin A1C 03/11/2022 5.6  4.2 - 6.3 % Final    eAG 03/11/2022 114  mg/dL Final    Iron 03/11/2022 72  37 - 145 ug/dL Final    UIBC 03/11/2022 175  110 - 370 ug/dL Final    TIBC 03/11/2022 247* 250 - 450 ug/dL Final    Transferrin % 03/11/2022 29  10 - 44 % Final    Triglycerides 03/11/2022 147  <150 MG/DL Final    Cholesterol 03/11/2022 202* <200 MG/DL Final    Comment: (NOTE)  Cardiovascular risk evaluation guidelines:  Low Risk        <200 mg/dL  Average Risk    200-240 mg/dL  High Risk       >240 mg/dL        HDL 03/11/2022 46  >40 MG/DL Final    LDL Calculated 03/11/2022 127* <100 MG/DL Final    Magnesium 03/11/2022 2.4  1.8 - 2.4 mg/dl Final    Zinc 03/11/2022 87.8  60.0 - 120.0 ug/dL Final    Comment: (NOTE)  INTERPRETIVE INFORMATION: Zinc, Serum or Plasma  Elevated results may be due to skin or collection-related contamination, including the use of a noncertified metal-free   collection/transport tube. If contamination concerns exist due to   elevated levels of serum/plasma zinc, confirmation with a second   specimen collected in a certified metal-free tube is recommended. Circulating zinc concentrations are dependent on albumin status   and are depressed with malnutrition. Zinc may also be lowered   with infection, inflammation, stress, oral contraceptives, and   pregnancy. Zinc may be elevated with zinc supplementation or   fasting. Elevated zinc concentrations may interfere with copper   absorption. This test was developed and its performance characteristics   determined by [facility]. It has not been cleared or   approved by the [employer] and Drug Administration. This test was   performed in a CLIA certified laboratory and is intended for   clinical purposes. Perf                           ormed By: Irving Chepe Monroy [address] [place] [address]  : Sonia Sams. Herbert Davis MD      Vit D, 25-Hydroxy 03/11/2022 16.32* >20 NG/ML Final    Vitamin B-12 03/11/2022 342.2  211 - 911 pg/ml Final    Folate 03/11/2022 6.6  3.1 - 17.5 NG/ML Final    Vitamin B1, Plasma 03/11/2022 112  70 - 180 nmol/L Final    Comment: (NOTE)  INTERPRETIVE INFORMATION: Vitamin B1, Whole Blood  This assay measures the concentration of thiamine diphosphate   (TDP), the primary active form of vitamin B1. Approximately 90   percent of vitamin B1 present in whole blood is TDP. Thiamine and   thiamine monophosphate, which comprise the remaining 10 percent,   are not measured. This test was developed and its performance characteristics   determined by [facility]. It has not been cleared or   approved by the [employer] and Drug Administration. This test was   performed in a CLIA certified laboratory and is intended for   clinical purposes.   Performed By: [facility]  [place] [address] [place] [id number]  : Kris Aguirre. Delia Cheung MD      TSH, High Sensitivity 03/11/2022 1.370  0.270 - 4.20 uIu/ml Final    Comment:         Patients with high levels of Biotin intake (ie >5mg/day) may have falsely decreased TSHS   levels. Samples collected within 8 hours of Biotin intake may require additional information for   diagnosis.  PTH 03/11/2022 53  15 - 65 pg/mL Final    Comment: (NOTE)  Performed By: [facility]ott[facility] [address]  [place], [address] [facility]  : Kris Aguirre. Delia Cheung MD      Protime 03/11/2022 12.2  11.7 - 14.5 SECONDS Final    Comment: Protime seconds can vary  due to reagent sensitivity. Please use INR to monitor  oral anticoagulants.  INR 03/11/2022 0.95  INDEX Final    Comment: THERAPEUTIC RANGE:  INDICATIONS: INR 2.0-3.0  Most (DVT, PE,  Atrial Fibillation, Bioprosthetic valve,   [place] bicuspid aortic valve)  INDICATIONS: 2.5-3.5 Most mechanical valves  recurrent thrombosis.  aPTT 03/11/2022 43.3* 25.1 - 37.1 SECONDS Final    Comment: EFFECTIVE 07/07/2017  THERAPEUTIC RANGE FOR ROUTINE  HEPARIN USE IS 52.0-85.0 SECONDS.  Hemoglobin A1C 03/11/2022 5.6  4.2 - 6.3 % Final    eAG 03/11/2022 114  mg/dL Final    3-OH-COTININE Urine 03/11/2022 <50  ng/mL Final    Cotinine, Urine 03/11/2022 <15  ng/mL Final    Anabasine Urine 03/11/2022 <5  ng/mL Final    NICOTINE AND METABOLITES 03/11/2022 <15  ng/mL Final    Comment: (NOTE)  INTERPRETIVE INFORMATION: Nicotine and Metabolites,                            Urine, Quantitative  Methodology: Quantitative Liquid Chromatography-Tandem Mass   Spectrometry  Positive cutoff:  Nicotine  15 ng/mL  Cotinine  15 ng/mL  3-OH-Cotinine 50 ng/mL  Anabasine        5 ng/mL  For medical purposes only; not valid for forensic use. This test is designed to evaluate recent use of   nicotine-containing products.   Passive and active exposure cannot   be discriminated definitively, although a cutoff of 100 ng/mL cotinine is frequently used for surgery qualification purposes. For smoking cessation programs or compliance testing, the absence   of expected drug(s) and/or drug metabolite(s) may indicate   non-compliance, inappropriate timing of specimen collection   relative to drug administration, poor drug absorption,   diluted/adulterated urine, or limitations of testing. The   concentration value must be greater than or equal to the cutoff to   be reported as positive. Anabasine is included as a biomarker of   tobacco use, versus nicotine replacement. Interpretive questions   should be directed to the laboratory. This test was developed and its performance characteristics   determined by [facility]. It has not been cleared or   approved by the [employer] and Drug Administration. This test was   performed in a CLIA certified laboratory and is intended for   clinical purposes. Performed By: [facility]  [address]  : Airam Hogue. Milla Ramos MD         Assessment and Plan:  1. Will plan for a colonoscopy with MAC anesthesia. The patient was informed of the risks and benefits of the procedure. 2.  Altered bowel habits with diarrhea. ?bile acid diarrhea. Will order stool studies and lab work to rule out infection vs. Inflammation. The patient was encouraged to increase her fruit, fiber and fluids. Recommend low fat diet. Recommend colonoscopy to rule out microscopic colitis. 3.  Family history of colon cancer in her mother. Will order colonoscopy for colorectal cancer surveillance.   4.  Further recommendations for follow-up will be determined after the colonoscopy has been completed.

## 2022-03-28 NOTE — PATIENT INSTRUCTIONS
Patient Education        Colonoscopy: Before Your Procedure  What is a colonoscopy? A colonoscopy is a test that lets a doctor look inside your colon. The doctor uses a thin, lighted tube called a colonoscope to look for problems. These include small growths called polyps, cancer, or bleeding. During the test, the doctor can take samples of tissue that can be checked for cancer or other problems. This is called a biopsy. The doctor can also take out polyps. Before the test, you will need to stop eating solid foods. You also will be given instructions on how to clean out your colon. This helps your doctor be able to see inside your colon during the test.  How do you prepare for the procedure? Procedures can be stressful. This information will help you understand what you can expect. And it will help you safely prepare for your procedure. Preparing for the procedure    · Be sure you have someone to take you home. Anesthesia and pain medicine will make it unsafe for you to drive or get home on your own. · Understand exactly what procedure is planned, along with the risks, benefits, and other options.     · Tell your doctor ALL the medicines, vitamins, supplements, and herbal remedies you take. Some may increase the risk of problems during your procedure. Your doctor will tell you if you should stop taking any of them before the procedure and how soon to do it.     · If you take aspirin or some other blood thinner, ask your doctor if you should stop taking it before your procedure. Make sure that you understand exactly what your doctor wants you to do. These medicines increase the risk of bleeding.     · Make sure your doctor and the hospital have a copy of your advance directive. If you don't have one, you may want to prepare one. It lets others know your health care wishes. It's a good thing to have before any type of surgery or procedure.    Before the procedure    · Follow your doctor's directions about when to stop eating solid foods and drink only clear liquids. You can drink water, clear juices, clear broths, flavored ice pops, and gelatin (such as Jell-O). Do not eat or drink anything red or purple. This includes grape juice and grape-flavored ice pops. It also includes fruit punch and cherry gelatin.     · Drink the \"colon prep\" liquid as your doctor tells you. You will want to stay home, because the liquid will make you go to the bathroom a lot. Your stools will be loose and watery. It's very important to drink all of the liquid. If you have problems drinking it, call your doctor.     · Do not eat any solid foods after you drink the colon prep.     · Stop drinking clear liquids for a few hours before the test. Your doctor will tell you how many hours this will be. What happens on the day of the procedure? · Follow the instructions exactly about when to stop eating and drinking. If you don't, your procedure may be canceled. If your doctor told you to take your medicines on the day of the procedure, take them with only a sip of water.     · Take a bath or shower before you come in for your procedure. Do not apply lotions, perfumes, deodorants, or nail polish.     · Take off all jewelry and piercings. And take out contact lenses, if you wear them. At the doctor's office or hospital   · Bring a picture ID.     · You will be kept comfortable and safe by your anesthesia provider. The anesthesia may make you sleep.     · You will lie on your back or your side with your knees drawn up toward your belly. The doctor will gently put a gloved finger into your anus. Then the doctor puts the scope in and moves it into your colon. The scope goes in easily because it is lubricated.     · The doctor may also use small tools to take tissue samples for a biopsy or to remove polyps. This does not hurt.     · The test usually takes 30 to 45 minutes. But it may take longer. It depends on what is found and what is done. When should you call your doctor? · You have questions or concerns.     · You don't understand how to prepare for your procedure.     · You are having trouble with the bowel prep.     · You become ill before the procedure (such as fever, flu, or a cold).     · You need to reschedule or have changed your mind about having the procedure. Where can you learn more? Go to [email or web address]. org and sign in to your [employer] account. Enter C315 in the [facility] box to learn more about \"Colonoscopy: Before Your Procedure. \"     If you do not have an account, please click on the \"Sign Up Now\" link. Current as of: September 8, 2021               Content Version: 13.1  © 2006-2021 Healthwise, [employer]. Care instructions adapted under license by [facility] ([facility]). If you have questions about a medical condition or this instruction, always ask your healthcare professional. [place] [address] any warranty or liability for your use of this information. Patient Education        cholestyramine  Pronunciation:  koe le STYE ra meen  Brand:  Cholestyramine Light, Prevalite, Questran  What is the most important information I should know about cholestyramine? You should not use this medicine if you have a blockage in your stomach or intestines. Wait at least 4 to 6 hours after taking cholestyramine before you take any other medications. What is cholestyramine? Cholestyramine helps reduce cholesterol (fatty acids) in the blood. High cholesterol is associated with an increased risk of heart disease and atherosclerosis (clogged arteries). Cholestyramine is used to lower high levels of cholesterol in the blood, especially low-density lipoprotein (LDL) (\"bad\" cholesterol). Cholestyramine powder is also used to treat itching caused by a blockage in the bile ducts of the gallbladder. Cholestyramine may also be used for purposes not listed in this medication guide.   What should I discuss with my healthcare provider before taking cholestyramine? You should not use cholestyramine if you are allergic to it, or if you have:  · a blockage in your digestive tract (stomach or intestines). To make sure cholestyramine is safe for you, tell your doctor if you have:  · chronic constipation;  · a thyroid disorder;  · diabetes;  · kidney disease;  · liver disease; or  · coronary artery disease (clogged arteries). This medicine may contain phenylalanine. Talk to your doctor before using cholestyramine if you have phenylketonuria (PKU). Taking cholestyramine can make it harder for your body to absorb certain vitamins. Your doctor may recommend you take a vitamin supplement. You may have an even greater need for vitamin supplements during pregnancy or while you are breast-feeding a baby. Follow your doctor's instructions about taking vitamin supplements during treatment with cholestyramine. It is not known whether this medicine will harm an unborn baby. Tell your doctor if you are pregnant or plan to become pregnant. How should I take cholestyramine? Follow all directions on your prescription label. Your doctor may occasionally change your dose. Do not use this medicine in larger or smaller amounts or for longer than recommended. Although cholestyramine is usually taken once or twice per day, this medicine may be taken up to 6 times per day. Carefully follow your doctor's dosing instructions. Mix the cholestyramine powder with at least 2 to 3 ounces of water or other non-carbonated beverage. You may also mix the powder with a brothy soup, crushed pineapple, or applesauce. Measure the powder using the scoop provided with your medication. Do not use any other scoop or measuring cup to measure your cholestyramine dose. Cholestyramine works best if you take it with meals. However, your dosing schedule may depend on when you need to take any other medications.  Cholestyramine should not be taken within 1 hour after or 4 hours before you take other medications. Use cholestyramine regularly to get the most benefit. Get your prescription refilled before you run out of medicine completely. Cholestyramine can affect the surfaces of your teeth. Sipping the cholestyramine/liquid mixture slowly or holding the liquid in your mouth for too long may result in tooth discoloration, enamel erosion, or tooth decay. Be sure to brush your teeth regularly while you are using this medicine. Drink extra fluids to prevent constipation while you are taking cholestyramine. While using cholestyramine, you may need frequent blood tests. Cholestyramine is only part of a complete program of treatment that may also include diet, exercise, and weight control. Follow your diet, medication, and exercise routines very closely. Store at room temperature away from moisture and heat. What happens if I miss a dose? Take the missed dose as soon as you remember. Skip the missed dose if it is almost time for your next scheduled dose. Do not take extra medicine to make up the missed dose. What happens if I overdose? Seek emergency medical attention or call the Poison Help line at [phone]. Overdose symptoms may include severe stomach pain or constipation. What should I avoid while taking cholestyramine? Avoid taking other medications at the same time you take cholestyramine. Wait at least 4 to 6 hours after taking cholestyramine before you take any other medications. What are the possible side effects of cholestyramine? Get emergency medical help if you have signs of an allergic reaction:  hives; difficulty breathing; swelling of your face, lips, tongue, or throat. Call your doctor at once if you have:  · ongoing or worsening constipation;  · severe stomach pain;  · blood in your urine;  · black, bloody, or tarry stools; or  · easy bruising, unusual bleeding.   Side effects such as constipation may be more likely in [facility] drug information is an informational resource designed to assist licensed healthcare practitioners in caring for their patients and/or to serve consumers viewing this service as a supplement to, and not a substitute for, the expertise, skill, knowledge and judgment of healthcare practitioners. The absence of a warning for a given drug or drug combination in no way should be construed to indicate that the drug or drug combination is safe, effective or appropriate for any given patient. [facility] does not assume any responsibility for any aspect of healthcare administered with the aid of information [facility] provides. The information contained herein is not intended to cover all possible uses, directions, precautions, warnings, drug interactions, allergic reactions, or adverse effects. If you have questions about the drugs you are taking, check with your doctor, nurse or pharmacist.  Copyright 9093-6166 [address]. Version: 5.01. Revision date: 3/10/2017. Care instructions adapted under license by [employer]. If you have questions about a medical condition or this instruction, always ask your healthcare professional. [place] [address] any warranty or liability for your use of this information.

## 2022-03-29 ENCOUNTER — TELEPHONE (OUTPATIENT)
Dept: GASTROENTEROLOGY | Age: 42
End: 2022-03-29

## 2022-03-29 ENCOUNTER — PREP FOR PROCEDURE (OUTPATIENT)
Dept: GASTROENTEROLOGY | Age: 42
End: 2022-03-29

## 2022-03-29 DIAGNOSIS — Z01.818 PREOP TESTING: Primary | ICD-10-CM

## 2022-03-29 RX ORDER — SODIUM CHLORIDE 9 MG/ML
25 INJECTION, SOLUTION INTRAVENOUS PRN
Status: CANCELLED | OUTPATIENT
Start: 2022-03-29

## 2022-03-29 RX ORDER — ERGOCALCIFEROL 1.25 MG/1
50000 CAPSULE ORAL WEEKLY
Qty: 8 CAPSULE | Refills: 0 | Status: SHIPPED | OUTPATIENT
Start: 2022-03-29 | End: 2022-08-11

## 2022-03-29 RX ORDER — SODIUM CHLORIDE 0.9 % (FLUSH) 0.9 %
5-40 SYRINGE (ML) INJECTION EVERY 12 HOURS SCHEDULED
Status: CANCELLED | OUTPATIENT
Start: 2022-03-29

## 2022-03-29 RX ORDER — SODIUM CHLORIDE 0.9 % (FLUSH) 0.9 %
5-40 SYRINGE (ML) INJECTION PRN
Status: CANCELLED | OUTPATIENT
Start: 2022-03-29

## 2022-03-29 RX ORDER — SODIUM CHLORIDE, SODIUM LACTATE, POTASSIUM CHLORIDE, CALCIUM CHLORIDE 600; 310; 30; 20 MG/100ML; MG/100ML; MG/100ML; MG/100ML
INJECTION, SOLUTION INTRAVENOUS CONTINUOUS
Status: CANCELLED | OUTPATIENT
Start: 2022-03-29

## 2022-03-29 NOTE — TELEPHONE ENCOUNTER
Per my call to Perlita Specne. At [facility]; Patient has been active since11/1/23. Prior Lacey Hill is pending for the C-scope. Ref# [id number]. If approved, it will be valid till 7/19/22.

## 2022-04-06 ENCOUNTER — HOSPITAL ENCOUNTER (OUTPATIENT)
Age: 42
Discharge: HOME | End: 2022-04-06
Payer: MEDICAID

## 2022-04-06 ENCOUNTER — OFFICE VISIT (OUTPATIENT)
Dept: FAMILY MEDICINE CLINIC | Age: 42
End: 2022-04-06
Payer: MEDICAID

## 2022-04-06 ENCOUNTER — HOSPITAL ENCOUNTER (OUTPATIENT)
Dept: GENERAL RADIOLOGY | Age: 42
Discharge: HOME | End: 2022-04-06
Payer: MEDICAID

## 2022-04-06 VITALS
DIASTOLIC BLOOD PRESSURE: 84 MMHG | WEIGHT: 238.2 LBS | BODY MASS INDEX: 42.21 KG/M2 | SYSTOLIC BLOOD PRESSURE: 132 MMHG | HEART RATE: 82 BPM | OXYGEN SATURATION: 96 % | HEIGHT: 63 IN

## 2022-04-06 DIAGNOSIS — M79.644 PAIN IN FINGER OF RIGHT HAND: ICD-10-CM

## 2022-04-06 DIAGNOSIS — M79.644 PAIN IN FINGER OF RIGHT HAND: Primary | ICD-10-CM

## 2022-04-06 PROCEDURE — 99213 OFFICE O/P EST LOW 20 MIN: CPT | Performed by: NURSE PRACTITIONER

## 2022-04-06 PROCEDURE — 1036F TOBACCO NON-USER: CPT | Performed by: NURSE PRACTITIONER

## 2022-04-06 PROCEDURE — G8417 CALC BMI ABV UP PARAM F/U: HCPCS | Performed by: NURSE PRACTITIONER

## 2022-04-06 PROCEDURE — G8427 DOCREV CUR MEDS BY ELIG CLIN: HCPCS | Performed by: NURSE PRACTITIONER

## 2022-04-06 PROCEDURE — 73130 X-RAY EXAM OF HAND: CPT

## 2022-04-06 NOTE — PROGRESS NOTES
2022     Umberto Hanson (:  1980) is a 42 y.o. female, here for evaluation of the following medical concerns:        Presents today with right hand and middle finger pain and swelling. Reports this started 3 days ago and is worsening. Ice and Advil helps some, but minimally. Denies any known mechanism of injury. There are no wounds present. Reports movement and activity make it worse, nothing has made it better. This has not happened before. She does not have a known diagnosis of arthritis. There is no warmth or color change other than very mild visible bruising to the left dorsal surface middle digit. Reports she was sitting at her grandfather's house and it started hurting. Review of Systems    Prior to Visit Medications    Medication Sig Taking? Authorizing Provider   vitamin D (ERGOCALCIFEROL) 1.25 MG (82189 UT) CAPS capsule Take 1 capsule by mouth once a week For eight weeks only Yes VIOLET Razo NP   Naproxen Sodium (ALEVE PO) Take by mouth Yes Historical Provider, MD   bisacodyl (BISACODYL) 5 MG EC tablet Take 4 tablets once for colonoscopy prep  Patient not taking: Reported on 2022  VIOLET Fernandez CNP        Social History     Tobacco Use    Smoking status: Never Smoker    Smokeless tobacco: Never Used   Substance Use Topics    Alcohol use: No        Vitals:    22 1320   BP: 132/84   Site: Left Upper Arm   Position: Sitting   Cuff Size: Large Adult   Pulse: 82   SpO2: 96%   Weight: 238 lb 3.2 oz (108 kg)   Height: 5' 3\" (1.6 m)     Estimated body mass index is 42.2 kg/m² as calculated from the following:    Height as of this encounter: 5' 3\" (1.6 m). Weight as of this encounter: 238 lb 3.2 oz (108 kg). Physical Exam  Musculoskeletal:      Right hand: Swelling, tenderness and bony tenderness present. No lacerations. Decreased range of motion. Normal sensation. Normal capillary refill.       Comments: Very very mild bruising visible to the dorsal surface of right middle finger. Right hand looks larger, swollen compared to left hand. There are no open wounds or sores, scabs or scratches. The nailbed is intact. No obvious nail deformities. She is unable to move the middle finger due to pain. The rest of her hand has full range of motion. ASSESSMENT/PLAN:  1. Pain in finger of right hand  RICE. She is concerned for cellulitis. We could try three days of bactrim if not resolving or send to ortho. Consider medrol dose pack for inflammation  - XR HAND RIGHT (MIN 3 VIEWS); Future          All care gaps addressed     All questions answered    Discussed use, benefit, and side effects of prescribed medications. Barriers to compliance discussed. All patient questions answered. Pt voiced understanding. Present to the ER for any emergent or acute symptoms not managed at home or in office. Please note that this chart was generated using dragon dictation software. Although every effort was made to ensure the accuracy of this automated transcription, some errors in transcription may have occurred. No follow-ups on file. An electronic signature was used to authenticate this note.     --Juliann Apley, APRN - NP on 4/6/2022 at 1:47 PM

## 2022-04-11 ENCOUNTER — PATIENT MESSAGE (OUTPATIENT)
Dept: FAMILY MEDICINE CLINIC | Age: 42
End: 2022-04-11

## 2022-04-11 NOTE — TELEPHONE ENCOUNTER
From: Chadd Woodson  To: Karan All  Sent: 4/11/2022 2:21 PM EDT  Subject: Question regarding XR Hand Right    My finger is not getting any better

## 2022-04-12 ENCOUNTER — INITIAL CONSULT (OUTPATIENT)
Dept: CARDIOLOGY CLINIC | Age: 42
End: 2022-04-12
Payer: MEDICAID

## 2022-04-12 VITALS
WEIGHT: 238 LBS | BODY MASS INDEX: 42.17 KG/M2 | HEART RATE: 73 BPM | HEIGHT: 63 IN | SYSTOLIC BLOOD PRESSURE: 120 MMHG | DIASTOLIC BLOOD PRESSURE: 80 MMHG

## 2022-04-12 DIAGNOSIS — Z01.810 PREOP CARDIOVASCULAR EXAM: ICD-10-CM

## 2022-04-12 DIAGNOSIS — E78.2 MIXED HYPERLIPIDEMIA: Primary | ICD-10-CM

## 2022-04-12 PROCEDURE — G8417 CALC BMI ABV UP PARAM F/U: HCPCS | Performed by: INTERNAL MEDICINE

## 2022-04-12 PROCEDURE — 93000 ELECTROCARDIOGRAM COMPLETE: CPT | Performed by: INTERNAL MEDICINE

## 2022-04-12 PROCEDURE — 99243 OFF/OP CNSLTJ NEW/EST LOW 30: CPT | Performed by: INTERNAL MEDICINE

## 2022-04-12 PROCEDURE — G8427 DOCREV CUR MEDS BY ELIG CLIN: HCPCS | Performed by: INTERNAL MEDICINE

## 2022-04-12 RX ORDER — SULFAMETHOXAZOLE AND TRIMETHOPRIM 800; 160 MG/1; MG/1
1 TABLET ORAL 2 TIMES DAILY
Qty: 10 TABLET | Refills: 0 | Status: SHIPPED | OUTPATIENT
Start: 2022-04-12 | End: 2022-04-17

## 2022-04-12 RX ORDER — MECLIZINE HCL 12.5 MG/1
12.5 TABLET ORAL 3 TIMES DAILY PRN
Qty: 30 TABLET | Refills: 0 | Status: SHIPPED | OUTPATIENT
Start: 2022-04-12 | End: 2022-04-22

## 2022-04-12 NOTE — PROGRESS NOTES
CARDIOLOGY CONSULT NOTE   Reason for consultation:  preop for gastric sleeve    Referring physician:  Dr. Lori Seymour     Primary care physician: VIOLET Agustin - NP      Dear  Dr. Lori Seymour     Thanks for the consult. History of present illness:Umberto is a 42 y. o.year old who  presents for preop for gastric sleeve, she denied any chest pain or shortness for breath, she feels dizzy when she bend over, sit down or take shower and sometimes, when she drives. she also feels dizzy when she moves her head side to side. Chief Complaint   Patient presents with    Hyperlipidemia     Blood pressure, cholesterol, blood glucose and weight are well controlled. Past medical history:    has a past medical history of Chronic depression, Migraine, Mixed hyperlipidemia, PONV (postoperative nausea and vomiting), Prolonged emergence from general anesthesia, Pulmonic stenosis, Sleep apnea, and Sleep apnea. Past surgical history:   has a past surgical history that includes Inner ear surgery; total nephrectomy; Breast surgery; partial nephrectomy; Tubal ligation; Cholecystectomy; cyst removal; Hysterectomy; other surgical history (4/16/2012); Hand surgery (Right); Breast biopsy (Left, 2010); Ovary removal; Breast reduction surgery (Bilateral, 2003); and Carpal tunnel release (Left, 2/16/2021). Social History:   reports that she has never smoked. She has never used smokeless tobacco. She reports that she does not drink alcohol and does not use drugs. Family history:   no family history of CAD, STROKE of DM    Allergies   Allergen Reactions    Codeine Anaphylaxis    Dilaudid [Hydromorphone Hcl] Anaphylaxis    Hydromorphone Anaphylaxis       No current facility-administered medications for this visit.     Current Outpatient Medications   Medication Sig Dispense Refill    vitamin D (ERGOCALCIFEROL) 1.25 MG (36040 UT) CAPS capsule Take 1 capsule by mouth once a week For eight weeks only 8 capsule 0    Naproxen Sodium (ALEVE PO) Take by mouth (Patient not taking: Reported on 4/12/2022)       No current facility-administered medications for this visit. Review of Systems:   · Constitutional: No Fever or Weight Loss   · Eyes: No Decreased Vision  · ENT: No Headaches, Hearing Loss or Vertigo  · Cardiovascular: No chest pain, dyspnea on exertion, palpitations or loss of consciousness  · Respiratory: No cough or wheezing    · Gastrointestinal: No abdominal pain, appetite loss, blood in stools, constipation, diarrhea or heartburn  · Genitourinary: No dysuria, trouble voiding, or hematuria  · Musculoskeletal:  No gait disturbance, weakness or joint complaints  · Integumentary: No rash or pruritis  · Neurological: No TIA or stroke symptoms  · Psychiatric: No anxiety or depression  · Endocrine: No malaise, fatigue or temperature intolerance  · Hematologic/Lymphatic: No bleeding problems, blood clots or swollen lymph nodes  · Allergic/Immunologic: No nasal congestion or hives  All systems negative except as marked. ·   ·      Physical Examination:    Vitals:    04/12/22 1041   BP: 120/80   Pulse: 73    rr 14  afebrile  Wt Readings from Last 3 Encounters:   04/12/22 238 lb (108 kg)   04/06/22 238 lb 3.2 oz (108 kg)   03/28/22 241 lb 9.6 oz (109.6 kg)     Body mass index is 42.16 kg/m². General Appearance:  No distress, conversant    Constitutional:  Well developed, Well nourished, No acute distress, Non-toxic appearance. HENT:  Normocephalic, Atraumatic, Bilateral external ears normal, Oropharynx moist, No oral exudates, Nose normal. Neck- Normal range of motion, No tenderness, Supple, No stridor,no apical-carotid delay, no carotid bruit  Eyes:  PERRL, EOMI, Conjunctiva normal, No discharge. Respiratory:  Normal breath sounds, No respiratory distress, No wheezing, No chest tenderness. ,no use of accessory muscles, diaphragm movement is normal  Cardiovascular: (PMI) apex non displaced,no lifts no thrills, no s3,no s4, Normal heart rate, Normal rhythm, No murmurs, No rubs, No gallops. Carotid arteries pulse and amplitude are normal no bruit, no abdominal bruit noted ( normal abdominal aorta ausculation), femoral arteries pulse and amplitude are normal no bruit, pedal pulses are normal  GI:  Bowel sounds normal, Soft, No tenderness, No masses, No pulsatile masses, no hepatosplenomegally, no bruits  : External genitalia appear normal, No masses or lesions. No discharge. No CVA tenderness. Musculoskeletal:  Intact distal pulses, No edema, No tenderness, No cyanosis, No clubbing. Good range of motion in all major joints. No tenderness to palpation or major deformities noted. Back- No tenderness. Integument:  Warm, Dry, No erythema, No rash. Skin: no rash, no ulcers  Lymphatic:  No lymphadenopathy noted. Neurologic:  Alert & oriented x 3, Normal motor function, Normal sensory function, No focal deficits noted. Psychiatric:  Affect normal, Judgment normal, Mood normal.   Lab Review   No results for input(s): WBC, HGB, HCT, PLT in the last 72 hours. No results for input(s): NA, K, CL, CO2, PHOS, BUN, CREATININE, CA in the last 72 hours. No results for input(s): AST, ALT, ALB, BILIDIR, BILITOT, ALKPHOS in the last 72 hours. No results for input(s): TROPONINI in the last 72 hours. No results found for: BNP  Lab Results   Component Value Date    INR 0.95 03/11/2022    PROTIME 12.2 03/11/2022         EKG:nsr    Chest Xray:    [address], echo, meds reviewed  Assessment: 42 y. o.year old with PMH of  has a past medical history of Chronic depression, Migraine, Mixed hyperlipidemia, PONV (postoperative nausea and vomiting), Prolonged emergence from general anesthesia, Pulmonic stenosis, Sleep apnea, and Sleep apnea. Recommendations:    1. preop for gastric sleeve: echo ordered  2. Dizziness: most likley from vertigo, recommend to try meclizine ( prescribed ) and see ENT  3.  Dyslpidemia: stable, diet controlled  All labs,

## 2022-04-13 ENCOUNTER — HOSPITAL ENCOUNTER (OUTPATIENT)
Dept: PHYSICAL THERAPY | Age: 42
Setting detail: THERAPIES SERIES
Discharge: HOME | End: 2022-04-13
Payer: MEDICAID

## 2022-04-13 PROCEDURE — 97110 THERAPEUTIC EXERCISES: CPT

## 2022-04-13 PROCEDURE — 97162 PT EVAL MOD COMPLEX 30 MIN: CPT

## 2022-04-13 ASSESSMENT — PAIN DESCRIPTION - ORIENTATION: ORIENTATION: LEFT

## 2022-04-13 ASSESSMENT — PAIN DESCRIPTION - PAIN TYPE: TYPE: CHRONIC PAIN

## 2022-04-13 ASSESSMENT — PAIN SCALES - GENERAL: PAINLEVEL_OUTOF10: 8

## 2022-04-13 ASSESSMENT — PAIN DESCRIPTION - LOCATION: LOCATION: BACK

## 2022-04-13 ASSESSMENT — PAIN DESCRIPTION - DESCRIPTORS: DESCRIPTORS: ACHING;BURNING;SHARP;SHOOTING;STABBING

## 2022-04-13 ASSESSMENT — PAIN DESCRIPTION - FREQUENCY: FREQUENCY: CONTINUOUS

## 2022-04-13 ASSESSMENT — PAIN DESCRIPTION - PROGRESSION: CLINICAL_PROGRESSION: GRADUALLY WORSENING

## 2022-04-13 ASSESSMENT — PAIN - FUNCTIONAL ASSESSMENT: PAIN_FUNCTIONAL_ASSESSMENT: PREVENTS OR INTERFERES SOME ACTIVE ACTIVITIES AND ADLS

## 2022-04-13 NOTE — FLOWSHEET NOTE
Outpatient Physical Therapy  [place]           [x] Phone: [phone]   Fax: [phone]  [facility]           [] Phone: [phone]   Fax: [phone]        Physical Therapy Daily Treatment Note  Date:  2022    Patient Name:  Adamaris Clay    :  1980  MRN: [id number]  Restrictions/Precautions:    Diagnosis:   Diagnosis: Spinal stenosis  Date of Injury/Surgery:   Treatment Diagnosis: Treatment Diagnosis: LBP, poor core strength, reduced lumbar ROM    Insurance/Certification information: PT Insurance Information: [facility]   Pre-cert   Referring Physician:  Referring Practitioner: Dr. Arely Reddy  Next Doctor Visit:    Plan of care signed (Y/N):  N, sent 22   Outcome Measure: Oswestry: Issue next visit   Visit# / total visits:   per POC  Pain level: 8/10   Goals:     Patient goals : Improvement in pain     Long term goals  Time Frame for Long term goals : 6 weeks 22  Long term goal 1: Pt will demo I with HEP/symptom management. Long term goal 2: Pt will demo able to complete full bridge with <5/10 pain to ease bed mobility. Long term goal 3: Pt will demo >4+/5 L LE strength to ease prolonged activities. .  Long term goal 4: Pt will demo 6MWT >1000ft with <7/10 back pain to community ambulation. Long term goal 5: Pt will demo able to reach for ground without increase in pain to ease reaching. Summary of Evaluation:   Pt is 42year old female with chronic history of back pain. Pt now has difficulties completing ADLs, general mobility and homemaking duties. Pt demo deficits this date that include reduced lumbar AR OM, poor core strength, L LE weakness, paraspinal stiffness, reduced gait tolerance and pain. Testing this date indicate signs and symptoms of spondylosis. Pt will benefit with PT services with progression of strength/ROM, manual and modalities to return to PLOF. Pt prior to onset of current condition had min/mod pain with able to complete full ADLs.  Patient received education on their current pathology and how their condition effects them with their functional activities. Patient understood discussion and questions were answered. Patient understands their activity limitations and understands rational for treatment progression. Subjective:  See eval         Any changes in Ambulatory Summary Sheet? None        Objective:  See eval   COVID screening questions were asked and patient attested that there had been no contact or symptoms        Exercises: (No more than 4 columns)   Exercise/Equipment 4/13/22  #1 Date Date           WARM UP        Nu step              TABLE      *hooklying clamshells RTB 10x2     *LTR 10x2  2\"     *Sitting lumbar flexion stretch   15\"x4 across table      *TA activation in hooklying  10x   3\"     SLR         heelslides with SB      Piriformis stretch                   STANDING                                                     PROPRIOCEPTION                                    MODALITIES                      Other Therapeutic Activities/Education:  Patient received education on their current pathology and how their condition effects them with their functional activities. Patient understood discussion and questions were answered. Patient understands their activity limitations and understands rational for treatment progression. Home Exercise Program:  HO issued, reviewed and discussed with patient. Pt agreed to comply. Manual Treatments:        Modalities:        Communication with other providers:  POC sent 4/13/22       Assessment:  (Response towards treatment session) (Pain Rating)       Pt is 42year old female with chronic history of back pain. Pt now has difficulties completing ADLs, general mobility and homemaking duties. Pt demo deficits this date that include reduced lumbar AR OM, poor core strength, L LE weakness, paraspinal stiffness, reduced gait tolerance and pain. Testing this date indicate signs and symptoms of spondylosis. Pt will benefit with PT services with progression of strength/ROM, manual and modalities to return to PLOF. Pt prior to onset of current condition had min/mod pain with able to complete full ADLs. Patient received education on their current pathology and how their condition effects them with their functional activities. Patient understood discussion and questions were answered. Patient understands their activity limitations and understands rational for treatment progression.     Plan for Next Session:  review HEP, TA activation with progression as able, open chain L LE strenghtening, paraspinal stretching, modalities PRN      Time In / Time Out:  3354-5016         If BWC Please Indicate Time In/Out/Total Time  CPT Code Time in Time out Total Time                                                            Total for session             Timed Code/Total Treatment Minutes:  14/45'   14' TE, 1 PT Eval       Next Progress Note due:  Eval 4/13/22   Visit 10       Plan of Care Interventions:  [x] Therapeutic Exercise  [x] Modalities:  [x] Therapeutic Activity     [] Ultrasound  [] Estim  [x] Gait Training      [] Cervical Traction [] Lumbar Traction  [x] Neuromuscular Re-education    [x] Cold/hotpack [] Iontophoresis   [x] Instruction in HEP      [x] Vasopneumatic   [] Dry Needling    [x] Manual Therapy               [] Aquatic Therapy              Electronically signed by:  Lana Horvath, PT, DPT, OCS  4/13/2022, 8:13 AM    4/13/2022 8:13 AM

## 2022-04-13 NOTE — PROGRESS NOTES
Physical Therapy  Initial Assessment  Date: 2022  Patient Name: Gilberto Thao  MRN: [id number]  : 1980     Treatment Diagnosis: LBP, poor core strength, reduced lumbar ROM    Restrictions       Subjective   General  Chart Reviewed: Yes  Patient assessed for rehabilitation services?: Yes  Referring Practitioner: Dr. Mazariegos  Diagnosis: Spinal stenosis  PT Visit Information  PT Insurance Information: [facility]  Subjective  Subjective:  since having her first kid with intermittent pain with increasing spasms with constant pain in the last 5-6 years. Attempted muscle relaxants, PT with self management with alternating ice/het, biofreeze and NSAIDs with min improvement. Feels most comfortable with sitting with increase in pain with lifting and weightbearing activities. X-rays with spinal stenosis with no attempts of injection which she prefers to not have completed. Feels her legs give out sometimes as well. No return follow up at this time. Pain Screening  Patient Currently in Pain: Yes  Pain Assessment  Pain Assessment: 0-10  Pain Level: 8 (Best: 0/10 every couple of months     Worst: 10/10 everyday)  Patient's Stated Pain Goal: 3  Pain Type: Chronic pain  Pain Location: Back  Pain Orientation: Left  Pain Radiating Towards: throughout low back  Pain Descriptors: Aching;Burning; Cele ; Shooting; Stabbing  Pain Frequency: Continuous  Clinical Progression: Gradually worsening  Functional Pain Assessment: Prevents or interferes some active activities and ADLs  Vital Signs  Patient Currently in Pain: Yes    Vision/Hearing  Vision  Vision: Within Functional Limits  Hearing  Hearing: Within functional limits    Orientation  Orientation  Overall Orientation Status: Within Normal Limits    Social/Functional History  Social/Functional History  Lives With: Spouse;Daughter  ADL Assistance: Independent  Homemaking Assistance: Independent  Ambulation Assistance: Independent  Transfer Assistance: Independent  Active : Yes  Mode of Transportation: Car  Occupation: Unemployed  Leisure & Hobbies: camp, horseback riding, hiking    Objective     Observation/Palpation  Palpation: tenderness throughout lumbar paraspinals/paravertebral  Observation: Normal gait mechanics    PROM LLE (degrees)  LLE PROM: WFL  LLE General PROM: pain with end range IR/ER into lateral hip. No increase in pain with overpressure into flexion. Spine  Lumbar: Flex:   distal tibia*         Ext: 25% with increase in pain                   B SB: distal thigh B with increase in L low back pain                 B Rot: WFL with increase in pain. Strength RLE  Strength RLE: WNL  Comment: 5/5 in all directions  Strength LLE  Strength LLE: Exception  Comment: pain into low back on L side with L hip activation against resistance. L Hip Flexion: 4/5  L Hip Extension: 4/5  L Hip ABduction: 4-/5  L Hip ADduction: 4-/5  L Hip Internal Rotation: 4/5  L Hip External Rotation: 4/5  Strength Other  Other: 5x sit to stand: 14.86 sec without UE assistance      9/10 pain into low back. Able to complete full bridge with immediate increase in back pain. 6MWT: 842 ft with 9/10 back pain post.     Additional Measures  Other: Oswestry: Not completed issue at next visit. Balance  Single Leg Stance R Le  Single Leg Stance L Le  Comments: min increase in L side low back with SLS. Assessment   Conditions Requiring Skilled Therapeutic Intervention  Body structures, Functions, Activity limitations: Decreased functional mobility ; Decreased ROM; Decreased strength;Decreased endurance;Decreased balance  Pt is 42year old female with chronic history of back pain. Pt now has difficulties completing ADLs, general mobility and homemaking duties. Pt demo deficits this date that include reduced lumbar AR OM, poor core strength, L LE weakness, paraspinal stiffness, reduced gait tolerance and pain. Testing this date indicate signs and symptoms of spondylosis.  Pt will benefit with PT services with progression of strength/ROM, manual and modalities to return to PLOF. Pt prior to onset of current condition had min/mod pain with able to complete full ADLs. Patient received education on their current pathology and how their condition effects them with their functional activities. Patient understood discussion and questions were answered. Patient understands their activity limitations and understands rational for treatment progression. Treatment Diagnosis: LBP, poor core strength, reduced lumbar ROM  Prognosis: Fair  Decision Making: Medium Complexity  Barriers to Learning: None  REQUIRES PT FOLLOW UP: Yes  Activity Tolerance  Activity Tolerance: Patient Tolerated treatment well         Plan   Plan  Times per week: 2  Plan weeks: 6  Specific instructions for Next Treatment: review HEP, TA activation with progression as able, open chain L LE strenghtening, paraspinal stretching, modalities PRN. Current Treatment Recommendations: Strengthening,ROM,Neuromuscular Re-education,Home Exercise Program,Manual Therapy - Soft Tissue Mobilization,Modalities,Endurance Training      Goals  Long term goals  Time Frame for Long term goals : 6 weeks 5/27/22  Long term goal 1: Pt will demo I with HEP/symptom management. Long term goal 2: Pt will demo able to complete full bridge with <5/10 pain to ease bed mobility. Long term goal 3: Pt will demo >4+/5 L LE strength to ease prolonged activities. .  Long term goal 4: Pt will demo 6MWT >1000ft with <7/10 back pain to community ambulation. Long term goal 5: Pt will demo able to reach for ground without increase in pain to ease reaching.   Patient Goals   Patient goals : Improvement in pain       Janell Collins, PT, DPT, OCS    4/13/2022 6:34 PM

## 2022-04-13 NOTE — PLAN OF CARE
[] Cervical Traction [] Lumbar Traction  [x] Neuromuscular Re-education    [x] Cold/hotpack [] Iontophoresis   [x] Instruction in HEP      [] Vasopneumatic    [] Dry Needling  [x] Manual Therapy               [] Aquatic Therapy       Other:          Frequency/Duration:  # Days per week: [] 1 day # Weeks: [] 1 week [] 5 weeks     [x] 2 days   [] 2 weeks [x] 6 weeks     [] 3 days   [] 3 weeks [] 7 weeks      [] 4 days   [] 4 weeks [] 8 weeks         [] 9 weeks [] 10 weeks         [] 11 weeks [] 12 weeks    Rehab Potential/Progress: [] Excellent [x] Good [x] Fair  [] Poor     Goals:    Patient goals : Improvement in pain     Long term goals  Time Frame for Long term goals : 6 weeks 5/27/22  Long term goal 1: Pt will demo I with HEP/symptom management. Long term goal 2: Pt will demo able to complete full bridge with <5/10 pain to ease bed mobility. Long term goal 3: Pt will demo >4+/5 L LE strength to ease prolonged activities. .  Long term goal 4: Pt will demo 6MWT >1000ft with <7/10 back pain to community ambulation. Long term goal 5: Pt will demo able to reach for ground without increase in pain to ease reaching. Electronically signed by:  Nicole Miramontes, PT, DPT, OCS  4/13/2022, 6:36 PM    4/13/2022 6:36 PM         If you have any questions or concerns, please don't hesitate to call.   Thank you for your referral.      Physician Signature:________________________________Date:_________ TIME: _____  By signing above, therapists plan is approved by physician

## 2022-04-14 ENCOUNTER — OFFICE VISIT (OUTPATIENT)
Dept: PSYCHOLOGY | Age: 42
End: 2022-04-14
Payer: MEDICAID

## 2022-04-14 ENCOUNTER — HOSPITAL ENCOUNTER (OUTPATIENT)
Dept: SLEEP CENTER | Age: 42
Discharge: HOME | End: 2022-04-14
Payer: MEDICAID

## 2022-04-14 VITALS — BODY MASS INDEX: 42.17 KG/M2 | HEIGHT: 63 IN | WEIGHT: 238 LBS

## 2022-04-14 DIAGNOSIS — E66.01 MORBIDLY OBESE (HCC): ICD-10-CM

## 2022-04-14 DIAGNOSIS — G47.10 HYPERSOMNIA: ICD-10-CM

## 2022-04-14 DIAGNOSIS — Z99.89 OBSTRUCTIVE SLEEP APNEA ON CPAP: ICD-10-CM

## 2022-04-14 DIAGNOSIS — F41.9 ANXIETY: ICD-10-CM

## 2022-04-14 DIAGNOSIS — G47.33 OBSTRUCTIVE SLEEP APNEA ON CPAP: ICD-10-CM

## 2022-04-14 DIAGNOSIS — F32.A DEPRESSIVE DISORDER: Primary | ICD-10-CM

## 2022-04-14 PROCEDURE — 99204 OFFICE O/P NEW MOD 45 MIN: CPT | Performed by: INTERNAL MEDICINE

## 2022-04-14 PROCEDURE — 90832 PSYTX W PT 30 MINUTES: CPT | Performed by: PSYCHOLOGIST

## 2022-04-14 PROCEDURE — 1036F TOBACCO NON-USER: CPT | Performed by: PSYCHOLOGIST

## 2022-04-14 PROCEDURE — 99211 OFF/OP EST MAY X REQ PHY/QHP: CPT

## 2022-04-14 ASSESSMENT — SLEEP AND FATIGUE QUESTIONNAIRES
HOW LIKELY ARE YOU TO NOD OFF OR FALL ASLEEP WHILE SITTING AND TALKING TO SOMEONE: 2
HOW LIKELY ARE YOU TO NOD OFF OR FALL ASLEEP WHILE WATCHING TV: 3
HOW LIKELY ARE YOU TO NOD OFF OR FALL ASLEEP WHEN YOU ARE A PASSENGER IN A CAR FOR AN HOUR WITHOUT A BREAK: 3
ESS TOTAL SCORE: 18
HOW LIKELY ARE YOU TO NOD OFF OR FALL ASLEEP IN A CAR, WHILE STOPPED FOR A FEW MINUTES IN TRAFFIC: 0
NECK CIRCUMFERENCE (INCHES): 17.25
HOW LIKELY ARE YOU TO NOD OFF OR FALL ASLEEP WHILE LYING DOWN TO REST IN THE AFTERNOON WHEN CIRCUMSTANCES PERMIT: 3
HOW LIKELY ARE YOU TO NOD OFF OR FALL ASLEEP WHILE SITTING AND READING: 3
HOW LIKELY ARE YOU TO NOD OFF OR FALL ASLEEP WHILE SITTING INACTIVE IN A PUBLIC PLACE: 1
HOW LIKELY ARE YOU TO NOD OFF OR FALL ASLEEP WHILE SITTING QUIETLY AFTER LUNCH WITHOUT ALCOHOL: 3

## 2022-04-14 ASSESSMENT — PATIENT HEALTH QUESTIONNAIRE - PHQ9
1. LITTLE INTEREST OR PLEASURE IN DOING THINGS: 3
8. MOVING OR SPEAKING SO SLOWLY THAT OTHER PEOPLE COULD HAVE NOTICED. OR THE OPPOSITE, BEING SO FIGETY OR RESTLESS THAT YOU HAVE BEEN MOVING AROUND A LOT MORE THAN USUAL: 0
2. FEELING DOWN, DEPRESSED OR HOPELESS: 3
SUM OF ALL RESPONSES TO PHQ QUESTIONS 1-9: 15
SUM OF ALL RESPONSES TO PHQ9 QUESTIONS 1 & 2: 6
SUM OF ALL RESPONSES TO PHQ QUESTIONS 1-9: 15
3. TROUBLE FALLING OR STAYING ASLEEP: 3
9. THOUGHTS THAT YOU WOULD BE BETTER OFF DEAD, OR OF HURTING YOURSELF: 0
7. TROUBLE CONCENTRATING ON THINGS, SUCH AS READING THE NEWSPAPER OR WATCHING TELEVISION: 0
4. FEELING TIRED OR HAVING LITTLE ENERGY: 3
10. IF YOU CHECKED OFF ANY PROBLEMS, HOW DIFFICULT HAVE THESE PROBLEMS MADE IT FOR YOU TO DO YOUR WORK, TAKE CARE OF THINGS AT HOME, OR GET ALONG WITH OTHER PEOPLE: 3
SUM OF ALL RESPONSES TO PHQ QUESTIONS 1-9: 15
5. POOR APPETITE OR OVEREATING: 0
6. FEELING BAD ABOUT YOURSELF - OR THAT YOU ARE A FAILURE OR HAVE LET YOURSELF OR YOUR FAMILY DOWN: 3
SUM OF ALL RESPONSES TO PHQ QUESTIONS 1-9: 15

## 2022-04-14 NOTE — PROGRESS NOTES
Behavioral Health Consultation  Leyla Valiente Psy.D. Psychologist      Time spent with Patient: 30 minutes  Visit number: 2  Reason for Consult:  depression  Referring Provider: No referring provider defined for this encounter. S:  ----------------------------------------------------------------------------------------------------------------------  depression  \"Things haven't been good. \"     [place] daughter's boyfriend will be deployed next month  May need spinal surgery for back pain   Concerned she may need pacemaker  Prepping for gastric sleeve--family does not support this. Nephew's girlfriend is pregnant and girlfriend's mother is encouraging her to have an  or offer child for adoption. Grandfather's health remains poor    \"My depression and PTSD are getting worse. \"      Endorsed depressed mood, anhedonia, anergia, amotivation, poor sleep, fluctuating appetite, diminished concentration, and corrosive self-image. Denied any SI/HI, planning, or intent. Social stressors persist:  -son in [place] with army. Going to [employer] in march.   -'s back and shoulder pain   -grandfather dying   -multiple family members w COVID   -searching for a job   -bills are behind     O:  ----------------------------------------------------------------------------------------------------------------------  MSE:  Orientation:  oriented to person, place, time, and general circumstances  Appearance and behavior:  alert, cooperative  Speech:  spontaneous, normal rate and normal volume  Mood: anxious   Thought Content:  intact, hopelessness and helplessness  Thought Process:  linear, goal directed and coherent  Interest/Pleasure:  Loss of Pleasure/Fun  Sleep disturbance: Yes  Motivation: Poor  Energy: Tired/Fatigued  Morbid ideation No  Suicide Assessment: no suicidal ideation    A:  ----------------------------------------------------------------------------------------------------------------------  Diagnosis:    1. Depressive disorder    2. Anxiety         PHQ Scores 4/14/2022 3/7/2022 1/20/2022 8/19/2021 1/27/2021 1/7/2020 10/31/2019   PHQ2 Score 6 0 6 6 0 0 2   PHQ9 Score 15 0 21 20 0 0 2     Interpretation of Total Score Depression Severity: 1-4 = Minimal depression, 5-9 = Mild depression, 10-14 = Moderate depression, 15-19 = Moderately severe depression, 20-27 = Severe depression    P:  ----------------------------------------------------------------------------------------------------------------------    General:   [x] [place]-setting to identify pt's primary goals for [facility] visit / overall health   [x] Provided psychoeducation/handout on:   1. Depressive disorder    2. Anxiety        [x]  Supportive interventions    Cognitive:   [x] Trained in strategies for increasing balanced thinking   [x] Cognitive strategies to target current mental health sx    [x] Identified and challenged maladaptive thoughts    Behavioral:   [x] Discussed and set plan for behavioral activation   [x] Discussed and problem-solved barriers in adhering to behavioral change plan   [x] Motivational Interviewing to increase patient confidence and compliance with       adhering to behavioral change plan   [x] Discussed potential barriers to change   [x] Discussed self-care (sleep, nutrition, rewarding activities, social support, exercise)    Other:   []   []   []   []    Recommendations to patient:    1. Return to Dr. Caitlin Núñez in 4 week(s)    2.                   Feedback provided to pt's PCP via EPIC and/or oral report

## 2022-04-14 NOTE — CONSULTS
Arleth Painting MD, Geovanna Castro MD, Abiodun Castanon MD, Cliff Knapp MD, [facility]      [address], [address] [facility]   PH: [phone]  F: [phone]     Subjective:     Patient ID: Valorie Boyer is a 42 y.o. female, referred to the sleep center for   Chief Complaint   Patient presents with    Sleep Apnea     G47.33, Z99.89 EMILEE on CPAP   . Referring physician:  VIOLET Lezama NP    History:has come back as a follow up. She has been diagnosed with EMILEE in Jan'2020. She was given a CPAP of 7 cmh20 which she has not used it for more than 1 year. She has a FFM and before that nasal mask. She gained 10 lbs in the last 2 years.        Symptoms:   [x]  Snoring                                                                    [x]  Dry Mouth  [x]  Choking                                                                   [x]  Morning Headaches  [x]  Gasping for Air                                                        []  Trouble Falling asleep  [x]  Tired during the daytime                                         []  Trouble Staying Asleep  [x]  Tired when you wake up                                         [x]  Weight Gain in Last 5 Years  [x]  Wake up frequently at night                                    []  Weight Loss in Last 5 Years  []  Shortness Of Breath                                               []  Shift Worker  []  Coughing                                                                []  Smoker (Previous or Current)  []  Chest Pain                                                              [x]  Anxiety  []  Trouble keeping your legs still at night                   [x]  Depression  []  Kicking your legs in your sleep                               []  Insomnia     [] Palpitation  [x]   Stop breathing      []  Other:     Significant Co-morbidities:  []  Congestive Heart Failure []  COPD         []  Stroke (Past 30 Days)      []  Supplemental Oxygen Usage       []  Cognitive Impairment      []  Neuromuscular Problems  []  Epilepsy/Neurological Disorders           Social History     Socioeconomic History    Marital status:      Spouse name: Not on file    Number of children: Not on file    Years of education: Not on file    Highest education level: Not on file   Occupational History    Not on file   Tobacco Use    Smoking status: Never Smoker    Smokeless tobacco: Never Used   Vaping Use    Vaping Use: Never used   Substance and Sexual Activity    Alcohol use: No    Drug use: No    Sexual activity: Not on file   Other Topics Concern    Not on file   Social History Narrative    ** Merged History Encounter **          Social Determinants of Health     Financial Resource Strain:     Difficulty of Paying Living Expenses: Not on file   Food Insecurity:     Worried About [address] in the Last Year: Not on file    [place] of Food in the Last Year: Not on file   Transportation Needs:     Lack of Transportation (Medical): Not on file    Lack of Transportation (Non-Medical):  Not on file   Physical Activity:     Days of Exercise per Week: Not on file    Minutes of Exercise per Session: Not on file   Stress:     Feeling of Stress : Not on file   Social Connections:     Frequency of Communication with Friends and Family: Not on file    Frequency of Social Gatherings with Friends and Family: Not on file    Attends Yazdanism Services: Not on file    Active Member of Clubs or Organizations: Not on file    Attends Club or Organization Meetings: Not on file    Marital Status: Not on file   Intimate Partner Violence:     Fear of Current or Ex-Partner: Not on file    Emotionally Abused: Not on file    Physically Abused: Not on file    Sexually Abused: Not on file   Housing Stability:     Unable to Pay for Housing in the Last Year: Not on file    Number of Places Lived in the Last Year: Not on file    Unstable Housing in the Last Year: Not on file       Prior to Admission medications    Medication Sig Start Date End Date Taking? Authorizing Provider   meclizine (ANTIVERT) 12.5 MG tablet Take 1 tablet by mouth 3 times daily as needed for Dizziness 4/12/22 4/22/22 Yes Londell Nissen, MD   sulfamethoxazole-trimethoprim (BACTRIM DS;SEPTRA DS) 800-160 MG per tablet Take 1 tablet by mouth 2 times daily for 5 days 4/12/22 4/17/22 Yes VIOLET Carter NP   vitamin D (ERGOCALCIFEROL) 1.25 MG (46071 UT) CAPS capsule Take 1 capsule by mouth once a week For eight weeks only 3/29/22  Yes VIOLET Carter NP   Naproxen Sodium (ALEVE PO) Take by mouth  Patient not taking: Reported on 4/12/2022    Historical Provider, MD       Allergies as of 04/14/2022 - Fully Reviewed 04/14/2022   Allergen Reaction Noted    Codeine Anaphylaxis 01/26/2012    Dilaudid [hydromorphone hcl] Anaphylaxis 01/26/2012    Hydromorphone Anaphylaxis 01/26/2012       Patient Active Problem List   Diagnosis    Vaginal bleeding    H/O of hysterectomy with bilateral oophorectomy    Panic disorder with agoraphobia    Early menopause    H/O bilateral breast reduction surgery    Myofascial pain syndrome    Somatic dysfunction of head region    Somatic dysfunction of cervical region    Somatic dysfunction of pelvic region    Snoring    Obstructive sleep apnea on CPAP    Somatic dysfunction of both upper extremities    Morbidly obese (HCC)    Paresthesia of left upper extremity    Fibromyalgia    Mixed hyperlipidemia    Carpal tunnel syndrome of left wrist    Vitamin D deficiency    Chronic depression    Anxiety    Chronic pain of left knee    Migraine without aura and without status migrainosus, not intractable    Injury of left ankle    Hypersomnia       Past Medical History:   Diagnosis Date    Chronic depression 7/9/2021    Kidney disorder     left removed .     Migraine     Mixed hyperlipidemia 1/27/2021    PONV (postoperative nausea and vomiting)     Prolonged emergence from general anesthesia     Pulmonic stenosis     Sleep apnea     Sleep apnea        Past Surgical History:   Procedure Laterality Date    BREAST BIOPSY Left 2010    benign    BREAST REDUCTION SURGERY Bilateral 2003    BREAST SURGERY      CARPAL TUNNEL RELEASE Left 2/16/2021    LEFT CARPAL TUNNEL RELEASE performed by Brittney Jordan DO at [facility] Right     wrist    HAND SURGERY Bilateral     4/2014    HYSTERECTOMY      INNER EAR SURGERY      OTHER SURGICAL HISTORY  4/16/2012    Repair vaginal cuff    OVARY REMOVAL      PARTIAL NEPHRECTOMY Left     TOTAL NEPHRECTOMY Left     TUBAL LIGATION         Family History   Problem Relation Age of Onset    Cancer Mother     Migraines Mother     Cancer Maternal Grandmother     Diabetes Maternal Grandmother     Allergies Son     Allergies Daughter     Bleeding Prob Daughter     Bleeding Prob Son     Asthma Daughter     Asthma Son          Objective:   Ht 5' 3\" (1.6 m)   Wt 238 lb (108 kg)   LMP 02/15/2012   BMI 42.16 kg/m²   Body mass index is 42.16 kg/m². Sleep Medicine 4/14/2022 12/20/2019   Sitting and reading 3 3   Watching TV 3 3   Sitting, inactive in a public place (e.g. a theatre or a meeting) 1 1   As a passenger in a car for an hour without a break 3 3   Lying down to rest in the afternoon when circumstances permit 3 3   Sitting and talking to someone 2 0   Sitting quietly after a lunch without alcohol 3 2   In a car, while stopped for a few minutes in traffic 0 0   Total score 18 15   Neck circumference (Inches) 17.25 15     Mallampati 3    Vitals:    04/14/22 1001   Weight: 238 lb (108 kg)   Height: 5' 3\" (1.6 m)     Neck circumference (Inches): 17.25  Inches  [place] - Total score: 18    Gen: No distress. Eyes: PERRL. No sclera icterus. No conjunctival injection. ENT: No discharge. Pharynx clear. External appearance of ears and nose normal.  Neck: Trachea midline. No obvious mass. Resp: No accessory muscle use. No crackles. No wheezes. No rhonchi. No dullness on percussion. CV: Regular rate. Regular rhythm. No murmur or rub. No edema. GI: Non-tender. Non-distended. No hernia. Skin: Warm, dry, normal texture and turgor. No nodule on exposed extremities. Lymph: No cervical LAD. No supraclavicular LAD. M/S: No cyanosis. No clubbing. No joint deformity. Psych: Oriented x 3. No anxiety. Awake. Alert. Intact judgement and insight. Mallampati Airway Classification:   []1 []2 [x]3 []4        Assessment and Plan     Diagnosis:    Problem List        Pulmonary Problems    Obstructive sleep apnea on CPAP      She has EMILEE not on the CPAP gained 10 lbs  Advised to go for the Split night study  Loose weight         Relevant Orders    Sleep Study with PAP Titration       Other    Morbidly obese ([address]      Advised to loose weight with diet and exercise         Hypersomnia      Advised to go for the Split night study  Loose weight                     Additional Plan:     [x]  Sleep hygiene/ relaxation methods & CBTi principles review with patient   [x]  Avoid supine/back sleep until sleep study   [x]  Driving precautions   [x]  Medical consequences of untreated EMILEE   [x]  Weight loss recommendations   [x]  Diet recommendations   [x]  Exercise   []  Advised to quit smoking       []  PFT referral   []  Bariatric Program referral      Follow-Up:    No follow-ups on file.     Electronically signed by Deonte Quan MD on 4/14/2022 at 10:34 AM

## 2022-04-14 NOTE — PATIENT INSTRUCTIONS
Every morning write down:  -3 things you are grateful for   -1 thing you want to work on that day     At the end of the day write down:  -3 things that went well   -1 thing you want to do better tomorrow

## 2022-04-14 NOTE — FLOWSHEET NOTE
Patients Plan of Care was received and signed. Signed POC was scanned and placed in the patients chart.     Michelle Adams

## 2022-04-14 NOTE — PROGRESS NOTES
Patient will arrive at 0700 at [facility] on 4/20/2022 for her procedure at 0900. 1. Do not eat or drink anything after midnight - unless instructed by your doctor prior to surgery. This includes                   no water, chewing gum or mints. 2. Follow your directions as prescribed by the doctor for your procedure and medications. 3. Check with your Doctor regarding stopping vitamins, supplements, blood thinners (Plavix, Coumadin, Lovenox, Effient, Pradaxa, Xarelto, Fragmin or                   other blood thinners) and follow their instructions. 4. Do not smoke, and do not drink any alcoholic beverages 24 hours prior to surgery. This includes NA Beer. 5. You may brush your teeth and gargle the morning of surgery. DO NOT SWALLOW WATER   6. You MUST make arrangements for a responsible adult to take you home after your surgery and be able to check on you every couple                   hours for the day. You will not be allowed to leave alone or drive yourself home. It is strongly suggested someone stay with you the first 24                   hrs. Your surgery will be cancelled if you do not have a ride home. 7. Please wear simple, loose fitting clothing to the hospital.  Jeniffer Denton not bring valuables (money, credit cards, checkbooks, etc.) Do not wear any                   makeup (including no eye makeup) or nail polish on your fingers or toes. 8. DO NOT wear any jewelry or piercings on day of surgery. All body piercing jewelry must be removed. 9. If you have dentures, they will be removed before going to the OR; we will provide you a container. If you wear contact lenses or glasses,                  they will be removed; please bring a case for them. 10. If you  have a Living Will and Durable Power of  for Healthcare, please bring in a copy.            11. Please bring picture ID,  insurance card, paperwork from the doctors office    (H & P, Consent, & card for implantable devices). 12. Take a shower the morning of your procedure with Hibiclens or an anti-bacterial soap. Do not apply any deodorant, lotion, oil or powder. 13.  Enter thru the main entrance wearing a mask on the day of surgery. Covid screening done.

## 2022-04-18 ENCOUNTER — HOSPITAL ENCOUNTER (OUTPATIENT)
Age: 42
Discharge: HOME | End: 2022-04-18
Payer: MEDICAID

## 2022-04-18 ENCOUNTER — ANESTHESIA EVENT (OUTPATIENT)
Dept: ENDOSCOPY | Age: 42
End: 2022-04-18
Payer: MEDICAID

## 2022-04-18 DIAGNOSIS — R19.7 DIARRHEA, UNSPECIFIED TYPE: ICD-10-CM

## 2022-04-18 LAB
ADENOVIRUS F 40 41 PCR: NOT DETECTED
AMYLASE: 87 U/L (ref 25–115)
ASTROVIRUS PCR: NOT DETECTED
BASOPHILS ABSOLUTE: 0.1 K/CU MM
BASOPHILS RELATIVE PERCENT: 0.6 % (ref 0–1)
CAMPYLOBACTER PCR: NOT DETECTED
CRYPTOSPORIDIUM PCR: NOT DETECTED
CYCLOSPORA CAYETANENSIS PCR: NOT DETECTED
DIFFERENTIAL TYPE: ABNORMAL
E COLI 0157 PCR: NOT DETECTED
E COLI ENTEROAGGREGATIVE PCR: NOT DETECTED
E COLI ENTEROPATHOGENIC PCR: NOT DETECTED
E COLI ENTEROTOXIGENIC PCR: NOT DETECTED
E COLI SHIGA LIKE TOXIN PCR: NOT DETECTED
E COLI SHIGELLA/ENTEROINVASIVE PCR: NOT DETECTED
ENTAMOEBA HISTOLYTICA PCR: NOT DETECTED
EOSINOPHILS ABSOLUTE: 0.4 K/CU MM
EOSINOPHILS RELATIVE PERCENT: 4.2 % (ref 0–3)
GIARDIA LAMBLIA PCR: NOT DETECTED
HCT VFR BLD CALC: 41.4 % (ref 37–47)
HEMOGLOBIN: 13.3 GM/DL (ref 12.5–16)
HIGH SENSITIVE C-REACTIVE PROTEIN: 12.5 MG/L
IGA: 432 MG/DL (ref 69–382)
IGG,SERUM: 990 MG/DL (ref 723–1685)
IMMATURE NEUTROPHIL %: 0.3 % (ref 0–0.43)
LIPASE: 38 IU/L (ref 13–60)
LYMPHOCYTES ABSOLUTE: 2.7 K/CU MM
LYMPHOCYTES RELATIVE PERCENT: 31.4 % (ref 24–44)
MCH RBC QN AUTO: 29.8 PG (ref 27–31)
MCHC RBC AUTO-ENTMCNC: 32.1 % (ref 32–36)
MCV RBC AUTO: 92.8 FL (ref 78–100)
MONOCYTES ABSOLUTE: 0.3 K/CU MM
MONOCYTES RELATIVE PERCENT: 3.1 % (ref 0–4)
NOROVIRUS GI GII PCR: NOT DETECTED
NUCLEATED RBC %: 0 %
PDW BLD-RTO: 13 % (ref 11.7–14.9)
PLATELET # BLD: 268 K/CU MM (ref 140–440)
PLESIOMONAS SHIGELLOIDES PCR: NOT DETECTED
PMV BLD AUTO: 10 FL (ref 7.5–11.1)
RBC # BLD: 4.46 M/CU MM (ref 4.2–5.4)
ROTAVIRUS A PCR: NOT DETECTED
SALMONELLA PCR: NOT DETECTED
SAPOVIRUS PCR: NOT DETECTED
SEGMENTED NEUTROPHILS ABSOLUTE COUNT: 5.2 K/CU MM
SEGMENTED NEUTROPHILS RELATIVE PERCENT: 60.4 % (ref 36–66)
TOTAL IMMATURE NEUTOROPHIL: 0.03 K/CU MM
TOTAL NUCLEATED RBC: 0 K/CU MM
VIBRIO CHOLERAE PCR: NOT DETECTED
VIBRIO PCR: NOT DETECTED
VITAMIN D 25-HYDROXY: 16.18 NG/ML
WBC # BLD: 8.6 K/CU MM (ref 4–10.5)
YERSINIA ENTEROCOLITICA PCR: NOT DETECTED

## 2022-04-18 PROCEDURE — 83516 IMMUNOASSAY NONANTIBODY: CPT

## 2022-04-18 PROCEDURE — 87324 CLOSTRIDIUM AG IA: CPT

## 2022-04-18 PROCEDURE — 85025 COMPLETE CBC W/AUTO DIFF WBC: CPT

## 2022-04-18 PROCEDURE — 82306 VITAMIN D 25 HYDROXY: CPT

## 2022-04-18 PROCEDURE — 82705 FATS/LIPIDS FECES QUAL: CPT

## 2022-04-18 PROCEDURE — 82150 ASSAY OF AMYLASE: CPT

## 2022-04-18 PROCEDURE — 83993 ASSAY FOR CALPROTECTIN FECAL: CPT

## 2022-04-18 PROCEDURE — 87385 HISTOPLASMA CAPSUL AG IA: CPT

## 2022-04-18 PROCEDURE — 87507 IADNA-DNA/RNA PROBE TQ 12-25: CPT

## 2022-04-18 PROCEDURE — 87449 NOS EACH ORGANISM AG IA: CPT

## 2022-04-18 PROCEDURE — 83690 ASSAY OF LIPASE: CPT

## 2022-04-18 PROCEDURE — 82784 ASSAY IGA/IGD/IGG/IGM EACH: CPT

## 2022-04-18 PROCEDURE — 87338 HPYLORI STOOL AG IA: CPT

## 2022-04-18 PROCEDURE — 87209 SMEAR COMPLEX STAIN: CPT

## 2022-04-18 PROCEDURE — 86141 C-REACTIVE PROTEIN HS: CPT

## 2022-04-18 PROCEDURE — 87177 OVA AND PARASITES SMEARS: CPT

## 2022-04-18 PROCEDURE — 36415 COLL VENOUS BLD VENIPUNCTURE: CPT

## 2022-04-18 NOTE — ANESTHESIA PRE PROCEDURE
Department of Anesthesiology  Preprocedure Note       Name:  Gilberto Thao   Age:  42 y.o.  :  1980                                          MRN:  [id number]         Date:  2022      Surgeon: Benji Collins):  Lazarus Kennedy, MD    Procedure: Procedure(s):  COLONOSCOPY DIAGNOSTIC    Medications prior to admission:   Prior to Admission medications    Medication Sig Start Date End Date Taking? Authorizing Provider   meclizine (ANTIVERT) 12.5 MG tablet Take 1 tablet by mouth 3 times daily as needed for Dizziness 22  Yumiko Andrade MD   vitamin D (ERGOCALCIFEROL) 1.25 MG (50437 UT) CAPS capsule Take 1 capsule by mouth once a week For eight weeks only 3/29/22   VIOLET Lynn - NP   Naproxen Sodium (ALEVE PO) Take by mouth  Patient not taking: Reported on 2022    Historical Provider, MD       Current medications:    No current facility-administered medications for this encounter. Current Outpatient Medications   Medication Sig Dispense Refill    meclizine (ANTIVERT) 12.5 MG tablet Take 1 tablet by mouth 3 times daily as needed for Dizziness 30 tablet 0    vitamin D (ERGOCALCIFEROL) 1.25 MG (73399 UT) CAPS capsule Take 1 capsule by mouth once a week For eight weeks only 8 capsule 0    Naproxen Sodium (ALEVE PO) Take by mouth (Patient not taking: Reported on 2022)         Allergies:     Allergies   Allergen Reactions    Codeine Anaphylaxis    Dilaudid [Hydromorphone Hcl] Anaphylaxis    Hydromorphone Anaphylaxis       Problem List:    Patient Active Problem List   Diagnosis Code    Vaginal bleeding N93.9    H/O of hysterectomy with bilateral oophorectomy Z90.710, Z90.722    Panic disorder with agoraphobia F40.01    Early menopause E27.56    H/O bilateral breast reduction surgery Z98.890    Myofascial pain syndrome M79.18    Somatic dysfunction of head region M99.00    Somatic dysfunction of cervical region M99.01    Somatic dysfunction of pelvic region M99.05  Snoring R06.83    Obstructive sleep apnea on CPAP G47.33, Z99.89    Somatic dysfunction of both upper extremities M99.07    Morbidly obese (HCC) E66.01    Paresthesia of left upper extremity R20.2    Fibromyalgia M79.7    Mixed hyperlipidemia E78.2    Carpal tunnel syndrome of left wrist G56.02    Vitamin D deficiency E55.9    Chronic depression F32. A    Anxiety F41.9    Chronic pain of left knee M25.562, G89.29    Migraine without aura and without status migrainosus, not intractable G43.009    Injury of left ankle S99.912A    Hypersomnia G47.10       Past Medical History:        Diagnosis Date    Chronic depression 7/9/2021    Kidney disorder     left removed .  Migraine     Mixed hyperlipidemia 1/27/2021    PONV (postoperative nausea and vomiting)     Prolonged emergence from general anesthesia     Pulmonic stenosis     Sleep apnea     Sleep apnea        Past Surgical History:        Procedure Laterality Date    BREAST BIOPSY Left 2010    benign    BREAST REDUCTION SURGERY Bilateral 2003    BREAST SURGERY      CARPAL TUNNEL RELEASE Left 2/16/2021    LEFT CARPAL TUNNEL RELEASE performed by [facility] Dang DO at [facility] Right     wrist    HAND SURGERY Bilateral     4/2014    HYSTERECTOMY      INNER EAR SURGERY      OTHER SURGICAL HISTORY  4/16/2012    Repair vaginal cuff    OVARY REMOVAL      PARTIAL NEPHRECTOMY Left     TOTAL NEPHRECTOMY Left     TUBAL LIGATION         Social History:    Social History     Tobacco Use    Smoking status: Never Smoker    Smokeless tobacco: Never Used   Substance Use Topics    Alcohol use:  No                                Counseling given: Not Answered      Vital Signs (Current):   Vitals:    04/14/22 0915   Weight: 238 lb (108 kg)   Height: 5' 3\" (1.6 m)                                              BP Readings from Last 3 Encounters:   04/12/22 120/80   04/06/22 132/84   03/28/22 120/82       NPO Cardiovascular:    (+) hyperlipidemia         Beta Blocker:  Not on Beta Blocker         Neuro/Psych:   (+) headaches: migraine headaches, psychiatric history:             ROS comment: Panic disorder with agoraphobia GI/Hepatic/Renal:   (+) bowel prep, morbid obesity          Endo/Other: Negative Endo/Other ROS                    Abdominal:             Vascular: negative vascular ROS. Other Findings:           Anesthesia Plan      MAC     ASA 3       Induction: intravenous. MIPS: Postoperative opioids intended. Anesthetic plan and risks discussed with patient. Plan discussed with CRNA. Attending anesthesiologist reviewed and agrees with Pre Eval content            VIOLET Tafoya - CRNA   4/18/2022         Pre Anesthesia Assessment complete.  Chart reviewed on 4/18/2022

## 2022-04-19 ENCOUNTER — OFFICE VISIT (OUTPATIENT)
Dept: BARIATRICS/WEIGHT MGMT | Age: 42
End: 2022-04-19
Payer: MEDICAID

## 2022-04-19 VITALS
SYSTOLIC BLOOD PRESSURE: 122 MMHG | BODY MASS INDEX: 42.54 KG/M2 | HEART RATE: 86 BPM | HEIGHT: 63 IN | DIASTOLIC BLOOD PRESSURE: 80 MMHG | OXYGEN SATURATION: 98 % | WEIGHT: 240.1 LBS

## 2022-04-19 DIAGNOSIS — E66.01 MORBID OBESITY DUE TO EXCESS CALORIES (HCC): Primary | ICD-10-CM

## 2022-04-19 LAB
REASON FOR REJECTION: NORMAL
REJECTED TEST: NORMAL

## 2022-04-19 PROCEDURE — G8427 DOCREV CUR MEDS BY ELIG CLIN: HCPCS | Performed by: SURGERY

## 2022-04-19 PROCEDURE — G8417 CALC BMI ABV UP PARAM F/U: HCPCS | Performed by: SURGERY

## 2022-04-19 PROCEDURE — 99215 OFFICE O/P EST HI 40 MIN: CPT | Performed by: SURGERY

## 2022-04-19 PROCEDURE — 1036F TOBACCO NON-USER: CPT | Performed by: SURGERY

## 2022-04-19 ASSESSMENT — PATIENT HEALTH QUESTIONNAIRE - PHQ9
2. FEELING DOWN, DEPRESSED OR HOPELESS: 0
SUM OF ALL RESPONSES TO PHQ QUESTIONS 1-9: 0
SUM OF ALL RESPONSES TO PHQ QUESTIONS 1-9: 0
SUM OF ALL RESPONSES TO PHQ9 QUESTIONS 1 & 2: 0
1. LITTLE INTEREST OR PLEASURE IN DOING THINGS: 0
SUM OF ALL RESPONSES TO PHQ QUESTIONS 1-9: 0
SUM OF ALL RESPONSES TO PHQ QUESTIONS 1-9: 0

## 2022-04-19 ASSESSMENT — ENCOUNTER SYMPTOMS
DIARRHEA: 1
ALLERGIC/IMMUNOLOGIC NEGATIVE: 1
BACK PAIN: 1

## 2022-04-19 NOTE — PROGRESS NOTES
Initial Bariatric Surgery Consultation History and Physical    Chief Complaint: Obesity Body mass index is 42.53 kg/m². History of Present Illness: The patient is a 42 y.o. female being seen today for initial bariatric surgery consultation. The patient previously attended a bariatric surgery informational seminar or received electronic version of presentation. The patient's PCP is Danika ([place]). The patient first recognized having issues with increased weight approximately ~21 years ago. The patient identifies the following precipitants causing, or contributing to, weight gain: having sonDagoberto lot of medical issues\"    The patient estimates the lowest weight in the past five years is approximately 225 pounds. The patient estimates the highest weight in the past five years is approximately 240 pounds. The patient's current weight is 240 pounds, while the current BMI is Body mass index is 42.53 kg/m². The patient has not had previous bariatric surgery. The patient has tried the following diet(s): Watching what pt eats    The patient has tried the following over-the-counter drugs and/or prescription weight loss medications: Adipex    The patient has tried the following physical activities/exercies: walking    The patient was mostly mostly unsuccessful with previous dietary, medication, and/or physical activity for sustained weight loss. The patient reports current level of commitment to weight loss as: 100 percent    In reviewing the patient's typical daily diet, it consists of the following:   Breakfast: didn't eat   Snack: denies   Lunch: [employer] - chicken   Snack: crackers   Dinner: pizza   Snack: denies    The patient reports drinking the following beverages throughout the day: sweet tea    The patient reports eating outside of the house approximately 1-2 times per week at either fast food or sit down restaurants.      The patient reports the following eating and dietary styles: grazing, nighttime eating    The patient has the following cardiovascular risk factor(s):  hypertension, dyslipidemia, elevated triglycerides, obstructive sleep apnea    The patient has the following obesity-related disorder(s):  symptomatic osteoarthritis (back and knee), cholelithiasis (s/p lap carlos), depression    Past Medical History:  Past Medical History:   Diagnosis Date    Chronic depression 7/9/2021    Kidney disorder     left removed .     Migraine     Mixed hyperlipidemia 1/27/2021    PONV (postoperative nausea and vomiting)     Prolonged emergence from general anesthesia     Pulmonic stenosis     Sleep apnea     Sleep apnea        Past Surgical History:  Past Surgical History:   Procedure Laterality Date    BREAST BIOPSY Left 2010    benign    BREAST REDUCTION SURGERY Bilateral 2003    BREAST SURGERY      CARPAL TUNNEL RELEASE Left 2/16/2021    LEFT CARPAL TUNNEL RELEASE performed by Ashly Gutierrez DO at [facility] Right     wrist    HAND SURGERY Bilateral     4/2014    HYSTERECTOMY      INNER EAR SURGERY      OTHER SURGICAL HISTORY  4/16/2012    Repair vaginal cuff    OVARY REMOVAL      PARTIAL NEPHRECTOMY Left     TOTAL NEPHRECTOMY Left     TUBAL LIGATION         Family History:  Family History   Problem Relation Age of Onset    Cancer Mother     Migraines Mother     Cancer Maternal Grandmother     Diabetes Maternal Grandmother     Allergies Son     Allergies Daughter     Bleeding Prob Daughter     Bleeding Prob Son     Asthma Daughter     Asthma Son        Social History:  Social History     Socioeconomic History    Marital status:      Spouse name: Not on file    Number of children: Not on file    Years of education: Not on file    Highest education level: Not on file   Occupational History    Not on file   Tobacco Use    Smoking status: Never Smoker    Smokeless tobacco: Never Used   Vaping Use    Vaping Use: Never used   Substance and Sexual Activity    Alcohol use: No    Drug use: No    Sexual activity: Not on file   Other Topics Concern    Not on file   Social History Narrative    ** Merged History Encounter **          Social Determinants of Health     Financial Resource Strain:     Difficulty of Paying Living Expenses: Not on file   Food Insecurity:     Worried About Running Out of Food in the Last Year: Not on file    Madina of Food in the Last Year: Not on file   Transportation Needs:     Lack of Transportation (Medical): Not on file    Lack of Transportation (Non-Medical): Not on file   Physical Activity:     Days of Exercise per Week: Not on file    Minutes of Exercise per Session: Not on file   Stress:     Feeling of Stress : Not on file   Social Connections:     Frequency of Communication with Friends and Family: Not on file    Frequency of Social Gatherings with Friends and Family: Not on file    Attends Jehovah's witness Services: Not on file    Active Member of [address] [place] [address] or Organizations: Not on file    Attends Club or Organization Meetings: Not on file    Marital Status: Not on file   Intimate Partner Violence:     Fear of Current or Ex-Partner: Not on file    Emotionally Abused: Not on file    Physically Abused: Not on file    Sexually Abused: Not on file   Housing Stability:     Unable to Pay for Housing in the Last Year: Not on file    Number of [place] in the Last Year: Not on file    Unstable Housing in the Last Year: Not on file       Allergies:   Allergies   Allergen Reactions    Codeine Anaphylaxis    Dilaudid [Hydromorphone Hcl] Anaphylaxis    Hydromorphone Anaphylaxis       Medications:  Current Outpatient Medications   Medication Sig Dispense Refill    meclizine (ANTIVERT) 12.5 MG tablet Take 1 tablet by mouth 3 times daily as needed for Dizziness 30 tablet 0    vitamin D (ERGOCALCIFEROL) 1.25 MG (84015 UT) CAPS capsule Take 1 capsule by mouth once a week For eight weeks only 8 capsule 0    Naproxen Sodium (ALEVE PO) Take by mouth (Patient not taking: Reported on 4/12/2022)       No current facility-administered medications for this visit. Review of Systems:  Review of Systems   Constitutional: Positive for fatigue. HENT: Negative. Eyes: Positive for visual disturbance (wears glasses sometimes). Cardiovascular: Positive for chest pain (rare). Gastrointestinal: Positive for diarrhea. Endocrine: Negative. Genitourinary: Negative. Musculoskeletal: Positive for arthralgias and back pain. Allergic/Immunologic: Negative. Neurological: Negative. Hematological: Negative. Psychiatric/Behavioral: Positive for dysphoric mood. Physical Exam:  Physical Exam  Vitals reviewed. Constitutional:       General: She is not in acute distress. Appearance: She is obese. She is not ill-appearing, toxic-appearing or diaphoretic. HENT:      Head: Normocephalic and atraumatic. Right Ear: External ear normal.      Left Ear: External ear normal.      Nose: Nose normal.   Eyes:      General:         Right eye: No discharge. Left eye: No discharge. Extraocular Movements: Extraocular movements intact. Cardiovascular:      Rate and Rhythm: Normal rate. Pulmonary:      Effort: No respiratory distress. Abdominal:      Palpations: Abdomen is soft. Tenderness: There is no abdominal tenderness. There is no guarding or rebound. Comments: Previous lap incisions well-healed     Musculoskeletal:         General: Normal range of motion. Cervical back: Normal range of motion. Skin:     General: Skin is warm. Neurological:      General: No focal deficit present. Mental Status: She is alert. Psychiatric:         Mood and Affect: Mood normal.          Assessment and Plan:  Tiffanie Paredes is a 42 y.o. presenting to clinic for initial surgical evaluation for bariatric surgery.     Patient Active Problem List   Diagnosis    Vaginal bleeding  H/O of hysterectomy with bilateral oophorectomy    Panic disorder with agoraphobia    Early menopause    H/O bilateral breast reduction surgery    Myofascial pain syndrome    Somatic dysfunction of head region    Somatic dysfunction of cervical region    Somatic dysfunction of pelvic region    Snoring    Obstructive sleep apnea on CPAP    Somatic dysfunction of both upper extremities    Morbidly obese (HCC)    Paresthesia of left upper extremity    Fibromyalgia    Mixed hyperlipidemia    Carpal tunnel syndrome of left wrist    Vitamin D deficiency    Chronic depression    Anxiety    Chronic pain of left knee    Migraine without aura and without status migrainosus, not intractable    Injury of left ankle    Hypersomnia       Plan   1. Because of the known health risks associated with excess body weight, the patient is a good candidate for bariatric surgery. Reviewed with the patient both sleeve gastrectomy and RYGB, including an overview of each--with pros and cons--while showing them a picture diagram of the new anatomical surgical changes. The patient is interested in sleeve gastrectomy. Discussed with the patient the basics of the surgical procedure including risks, benefits, alternatives, and expected hospital course. Expectations were discussed with the patient and the patient agreed to proceed. The patient previously attended an informational seminar which discussed surgical and non-surgical options as well as procedure specific risks, benefits, and alternatives. 2. Will order initial bariatric surgery labs unless the patient has had any in the previous six months. 3. The patient will be scheduled to be seen by our weight management CNP and registered dietician after having the above labs drawn and resulted. 4. The patient will be referred for an evaluation by physical therapy for assistance with an exercise plan.     5. The patient will be given a bariatric handbook by the dietician at the initial nutrition class. The patient will be instructed to review the handbook and to ask questions about any part which is not clear at any time throughout the workup process. It was stressed to the patient the need to thoroughly review the handbook and the patient agreed. 6. Discussed with patient that losing weight prior to surgery can be indicator of post-operative success. Goal set at this visit for patient for weight loss prior to surgery is 12-24 lbs. 7. Discussed with patient need for long-term follow-up and vitamin supplementation. The patient is agreeable. 8. This patient is a female of reproductive age and was advised she should not become pregnant during the bariatric workup process and for at least 12-18 months after surgery. The patient is agreeable to this plan. Previous total hysterectomy. Thank you for this referral / consult. Please call with any questions or concerns you have. Patient was seen with total face-to-face time and time spent reviewing chart, placing orders, and reviewing past/current results of labs and images of over 45 minutes. More than 50% of this visit was counseling on diet, nutrition, surgical options, and education as above documented in my note.     Electronically signed by Alejandrina Mccoy II, MD on 4/19/2022 at 1:27 PM

## 2022-04-20 ENCOUNTER — HOSPITAL ENCOUNTER (OUTPATIENT)
Age: 42
Setting detail: OUTPATIENT SURGERY
Discharge: HOME | End: 2022-04-20
Attending: INTERNAL MEDICINE | Admitting: INTERNAL MEDICINE
Payer: MEDICAID

## 2022-04-20 ENCOUNTER — ANESTHESIA (OUTPATIENT)
Dept: ENDOSCOPY | Age: 42
End: 2022-04-20
Payer: MEDICAID

## 2022-04-20 VITALS
RESPIRATION RATE: 14 BRPM | OXYGEN SATURATION: 99 % | BODY MASS INDEX: 42.17 KG/M2 | DIASTOLIC BLOOD PRESSURE: 75 MMHG | WEIGHT: 238 LBS | TEMPERATURE: 97.2 F | HEIGHT: 63 IN | HEART RATE: 72 BPM | SYSTOLIC BLOOD PRESSURE: 118 MMHG

## 2022-04-20 VITALS
DIASTOLIC BLOOD PRESSURE: 68 MMHG | SYSTOLIC BLOOD PRESSURE: 108 MMHG | RESPIRATION RATE: 14 BRPM | OXYGEN SATURATION: 99 %

## 2022-04-20 DIAGNOSIS — R19.7 DIARRHEA, UNSPECIFIED TYPE: ICD-10-CM

## 2022-04-20 DIAGNOSIS — R19.4 ALTERED BOWEL HABITS: ICD-10-CM

## 2022-04-20 DIAGNOSIS — Z80.0 FAMILY HISTORY OF COLON CANCER: ICD-10-CM

## 2022-04-20 PROCEDURE — 3700000001 HC ADD 15 MINUTES (ANESTHESIA): Performed by: INTERNAL MEDICINE

## 2022-04-20 PROCEDURE — 7100000010 HC PHASE II RECOVERY - FIRST 15 MIN: Performed by: INTERNAL MEDICINE

## 2022-04-20 PROCEDURE — 3609010600 HC COLONOSCOPY POLYPECTOMY SNARE/COLD BIOPSY: Performed by: INTERNAL MEDICINE

## 2022-04-20 PROCEDURE — 88305 TISSUE EXAM BY PATHOLOGIST: CPT | Performed by: PATHOLOGY

## 2022-04-20 PROCEDURE — 3700000000 HC ANESTHESIA ATTENDED CARE: Performed by: INTERNAL MEDICINE

## 2022-04-20 PROCEDURE — 45380 COLONOSCOPY AND BIOPSY: CPT | Performed by: INTERNAL MEDICINE

## 2022-04-20 PROCEDURE — 6360000002 HC RX W HCPCS: Performed by: NURSE ANESTHETIST, CERTIFIED REGISTERED

## 2022-04-20 PROCEDURE — 2709999900 HC NON-CHARGEABLE SUPPLY: Performed by: INTERNAL MEDICINE

## 2022-04-20 PROCEDURE — 2580000003 HC RX 258: Performed by: NURSE PRACTITIONER

## 2022-04-20 PROCEDURE — 7100000011 HC PHASE II RECOVERY - ADDTL 15 MIN: Performed by: INTERNAL MEDICINE

## 2022-04-20 RX ORDER — SODIUM CHLORIDE, SODIUM LACTATE, POTASSIUM CHLORIDE, CALCIUM CHLORIDE 600; 310; 30; 20 MG/100ML; MG/100ML; MG/100ML; MG/100ML
INJECTION, SOLUTION INTRAVENOUS CONTINUOUS
Status: DISCONTINUED | OUTPATIENT
Start: 2022-04-20 | End: 2022-04-20 | Stop reason: HOSPADM

## 2022-04-20 RX ORDER — SODIUM CHLORIDE 9 MG/ML
25 INJECTION, SOLUTION INTRAVENOUS PRN
Status: DISCONTINUED | OUTPATIENT
Start: 2022-04-20 | End: 2022-04-20 | Stop reason: HOSPADM

## 2022-04-20 RX ORDER — PROPOFOL 10 MG/ML
INJECTION, EMULSION INTRAVENOUS PRN
Status: DISCONTINUED | OUTPATIENT
Start: 2022-04-20 | End: 2022-04-20 | Stop reason: SDUPTHER

## 2022-04-20 RX ORDER — SODIUM CHLORIDE 0.9 % (FLUSH) 0.9 %
5-40 SYRINGE (ML) INJECTION PRN
Status: DISCONTINUED | OUTPATIENT
Start: 2022-04-20 | End: 2022-04-20 | Stop reason: HOSPADM

## 2022-04-20 RX ORDER — SODIUM CHLORIDE 0.9 % (FLUSH) 0.9 %
5-40 SYRINGE (ML) INJECTION EVERY 12 HOURS SCHEDULED
Status: DISCONTINUED | OUTPATIENT
Start: 2022-04-20 | End: 2022-04-20 | Stop reason: HOSPADM

## 2022-04-20 RX ORDER — LIDOCAINE HYDROCHLORIDE 20 MG/ML
INJECTION, SOLUTION INTRAVENOUS PRN
Status: DISCONTINUED | OUTPATIENT
Start: 2022-04-20 | End: 2022-04-20 | Stop reason: SDUPTHER

## 2022-04-20 RX ADMIN — LIDOCAINE HYDROCHLORIDE 100 MG: 20 INJECTION, SOLUTION INTRAVENOUS at 09:00

## 2022-04-20 RX ADMIN — PROPOFOL 250 MG: 10 INJECTION, EMULSION INTRAVENOUS at 09:00

## 2022-04-20 RX ADMIN — SODIUM CHLORIDE, POTASSIUM CHLORIDE, SODIUM LACTATE AND CALCIUM CHLORIDE: 600; 310; 30; 20 INJECTION, SOLUTION INTRAVENOUS at 07:29

## 2022-04-20 ASSESSMENT — PAIN SCALES - GENERAL
PAINLEVEL_OUTOF10: 0
PAINLEVEL_OUTOF10: 0

## 2022-04-20 NOTE — H&P
HISTORY & PHYSICAL:  Patient's history with special attention to the cardiovascular, pulmonary systems and the current problem was reviewed with the patient immediately prior to the procedure. Medications, allergies, and pertinent laboratory tests were also reviewed at this time. The physical examination,as below, was then performed. Patient assessed to be ASA Class 2. Mallampati Airway Assessment: 2. History of adverse reactions involving sedation/anesthesia. None  Family history of adverse reaction to sedation/anesthesia. None      PHYSICAL EXAMINATION    /76   Pulse 80   Temp 96.9 °F (36.1 °C) (Temporal)   Resp 18   Ht 5' 3\" (1.6 m)   Wt 238 lb (108 kg)   LMP 02/15/2012   SpO2 97%   BMI 42.16 kg/m²     Mouth and Pharynx : Normal without focal findings  Cardiac: Regular rate and rhythm  Pulmonary: Clear bilaterally  Neurological: Normal without focal findings   Abdomen: Soft, non-tender, non-distended     Written informed consent obtained from patient. Risks (including but not limited to perforation, bloating and bleeding,) benefits and alternatives explained and questions answered. Patient verbalized understanding. Based on history and airway assessment patient is an appropriate candidate for  sedation.     Arlin Jarrett MD  04/20/22

## 2022-04-20 NOTE — PROGRESS NOTES
0930 Pt received from [facility] and report received from [facility]. Pt denies c/o. Pt abdomen soft and non tender. Pt given Nazia mist. Call light in reach. 0945 In to check on pt. Pt denies c/o. Call light in reach. [address] given discharge instructions and both verbalized understanding of instructions. 1005 Pt up to side of bed with assist. Pt tolerated well and ready to get dressed to go home. Gilda Bouquet to get car. Call light in reach. Pt abdomen soft and non tender. 1010 Pt discharged to home per wheelchair to [facility] awaiting vehicle to drive pt home.

## 2022-04-20 NOTE — BRIEF OP NOTE
Brief Postoperative Note      Patient: Juvenal Simon  YOB: 1980  MRN: [id number]    Date of Procedure: 4/20/2022    Pre-Op Diagnosis: Altered bowel habits [R19.4] Diarrhea, unspecified type [R19.7] Family history of colon cancer [Z80.0]    Post-Op Diagnosis: Multiple biopsies taken to check for microscopic colitis. Procedure(s):  COLONOSCOPY POLYPECTOMY SNARE/COLD BIOPSY    Surgeon(s):  Bianka Nobles MD    Assistant:  * No surgical staff found *    Anesthesia: Monitor Anesthesia Care    Estimated Blood Loss (mL): Minimal    Complications: None    Specimens:   ID Type Source Tests Collected by Time Destination   A : RANDOM BIOPSIES FOR MICROSCOPIC COLITIS  Tissue Colon SURGICAL PATHOLOGY Bianka Nobles MD 4/20/2022 1174        Implants:  * No implants in log *      Drains: * No LDAs found *    Findings: As above, repeat colon in 5 years because of family hx of colon ca, fu with Sandra Edwards.      Electronically signed by Bianka Nobles MD on 4/20/2022 at 9:15 AM

## 2022-04-20 NOTE — ANESTHESIA POSTPROCEDURE EVALUATION
Department of Anesthesiology  Postprocedure Note    Patient: Chadd Woodson  MRN: [id number]  YOB: 1980  Date of evaluation: 4/20/2022  Time:  9:14 AM     Procedure Summary     Date: 04/20/22 Room / Location: [address] [place] [address]    Anesthesia Start: 8580 Anesthesia Stop: 1862    Procedure: COLONOSCOPY POLYPECTOMY SNARE/COLD BIOPSY (N/A ) Diagnosis:       Altered bowel habits      Diarrhea, unspecified type      Family history of colon cancer      (Altered bowel habits [R19.4] Diarrhea, unspecified type [R19.7] Family history of colon cancer [Z80.0])    Surgeons: John Ochoa MD Responsible Provider: Dennie Stains, MD    Anesthesia Type: MAC ASA Status: 3          Anesthesia Type: MAC    Waldo Phase I:      Waldo Phase II:      Last vitals: Reviewed and per EMR flowsheets.        Anesthesia Post Evaluation    Patient location during evaluation: bedside  Patient participation: complete - patient participated  Level of consciousness: sleepy but conscious  Pain score: 0  Airway patency: patent  Nausea & Vomiting: no nausea and no vomiting  Complications: no  Cardiovascular status: blood pressure returned to baseline and hemodynamically stable  Respiratory status: acceptable  Hydration status: euvolemic

## 2022-04-20 NOTE — DISCHARGE SUMMARY
Outpatient Physical Therapy           [place]           [x] Phone: [phone]   Fax: [phone]  [place]           [] Phone: [phone]   Fax: [phone]      To: Referring Practitioner: Alee Delacruz DO                                        From: Alee Delacruz PT       Patient: Cortez Velarde                                                          : 1980  Diagnosis: Diagnosis: Lt ankle sprain             Treatment Diagnosis: Treatment Diagnosis: L lateral ankle pain   Date:        []  Progress Note                [x]  Discharge Note    Evaluation Date:3/6/2020     Total Visits to date:   4 Cancels/No-shows to date:  2    Subjective:  Cancelled therapy due to [address] of Care Interventions:  [x]? Therapeutic Exercise                     []? Modalities:  [x]? Therapeutic Activity                                   []? Ultrasound              []? Estim  [x]? Gait Training                                              []? Cervical Traction    []? Lumbar Traction  [x]? Neuromuscular Re-education                    []? Cold/hotpack          []? Iontophoresis           [x]? Instruction in HEP                                      [x]? Vasopneumatic      []? Dry Needling    [x]? Manual Therapy                                                  []? Aquatic Therapy                                 []                        Objective/Significant Findings At Last Visit/Comments:  Formal testing not complete for this d/c, as cancellations and facility closure due to COVID-19 have impacted patient's ability to come to therapy this week. Refer to initial eval for more information on function. Assessment:   Patient progress was fluctuating. The fact she is still having pain with isometric ankle eversion 4-5 weeks post injury is a mystery. She has been educated on activity modification, rest, and icing.  With evidence of no tears on MRI and minor tissue irritation, this condition should resolve completely in the next 4-6 weeks. Goal Status:  [] Achieved [x] Partially Achieved  [] Not Achieved       Patient Status: [] Continue per initial plan of Care     [x] Patient now discharged       If we are requesting more visits, we fully anticipate the patient's condition is expected to improve within the treatment timeframe we are requesting. Electronically signed by:  Dessie Closs, PT, 3/20/2020, 2:11 PM    If you have any questions or concerns, please don't hesitate to call.   Thank you for your referral.    Physician Signature:______________________ Date:______ Time: ________  By signing above, therapists plan is approved by physician Xelkimz Pregnancy And Lactation Text: This medication is Pregnancy Category D and is not considered safe during pregnancy.  The risk during breast feeding is also uncertain.

## 2022-04-21 ENCOUNTER — PATIENT MESSAGE (OUTPATIENT)
Dept: GASTROENTEROLOGY | Age: 42
End: 2022-04-21

## 2022-04-21 LAB
FAT QUALITATIVE NEUTRAL STOOL: NORMAL
FAT QUALITATIVE SPLIT STOOL: NORMAL
H PYLORI ANTIGEN STOOL: NEGATIVE
HISTOPLASMA ANTIGEN URINE INTERP: NOT DETECTED
HISTOPLASMA ANTIGEN URINE: NOT DETECTED NG/ML
TRANSGLUTAMINASE IGA: 2 U/ML (ref 0–3)

## 2022-04-21 RX ORDER — CHOLESTYRAMINE 4 G/9G
1 POWDER, FOR SUSPENSION ORAL 2 TIMES DAILY
Qty: 90 PACKET | Refills: 3 | Status: SHIPPED | OUTPATIENT
Start: 2022-04-21 | End: 2022-08-11

## 2022-04-21 NOTE — TELEPHONE ENCOUNTER
Patient called with results- stool studies showed no evidence of infection; celiac panel normal, vitamin D is low- recommend taking vitamin D as her PCP prescribed; her colonoscopy showed no abnormalities; random colon biopsies negative for colitis. Will order Questran take twice daily for bile acid diarrhea. She mentioned Imodium not helping much, recommend alternate with Pepto bismo tablets and increase her fiber and fluids. Instructed to call for follow-up in clinic if symptoms proceed and she verbalized understanding.

## 2022-04-21 NOTE — TELEPHONE ENCOUNTER
From: Bert Strong  To: Elizabeth Miller  Sent: 4/21/2022 9:34 AM EDT  Subject: Question regarding PATHOLOGY TISSUE    What does this mean?

## 2022-04-22 LAB
CALPROTECTIN, FECAL: <5 UG/G
INTERPRETATION: NEGATIVE

## 2022-04-27 ENCOUNTER — PROCEDURE VISIT (OUTPATIENT)
Dept: CARDIOLOGY CLINIC | Age: 42
End: 2022-04-27
Payer: MEDICAID

## 2022-04-27 DIAGNOSIS — Z01.810 PREOP CARDIOVASCULAR EXAM: ICD-10-CM

## 2022-04-27 LAB
LV EF: 58 %
LVEF MODALITY: NORMAL

## 2022-04-27 PROCEDURE — 93306 TTE W/DOPPLER COMPLETE: CPT | Performed by: INTERNAL MEDICINE

## 2022-04-29 ENCOUNTER — TELEPHONE (OUTPATIENT)
Dept: CARDIOLOGY CLINIC | Age: 42
End: 2022-04-29

## 2022-04-29 NOTE — TELEPHONE ENCOUNTER
Summary   Limited study due to patients body habitus. Left ventricular function and size is normal, EF is estimated at 55-60%. Mild left ventricular hypertrophy. Grade I diastolic dysfunction. No regional wall motion abnormalities were detected. Mildly dilated left atrium. Mild mitral and tricuspid regurgitation is present. RVSP is 27 mmHg. No evidence of pericardial effusion. Left message on voice mail with results of echo.

## 2022-05-02 ENCOUNTER — HOSPITAL ENCOUNTER (OUTPATIENT)
Dept: PHYSICAL THERAPY | Age: 42
Discharge: HOME | End: 2022-05-02

## 2022-05-02 NOTE — FLOWSHEET NOTE
Physical Therapy  Cancellation/No-show Note  Patient Name:  Tong Harper  :  1980   Date:  2022  Cancelled visits to date: 1  No-shows to date: 0    For today's appointment patient:  [x]  Cancelled  []  Rescheduled appointment  []  No-show     Reason given by patient:  []  Patient ill  []  Conflicting appointment  [x]  No transportation    []  Conflict with work  []  No reason given  []  Other:     Comments:      Electronically signed by:  Vianey Arellano 2022, 2:24 PM

## 2022-05-03 ENCOUNTER — HOSPITAL ENCOUNTER (OUTPATIENT)
Dept: PHYSICAL THERAPY | Age: 42
Setting detail: THERAPIES SERIES
Discharge: HOME | End: 2022-05-03
Payer: MEDICAID

## 2022-05-03 PROCEDURE — 97110 THERAPEUTIC EXERCISES: CPT

## 2022-05-03 NOTE — FLOWSHEET NOTE
Outpatient Physical Therapy  [place]           [x] Phone: [phone]   Fax: [phone]  Sudha Ascenciochal           [] Phone: [phone]   Fax: [phone]        Physical Therapy Daily Treatment Note  Date:  5/3/2022    Patient Name:  Valorie Boyer    :  1980  MRN: [id number]  Restrictions/Precautions:    Diagnosis:   Diagnosis: Spinal stenosis  Date of Injury/Surgery:   Treatment Diagnosis: Treatment Diagnosis: LBP, poor core strength, reduced lumbar ROM    Insurance/Certification information: PT Insurance Information: [facility]  (Approved 12 visits from 22 thru 22 - E Stim not covered)  Referring Physician:  Referring Practitioner: Dr. Annalee Wooten  Next Doctor Visit:    Plan of care signed (Y/N):  N, sent 22   Outcome Measure: Oswestry: 54% disability   Visit# / total visits:  2 / 12 per POC  Pain level: 8-10/10, currently rating 10/10 this morning   Goals:     Patient goals : Improvement in pain     Long term goals  Time Frame for Long term goals : 6 weeks 22  Long term goal 1: Pt will demo I with HEP/symptom management. Long term goal 2: Pt will demo able to complete full bridge with <5/10 pain to ease bed mobility. Long term goal 3: Pt will demo >4+/5 L LE strength to ease prolonged activities. .  Long term goal 4: Pt will demo 6MWT >1000ft with <7/10 back pain to community ambulation. Long term goal 5: Pt will demo able to reach for ground without increase in pain to ease reaching. Summary of Evaluation:   Pt is 42year old female with chronic history of back pain. Pt now has difficulties completing ADLs, general mobility and homemaking duties. Pt demo deficits this date that include reduced lumbar AR OM, poor core strength, L LE weakness, paraspinal stiffness, reduced gait tolerance and pain. Testing this date indicate signs and symptoms of spondylosis. Pt will benefit with PT services with progression of strength/ROM, manual and modalities to return to PLOF.  Pt prior to onset of current condition had min/mod pain with able to complete full ADLs. Patient received education on their current pathology and how their condition effects them with their functional activities. Patient understood discussion and questions were answered. Patient understands their activity limitations and understands rational for treatment progression. Subjective:  Pt states her pain stays between 8-10/10. Did sleep fairly well last night. Reports of pain across LB and around into right groin area. Any changes in Ambulatory Summary Sheet? None        Objective:  See eval   COVID screening questions were asked and patient attested that there had been no contact or symptoms    Most comfortable lying down on her side  Oswestry score = 27/50  Exercises: (No more than 4 columns)   Exercise/Equipment 4/13/22  #1 Date 5/3/22 #2 Date           WARM UP        Nu step    WL 3 x 5 min          TABLE      *hooklying clamshells RTB 10x2 RTB 10x2    *LTR 10x2  2\" 10x2  2\"    *Sitting lumbar flexion stretch   15\"x4 across table  20\"x4 across table    *TA activation in hooklying  10x   3\" 10x2   3\"    SLR with abd bracing    10x R/L    heelslides with SB  5\" 10x    Piriformis stretch   With towel 20 sec x 2 R/L                STANDING                                                     PROPRIOCEPTION                                    MODALITIES                      Other Therapeutic Activities/Education:  Patient received education on their current pathology and how their condition effects them with their functional activities. Patient understood discussion and questions were answered. Patient understands their activity limitations and understands rational for treatment progression. Home Exercise Program:  HO issued, reviewed and discussed with patient. Pt agreed to comply. Reviewed and added SLR with abd bracing and piriformis stretch. Provided with handout.     Manual Treatments:        Modalities: Communication with other providers:  POC sent 4/13/22       Assessment:  (Response towards treatment session) (Pain Rating) Pt demonstrated overall fair tolerance to today's session. Reviewed and advanced HEP. Did not have any increase in pain while performing exercises. Pain remained 10/10 following session. Pt is 42year old female with chronic history of back pain. Pt now has difficulties completing ADLs, general mobility and homemaking duties. Pt demo deficits this date that include reduced lumbar AR OM, poor core strength, L LE weakness, paraspinal stiffness, reduced gait tolerance and pain. Testing this date indicate signs and symptoms of spondylosis. Pt will benefit with PT services with progression of strength/ROM, manual and modalities to return to PLOF. Pt prior to onset of current condition had min/mod pain with able to complete full ADLs. Patient received education on their current pathology and how their condition effects them with their functional activities. Patient understood discussion and questions were answered. Patient understands their activity limitations and understands rational for treatment progression.     Plan for Next Session:  review HEP, TA activation with progression as able, open chain L LE strenghtening, paraspinal stretching, modalities PRN      Time In / Time Out:  8475-7594         Timed Code/Total Treatment Minutes:  35/35, (2) TE       Next Progress Note due:  Aggieal 4/13/22   Visit 10       Plan of Care Interventions:  [x] Therapeutic Exercise  [x] Modalities:  [x] Therapeutic Activity     [] Ultrasound  [] Estim  [x] Gait Training      [] Cervical Traction [] Lumbar Traction  [x] Neuromuscular Re-education    [x] Cold/hotpack [] Iontophoresis   [x] Instruction in HEP      [x] Vasopneumatic   [] Dry Needling    [x] Manual Therapy               [] Aquatic Therapy              Electronically signed by: Solomon Goetz 5/3/2022, 8:22 AM

## 2022-05-05 ENCOUNTER — OFFICE VISIT (OUTPATIENT)
Dept: BARIATRICS/WEIGHT MGMT | Age: 42
End: 2022-05-05

## 2022-05-05 ENCOUNTER — OFFICE VISIT (OUTPATIENT)
Dept: BARIATRICS/WEIGHT MGMT | Age: 42
End: 2022-05-05
Payer: MEDICAID

## 2022-05-05 VITALS — WEIGHT: 239.8 LBS | HEIGHT: 63 IN | BODY MASS INDEX: 42.49 KG/M2

## 2022-05-05 VITALS
HEART RATE: 88 BPM | WEIGHT: 239.8 LBS | SYSTOLIC BLOOD PRESSURE: 122 MMHG | OXYGEN SATURATION: 95 % | HEIGHT: 63 IN | BODY MASS INDEX: 42.49 KG/M2 | DIASTOLIC BLOOD PRESSURE: 78 MMHG

## 2022-05-05 DIAGNOSIS — E66.01 MORBID OBESITY WITH BMI OF 40.0-44.9, ADULT (HCC): Primary | ICD-10-CM

## 2022-05-05 PROCEDURE — 99999 PR OFFICE/OUTPT VISIT,PROCEDURE ONLY: CPT

## 2022-05-05 PROCEDURE — 99214 OFFICE O/P EST MOD 30 MIN: CPT | Performed by: NURSE PRACTITIONER

## 2022-05-05 PROCEDURE — G8428 CUR MEDS NOT DOCUMENT: HCPCS | Performed by: NURSE PRACTITIONER

## 2022-05-05 PROCEDURE — G8417 CALC BMI ABV UP PARAM F/U: HCPCS | Performed by: NURSE PRACTITIONER

## 2022-05-05 PROCEDURE — 1036F TOBACCO NON-USER: CPT | Performed by: NURSE PRACTITIONER

## 2022-05-05 NOTE — PROGRESS NOTES
Outpatient Nutrition Counseling    REASON FOR VISIT: Initial Surgical Class    Chief Complaint:    Chief Complaint   Patient presents with    Weight Management       SUBJECTIVE:  Pt here for Initial surgical class. Pt instructed on mindful eating, label reading, calori counting, healthy food choices and goal setting. Pt was provided food list, meal plan and goal card. Pt verbalized understanding and signed goal card. The patient is a 42 y.o. female being seen for morbid obesity, considering weight loss surgery; Umberto's, Height: 5' 3\" (160 cm), Weight: 239 lb 12.8 oz (108.8 kg), Current Body mass index is 42.48 kg/m². The patient's PCP is VIOLET Hooks NP   Bernies life is significantlyaffected by weight related to her co-morbidities. Comorbid Conditions:  Significant diseases affecting this patient are   Past Medical History:   Diagnosis Date    Chronic depression 07/09/2021    Hx of Doppler echocardiogram 04/27/2022    EF 55-60%. Mild LV hypertrophy. Grade I diastolic dysfunction. Mildly dilated LA. Mild MR and TR.    Kidney disorder     left removed .  Migraine     Mixed hyperlipidemia 01/27/2021    PONV (postoperative nausea and vomiting)     Prolonged emergence from general anesthesia     Pulmonic stenosis     Sleep apnea     Sleep apnea    . Review of Systems - Review of Systems  Otherwise per HPI. Allergies:   Allergies   Allergen Reactions    Codeine Anaphylaxis    Dilaudid [Hydromorphone Hcl] Anaphylaxis    Hydromorphone Anaphylaxis       Past Surgical History:  Past Surgical History:   Procedure Laterality Date    BREAST BIOPSY Left 2010    benign    BREAST REDUCTION SURGERY Bilateral 2003    BREAST SURGERY      CARPAL TUNNEL RELEASE Left 2/16/2021    LEFT CARPAL TUNNEL RELEASE performed by Ellie Charles DO at [facility] COLONOSCOPY N/A 4/20/2022    COLONOSCOPY POLYPECTOMY SNARE/COLD BIOPSY performed by Rashard Antoine MD at [facility] ENDOSCOPY    CYST REMOVAL Right     wrist    HAND SURGERY Bilateral     4/2014    HYSTERECTOMY      INNER EAR SURGERY      OTHER SURGICAL HISTORY  4/16/2012    Repair vaginal cuff    OVARY REMOVAL      PARTIAL NEPHRECTOMY Left     TOTAL NEPHRECTOMY Left     TUBAL LIGATION         Family History:  Family History   Problem Relation Age of Onset    Cancer Mother     Migraines Mother     Cancer Maternal Grandmother     Diabetes Maternal Grandmother     Allergies Son     Allergies Daughter     Bleeding Prob Daughter     Bleeding Prob Son     Asthma Daughter     Asthma Son        Social History:  Social History     Socioeconomic History    Marital status:      Spouse name: Not on file    Number of children: Not on file    Years of education: Not on file    Highest education level: Not on file   Occupational History    Not on file   Tobacco Use    Smoking status: Never Smoker    Smokeless tobacco: Never Used   Vaping Use    Vaping Use: Never used   Substance and Sexual Activity    Alcohol use: No    Drug use: No    Sexual activity: Not on file   Other Topics Concern    Not on file   Social History Narrative    ** Merged History Encounter **          Social Determinants of Health     Financial Resource Strain:     Difficulty of Paying Living Expenses: Not on file   Food Insecurity:     Worried About Running Out of Food in the Last Year: Not on file    Madina of Food in the Last Year: Not on file   Transportation Needs:     Lack of Transportation (Medical): Not on file    Lack of Transportation (Non-Medical):  Not on file   Physical Activity:     Days of Exercise per Week: Not on file    Minutes of Exercise per Session: Not on file   Stress:     Feeling of Stress : Not on file   Social Connections:     Frequency of Communication with Friends and Family: Not on file    Frequency of Social Gatherings with Friends and Family: Not on file    Attends Muslim Services: Not on file  Active Member of Clubs or Organizations: Not on file    Attends Club or Organization Meetings: Not on file    Marital Status: Not on file   Intimate Partner Violence:     Fear of Current or Ex-Partner: Not on file    Emotionally Abused: Not on file    Physically Abused: Not on file    Sexually Abused: Not on file   Housing Stability:     Unable to Pay for Housing in the Last Year: Not on file    Number of [place] in the Last Year: Not on file    Unstable Housing in the Last Year: Not on file         OBJECTIVE:  Physical Exam   Ht 5' 3\" (1.6 m)   Wt 239 lb 12.8 oz (108.8 kg)   LMP 02/15/2012   BMI 42.48 kg/m²        NUTRITION DIAGNOSIS: Overweight / Obesity   Problem: Increased adiposity compared to reference standard or established norms   Etiology: Excess intake compared to output over time   S/S: Ht: 63\" Wt: 239.8 lbs BMI: 42.48    NUTRITION INTERVENTIONS:    Individualized treatment goals to address nutritiondiagnosis:   Instructed on 1200 kcal diet for weight loss   Provided sample menus, food lists, and goal card   Encouraged Physical activity as approved by physician    MONITORING/ EVALUATION/ PLAN:   Pt verbalized understanding of allmaterials covered   Pt asked pertinent questions throughout the session - expect compliance with nutrition guidelines presented   Provided pt with contact information should questions arise prior to next visit   Will f/u with pt in 4-5 months for further education and post-op diet instructions  JIM Sibley MS, RDN, LD  5/5/2022

## 2022-05-05 NOTE — PROGRESS NOTES
Umberto Hanson  1980  42 y.o. SUBJECT MELODY:  HPI: Jan Rutledge presents today for Initial Surgical Weight Loss Class instructed by this provider and registered dietician. Patient had an initial individual consult with Dr. Laura Kelsey for surgical weight loss management and agrees to follow the program guidelines. Chief Complaint   Patient presents with    Weight Management     Class    Education Class  Past Medical History:   Diagnosis Date    Chronic depression 07/09/2021    Hx of Doppler echocardiogram 04/27/2022    EF 55-60%. Mild LV hypertrophy. Grade I diastolic dysfunction. Mildly dilated LA. Mild MR and TR.    Kidney disorder     left removed .     Migraine     Mixed hyperlipidemia 01/27/2021    PONV (postoperative nausea and vomiting)     Prolonged emergence from general anesthesia     Pulmonic stenosis     Sleep apnea     Sleep apnea      Past Surgical History:   Procedure Laterality Date    BREAST BIOPSY Left 2010    benign    BREAST REDUCTION SURGERY Bilateral 2003    BREAST SURGERY      CARPAL TUNNEL RELEASE Left 2/16/2021    LEFT CARPAL TUNNEL RELEASE performed by Johnny Antoine DO at [address] COLONOSCOPY N/A 4/20/2022    COLONOSCOPY POLYPECTOMY SNARE/COLD BIOPSY performed by Bhupinder Otero MD at [facility] Right     wrist    HAND SURGERY Bilateral     4/2014    HYSTERECTOMY      INNER EAR SURGERY      OTHER SURGICAL HISTORY  4/16/2012    Repair vaginal cuff    OVARY REMOVAL      PARTIAL NEPHRECTOMY Left     TOTAL NEPHRECTOMY Left     TUBAL LIGATION       Current Outpatient Medications   Medication Sig Dispense Refill    cholestyramine (QUESTRAN) 4 g packet Take 1 packet by mouth 2 times daily 90 packet 3    vitamin D (ERGOCALCIFEROL) 1.25 MG (33650 UT) CAPS capsule Take 1 capsule by mouth once a week For eight weeks only 8 capsule 0    Naproxen Sodium (ALEVE PO) Take by mouth        No current facility-administered medications for this visit. Family History   Problem Relation Age of Onset   Crooks Cancer Mother     Migraines Mother     Cancer Maternal Grandmother     Diabetes Maternal Grandmother     Allergies Son     Allergies Daughter     Bleeding Prob Daughter     Bleeding Prob Son     Asthma Daughter     Asthma Son      Allergies   Allergen Reactions    Codeine Anaphylaxis    Dilaudid [Hydromorphone Hcl] Anaphylaxis    Hydromorphone Anaphylaxis        OBJECTIVE:     /78 (Site: Right Upper Arm, Position: Sitting, Cuff Size: Large Adult)   Pulse 88   Ht 5' 3\" (1.6 m)   Wt 239 lb 12.8 oz (108.8 kg)   LMP 02/15/2012   SpO2 95%   BMI 42.48 kg/m²   Wt Readings from Last 3 Encounters:   05/05/22 239 lb 12.8 oz (108.8 kg)   04/14/22 238 lb (108 kg)   04/19/22 240 lb 1.6 oz (108.9 kg)       - Patient attended the Surgical Weight Loss Group class today. - Instructed on risks of obesity, criteria for surgical intervention, advantages of weight loss surgery and the program requirements. - Also instructed on mindful eating, label reading, calorie counting, healthy food choices, and goal setting.    - Patient was provided a foods list, meal plan, and goal card. - Patient asked pertinent questions throughout session and answers given. - Patient voiced understanding of all information provided. - Patient is to refer to surgical handbook or to call office if questions arise.     - Goal card signed. Patient was seen with total face to face time of 40 minutes in a group setting. More than 50% of this visit was counseling on obesity, strict diet recommendations, nutrition and education as above in my assessment and plan section of my note.

## 2022-05-06 ENCOUNTER — HOSPITAL ENCOUNTER (OUTPATIENT)
Dept: PHYSICAL THERAPY | Age: 42
Setting detail: THERAPIES SERIES
Discharge: HOME | End: 2022-05-06
Payer: MEDICAID

## 2022-05-06 PROCEDURE — 97110 THERAPEUTIC EXERCISES: CPT

## 2022-05-06 NOTE — FLOWSHEET NOTE
Outpatient Physical Therapy  [place]           [x] Phone: [phone]   Fax: [phone]  Jessi Villafuerte           [] Phone: [phone]   Fax: [phone]        Physical Therapy Daily Treatment Note  Date:  2022    Patient Name:  Juni Beckman    :  1980  MRN: [id number]  Restrictions/Precautions:    Diagnosis:   Diagnosis: Spinal stenosis  Date of Injury/Surgery:   Treatment Diagnosis: Treatment Diagnosis: LBP, poor core strength, reduced lumbar ROM    Insurance/Certification information: PT Insurance Information: [facility]  (Approved 12 visits from 22 thru 22 - E Stim not covered)  Referring Physician:  Referring Practitioner: Dr. Anna Henry  Next Doctor Visit:    Plan of care signed (Y/N):  N, sent 22   Outcome Measure: Oswestry: 54% disability   Visit# / total visits:  3 / 12 per POC  Pain level:  6/10, 10/10 this morning   Goals:     Patient goals : Improvement in pain     Long term goals  Time Frame for Long term goals : 6 weeks 22  Long term goal 1: Pt will demo I with HEP/symptom management. Long term goal 2: Pt will demo able to complete full bridge with <5/10 pain to ease bed mobility. Long term goal 3: Pt will demo >4+/5 L LE strength to ease prolonged activities. .  Long term goal 4: Pt will demo 6MWT >1000ft with <7/10 back pain to community ambulation. Long term goal 5: Pt will demo able to reach for ground without increase in pain to ease reaching. Summary of Evaluation:   Pt is 42year old female with chronic history of back pain. Pt now has difficulties completing ADLs, general mobility and homemaking duties. Pt demo deficits this date that include reduced lumbar AR OM, poor core strength, L LE weakness, paraspinal stiffness, reduced gait tolerance and pain. Testing this date indicate signs and symptoms of spondylosis. Pt will benefit with PT services with progression of strength/ROM, manual and modalities to return to PLOF.  Pt prior to onset of current condition had min/mod pain with able to complete full ADLs. Patient received education on their current pathology and how their condition effects them with their functional activities. Patient understood discussion and questions were answered. Patient understands their activity limitations and understands rational for treatment progression. Subjective:  Pt states her pain stays between 6/10. Reports of pain across LB currently. Pain yesterday across low back at 10/10 upon waking and last all day long. No issues after last visit. Any changes in Ambulatory Summary Sheet? None        Objective:  See eval   COVID screening questions were asked and patient attested that there had been no contact or symptoms    Good technique with exercises  Normal gait mechanics and transitions on bed. Min AR OM with extension with min increase in pain with bridge. No reported increase in pain with all activities. Exercises: (No more than 4 columns)   Exercise/Equipment 4/13/22  #1  5/3/22 #2 5/6/22  #3           WARM UP        Nu step    WL 3 x 5 min WL 3 x 5 min         TABLE      *hooklying clamshells RTB 10x2 RTB 10x2 RTB 10x2   *LTR 10x2  2\" 10x2  2\" 10x2  2\"   *Sitting lumbar flexion stretch   15\"x4 across table  20\"x4 across table    *TA activation in hooklying  10x   3\" 10x2   3\" 10x  3\"   SLR with abd bracing    10x R/L 10x2  B   heelslides with SB  5\" 10x 10x2  3\"   Piriformis stretch   With towel 20 sec x 2 R/L 20\"x3 man   Light bridge with SB   RSB 10x2     Hamstring stretch   20\"x3   iso hip ADD   10x2  2\"                     STANDING                                                     PROPRIOCEPTION                                    MODALITIES      Estim    Not billed 13' with [facility]             Other Therapeutic Activities/Education:  --      Home Exercise Program:   issued, reviewed and discussed with patient. Pt agreed to comply. Reviewed and added SLR with abd bracing and piriformis stretch.  Provided with handout. Manual Treatments:        Modalities:   Estim to low lumbar in sitting , 80/150 Hz, pre-mod, continuous, 12.5 volts x15' with MHP for pain management. No adverse effects. (Not covered)      Communication with other providers:  POC sent 4/13/22       Assessment:  (Response towards treatment session) (Pain Rating) Pt tolerated all activities without reported increase in pain with expectation of bridge. No reported pain with appropriate AR OM and activation with all other exercises. Elevated pain yesterday to elevated level. Trialed Estim despite not covered with additional improvement in pain post tx. Will assess next visit and progress to standing activities targeting in the near future to ease ADLS. Pain remained  /10 following session. Pt is 42year old female with chronic history of back pain. Pt now has difficulties completing ADLs, general mobility and homemaking duties. Pt demo deficits this date that include reduced lumbar AR OM, poor core strength, L LE weakness, paraspinal stiffness, reduced gait tolerance and pain. Testing this date indicate signs and symptoms of spondylosis. Pt will benefit with PT services with progression of strength/ROM, manual and modalities to return to PLOF. Pt prior to onset of current condition had min/mod pain with able to complete full ADLs. Patient received education on their current pathology and how their condition effects them with their functional activities. Patient understood discussion and questions were answered. Patient understands their activity limitations and understands rational for treatment progression.     Plan for Next Session:   TA activation with progression as able, open chain L LE strenghtening, paraspinal stretching, modalities PRN      Time In / Time Out:  1100- 1155       Timed Code/Total Treatment Minutes:  40/55',           (3) TE 40'        15' unattended Estim (Not covered)       Next Progress Note due:  Caleb 4/13/22 Visit 10       Plan of Care Interventions:  [x] Therapeutic Exercise  [x] Modalities:  [x] Therapeutic Activity     [] Ultrasound  [] Estim  [x] Gait Training      [] Cervical Traction [] Lumbar Traction  [x] Neuromuscular Re-education    [x] Cold/hotpack [] Iontophoresis   [x] Instruction in HEP      [x] Vasopneumatic   [] Dry Needling    [x] Manual Therapy               [] Aquatic Therapy              Electronically signed by: Brianna Velasquez, PT, DPT, OCS  5/6/2022, 7:49 AM    5/6/2022 7:49 AM

## 2022-05-10 ENCOUNTER — HOSPITAL ENCOUNTER (OUTPATIENT)
Dept: PHYSICAL THERAPY | Age: 42
Discharge: HOME | End: 2022-05-10

## 2022-05-10 NOTE — FLOWSHEET NOTE
Physical Therapy  Cancellation/No-show Note  Patient Name:  Magdalene Blanton  :  1980   Date:  5/10/2022  Cancelled visits to date: 1  No-shows to date: 1    For today's appointment patient:  []  Cancelled  []  Rescheduled appointment  [x]  No-show     Reason given by patient:  []  Patient ill  []  Conflicting appointment  []  No transportation    []  Conflict with work  []  No reason given  []  Other:     Comments:      Electronically signed by:  Thania Sanchez 5/10/2022, 3:46 PM     5/10/2022,3:47 PM

## 2022-05-12 ENCOUNTER — HOSPITAL ENCOUNTER (OUTPATIENT)
Dept: PHYSICAL THERAPY | Age: 42
Setting detail: THERAPIES SERIES
Discharge: HOME | End: 2022-05-12
Payer: MEDICAID

## 2022-05-12 PROCEDURE — 97110 THERAPEUTIC EXERCISES: CPT

## 2022-05-12 NOTE — FLOWSHEET NOTE
Outpatient Physical Therapy  [place]           [x] Phone: [phone]   Fax: [phone]  [place]           [] Phone: [phone]   Fax: [phone]        Physical Therapy Daily Treatment Note  Date:  2022    Patient Name:  Gaston Feng    :  1980  MRN: [id number]  Restrictions/Precautions:    Diagnosis:   Diagnosis: Spinal stenosis  Date of Injury/Surgery:   Treatment Diagnosis: Treatment Diagnosis: LBP, poor core strength, reduced lumbar ROM    Insurance/Certification information: PT Insurance Information: [facility]  (Approved 12 visits from 22 thru 22 - E Stim not covered)  Referring Physician:  Referring Practitioner: Dr. Marisa Wells  Next Doctor Visit:    Plan of care signed (Y/N):  N, sent 22   Outcome Measure: Oswestry: 54% disability   Visit# / total visits:   per POC  Pain level:  5/10, 10/10 this morning   Goals:     Patient goals : Improvement in pain     Long term goals  Time Frame for Long term goals : 6 weeks 22  Long term goal 1: Pt will demo I with HEP/symptom management. Long term goal 2: Pt will demo able to complete full bridge with <5/10 pain to ease bed mobility. Long term goal 3: Pt will demo >4+/5 L LE strength to ease prolonged activities. .  Long term goal 4: Pt will demo 6MWT >1000ft with <7/10 back pain to community ambulation. Long term goal 5: Pt will demo able to reach for ground without increase in pain to ease reaching. Summary of Evaluation:   Pt is 42year old female with chronic history of back pain. Pt now has difficulties completing ADLs, general mobility and homemaking duties. Pt demo deficits this date that include reduced lumbar AR OM, poor core strength, L LE weakness, paraspinal stiffness, reduced gait tolerance and pain. Testing this date indicate signs and symptoms of spondylosis. Pt will benefit with PT services with progression of strength/ROM, manual and modalities to return to PLOF.  Pt prior to onset of current condition had min/mod pain with able to complete full ADLs. Patient received education on their current pathology and how their condition effects them with their functional activities. Patient understood discussion and questions were answered. Patient understands their activity limitations and understands rational for treatment progression. Subjective:  Pt states she is not noticing much relief. Did feel a little better after previous session but did not last long. Continuing to have 10/10 pain in the morning. Sleeping fairly well waking usually around 6 with increase pain. Any changes in Ambulatory Summary Sheet?   None        Objective:  See eval   COVID screening questions were asked and patient attested that there had been no contact or symptoms    Good technique with exercises  Normal gait mechanics    Increase pain with bridging    No radicular symptoms today      Exercises: (No more than 4 columns)   Exercise/Equipment 4/13/22  #1  5/3/22 #2 5/6/22  #3 5/12/22 #4            WARM UP         Nu step    WL 3 x 5 min WL 3 x 5 min WL 3 x 5 min          TABLE       *hooklying clamshells RTB 10x2 RTB 10x2 RTB 10x2 RTB 10x2   *LTR 10x2  2\" 10x2  2\" 10x2  2\" 10x2  2\"   *Sitting lumbar flexion stretch   15\"x4 across table  20\"x4 across table     *TA activation in hooklying  10x   3\" 10x2   3\" 10x  3\" 10x  3\"   SLR with abd bracing    10x R/L 10x2  B 10x2  B   heelslides with SB  5\" 10x 10x2  3\" 10x2  3\"   Piriformis stretch   With towel 20 sec x 2 R/L 20\"x3 man 20\"x3 man   Light bridge with SB   RSB 10x2   Attempted - too painful today   Hamstring stretch   20\"x3 20\"x3 manual   iso hip ADD   10x2  2\" 10x2  2\"                        STANDING                                                             PROPRIOCEPTION                                          MODALITIES       Estim    Not billed 13' with MHP Not today - arrived late              Other Therapeutic Activities/Education:  --      Home Exercise Program:  HO issued, reviewed and discussed with patient. Pt agreed to comply. Reviewed and added SLR with abd bracing and piriformis stretch. Provided with handout. Manual Treatments:        Modalities:      (Not covered)      Communication with other providers:  POC sent 4/13/22       Assessment:  (Response towards treatment session) (Pain Rating) Pt demonstrated overall fair tolerance to today's session. Continues to have increase pain with bridging. Currently not noticing much relief with exercises. Will assess next visit and progress to standing activities targeting in the near future to ease ADLS. End pain Pain 8/10 RLB following session with getting up from table. Pt is 42year old female with chronic history of back pain. Pt now has difficulties completing ADLs, general mobility and homemaking duties. Pt demo deficits this date that include reduced lumbar AR OM, poor core strength, L LE weakness, paraspinal stiffness, reduced gait tolerance and pain. Testing this date indicate signs and symptoms of spondylosis. Pt will benefit with PT services with progression of strength/ROM, manual and modalities to return to PLOF. Pt prior to onset of current condition had min/mod pain with able to complete full ADLs. Patient received education on their current pathology and how their condition effects them with their functional activities. Patient understood discussion and questions were answered. Patient understands their activity limitations and understands rational for treatment progression.     Plan for Next Session:   TA activation with progression as able, open chain L LE strenghtening, paraspinal stretching, modalities PRN      Time In / Time Out:  1123- 1202       Timed Code/Total Treatment Minutes:  39/39,   (3) TE      Next Progress Note due:  Caleb 4/13/22   Visit 10       Plan of Care Interventions:  [x] Therapeutic Exercise  [x] Modalities:  [x] Therapeutic Activity     [] Ultrasound  [] Estim  [x] Gait Training      [] Cervical Traction [] Lumbar Traction  [x] Neuromuscular Re-education    [x] Cold/hotpack [] Iontophoresis   [x] Instruction in HEP      [x] Vasopneumatic   [] Dry Needling    [x] Manual Therapy               [] Aquatic Therapy              Electronically signed by: Shree Rankin  5/12/2022, 11:23 AM    5/12/2022 11:23 AM

## 2022-05-16 ENCOUNTER — HOSPITAL ENCOUNTER (OUTPATIENT)
Dept: PHYSICAL THERAPY | Age: 42
Setting detail: THERAPIES SERIES
Discharge: HOME | End: 2022-05-16
Payer: MEDICAID

## 2022-05-16 PROCEDURE — 97110 THERAPEUTIC EXERCISES: CPT

## 2022-05-16 NOTE — FLOWSHEET NOTE
Outpatient Physical Therapy  [place]           [x] Phone: [phone]   Fax: [phone]  [place]           [] Phone: [phone]   Fax: [phone]        Physical Therapy Daily Treatment Note  Date:  2022    Patient Name:  Juni Beckman    :  1980  MRN: [id number]  Restrictions/Precautions:    Diagnosis:   Diagnosis: Spinal stenosis  Date of Injury/Surgery:   Treatment Diagnosis: Treatment Diagnosis: LBP, poor core strength, reduced lumbar ROM    Insurance/Certification information: PT Insurance Information: [facility]  (Approved 12 visits from 22 thru 22 - E Stim not covered)  Referring Physician:  Referring Practitioner:  [facility]  Next Doctor Visit:    Plan of care signed (Y/N):  N, sent 22 - resent   Outcome Measure: Oswestry: 54% disability   Visit# / total visits:   per POC  Pain level:  6/10       Goals:     Patient goals : Improvement in pain     Long term goals  Time Frame for Long term goals : 6 weeks 22  Long term goal 1: Pt will demo I with HEP/symptom management. Met - reports compliance   Long term goal 2: Pt will demo able to complete full bridge with <5/10 pain to ease bed mobility. Long term goal 3: Pt will demo >4+/5 L LE strength to ease prolonged activities. .  Long term goal 4: Pt will demo 6MWT >1000ft with <7/10 back pain to community ambulation. Long term goal 5: Pt will demo able to reach for ground without increase in pain to ease reaching. Summary of Evaluation:   Pt is 42year old female with chronic history of back pain. Pt now has difficulties completing ADLs, general mobility and homemaking duties. Pt demo deficits this date that include reduced lumbar AR OM, poor core strength, L LE weakness, paraspinal stiffness, reduced gait tolerance and pain. Testing this date indicate signs and symptoms of spondylosis. Pt will benefit with PT services with progression of strength/ROM, manual and modalities to return to PLOF.  Pt prior to onset of current condition had min/mod pain with able to complete full ADLs. Patient received education on their current pathology and how their condition effects them with their functional activities. Patient understood discussion and questions were answered. Patient understands their activity limitations and understands rational for treatment progression. Subjective:  Umberto arrives to therapy stating that the back is feeling better today. She reports compliance with HEP but does not do the bridges because they increase her pain. Any changes in Ambulatory Summary Sheet? None        Objective:  COVID screening questions were asked and patient attested that there had been no contact or symptoms    Pain with bridges but no pain with glut squeeze. Easily transitions onto and off of the table without pain or muscle guarding. Good muscle activation in core and gluts without reports of increased pain. R HS tighter than L. Exercises: (No more than 4 columns)   Exercise/Equipment 5/6/22  #3 5/12/22 #4 5/16/2022 #5           WARM UP      Nu step   WL 3 x 5 min WL 3 x 5 min 5'          TABLE      *hooklying clamshells RTB 10x2 RTB 10x2 RTB 2*10   *LTR 10x2  2\" 10x2  2\" 2*10 2\"   *TA activation in hooklying  10x  3\" 10x  3\" 2*10 5\"   SLR with abd bracing   10x2  B 10x2  B 2*10 ea    heelslides with SB 10x2  3\" 10x2  3\" 2*10 3\"   Piriformis stretch  20\"x3 man 20\"x3 man 3*20\" ea manual   Light bridge with SB RSB 10x2   Attempted - too painful today Glut squeeze 2*10 5\"   Hamstring stretch 20\"x3 20\"x3 manual 3*20\" ea manual   iso hip ADD 10x2  2\" 10x2  2\" 2*10 2\"                     STANDING                                                     PROPRIOCEPTION                                    MODALITIES      Estim  Not billed 13' with MHP Not today - arrived late              Other Therapeutic Activities/Education:  --      Home Exercise Program:  HO issued, reviewed and discussed with patient. Pt agreed to comply. Reviewed and added SLR with abd bracing and piriformis stretch. Provided with handout. Manual Treatments:  None       Modalities:      (Not covered)      Communication with other providers:  POC sent 4/13/22       Assessment:  Aurelia tolerated today's session well despite reports of moderate pain levels. She demonstrates appropriate muscle activation in the core and gluts. She may benefit from a trial of transitioning to more functional based core strengthening activities next session.  End session pain: 4/10    Plan for Next Session:   TA activation with progression as able, open chain L LE strenghtening, paraspinal stretching, modalities PRN      Time In / Time Out:  1435/1508       Timed Code/Total Treatment Minutes:  35' 2 TE       Next Progress Note due:  Aggieal 4/13/22   Visit 10       Plan of Care Interventions:  [x] Therapeutic Exercise  [x] Modalities:  [x] Therapeutic Activity     [] Ultrasound  [] Estim  [x] Gait Training      [] Cervical Traction [] Lumbar Traction  [x] Neuromuscular Re-education    [x] Cold/hotpack [] Iontophoresis   [x] Instruction in HEP      [x] Vasopneumatic   [] Dry Needling    [x] Manual Therapy               [] Aquatic Therapy              Electronically signed by: Caitlyn Egan        5/16/2022 9:22 AM

## 2022-05-17 ENCOUNTER — OFFICE VISIT (OUTPATIENT)
Dept: BARIATRICS/WEIGHT MGMT | Age: 42
End: 2022-05-17
Payer: MEDICAID

## 2022-05-17 VITALS
HEIGHT: 63 IN | OXYGEN SATURATION: 100 % | DIASTOLIC BLOOD PRESSURE: 82 MMHG | HEART RATE: 86 BPM | WEIGHT: 235.2 LBS | SYSTOLIC BLOOD PRESSURE: 120 MMHG | BODY MASS INDEX: 41.67 KG/M2

## 2022-05-17 DIAGNOSIS — Z01.818 PREOPERATIVE CLEARANCE: ICD-10-CM

## 2022-05-17 DIAGNOSIS — Z01.818 PREOPERATIVE CLEARANCE: Primary | ICD-10-CM

## 2022-05-17 DIAGNOSIS — E66.01 MORBID OBESITY WITH BMI OF 40.0-44.9, ADULT (HCC): Primary | ICD-10-CM

## 2022-05-17 PROCEDURE — G8417 CALC BMI ABV UP PARAM F/U: HCPCS | Performed by: NURSE PRACTITIONER

## 2022-05-17 PROCEDURE — G8427 DOCREV CUR MEDS BY ELIG CLIN: HCPCS | Performed by: NURSE PRACTITIONER

## 2022-05-17 PROCEDURE — 1036F TOBACCO NON-USER: CPT | Performed by: NURSE PRACTITIONER

## 2022-05-17 PROCEDURE — 99213 OFFICE O/P EST LOW 20 MIN: CPT | Performed by: NURSE PRACTITIONER

## 2022-05-17 ASSESSMENT — ENCOUNTER SYMPTOMS
APNEA: 0
COLOR CHANGE: 0
SORE THROAT: 0
CHEST TIGHTNESS: 0
BACK PAIN: 0
DIARRHEA: 0
WHEEZING: 0
NAUSEA: 0
PHOTOPHOBIA: 0
SHORTNESS OF BREATH: 0

## 2022-05-17 NOTE — PROGRESS NOTES
BARIATRIC SURGERY OFFICE PROGRESS NOTE    SUBJECTIVE:    Patient presenting today referred from VIOLET Lyn NP, for   Chief Complaint   Patient presents with    Weight Management     2nd surg wm    . Vitals:    05/17/22 1138   BP: 120/82   Pulse: 86   SpO2: 100%        BMI: Body mass index is 41.66 kg/m². Weight History: Wt Readings from Last 3 Encounters:   05/17/22 235 lb 3.2 oz (106.7 kg)   05/05/22 239 lb 12.8 oz (108.8 kg)   05/05/22 239 lb 12.8 oz (108.8 kg)         Gunner Munoz is a 42 y.o. female presenting in second bariatric PRE-OP visit    Diet Recall: Total weight loss/gain: -4.6 lbs since last visit, and -3.6 lbs since starting program     Calories: tracking calories. Around 1200  Water Intake: not so good. 1 bottle  Exercise: no. Does therapy for her back. New health problems: denies  New medications:denies    Clearances:  -- Cardiology: had echo. Next follow up is in October. Will need to call office for clearance letter  -- Pulmonology: number provided to schedule appointment  -- Psychology: Dr. Mark Jaimes # provided. Thoroughly reviewed the patient's medical history, family history, social history and review of systems with the patient today in the office. Please see medical record for pertinent positives. Past Medical History:   Diagnosis Date    Chronic depression 07/09/2021    Hx of Doppler echocardiogram 04/27/2022    EF 55-60%. Mild LV hypertrophy. Grade I diastolic dysfunction. Mildly dilated LA. Mild MR and TR.    Kidney disorder     left removed .     Migraine     Mixed hyperlipidemia 01/27/2021    PONV (postoperative nausea and vomiting)     Prolonged emergence from general anesthesia     Pulmonic stenosis     Sleep apnea     Sleep apnea         Patient Active Problem List   Diagnosis    Vaginal bleeding    H/O of hysterectomy with bilateral oophorectomy    Panic disorder with agoraphobia    Early menopause    H/O bilateral breast reduction surgery    Myofascial pain syndrome    Somatic dysfunction of head region    Somatic dysfunction of cervical region    Somatic dysfunction of pelvic region    Snoring    Obstructive sleep apnea on CPAP    Somatic dysfunction of both upper extremities    Morbidly obese (HCC)    Paresthesia of left upper extremity    Fibromyalgia    Mixed hyperlipidemia    Carpal tunnel syndrome of left wrist    Vitamin D deficiency    Chronic depression    Anxiety    Chronic pain of left knee    Migraine without aura and without status migrainosus, not intractable    Injury of left ankle    Hypersomnia    Diarrhea       Past Surgical History:   Procedure Laterality Date    BREAST BIOPSY Left 2010    benign    BREAST REDUCTION SURGERY Bilateral 2003    BREAST SURGERY      CARPAL TUNNEL RELEASE Left 2/16/2021    LEFT CARPAL TUNNEL RELEASE performed by Nati Morales DO at [address] [facility] N/A 4/20/2022    COLONOSCOPY POLYPECTOMY SNARE/COLD BIOPSY performed by Neo Billingsley MD at [place] [address] Right     wrist    HAND SURGERY Bilateral     4/2014    HYSTERECTOMY      INNER EAR SURGERY      OTHER SURGICAL HISTORY  4/16/2012    Repair vaginal cuff    OVARY REMOVAL      PARTIAL NEPHRECTOMY Left     TOTAL NEPHRECTOMY Left     TUBAL LIGATION         Current Outpatient Medications   Medication Sig Dispense Refill    cholestyramine (QUESTRAN) 4 g packet Take 1 packet by mouth 2 times daily (Patient not taking: Reported on 5/17/2022) 90 packet 3    vitamin D (ERGOCALCIFEROL) 1.25 MG (62674 UT) CAPS capsule Take 1 capsule by mouth once a week For eight weeks only 8 capsule 0    Naproxen Sodium (ALEVE PO) Take by mouth        No current facility-administered medications for this visit.         Allergies   Allergen Reactions    Codeine Anaphylaxis    Dilaudid [Hydromorphone Hcl] Anaphylaxis    Hydromorphone Anaphylaxis         Review of Systems Constitutional: Negative for fatigue and fever. HENT: Negative for congestion, dental problem and sore throat. Eyes: Negative for photophobia and visual disturbance. Respiratory: Negative for apnea, chest tightness, shortness of breath and wheezing. Cardiovascular: Negative for chest pain and leg swelling. Gastrointestinal: Negative for diarrhea and nausea. Endocrine: Negative for cold intolerance and heat intolerance. Genitourinary: Negative for difficulty urinating, dysuria, flank pain, frequency and hematuria. Musculoskeletal: Negative for arthralgias and back pain. Skin: Negative for color change, rash and wound. Allergic/Immunologic: Negative for environmental allergies, food allergies and immunocompromised state. Neurological: Negative for dizziness, weakness, light-headedness and numbness. Hematological: Negative for adenopathy. Does not bruise/bleed easily. Psychiatric/Behavioral: Negative for behavioral problems, confusion, sleep disturbance and suicidal ideas. OBJECTIVE:    /82   Pulse 86   Ht 5' 3\" (1.6 m)   Wt 235 lb 3.2 oz (106.7 kg)   LMP 02/15/2012   SpO2 100%   BMI 41.66 kg/m²      Physical Exam  Vitals reviewed. Constitutional:       Appearance: She is obese. HENT:      Head: Normocephalic and atraumatic. Right Ear: External ear normal.      Left Ear: External ear normal.      Nose: Nose normal.      Mouth/Throat:      Mouth: Mucous membranes are moist.   Eyes:      Extraocular Movements: Extraocular movements intact. Pupils: Pupils are equal, round, and reactive to light. Cardiovascular:      Rate and Rhythm: Normal rate and regular rhythm. Pulses: Normal pulses. Heart sounds: Normal heart sounds. Pulmonary:      Effort: Pulmonary effort is normal.      Breath sounds: Normal breath sounds. Abdominal:      General: Bowel sounds are normal.   Musculoskeletal:         General: Normal range of motion.       Cervical back: Normal range of motion and neck supple. Skin:     General: Skin is warm and dry. Neurological:      General: No focal deficit present. Mental Status: She is alert and oriented to person, place, and time. Mental status is at baseline. Psychiatric:         Mood and Affect: Mood normal.         Behavior: Behavior normal.         ASSESSMENT & PLAN:    1. Preoperative clearance  - Continue to work on clearances    2. Morbid obesity with BMI of 40.0-44.9, adult [facility])  -Patient was encouraged to journal all food intake.   -Keep calorie level at approximately 1200, per discussion / plan with registered dietician.  -Protein intake is to be a minimum of 60 grams per day. -Water drinking was encouraged with a goal of 64oz-128oz daily. Beverages are to be calorie free except for milk. Avoid soda. -Continue to increase level of physical activity. This patient is a female of reproductive age and was advised she should not become pregnant during the bariatric workup process and for at least 12-18 months after surgery. The patient is agreeable to this plan. I spent 25 minutes with the patient face to face today and over 50% of the office visit today was spent in face to face counseling regarding diet and exercise, in preparation for her planned Robotic Sleeve Gastrectomy. Discussed in length complying with the dietary recommendations, complying with the preoperative workup including dietary counseling, exercise physiologist counseling, and pre-operative optimization of pulmonologist and cardiologist.    The patient expressed understanding and willingness to comply nicely; all questions and concerns addressed. No orders of the defined types were placed in this encounter. No orders of the defined types were placed in this encounter. Follow Up:  Return in about 1 month (around 6/17/2022).     Taniya Snowden, APRN - CNP

## 2022-05-18 ENCOUNTER — HOSPITAL ENCOUNTER (OUTPATIENT)
Dept: PHYSICAL THERAPY | Age: 42
Setting detail: THERAPIES SERIES
Discharge: HOME | End: 2022-05-18
Payer: MEDICAID

## 2022-05-18 PROCEDURE — 97110 THERAPEUTIC EXERCISES: CPT

## 2022-05-18 NOTE — FLOWSHEET NOTE
Patients Plan of Care was received and signed. Signed POC was scanned and placed in the patients chart.     Carli Frazier

## 2022-05-18 NOTE — FLOWSHEET NOTE
Outpatient Physical Therapy  [place]           [x] Phone: [phone]   Fax: [phone]  [place]           [] Phone: [phone]   Fax: [phone]        Physical Therapy Daily Treatment Note  Date:  2022    Patient Name:  Wilma Garrett    :  1980  MRN: [id number]  Restrictions/Precautions:    Diagnosis:   Diagnosis: Spinal stenosis  Date of Injury/Surgery:   Treatment Diagnosis: Treatment Diagnosis: LBP, poor core strength, reduced lumbar ROM    Insurance/Certification information: PT Insurance Information: [employer]  (Approved 12 visits from 22 thru 22 - E Stim not covered)  Referring Physician:  Referring Practitioner: Dr. Vaishali Siddiqui  Next Doctor Visit:    Plan of care signed (Y/N):  N, sent 22 - resent   Outcome Measure: Oswestry: 54% disability   Visit# / total visits:   per POC  Pain level:  5 /10       Goals:     Patient goals : Improvement in pain     Long term goals  Time Frame for Long term goals : 6 weeks 22  Long term goal 1: Pt will demo I with HEP/symptom management. Met - reports compliance   Long term goal 2: Pt will demo able to complete full bridge with <5/10 pain to ease bed mobility. Long term goal 3: Pt will demo >4+/5 L LE strength to ease prolonged activities. .  Long term goal 4: Pt will demo 6MWT >1000ft with <7/10 back pain to community ambulation. Long term goal 5: Pt will demo able to reach for ground without increase in pain to ease reaching. Summary of Evaluation:   Pt is 42year old female with chronic history of back pain. Pt now has difficulties completing ADLs, general mobility and homemaking duties. Pt demo deficits this date that include reduced lumbar AR OM, poor core strength, L LE weakness, paraspinal stiffness, reduced gait tolerance and pain. Testing this date indicate signs and symptoms of spondylosis. Pt will benefit with PT services with progression of strength/ROM, manual and modalities to return to PLOF.  Pt prior to onset of current condition had min/mod pain with able to complete full ADLs. Patient received education on their current pathology and how their condition effects them with their functional activities. Patient understood discussion and questions were answered. Patient understands their activity limitations and understands rational for treatment progression. Subjective:  Umberto arrives to therapy stating that the back is feeling better today. She reports compliance with HEP but does not do the bridges because they increase her pain. Lowest pain at 0/10 with every other day with good days. States lost 4# with reva surgery in August/Sept.         Any changes in Ambulatory Summary Sheet? None        Objective:  COVID screening questions were asked and patient attested that there had been no contact or symptoms    No increase in pain with bridges    Easily transitions onto and off of the table without pain or muscle guarding. Good muscle activation in core and gluts without reports of increased pain. Good technique with added standing activities this date.        Exercises: (No more than 4 columns)   Exercise/Equipment 5/6/22  #3 5/12/22 #4 5/16/2022 #5 5/18/22   #6            WARM UP       Nu step   WL 3 x 5 min WL 3 x 5 min 5'  5' Lv5           TABLE       *hooklying clamshells RTB 10x2 RTB 10x2 RTB 2*10    *LTR 10x2  2\" 10x2  2\" 2*10 2\"    *TA activation in hooklying  10x  3\" 10x  3\" 2*10 5\"    SLR with abd bracing   10x2  B 10x2  B 2*10 ea     heelslides with SB 10x2  3\" 10x2  3\" 2*10 3\" GSB 10x2   Piriformis stretch  20\"x3 man 20\"x3 man 3*20\" ea manual 3*20\" ea manual   Light bridge with SB RSB 10x2   Attempted - too painful today Glut squeeze 2*10 5\" GSB 10x2   Hamstring stretch 20\"x3 20\"x3 manual 3*20\" ea manual 3*20\" ea manual   iso hip ADD 10x2  2\" 10x2  2\" 2*10 2\"                         STANDING       Mid Rows     BTB 10x2   paloff press     BTB 10x2   Hip ext     10x2 PROPRIOCEPTION                                          MODALITIES       Estim  Not billed 13' with P Not today - arrived late  Declined               Other Therapeutic Activities/Education:  --      Home Exercise Program:  HO issued, reviewed and discussed with patient. Pt agreed to comply. Reviewed and added SLR with abd bracing and piriformis stretch. Provided with handout. Manual Treatments:  None       Modalities:      (Not covered)      Communication with other providers:  POC sent 4/13/22       Assessment:  Aurelia tolerated today's session well despite reports of moderate pain levels. She demonstrates appropriate muscle activation in the core and gluts. Able to complete progression of standing pain without increase in pain. Appears improving functional tolerance and ease with lower pain level. Will assess next visit and continue to progress.      End session pain: 3-4/10    Plan for Next Session:   TA activation with progression as able, open chain L LE strenghtening, paraspinal stretching, modalities PRN      Time In / Time Out:  1118- 1158       Timed Code/Total Treatment Minutes:    40/40'     3 TE      Next Progress Note due:  Eval 4/13/22   Visit 10       Plan of Care Interventions:  [x] Therapeutic Exercise  [x] Modalities:  [x] Therapeutic Activity     [] Ultrasound  [] Estim  [x] Gait Training      [] Cervical Traction [] Lumbar Traction  [x] Neuromuscular Re-education    [x] Cold/hotpack [] Iontophoresis   [x] Instruction in HEP      [x] Vasopneumatic   [] Dry Needling    [x] Manual Therapy               [] Aquatic Therapy              Electronically signed by: Len Baum PT, DPT, OCS    5/18/2022 8:18 AM

## 2022-05-20 ENCOUNTER — HOSPITAL ENCOUNTER (OUTPATIENT)
Dept: PHYSICAL THERAPY | Age: 42
Setting detail: THERAPIES SERIES
Discharge: HOME | End: 2022-05-20
Payer: MEDICAID

## 2022-05-20 PROCEDURE — 97110 THERAPEUTIC EXERCISES: CPT | Performed by: PHYSICAL THERAPY ASSISTANT

## 2022-05-20 NOTE — FLOWSHEET NOTE
to return to [facility]. Pt prior to onset of current condition had min/mod pain with able to complete full ADLs. Patient received education on their current pathology and how their condition effects them with their functional activities. Patient understood discussion and questions were answered. Patient understands their activity limitations and understands rational for treatment progression. Subjective:  Amilee stated she is still having difficulty moving around in the bed. Pt is able to begin bending some with less pain using the best posture possible it is better but still painful. MRI has been ordered and waiting on the schedule to get it done. The MRI for the low back. Any changes in Ambulatory Summary Sheet? None        Objective:  COVID screening questions were asked and patient attested that there had been no contact or symptoms    No increase in pain with bridges    Easily transitions onto and off of the table without pain or muscle guarding. Good muscle activation in core and gluts without reports of increased pain. Good technique with added standing activities this date.      6 Minute walk: without AD, self selected pace, x 1029 feet with arms swing good and step length equal.  No compensation noted and no need to rest.    Exercises: (No more than 4 columns)   Exercise/Equipment 5/12/22 #4 5/16/2022 #5 5/18/22   #6 5/20/22 #7            WARM UP       Nu step   WL 3 x 5 min 5'  5' Lv5  5' Lv 5          TABLE       *hooklying clamshells RTB 10x2 RTB 2*10  RTB 10x2   *LTR 10x2  2\" 2*10 2\"     *TA activation in hooklying  10x  3\" 2*10 5\"  10x2, 5\"   SLR with abd bracing   10x2  B 2*10 ea   10x1   heelslides with SB 10x2  3\" 2*10 3\" GSB 10x2 GSB 10x2   Piriformis stretch  20\"x3 man 3*20\" ea manual 3*20\" ea manual -   Light bridge with SB Attempted - too painful today Glut squeeze 2*10 5\" GSB 10x2 GSB 10x2   Hamstring stretch 20\"x3 manual 3*20\" ea manual 3*20\" ea manual -   iso hip ADD 10x2  2\" [] Dry Needling    [x] Manual Therapy               [] Aquatic Therapy              Electronically signed by:  Benard Kehr, PTA,    5/20/2022 7:05 AM

## 2022-05-23 ENCOUNTER — HOSPITAL ENCOUNTER (OUTPATIENT)
Dept: PHYSICAL THERAPY | Age: 42
Setting detail: THERAPIES SERIES
Discharge: HOME | End: 2022-05-23
Payer: MEDICAID

## 2022-05-23 PROCEDURE — 97110 THERAPEUTIC EXERCISES: CPT

## 2022-05-23 NOTE — FLOWSHEET NOTE
[address] [place]           [x] Phone: [phone]   Fax: [phone]  Izabel Ryan           [] Phone: [phone]   Fax: [phone]        Physical Therapy Daily Treatment Note  Date:  2022    Patient Name:  Bo Lawson    :  1980  MRN: [id number]  Restrictions/Precautions:    Diagnosis:   Diagnosis: Spinal stenosis  Date of Injury/Surgery:   Treatment Diagnosis: Treatment Diagnosis: LBP, poor core strength, reduced lumbar ROM    Insurance/Certification information: PT Insurance Information: [facility]  (Approved 12 visits from 22 thru 22 - E Stim not covered)  Referring Physician:  Referring Practitioner: Dr. Silvano Perez  Next Doctor Visit:    Plan of care signed (Y/N):  N, sent 22 - resent   Outcome Measure: Oswestry: 54% disability   Visit# / total visits:   per POC  Pain level: 10 /10       Goals:     Patient goals : Improvement in pain     Long term goals  Time Frame for Long term goals : 6 weeks 22  Long term goal 1: Pt will demo I with HEP/symptom management. Met - reports compliance   Long term goal 2: Pt will demo able to complete full bridge with <5/10 pain to ease bed mobility. Long term goal 3: Pt will demo >4+/5 L LE strength to ease prolonged activities. .  Long term goal 4: Pt will demo 6MWT >1000ft with <7/10 back pain to community ambulation. 22 Met x 1 trial.  Long term goal 5: Pt will demo able to reach for ground without increase in pain to ease reaching. Summary of Evaluation:   Pt is 42year old female with chronic history of back pain. Pt now has difficulties completing ADLs, general mobility and homemaking duties. Pt demo deficits this date that include reduced lumbar AR OM, poor core strength, L LE weakness, paraspinal stiffness, reduced gait tolerance and pain. Testing this date indicate signs and symptoms of spondylosis.  Pt will benefit with PT services with progression of strength/ROM, manual and modalities to return to PLOF. Pt prior to onset of current condition had min/mod pain with able to complete full ADLs. Patient received education on their current pathology and how their condition effects them with their functional activities. Patient understood discussion and questions were answered. Patient understands their activity limitations and understands rational for treatment progression. Subjective:  Amilee states she started having increase pain yesterday evening. Woke up this morning with 10/10 pain. Did try Biofreeze with no relief noted. Reports of no unusual activity. Still waiting to hear about authorization for MRI. Any changes in Ambulatory Summary Sheet? None        Objective:  COVID screening questions were asked and patient attested that there had been no contact or symptoms    Did have some discomfort with bridges, kept ball close to body which was helpful. Easily transitions onto and off of the table without pain or muscle guarding. Good muscle activation in core and gluts.             Exercises: (No more than 4 columns)   Exercise/Equipment 5/16/2022 #5 5/18/22   #6 5/20/22 #7 5/23/22 #8            WARM UP       Nu step   5'  5' Lv5  5' Lv 5 5' Lv 5          TABLE       *hooklying clamshells RTB 2*10  RTB 10x2 RTB 10x2   *LTR 2*10 2\"      *TA activation in hooklying  2*10 5\"  10x2, 5\" 10x2, 5\"   SLR with abd bracing   2*10 ea   10x1 10x1 R/L   heelslides with SB 2*10 3\" GSB 10x2 GSB 10x2 RSB 10x2   Piriformis stretch  3*20\" ea manual 3*20\" ea manual - 30 sec x 2 R/L   Light bridge with SB Glut squeeze 2*10 5\" GSB 10x2 GSB 10x2 RSB 10x2   Hamstring stretch 3*20\" ea manual 3*20\" ea manual -    iso hip ADD 2*10 2\"  10x2, 5\" 10x2, 5\"                        STANDING       Mid Rows   BTB 10x2 BTB 10x2 BTB 10x2   paloff press   BTB 10x2 BTB 10x2  BTB 10x2    Hip ext   10x2 RTB 10x2 RTB 10x2                                    PROPRIOCEPTION MODALITIES       Estim   Declined  -- 10'              Other Therapeutic Activities/Education:  --Pt education related to the abdominal bracing during all clinic activity. Home Exercise Program:  HO issued, reviewed and discussed with patient. Pt agreed to comply. Reviewed and added SLR with abd bracing and piriformis stretch. Provided with handout. 5/20/22: added Red thera band to the hip extension, and added the Palloff press with the Blue band. (both bands issued this date)    Manual Treatments:  None       Modalities:   Estim to lumbar in L sidelying , 80/120 Hz, pre-mod x 10 min for pain management. No adverse effects. (Not covered)      Communication with other providers:  POC sent 4/13/22       Assessment:  Aurelia demonstrated overall fair tolerance to today's session. Performed E stim today to help decrease c/o pain. Did note a little less pain following rating 7/10. Continue with therapy as tolerated.       End session pain: 7/10    Plan for Next Session:   TA activation with progression as able, open chain L LE strenghtening, paraspinal stretching, modalities PRN      Time In / Time Out: 1033/1123       Timed Code/Total Treatment Minutes:    40/50'    (3) TE, elect stim-not billed      Next Progress Note due:  Eval 4/13/22   Visit 10       Plan of Care Interventions:  [x] Therapeutic Exercise  [x] Modalities:  [x] Therapeutic Activity     [] Ultrasound  [] Estim  [x] Gait Training      [] Cervical Traction [] Lumbar Traction  [x] Neuromuscular Re-education    [x] Cold/hotpack [] Iontophoresis   [x] Instruction in HEP      [x] Vasopneumatic   [] Dry Needling    [x] Manual Therapy               [] Aquatic Therapy              Electronically signed by: Kd Young PTA,    5/23/2022 10:34 AM

## 2022-05-24 ENCOUNTER — INITIAL CONSULT (OUTPATIENT)
Dept: PULMONOLOGY | Age: 42
End: 2022-05-24
Payer: MEDICAID

## 2022-05-24 VITALS
SYSTOLIC BLOOD PRESSURE: 114 MMHG | BODY MASS INDEX: 41.64 KG/M2 | DIASTOLIC BLOOD PRESSURE: 80 MMHG | HEART RATE: 72 BPM | HEIGHT: 63 IN | OXYGEN SATURATION: 96 % | WEIGHT: 235 LBS

## 2022-05-24 DIAGNOSIS — Z99.89 OBSTRUCTIVE SLEEP APNEA ON CPAP: ICD-10-CM

## 2022-05-24 DIAGNOSIS — E66.01 MORBIDLY OBESE (HCC): Primary | ICD-10-CM

## 2022-05-24 DIAGNOSIS — E66.01 MORBIDLY OBESE (HCC): ICD-10-CM

## 2022-05-24 DIAGNOSIS — Z01.811 PREOP PULMONARY/RESPIRATORY EXAM: ICD-10-CM

## 2022-05-24 DIAGNOSIS — G47.33 OBSTRUCTIVE SLEEP APNEA ON CPAP: ICD-10-CM

## 2022-05-24 LAB
EXPIRATORY TIME-POST: NORMAL
EXPIRATORY TIME: NORMAL
FEF 25-75% %CHNG: NORMAL
FEF 25-75% %PRED-POST: NORMAL
FEF 25-75% %PRED-PRE: NORMAL
FEF 25-75% PRED: NORMAL
FEF 25-75%-POST: NORMAL
FEF 25-75%-PRE: NORMAL
FEV1 %PRED-POST: 74.6 %
FEV1 %PRED-PRE: 75.7 %
FEV1 PRED: 2.88 L
FEV1-POST: 2.15 L
FEV1-PRE: 2.18 L
FEV1/FVC %PRED-POST: 108.9 %
FEV1/FVC %PRED-PRE: 105.8 %
FEV1/FVC PRED: 81.8 %
FEV1/FVC-POST: 89 %
FEV1/FVC-PRE: 86.6 %
FVC %PRED-POST: 68 L
FVC %PRED-PRE: 71.1 %
FVC PRED: 3.55 L
FVC-POST: 2.41 L
FVC-PRE: 2.52 L
PEF %PRED-POST: NORMAL
PEF %PRED-PRE: NORMAL
PEF PRED: NORMAL
PEF%CHNG: NORMAL
PEF-POST: NORMAL
PEF-PRE: NORMAL

## 2022-05-24 PROCEDURE — 99204 OFFICE O/P NEW MOD 45 MIN: CPT | Performed by: INTERNAL MEDICINE

## 2022-05-24 PROCEDURE — 1036F TOBACCO NON-USER: CPT | Performed by: INTERNAL MEDICINE

## 2022-05-24 PROCEDURE — G8427 DOCREV CUR MEDS BY ELIG CLIN: HCPCS | Performed by: INTERNAL MEDICINE

## 2022-05-24 PROCEDURE — G8417 CALC BMI ABV UP PARAM F/U: HCPCS | Performed by: INTERNAL MEDICINE

## 2022-05-24 ASSESSMENT — PULMONARY FUNCTION TESTS
FEV1_PERCENT_PREDICTED_POST: 74.6
FEV1_PERCENT_PREDICTED_PRE: 75.7
FVC_PRE: 2.52
FVC_PERCENT_PREDICTED_PRE: 71.1
FVC_PREDICTED: 3.55
FEV1/FVC_PRE: 86.6
FEV1/FVC_PREDICTED: 81.8
FVC_POST: 2.41
FEV1/FVC_PERCENT_PREDICTED_POST: 108.9
FEV1_PREDICTED: 2.88
FEV1/FVC_PERCENT_PREDICTED_PRE: 105.8
FVC_PERCENT_PREDICTED_POST: 68.0
FEV1/FVC_POST: 89.0
FEV1_PRE: 2.18
FEV1_POST: 2.15

## 2022-05-24 NOTE — PROGRESS NOTES
Subjective:   CHIEF COMPLAINT / HPI: Preop pulmonary exam, morbid obesity, obstructive sleep apnea, history of pulmonic stenosis  Joann Oden is a 54-year-old female referred here for pulmonary preop exam for anticipated bariatric surgery for obstructive sleep apnea. Mrs. Hanson states that she does not have a history of chronic lung disease, asthma or dyspnea exertion. She does mention that 4 years ago she was diagnosed with obstructive sleep apnea and was on CPAP for several years. About a year and a half ago she stopped using CPAP. She is now undergoing a repeat work-up for EMILEE and hopefully reinstitution of APAP or CPAP therapy. 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 Scooter Jackson. She denies a history of pulmonary hypertension. She does not complain of dyspnea on exertion or shortness of breath. She is not aware of a family history of chronic lung disease but mentions that her brother had EMILEE. She has not had any COVID-19 exposure or infection but also has decided not to get COVID-19 vaccination. She does carry history of a left nephrectomy for malfunctioning kidney around 14 years ago. She has always been a non-smoker. Office spirometry demonstrates an FEV1 of 2.18 L with an FVC of 2.52 L. There is no significant airways obstruction. Because her FEV1 and FVC are both mildly reduced I cannot rule out a restrictive lung process without further testing. Following bronchodilator she had no significant response    Past Medical History:  Past Medical History:   Diagnosis Date    Chronic depression 07/09/2021    Hx of Doppler echocardiogram 04/27/2022    EF 55-60%. Mild LV hypertrophy. Grade I diastolic dysfunction. Mildly dilated LA. Mild MR and TR.    Kidney disorder     left removed .     Migraine     Mixed hyperlipidemia 01/27/2021    PONV (postoperative nausea and vomiting)     Preop pulmonary/respiratory exam 5/24/2022    Prolonged emergence from general anesthesia     Pulmonic stenosis     Sleep apnea     Sleep apnea        Current Medications:    No current facility-administered medications for this visit. Allergies   Allergen Reactions    Codeine Anaphylaxis    Dilaudid [Hydromorphone Hcl] Anaphylaxis    Hydromorphone Anaphylaxis       Social History:    Social History     Socioeconomic History    Marital status:      Spouse name: None    Number of children: None    Years of education: None    Highest education level: None   Occupational History    None   Tobacco Use    Smoking status: Never Smoker    Smokeless tobacco: Never Used   Vaping Use    Vaping Use: Never used   Substance and Sexual Activity    Alcohol use: No    Drug use: No    Sexual activity: None   Other Topics Concern    None   Social History Narrative    ** Merged History Encounter **          Social Determinants of Health     Financial Resource Strain:     Difficulty of Paying Living Expenses: Not on file   Food Insecurity:     Worried About Running Out of Food in the Last Year: Not on file    [place] of Food in the Last Year: Not on file   Transportation Needs:     Lack of Transportation (Medical): Not on file    Lack of Transportation (Non-Medical):  Not on file   Physical Activity:     Days of Exercise per Week: Not on file    Minutes of Exercise per Session: Not on file   Stress:     Feeling of Stress : Not on file   Social Connections:     Frequency of Communication with Friends and Family: Not on file    Frequency of Social Gatherings with Friends and Family: Not on file    Attends Jain Services: Not on file    Active Member of Clubs or Organizations: Not on file    Attends Club or Organization Meetings: Not on file    Marital Status: Not on file   Intimate Partner Violence:     Fear of Current or Ex-Partner: Not on file    Emotionally Abused: Not on file    Physically Abused: Not on file    Sexually Abused: Not on file   Housing Stability:     Unable to Pay for Housing in the Last Year: Not on file    Number of Places Lived in the Last Year: Not on file    Unstable Housing in the Last Year: Not on file       Family History:    Family History   Problem Relation Age of Onset    Cancer Mother     Migraines Mother     Cancer Maternal Grandmother     Diabetes Maternal Grandmother     Allergies Son     Allergies Daughter     Bleeding Prob Daughter     Bleeding Prob Son     Asthma Daughter     Asthma Son          REVIEW OF SYSTEMS:    CONSTITUTIONAL:  negative for fevers, chills, diaphoresis, activity change, appetite change, night sweats and unexpected weight change.    HEENT:  negative for hearing loss,  sinus pressure, nasal congestion, epistaxis   RESPIRATORY:  See HPI  CARDIOVASCULAR:  negative for chest pain, palpitations, exertional chest pressure/discomfort, edema, syncope  GASTROINTESTINAL: negative for nausea, vomiting, diarrhea, constipation, blood in stool and abdominal pain  GENITOURINARY:  negative for frequency, dysuria and hematuria  HEMATOLOGIC/LYMPHATIC:  negative for easy bruising, bleeding and lymphadenopathy  ALLERGIC/IMMUNOLOGIC:  negative for recurrent infections, angioedema, anaphylaxis and drug reaction  MUSCULOSKELETAL:  negative for  pain, joint swelling, decreased range of motion and muscle weakness  NEURO: negative for chronic headaches,seizures,TIA,CVA  SKIN: negative for rash,pruritis    Objective:   PHYSICAL EXAM:      VITALS:    Vitals:    05/24/22 0825   BP: 114/80   Pulse: 72   SpO2: 96%   Weight: 235 lb (106.6 kg)   Height: 5' 3\" (1.6 m)   Mallampati II  [place] sleepiness scale6  Neck circumference 17.5 inches  BMI 41.63    CONSTITUTIONAL:  awake, alert, cooperative, no apparent distress, and appears stated age  HEENT:  supple and nontender,  trachea midline, no adenopathy, thyroid nl, no JVD, no wheezing or stridor over neck  CHEST: chest expansion equal and symmetrical, no intercostal retraction, no increased work of breathing  LUNGS: Breath sounds are clear throughout all areas without wheezing or rhonchi  CARDIOVASCULAR: normal S1 and S2 , no murmurs or gallops ,no pericardial rubs  ABDOMEN:  normal bowel sounds, non-distended and no masses palpated, and no tenderness to palpation. No hepatosplenomegaly  LYMPHADENOPATHY:  no axillary or supraclavicular adenopathy. No cervical adenopathy  EXTREMITIES: no edema, no inflammation, no tenderness. NEURO: oriented X 3, No focal deficits  SKIN: no rashes, No lesions    RADIOLOGY: Chest x-ray on 4/19/2017 showed no acute process    PFT: 5/24/2022  Office spirometry demonstrates an FEV1 of 2.18 L with an FVC of 2.52 L. There is no significant airways obstruction. Because her FEV1 and FVC are both mildly reduced I cannot rule out a restrictive lung process without further testing. Following bronchodilator she had no significant response    Assessment:     1. Morbidly obese ([address]    2. Preop pulmonary/respiratory exam    3. Obstructive sleep apnea on CPAP        Plan:   From a pulmonary standpoint she has full clearance for bariatric surgery and general anesthesia. I did recommend she get started on CPAP or APAP as soon as possible. APAP would be a better option because of her anticipated weight loss  and may require reduced Pap pressures as she loses weight. I did recommend she bring her equipment with her to surgery in case she does need to stay overnight. We have discussed the need to maintain yearly flu immunization, pneumococcal vaccination. We have discussed Coronavirus precaution including handwashing practice, wiping items touched in public such as gas pumps, door handles, shopping carts, etc. Self monitoring for infection - fever, chills, cough, SOB. Should they develop symptoms they should call office for further instructions. No follow-ups on file.     This dictation was performed with a verbal recognition program and it was checked for errors. It is possible that there are still dictated errors within this office note. Any errors should be brought immediately to my attention for correction. All efforts were made to ensure that this office note is accurate.

## 2022-05-25 ENCOUNTER — HOSPITAL ENCOUNTER (OUTPATIENT)
Dept: SLEEP CENTER | Age: 42
Discharge: HOME | End: 2022-05-25
Payer: MEDICAID

## 2022-05-25 VITALS — HEIGHT: 63 IN | BODY MASS INDEX: 41.64 KG/M2 | WEIGHT: 235 LBS

## 2022-05-25 DIAGNOSIS — G47.33 OBSTRUCTIVE SLEEP APNEA ON CPAP: ICD-10-CM

## 2022-05-25 DIAGNOSIS — Z99.89 OBSTRUCTIVE SLEEP APNEA ON CPAP: ICD-10-CM

## 2022-05-25 PROCEDURE — 95811 POLYSOM 6/>YRS CPAP 4/> PARM: CPT

## 2022-05-25 ASSESSMENT — SLEEP AND FATIGUE QUESTIONNAIRES
HOW LIKELY ARE YOU TO NOD OFF OR FALL ASLEEP WHILE SITTING AND TALKING TO SOMEONE: 2
HOW LIKELY ARE YOU TO NOD OFF OR FALL ASLEEP WHEN YOU ARE A PASSENGER IN A CAR FOR AN HOUR WITHOUT A BREAK: 3
HOW LIKELY ARE YOU TO NOD OFF OR FALL ASLEEP WHILE LYING DOWN TO REST IN THE AFTERNOON WHEN CIRCUMSTANCES PERMIT: 3
HOW LIKELY ARE YOU TO NOD OFF OR FALL ASLEEP WHILE SITTING QUIETLY AFTER LUNCH WITHOUT ALCOHOL: 3
ESS TOTAL SCORE: 18
HOW LIKELY ARE YOU TO NOD OFF OR FALL ASLEEP IN A CAR, WHILE STOPPED FOR A FEW MINUTES IN TRAFFIC: 0
HOW LIKELY ARE YOU TO NOD OFF OR FALL ASLEEP WHILE WATCHING TV: 3
NECK CIRCUMFERENCE (INCHES): 17.5
HOW LIKELY ARE YOU TO NOD OFF OR FALL ASLEEP WHILE SITTING AND READING: 3
HOW LIKELY ARE YOU TO NOD OFF OR FALL ASLEEP WHILE SITTING INACTIVE IN A PUBLIC PLACE: 1

## 2022-05-26 ENCOUNTER — HOSPITAL ENCOUNTER (OUTPATIENT)
Dept: PHYSICAL THERAPY | Age: 42
Setting detail: THERAPIES SERIES
Discharge: HOME | End: 2022-05-26
Payer: MEDICAID

## 2022-05-26 LAB — STATUS: NORMAL

## 2022-05-26 PROCEDURE — 95811 POLYSOM 6/>YRS CPAP 4/> PARM: CPT | Performed by: INTERNAL MEDICINE

## 2022-05-26 PROCEDURE — 97110 THERAPEUTIC EXERCISES: CPT

## 2022-05-26 NOTE — FLOWSHEET NOTE
[address]           [x] Phone: [phone]   Fax: [phone]  Suzi hartman           [] Phone: [phone]   Fax: [phone]        Physical Therapy Daily Treatment Note  Date:  2022    Patient Name:  Abiodun Hernandez    :  1980  MRN: [id number]  Restrictions/Precautions:    Diagnosis:   Diagnosis: Spinal stenosis  Date of Injury/Surgery:   Treatment Diagnosis: Treatment Diagnosis: LBP, poor core strength, reduced lumbar ROM    Insurance/Certification information: PT Insurance Information: [facility]  (Approved 12 visits from 22 thru 22 - E Stim not covered)  Referring Physician:  Referring Practitioner: Dr. Charu Rutherford  Next Doctor Visit:    Plan of care signed (Y/N):  N, sent 22 - resent   Outcome Measure: Oswestry: 54% disability   Visit# / total visits:   per POC  Pain level:  3 /10       Goals:     Patient goals : Improvement in pain     Long term goals  Time Frame for Long term goals : 6 weeks 22  Long term goal 1: Pt will demo I with HEP/symptom management. Met - reports compliance   Long term goal 2: Pt will demo able to complete full bridge with <5/10 pain to ease bed mobility. Mostly MET   Long term goal 3: Pt will demo >4+/5 L LE strength to ease prolonged activities. MET  Long term goal 4: Pt will demo 6MWT >1000ft with <7/10 back pain to community ambulation. 22 Met x 1 trial.  Long term goal 5: Pt will demo able to reach for ground without increase in pain to ease reaching. Not MET     Summary of Evaluation:   Pt is 42year old female with chronic history of back pain. Pt now has difficulties completing ADLs, general mobility and homemaking duties. Pt demo deficits this date that include reduced lumbar AR OM, poor core strength, L LE weakness, paraspinal stiffness, reduced gait tolerance and pain. Testing this date indicate signs and symptoms of spondylosis.  Pt will benefit with PT services with progression of strength/ROM, manual and modalities to return to PLOF. Pt prior to onset of current condition had min/mod pain with able to complete full ADLs. Patient received education on their current pathology and how their condition effects them with their functional activities. Patient understood discussion and questions were answered. Patient understands their activity limitations and understands rational for treatment progression. Subjective:  Amilee states feeling fine 3/10 pain currently. Woke up some morning with 10/10 pain. Still waiting to hear about authorization for MRI. 2-3 mornings per week with elevated pain that takes extended time to be functional. Completes stretches to manage pain, meds and lay down to resolve pain when elevated. Any changes in Ambulatory Summary Sheet? None        Objective:  COVID screening questions were asked and patient attested that there had been no contact or symptoms       L4-S1 paravertebral pain with palpation. Flex: mid tibia secondary to pain   Ext: 50% deficit secondary to pain   L SB: WNL   R SB: pain with 25% deficit   Rot: WNL    Able to complete bridge with single LE iso hold, full bridge with 6/10 pain    Easily transitions onto and off of the table without pain or muscle guarding.         Oswestry: 42% disability           Exercises: (No more than 4 columns)   Exercise/Equipment 5/20/22 #7 5/23/22 #8 5/26/22   #9            WARM UP      Nu step   5' Lv 5 5' Lv 5 5' Lv 5         TABLE      *hooklying clamshells RTB 10x2 RTB 10x2 RTB 10x2   *LTR   10x2  3\"   *TA activation in hooklying  10x2, 5\" 10x2, 5\"    SLR with abd bracing   10x1 10x1 R/L    heelslides with SB GSB 10x2 RSB 10x2    Piriformis stretch  - 30 sec x 2 R/L 20\"x3   Light bridge with SB GSB 10x2 RSB 10x2    Hamstring stretch -     iso hip ADD 10x2, 5\" 10x2, 5\"                      STANDING      Mid Rows  BTB 10x2 BTB 10x2    paloff press  BTB 10x2  BTB 10x2     Hip ext  RTB 10x2 RTB 10x2 PROPRIOCEPTION                                    MODALITIES      Estim  -- 10'              Other Therapeutic Activities/Education:  --Pt education related to the abdominal bracing during all clinic activity. Home Exercise Program:  HO issued, reviewed and discussed with patient. Pt agreed to comply. Reviewed and added SLR with abd bracing and piriformis stretch. Provided with handout. 5/20/22: added Red thera band to the hip extension, and added the Palloff press with the Blue band. (both bands issued this date)    Manual Treatments:  None       Modalities:         Communication with other providers:  POC sent 4/13/22   D/c sent 5/26/22       Assessment:  Aurelia has completed 9 visits since start of therapy on 4/13/22. Fluctuating pain and performance in therapy with altered intensity. Demo appropriate core/LE strength and ROM to be functional with elevated symptoms at times per patient. Appears low to mod disability and at max potential in therapy with pain as greater deficit that varies minimally. Demo independence with home program and will be discharged at this time. Pt agrees to this plan. Pain same post tx.     demonstrated overall fair tolerance to today's session. Performed E stim today to help decrease c/o pain. Did note a little less pain following rating 7/10. Continue with therapy as tolerated.        Time In / Time Out: 8364-4383        Timed Code/Total Treatment Minutes:   30/30'   30' TE      Next Progress Note due:  Caleb 4/13/22   Visit 10       Plan of Care Interventions:  [x] Therapeutic Exercise  [x] Modalities:  [x] Therapeutic Activity     [] Ultrasound  [] Estim  [x] Gait Training      [] Cervical Traction [] Lumbar Traction  [x] Neuromuscular Re-education    [x] Cold/hotpack [] Iontophoresis   [x] Instruction in HEP      [x] Vasopneumatic   [] Dry Needling    [x] Manual Therapy               [] Aquatic Therapy              Electronically signed by: Elva Garcia Isabela, PT, DPT, OCS    5/26/2022 6:46 AM

## 2022-05-26 NOTE — PROGRESS NOTES
5/26/2022  sleep study  for Umberto Hanson  1980 is complete. Results are pending physician review.     Electronically signed by Laureen Sosa on 5/26/2022 at 7:12 AM

## 2022-05-26 NOTE — DISCHARGE SUMMARY
Outpatient Physical Therapy           [place]           [] Phone: [phone]   Fax: [phone]  [facility]           [] Phone: [phone]   Fax: [phone]      To: Tacho Sadler MD     From: Reyes Madison, PT, DPT, OCS      Patient: Tabatha Medrano                    : 1980  Diagnosis:  Spinal stenosis, site unspecified [M48.00]        Treatment Diagnosis:       Date: 2022  []  Progress Note                [x]  Discharge Note    Evaluation Date:   22  Total Visits to date:  9  Cancels/No-shows to date:  1    Subjective:  Amilee states feeling fine 3/10 pain currently. Woke up some morning with 10/10 pain. Still waiting to hear about authorization for MRI. 2-3 mornings per week with elevated pain that takes extended time to be functional. Completes stretches to manage pain, meds and lay down to resolve pain when elevated. Plan of Care/Treatment to date:  [x] Therapeutic Exercise    [x] Modalities:  [x] Therapeutic Activity     [] Ultrasound  [x] Electrical Stimulation  [x] Gait Training      [] Cervical Traction   [] Lumbar Traction  [x] Neuromuscular Re-education  [x] Cold/hotpack [] Iontophoresis  [x] Instruction in HEP      Other:  [x] Manual Therapy       []  Vasopneumatic  [] Aquatic Therapy       []   Dry Needle Therapy                      Objective/Significant Findings At Last Visit/Comments:     L4-S1 paravertebral pain with palpation.      Flex: mid tibia secondary to pain   Ext: 50% deficit secondary to pain   L SB: WNL   R SB: pain with 25% deficit   Rot: WNL    5/5 LE strength   (-) SLR. SLump      Able to complete bridge with single LE iso hold, full bridge with 6/10 pain    Easily transitions onto and off of the table without pain or muscle guarding. Normal gait mechanics         Oswestry: 42% disability     Assessment:  Aurelia has completed 9 visits since start of therapy on 22. Fluctuating pain and performance in therapy with altered intensity.  Jose appropriate core/LE strength and ROM to be functional with elevated symptoms at times per patient. Appears low to mod disability and at max potential in therapy with pain as greater deficit that varies minimally. Demo independence with home program and will be discharged at this time. Pt agrees to this plan. Goal Status:  [] Achieved [x] Partially Achieved  [] Not Achieved      Patient goals : Improvement in pain  Long term goals  Time Frame for Long term goals : 6 weeks 5/27/22  Long term goal 1: Pt will demo I with HEP/symptom management. Met - reports compliance   Long term goal 2: Pt will demo able to complete full bridge with <5/10 pain to ease bed mobility. Mostly MET   Long term goal 3: Pt will demo >4+/5 L LE strength to ease prolonged activities. MET  Long term goal 4: Pt will demo 6MWT >1000ft with <7/10 back pain to community ambulation. 5/20/22 Met x 1 trial.  Long term goal 5: Pt will demo able to reach for ground without increase in pain to ease reaching. Not MET              Patient Status: [] Continue per initial plan of Care     [x] Patient now discharged     [] Additional visits requested, Please re-certify for additional visits: If we are requesting more visits, we fully anticipate the patient's condition is expected to improve within the treatment timeframe we are requesting. Electronically signed by:  Nette Bermeo PT, DPT, OCS  5/26/2022, 1:00 PM    5/26/2022 1:00 PM     If you have any questions or concerns, please don't hesitate to call.   Thank you for your referral.    Physician Signature:______________________ Date:______ Time: ________  By signing above, therapists plan is approved by physician

## 2022-06-01 NOTE — FLOWSHEET NOTE
Patients Plan of Care was received and signed. Signed POC was scanned and placed in the patients chart.     Newton Osorio

## 2022-06-14 ENCOUNTER — OFFICE VISIT (OUTPATIENT)
Dept: BARIATRICS/WEIGHT MGMT | Age: 42
End: 2022-06-14
Payer: MEDICAID

## 2022-06-14 ENCOUNTER — TELEPHONE (OUTPATIENT)
Dept: CARDIOLOGY CLINIC | Age: 42
End: 2022-06-14

## 2022-06-14 VITALS
DIASTOLIC BLOOD PRESSURE: 70 MMHG | WEIGHT: 236.6 LBS | HEIGHT: 63 IN | OXYGEN SATURATION: 99 % | HEART RATE: 67 BPM | BODY MASS INDEX: 41.92 KG/M2 | SYSTOLIC BLOOD PRESSURE: 112 MMHG

## 2022-06-14 DIAGNOSIS — Z01.818 PREOPERATIVE CLEARANCE: Primary | ICD-10-CM

## 2022-06-14 DIAGNOSIS — E66.01 MORBID OBESITY WITH BMI OF 40.0-44.9, ADULT (HCC): ICD-10-CM

## 2022-06-14 PROCEDURE — G8427 DOCREV CUR MEDS BY ELIG CLIN: HCPCS | Performed by: NURSE PRACTITIONER

## 2022-06-14 PROCEDURE — G8417 CALC BMI ABV UP PARAM F/U: HCPCS | Performed by: NURSE PRACTITIONER

## 2022-06-14 PROCEDURE — 99213 OFFICE O/P EST LOW 20 MIN: CPT | Performed by: NURSE PRACTITIONER

## 2022-06-14 PROCEDURE — 1036F TOBACCO NON-USER: CPT | Performed by: NURSE PRACTITIONER

## 2022-06-14 ASSESSMENT — PATIENT HEALTH QUESTIONNAIRE - PHQ9
2. FEELING DOWN, DEPRESSED OR HOPELESS: 0
SUM OF ALL RESPONSES TO PHQ QUESTIONS 1-9: 0
1. LITTLE INTEREST OR PLEASURE IN DOING THINGS: 0
SUM OF ALL RESPONSES TO PHQ QUESTIONS 1-9: 0
SUM OF ALL RESPONSES TO PHQ9 QUESTIONS 1 & 2: 0

## 2022-06-14 ASSESSMENT — ENCOUNTER SYMPTOMS
NAUSEA: 0
APNEA: 0
COLOR CHANGE: 0
DIARRHEA: 0
CHEST TIGHTNESS: 0
SORE THROAT: 0
BACK PAIN: 0
PHOTOPHOBIA: 0
SHORTNESS OF BREATH: 0
WHEEZING: 0

## 2022-06-14 NOTE — PROGRESS NOTES
BARIATRIC SURGERY OFFICE PROGRESS NOTE    SUBJECTIVE:    Patient presenting today referred from VIOLET Soliz NP, for   Chief Complaint   Patient presents with    Follow-up     3rd surg wm visit -card +pulm -psych collect fee   . Vitals:    06/14/22 1000   BP: 112/70   Pulse: 67   SpO2: 99%        BMI: Body mass index is 41.91 kg/m². Weight History: Wt Readings from Last 3 Encounters:   06/14/22 236 lb 9.6 oz (107.3 kg)   05/25/22 235 lb (106.6 kg)   05/24/22 235 lb (106.6 kg)         Hazel Trujillo is a 42 y.o. female presenting in third bariatric PRE-OP visit    Diet Recall: Total weight loss/gain: +1.4 lbs since last visit, and -2.2 lbs since starting program     Calories: fluctuates between 6180-2119  Water Intake: good  Exercise: no.    New health problems: denies  New medications: denies    Clearances:  -- Cardiology: will call for clearance letter  -- Pulmonology: cleared  -- Psychology: will need to call and make appointment with Dr. Claudia Jack reviewed the patient's medical history, family history, social history and review of systems with the patient today in the office. Please see medical record for pertinent positives. Past Medical History:   Diagnosis Date    Chronic depression 07/09/2021    Hx of Doppler echocardiogram 04/27/2022    EF 55-60%. Mild LV hypertrophy. Grade I diastolic dysfunction. Mildly dilated LA. Mild MR and TR.    Kidney disorder     left removed .     Migraine     Mixed hyperlipidemia 01/27/2021    PONV (postoperative nausea and vomiting)     Preop pulmonary/respiratory exam 5/24/2022    Prolonged emergence from general anesthesia     Pulmonic stenosis     Sleep apnea     Sleep apnea         Patient Active Problem List   Diagnosis    Vaginal bleeding    H/O of hysterectomy with bilateral oophorectomy    Panic disorder with agoraphobia    Early menopause    H/O bilateral breast reduction surgery    Myofascial pain syndrome  Somatic dysfunction of head region    Somatic dysfunction of cervical region    Somatic dysfunction of pelvic region    Snoring    Obstructive sleep apnea on CPAP    Somatic dysfunction of both upper extremities    Morbidly obese (HCC)    Paresthesia of left upper extremity    Fibromyalgia    Mixed hyperlipidemia    Carpal tunnel syndrome of left wrist    Vitamin D deficiency    Chronic depression    Anxiety    Chronic pain of left knee    Migraine without aura and without status migrainosus, not intractable    Injury of left ankle    Hypersomnia    Diarrhea    Preop pulmonary/respiratory exam       Past Surgical History:   Procedure Laterality Date    BREAST BIOPSY Left 2010    benign    BREAST REDUCTION SURGERY Bilateral 2003    BREAST SURGERY      CARPAL TUNNEL RELEASE Left 2/16/2021    LEFT CARPAL TUNNEL RELEASE performed by Lou Strauss DO at [address] COLONOSCOPY N/A 4/20/2022    COLONOSCOPY POLYPECTOMY SNARE/COLD BIOPSY performed by Roselia Jackson MD at [place] [address] Right     wrist    HAND SURGERY Bilateral     4/2014    HYSTERECTOMY (CERVIX STATUS UNKNOWN)      INNER EAR SURGERY      OTHER SURGICAL HISTORY  4/16/2012    Repair vaginal cuff    OVARY REMOVAL      PARTIAL NEPHRECTOMY Left     TOTAL NEPHRECTOMY Left     TUBAL LIGATION         Current Outpatient Medications   Medication Sig Dispense Refill    cholestyramine (QUESTRAN) 4 g packet Take 1 packet by mouth 2 times daily 90 packet 3    vitamin D (ERGOCALCIFEROL) 1.25 MG (15169 UT) CAPS capsule Take 1 capsule by mouth once a week For eight weeks only 8 capsule 0    Naproxen Sodium (ALEVE PO) Take by mouth        No current facility-administered medications for this visit.         Allergies   Allergen Reactions    Codeine Anaphylaxis    Dilaudid [Hydromorphone Hcl] Anaphylaxis    Hydromorphone Anaphylaxis         Review of Systems   Constitutional: Negative for fatigue and fever. HENT: Negative for congestion, dental problem and sore throat. Eyes: Negative for photophobia and visual disturbance. Respiratory: Negative for apnea, chest tightness, shortness of breath and wheezing. Cardiovascular: Negative for chest pain and leg swelling. Gastrointestinal: Negative for diarrhea and nausea. Endocrine: Negative for cold intolerance and heat intolerance. Genitourinary: Negative for difficulty urinating, dysuria, flank pain, frequency and hematuria. Musculoskeletal: Negative for arthralgias and back pain. Skin: Negative for color change, rash and wound. Allergic/Immunologic: Negative for environmental allergies, food allergies and immunocompromised state. Neurological: Negative for dizziness, weakness, light-headedness and numbness. Hematological: Negative for adenopathy. Does not bruise/bleed easily. Psychiatric/Behavioral: Negative for behavioral problems, confusion, sleep disturbance and suicidal ideas. OBJECTIVE:    /70 (Site: Right Upper Arm, Position: Sitting, Cuff Size: Medium Adult)   Pulse 67   Ht 5' 3\" (1.6 m)   Wt 236 lb 9.6 oz (107.3 kg)   LMP 02/15/2012   SpO2 99%   BMI 41.91 kg/m²      Physical Exam  Vitals reviewed. Constitutional:       Appearance: She is obese. HENT:      Head: Normocephalic and atraumatic. Right Ear: External ear normal.      Left Ear: External ear normal.      Nose: Nose normal.      Mouth/Throat:      Mouth: Mucous membranes are moist.   Eyes:      Extraocular Movements: Extraocular movements intact. Pupils: Pupils are equal, round, and reactive to light. Cardiovascular:      Rate and Rhythm: Normal rate and regular rhythm. Pulses: Normal pulses. Heart sounds: Normal heart sounds. Pulmonary:      Effort: Pulmonary effort is normal.      Breath sounds: Normal breath sounds.    Abdominal:      General: Bowel sounds are normal.   Musculoskeletal:         General: Normal range of motion. Cervical back: Normal range of motion and neck supple. Skin:     General: Skin is warm and dry. Neurological:      General: No focal deficit present. Mental Status: She is alert and oriented to person, place, and time. Mental status is at baseline. Psychiatric:         Mood and Affect: Mood normal.         Behavior: Behavior normal.         ASSESSMENT & PLAN:    1. Preoperative clearance  - Continue to work on clearances  - Cleared by pulmonology  - Will need to contact cardiology for clearance letter  - Florencio needs to make an appointment    2. Morbid obesity with BMI of 40.0-44.9, adult [facility])  -Patient was encouraged to journal all food intake.   -Keep calorie level at approximately 1200, per discussion / plan with registered dietician.  -Protein intake is to be a minimum of 60 grams per day. -Water drinking was encouraged with a goal of 64oz-128oz daily. Beverages are to be calorie free except for milk. Avoid soda. -Continue to increase level of physical activity. I spent 25 minutes with the patient face to face today and over 50% of the office visit today was spent in face to face counseling regarding diet and exercise, in preparation for her planned Robotic Sleeve Gastrectomy. Discussed in length complying with the dietary recommendations, complying with the preoperative workup including dietary counseling, exercise physiologist counseling, and pre-operative optimization of pulmonologist and cardiologist.    The patient expressed understanding and willingness to comply nicely; all questions and concerns addressed. No orders of the defined types were placed in this encounter. No orders of the defined types were placed in this encounter. Follow Up:  Return in about 1 month (around 7/14/2022) for weight check.     VIOLET Mendosa - CNP

## 2022-06-23 PROBLEM — Z01.811 PREOP PULMONARY/RESPIRATORY EXAM: Status: RESOLVED | Noted: 2022-05-24 | Resolved: 2022-06-23

## 2022-07-12 ENCOUNTER — OFFICE VISIT (OUTPATIENT)
Dept: BARIATRICS/WEIGHT MGMT | Age: 42
End: 2022-07-12
Payer: MEDICAID

## 2022-07-12 VITALS
BODY MASS INDEX: 41.85 KG/M2 | WEIGHT: 236.2 LBS | HEART RATE: 88 BPM | SYSTOLIC BLOOD PRESSURE: 118 MMHG | DIASTOLIC BLOOD PRESSURE: 72 MMHG | OXYGEN SATURATION: 96 % | HEIGHT: 63 IN

## 2022-07-12 DIAGNOSIS — E66.01 MORBID OBESITY WITH BMI OF 40.0-44.9, ADULT (HCC): ICD-10-CM

## 2022-07-12 DIAGNOSIS — Z01.818 PREOPERATIVE CLEARANCE: Primary | ICD-10-CM

## 2022-07-12 DIAGNOSIS — E78.2 MIXED HYPERLIPIDEMIA: ICD-10-CM

## 2022-07-12 PROCEDURE — G8427 DOCREV CUR MEDS BY ELIG CLIN: HCPCS | Performed by: NURSE PRACTITIONER

## 2022-07-12 PROCEDURE — G8417 CALC BMI ABV UP PARAM F/U: HCPCS | Performed by: NURSE PRACTITIONER

## 2022-07-12 PROCEDURE — 99213 OFFICE O/P EST LOW 20 MIN: CPT | Performed by: NURSE PRACTITIONER

## 2022-07-12 PROCEDURE — 1036F TOBACCO NON-USER: CPT | Performed by: NURSE PRACTITIONER

## 2022-07-12 ASSESSMENT — PATIENT HEALTH QUESTIONNAIRE - PHQ9
SUM OF ALL RESPONSES TO PHQ QUESTIONS 1-9: 0
7. TROUBLE CONCENTRATING ON THINGS, SUCH AS READING THE NEWSPAPER OR WATCHING TELEVISION: 0
6. FEELING BAD ABOUT YOURSELF - OR THAT YOU ARE A FAILURE OR HAVE LET YOURSELF OR YOUR FAMILY DOWN: 0
2. FEELING DOWN, DEPRESSED OR HOPELESS: 0
10. IF YOU CHECKED OFF ANY PROBLEMS, HOW DIFFICULT HAVE THESE PROBLEMS MADE IT FOR YOU TO DO YOUR WORK, TAKE CARE OF THINGS AT HOME, OR GET ALONG WITH OTHER PEOPLE: 0
SUM OF ALL RESPONSES TO PHQ QUESTIONS 1-9: 0
5. POOR APPETITE OR OVEREATING: 0
3. TROUBLE FALLING OR STAYING ASLEEP: 0
SUM OF ALL RESPONSES TO PHQ QUESTIONS 1-9: 0
9. THOUGHTS THAT YOU WOULD BE BETTER OFF DEAD, OR OF HURTING YOURSELF: 0
4. FEELING TIRED OR HAVING LITTLE ENERGY: 0
8. MOVING OR SPEAKING SO SLOWLY THAT OTHER PEOPLE COULD HAVE NOTICED. OR THE OPPOSITE, BEING SO FIGETY OR RESTLESS THAT YOU HAVE BEEN MOVING AROUND A LOT MORE THAN USUAL: 0
SUM OF ALL RESPONSES TO PHQ QUESTIONS 1-9: 0

## 2022-07-12 ASSESSMENT — ENCOUNTER SYMPTOMS
DIARRHEA: 0
APNEA: 0
CHEST TIGHTNESS: 0
COLOR CHANGE: 0
SORE THROAT: 0
SHORTNESS OF BREATH: 0
WHEEZING: 0
PHOTOPHOBIA: 0
BACK PAIN: 0
NAUSEA: 0

## 2022-07-12 NOTE — PROGRESS NOTES
BARIATRIC SURGERY OFFICE PROGRESS NOTE    SUBJECTIVE:    Patient presenting today referred from VIOLET Garcia NP, for   Chief Complaint   Patient presents with    Weight Management     4th SURG WM Visit +Cardia., +Pulm. , -Psych   . Vitals:    07/12/22 1137   BP: 118/72   Pulse: 88   SpO2: 96%        BMI: Body mass index is 41.84 kg/m². Weight History: Wt Readings from Last 3 Encounters:   07/12/22 236 lb 3.2 oz (107.1 kg)   06/14/22 236 lb 9.6 oz (107.3 kg)   05/25/22 235 lb (106.6 kg)         Ariella Mckeon is a 42 y.o. female presenting in fourth bariatric PRE-OP visit    Diet Recall: Total weight loss/gain: -0.4 lbs since last visit, and -2.6 lbs since starting program     Calories: tracking calories. Water Intake: not good. Does drink [employer] water but started new job and hasnt gotten a routine down  Exercise: walks a lot with new job    New health problems: denies  New medications: denies    Clearances:  -- Cardiology:  cleared  -- Pulmonology:  cleared  -- Psychology:  Needs to make appointment    Needs EGD consent    Thoroughly reviewed the patient's medical history, family history, social history and review of systems with the patient today in the office. Please see medical record for pertinent positives. Past Medical History:   Diagnosis Date    Chronic depression 07/09/2021    Hx of Doppler echocardiogram 04/27/2022    EF 55-60%. Mild LV hypertrophy. Grade I diastolic dysfunction. Mildly dilated LA. Mild MR and TR.    Kidney disorder     left removed .     Migraine     Mixed hyperlipidemia 01/27/2021    PONV (postoperative nausea and vomiting)     Preop pulmonary/respiratory exam 5/24/2022    Prolonged emergence from general anesthesia     Pulmonic stenosis     Sleep apnea     Sleep apnea         Patient Active Problem List   Diagnosis    Vaginal bleeding    H/O of hysterectomy with bilateral oophorectomy    Panic disorder with agoraphobia    Early menopause  H/O bilateral breast reduction surgery    Myofascial pain syndrome    Somatic dysfunction of head region    Somatic dysfunction of cervical region    Somatic dysfunction of pelvic region    Snoring    Obstructive sleep apnea on CPAP    Somatic dysfunction of both upper extremities    Morbidly obese (HCC)    Paresthesia of left upper extremity    Fibromyalgia    Mixed hyperlipidemia    Carpal tunnel syndrome of left wrist    Vitamin D deficiency    Chronic depression    Anxiety    Chronic pain of left knee    Migraine without aura and without status migrainosus, not intractable    Injury of left ankle    Hypersomnia    Diarrhea       Past Surgical History:   Procedure Laterality Date    BREAST BIOPSY Left 2010    benign    BREAST REDUCTION SURGERY Bilateral 2003    BREAST SURGERY      CARPAL TUNNEL RELEASE Left 2/16/2021    LEFT CARPAL TUNNEL RELEASE performed by Efrain Jacobson DO at [address] COLONOSCOPY N/A 4/20/2022    COLONOSCOPY POLYPECTOMY SNARE/COLD BIOPSY performed by Imtiaz Larry MD at [place] [address] Right     wrist    HAND SURGERY Bilateral     4/2014    HYSTERECTOMY (CERVIX STATUS UNKNOWN)      INNER EAR SURGERY      OTHER SURGICAL HISTORY  4/16/2012    Repair vaginal cuff    OVARY REMOVAL      PARTIAL NEPHRECTOMY Left     TOTAL NEPHRECTOMY Left     TUBAL LIGATION         Current Outpatient Medications   Medication Sig Dispense Refill    cholestyramine (QUESTRAN) 4 g packet Take 1 packet by mouth 2 times daily 90 packet 3    vitamin D (ERGOCALCIFEROL) 1.25 MG (27469 UT) CAPS capsule Take 1 capsule by mouth once a week For eight weeks only 8 capsule 0    Naproxen Sodium (ALEVE PO) Take by mouth        No current facility-administered medications for this visit.         Allergies   Allergen Reactions    Codeine Anaphylaxis    Dilaudid [Hydromorphone Hcl] Anaphylaxis    Hydromorphone Anaphylaxis         Review of Systems General: Normal range of motion. Cervical back: Normal range of motion and neck supple. Skin:     General: Skin is warm and dry. Neurological:      General: No focal deficit present. Mental Status: She is alert and oriented to person, place, and time. Mental status is at baseline. Psychiatric:         Mood and Affect: Mood normal.         Behavior: Behavior normal.       ASSESSMENT & PLAN:    1. Preoperative clearance  - Cardiology cleared  - Pulmonology cleared  - Psych; needs to make appointment    2. Mixed hyperlipidemia  - Continue to work on diet and weight loss    3. Morbid obesity with BMI of 40.0-44.9, adult [facility])  -Patient was encouraged to journal all food intake.   -Keep calorie level at approximately 1200, per discussion / plan with registered dietician.  -Protein intake is to be a minimum of 60 grams per day. -Water drinking was encouraged with a goal of 64oz-128oz daily. Beverages are to be calorie free except for milk. Avoid soda. -Continue to increase level of physical activity. This patient is a female of reproductive age and was advised she should not become pregnant during the bariatric workup process and for at least 12-18 months after surgery. The patient is agreeable to this plan. I spent 25 minutes with the patient face to face today and over 50% of the office visit today was spent in face to face counseling regarding diet and exercise, in preparation for her planned Robotic Sleeve Gastrectomy. Discussed in length complying with the dietary recommendations, complying with the preoperative workup including dietary counseling, exercise physiologist counseling, and pre-operative optimization of pulmonologist and cardiologist.    The patient expressed understanding and willingness to comply nicely; all questions and concerns addressed. No orders of the defined types were placed in this encounter.     No orders of the defined types were placed in this encounter. Follow Up:  Return in about 1 month (around 8/12/2022) for weight check.     VIOLET Knox - CNP

## 2022-07-18 ENCOUNTER — TELEPHONE (OUTPATIENT)
Dept: BARIATRICS/WEIGHT MGMT | Age: 42
End: 2022-07-18

## 2022-07-18 NOTE — TELEPHONE ENCOUNTER
SPOKE TO  Umberto Hanson REGARDING SURGERY (EGD W/ BX) SCHEDULED @ [facility].  NOTIFIED OF DATES, TIMES AND LOCATION    PHONE ASSESSMENT   SURGERY - 7/29/22 @ 830  P/O - MONTHLY VISIT 8/18/22 1115    NPO AFTER MIDNIGHT  HOLD BLOOD THINNERS - NA

## 2022-07-19 ENCOUNTER — TELEPHONE (OUTPATIENT)
Dept: BARIATRICS/WEIGHT MGMT | Age: 42
End: 2022-07-19

## 2022-07-19 NOTE — TELEPHONE ENCOUNTER
Nneka Horton  [id number]      CPT [address]        [facility] 7/29/2022

## 2022-07-25 NOTE — PROGRESS NOTES
Patient will be called with an arrival time on 7/28/2022 for her procedure at [facility] on 7/29/2022. 1. Do not eat or drink anything after midnight - unless instructed by your doctor prior to surgery. This includes                   no water, chewing gum or mints. 2. Follow your directions as prescribed by the doctor for your procedure and medications. 3. Check with your Doctor regarding stopping vitamins, supplements, blood thinners (Plavix, Coumadin, Lovenox, Effient, Pradaxa, Xarelto, Fragmin or                   other blood thinners) and follow their instructions. 4. Do not smoke, vape or use chewing tobacco morning of surgery. Do not drink any alcoholic beverages 24 hours prior to surgery. This includes NA Beer. No street drugs 7 days prior to surgery. 5. You may brush your teeth and gargle the morning of surgery. DO NOT SWALLOW WATER   6. You MUST make arrangements for a responsible adult to take you home after your surgery and be able to check on you every couple                   hours for the day. You will not be allowed to leave alone or drive yourself home. It is strongly suggested someone stay with you the first 24                   hrs. Your surgery will be cancelled if you do not have a ride home. 7. Please wear simple, loose fitting clothing to the hospital.  [place] Bars not bring valuables (money, credit cards, checkbooks, etc.) Do not wear any                   makeup (including no eye makeup) or nail polish on your fingers or toes. 8. DO NOT wear any jewelry or piercings on day of surgery. All body piercing jewelry must be removed. 9. If you have dentures, they will be removed before going to the OR; we will provide you a container. If you wear contact lenses or glasses,                  they will be removed; please bring a case for them.            10. If you  have a Living Will and Durable Power of  for Healthcare, please bring in a copy. 11. Please bring picture ID,  insurance card, paperwork from the doctors office    (H & P, Consent, & card for implantable devices). 12. Take a shower the morning of your procedure with Hibiclens or an anti-bacterial soap. Do not apply any make-up, deodorant, lotion, oil or powder. 13.  Enter thru the main entrance wearing a mask on the day of surgery. Patient voiced understanding concerning EGD instructions and had no questions at this time.

## 2022-07-28 ENCOUNTER — ANESTHESIA EVENT (OUTPATIENT)
Dept: ENDOSCOPY | Age: 42
End: 2022-07-28
Payer: MEDICAID

## 2022-07-28 NOTE — PROGRESS NOTES
Left message for patient to arrive at 0630 at [facility] on 7/29/2022 for her procedure at 0830.

## 2022-07-28 NOTE — ANESTHESIA PRE PROCEDURE
Department of Anesthesiology  Preprocedure Note       Name:  Ras Peralta   Age:  36 y.o.  :  1980                                          MRN:  [id number]         Date:  2022      Surgeon: Blanca Marshall):  Eileen Mccoy MD    Procedure: Procedure(s):  EGD ESOPHAGOGASTRODUODENOSCOPY WITH BX    Medications prior to admission:   Prior to Admission medications    Medication Sig Start Date End Date Taking? Authorizing Provider   cholestyramine (QUESTRAN) 4 g packet Take 1 packet by mouth 2 times daily 22   VIOLET Wang CNP   vitamin D (ERGOCALCIFEROL) 1.25 MG (56362 UT) CAPS capsule Take 1 capsule by mouth once a week For eight weeks only 3/29/22   VIOLET Baker NP   Naproxen Sodium (ALEVE PO) Take by mouth     Historical Provider, MD       Current medications:    No current facility-administered medications for this encounter. Current Outpatient Medications   Medication Sig Dispense Refill    cholestyramine (QUESTRAN) 4 g packet Take 1 packet by mouth 2 times daily 90 packet 3    vitamin D (ERGOCALCIFEROL) 1.25 MG (83339 UT) CAPS capsule Take 1 capsule by mouth once a week For eight weeks only 8 capsule 0    Naproxen Sodium (ALEVE PO) Take by mouth          Allergies:     Allergies   Allergen Reactions    Codeine Anaphylaxis    Dilaudid [Hydromorphone Hcl] Anaphylaxis    Hydromorphone Anaphylaxis       Problem List:    Patient Active Problem List   Diagnosis Code    Vaginal bleeding N93.9    H/O of hysterectomy with bilateral oophorectomy Z90.710, Z90.722    Panic disorder with agoraphobia F40.01    Early menopause E28.319    H/O bilateral breast reduction surgery Z98.890    Myofascial pain syndrome M79.18    Somatic dysfunction of head region M99.00    Somatic dysfunction of cervical region M99.01    Somatic dysfunction of pelvic region M99.05    Snoring R06.83    Obstructive sleep apnea on CPAP G47.33, Z99.89    Somatic dysfunction of both upper extremities M99.07    Morbidly obese (HCC) E66.01    Paresthesia of left upper extremity R20.2    Fibromyalgia M79.7    Mixed hyperlipidemia E78.2    Carpal tunnel syndrome of left wrist G56.02    Vitamin D deficiency E55.9    Chronic depression F32. A    Anxiety F41.9    Chronic pain of left knee M25.562, G89.29    Migraine without aura and without status migrainosus, not intractable G43.009    Injury of left ankle S99.912A    Hypersomnia G47.10    Diarrhea R19.7       Past Medical History:        Diagnosis Date    Chronic depression 07/09/2021    Hx of Doppler echocardiogram 04/27/2022    EF 55-60%. Mild LV hypertrophy. Grade I diastolic dysfunction. Mildly dilated LA. Mild MR and TR.    Kidney disorder     left removed .  Migraine     Mixed hyperlipidemia 01/27/2021    PONV (postoperative nausea and vomiting)     Preop pulmonary/respiratory exam 5/24/2022    Prolonged emergence from general anesthesia     Pulmonic stenosis     Sleep apnea     Sleep apnea        Past Surgical History:        Procedure Laterality Date    BREAST BIOPSY Left 2010    benign    BREAST REDUCTION SURGERY Bilateral 2003    BREAST SURGERY      CARPAL TUNNEL RELEASE Left 2/16/2021    LEFT CARPAL TUNNEL RELEASE performed by Gely Thao DO at [address] COLONOSCOPY N/A 4/20/2022    COLONOSCOPY POLYPECTOMY SNARE/COLD BIOPSY performed by Adeline Page MD at [facility] Right     wrist    HAND SURGERY Bilateral     4/2014    HYSTERECTOMY (CERVIX STATUS UNKNOWN)      INNER EAR SURGERY      OTHER SURGICAL HISTORY  4/16/2012    Repair vaginal cuff    OVARY REMOVAL      PARTIAL NEPHRECTOMY Left     TOTAL NEPHRECTOMY Left     TUBAL LIGATION         Social History:    Social History     Tobacco Use    Smoking status: Never    Smokeless tobacco: Never   Substance Use Topics    Alcohol use:  No                                Counseling given: Not

## 2022-07-29 ENCOUNTER — ANESTHESIA (OUTPATIENT)
Dept: ENDOSCOPY | Age: 42
End: 2022-07-29
Payer: MEDICAID

## 2022-07-29 ENCOUNTER — HOSPITAL ENCOUNTER (OUTPATIENT)
Age: 42
Setting detail: OUTPATIENT SURGERY
Discharge: HOME | End: 2022-07-29
Attending: SURGERY | Admitting: SURGERY
Payer: MEDICAID

## 2022-07-29 VITALS
TEMPERATURE: 96.8 F | OXYGEN SATURATION: 99 % | BODY MASS INDEX: 41.82 KG/M2 | WEIGHT: 236 LBS | SYSTOLIC BLOOD PRESSURE: 128 MMHG | RESPIRATION RATE: 16 BRPM | HEART RATE: 70 BPM | HEIGHT: 63 IN | DIASTOLIC BLOOD PRESSURE: 79 MMHG

## 2022-07-29 DIAGNOSIS — E66.01 MORBID OBESITY (HCC): ICD-10-CM

## 2022-07-29 PROCEDURE — 7100000010 HC PHASE II RECOVERY - FIRST 15 MIN: Performed by: SURGERY

## 2022-07-29 PROCEDURE — 43239 EGD BIOPSY SINGLE/MULTIPLE: CPT | Performed by: SURGERY

## 2022-07-29 PROCEDURE — 2580000003 HC RX 258

## 2022-07-29 PROCEDURE — 3700000001 HC ADD 15 MINUTES (ANESTHESIA): Performed by: SURGERY

## 2022-07-29 PROCEDURE — 88342 IMHCHEM/IMCYTCHM 1ST ANTB: CPT

## 2022-07-29 PROCEDURE — 3609017100 HC EGD: Performed by: SURGERY

## 2022-07-29 PROCEDURE — 2500000003 HC RX 250 WO HCPCS

## 2022-07-29 PROCEDURE — 3700000000 HC ANESTHESIA ATTENDED CARE: Performed by: SURGERY

## 2022-07-29 PROCEDURE — 88305 TISSUE EXAM BY PATHOLOGIST: CPT

## 2022-07-29 PROCEDURE — 2580000003 HC RX 258: Performed by: ANESTHESIOLOGY

## 2022-07-29 PROCEDURE — 2709999900 HC NON-CHARGEABLE SUPPLY: Performed by: SURGERY

## 2022-07-29 PROCEDURE — 6360000002 HC RX W HCPCS

## 2022-07-29 PROCEDURE — 7100000011 HC PHASE II RECOVERY - ADDTL 15 MIN: Performed by: SURGERY

## 2022-07-29 RX ORDER — LIDOCAINE HYDROCHLORIDE 20 MG/ML
INJECTION, SOLUTION EPIDURAL; INFILTRATION; INTRACAUDAL; PERINEURAL PRN
Status: DISCONTINUED | OUTPATIENT
Start: 2022-07-29 | End: 2022-07-29 | Stop reason: SDUPTHER

## 2022-07-29 RX ORDER — SODIUM CHLORIDE, SODIUM LACTATE, POTASSIUM CHLORIDE, CALCIUM CHLORIDE 600; 310; 30; 20 MG/100ML; MG/100ML; MG/100ML; MG/100ML
INJECTION, SOLUTION INTRAVENOUS CONTINUOUS
Status: DISCONTINUED | OUTPATIENT
Start: 2022-07-29 | End: 2022-07-29 | Stop reason: HOSPADM

## 2022-07-29 RX ORDER — PROPOFOL 10 MG/ML
INJECTION, EMULSION INTRAVENOUS PRN
Status: DISCONTINUED | OUTPATIENT
Start: 2022-07-29 | End: 2022-07-29 | Stop reason: SDUPTHER

## 2022-07-29 RX ORDER — SODIUM CHLORIDE, SODIUM LACTATE, POTASSIUM CHLORIDE, CALCIUM CHLORIDE 600; 310; 30; 20 MG/100ML; MG/100ML; MG/100ML; MG/100ML
INJECTION, SOLUTION INTRAVENOUS CONTINUOUS PRN
Status: DISCONTINUED | OUTPATIENT
Start: 2022-07-29 | End: 2022-07-29 | Stop reason: SDUPTHER

## 2022-07-29 RX ADMIN — PROPOFOL 270 MG: 10 INJECTION, EMULSION INTRAVENOUS at 08:45

## 2022-07-29 RX ADMIN — SODIUM CHLORIDE, POTASSIUM CHLORIDE, SODIUM LACTATE AND CALCIUM CHLORIDE: 600; 310; 30; 20 INJECTION, SOLUTION INTRAVENOUS at 07:10

## 2022-07-29 RX ADMIN — LIDOCAINE HYDROCHLORIDE 100 MG: 20 INJECTION, SOLUTION EPIDURAL; INFILTRATION; INTRACAUDAL; PERINEURAL at 08:45

## 2022-07-29 RX ADMIN — SODIUM CHLORIDE, POTASSIUM CHLORIDE, SODIUM LACTATE AND CALCIUM CHLORIDE: 600; 310; 30; 20 INJECTION, SOLUTION INTRAVENOUS at 08:40

## 2022-07-29 NOTE — ANESTHESIA POSTPROCEDURE EVALUATION
Department of Anesthesiology  Postprocedure Note    Patient: Ajith Honeycutt  MRN: [id number]  YOB: 1980  Date of evaluation: 7/29/2022      Procedure Summary     Date: 07/29/22 Room / Location: [address]    Anesthesia Start: Stefanierxo Aleksema Anesthesia Stop: 7291    Procedure: EGD ESOPHAGOGASTRODUODENOSCOPY WITH BX Diagnosis:       Morbid obesity ([address]      (Morbid obesity ([address] [E66.01])    Surgeons: Tab Mcgee MD Responsible Provider: Bakari Murrell MD    Anesthesia Type: MAC ASA Status: 3          Anesthesia Type: No value filed.     Waldo Phase I:      Waldo Phase II:        Anesthesia Post Evaluation    Patient location during evaluation: bedside  Patient participation: complete - patient participated  Level of consciousness: awake  Pain score: 0  Airway patency: patent  Nausea & Vomiting: no nausea and no vomiting  Complications: no  Cardiovascular status: blood pressure returned to baseline and hemodynamically stable  Respiratory status: acceptable and room air  Hydration status: euvolemic

## 2022-07-29 NOTE — OP NOTE
PROCEDURE NOTE    DATE OF PROCEDURE: 7/29/2022     SURGEON: Farzana Way MD , FACS    ASSISTANT: None    PREOPERATIVE DIAGNOSIS:  1. Morbid obesity with planned bariatric surgery    POSTOPERATIVE DIAGNOSIS:  1. Mild superficial gastritis     OPERATION: Esophagogastroduodenoscopy with biopsies    ANESTHESIA: Local monitored anesthesia    ESTIMATED BLOOD LOSS: None    COMPLICATIONS: None apparent    SPECIMENS: were obtained    HISTORY: The patient is a 36y.o. year old female with history of the above preoperative diagnosis. I recommended esophagogastroduodenoscopy with possible biopsy and I explained the risk, benefits, expected outcome, and alternatives to the procedure. Risks included but are not limited to bleeding, infection, respiratory distress, hypotension, and perforation of the esophagus, stomach, or duodenum. Patient understands and is in agreement, wishing to proceed. PROCEDURE: The patient was brought into the endoscopy suite and the appropriate monitors were connected to the patient. A timeout was held with all members of the procedure team present and in agreement. The patient was positioned in the left lateral decubitus position with a bite block in place. The endoscope was inserted into the patient's mouth and advanced from the oropharynx into the hypopharynx without difficulty and under direct vision. The scope was then advanced through the patient's esophagus under direct vision into the stomach, pylorus, and duodenum. A retroflexed view of the gastroesophageal junction was performed. Biopsies were taken. A summary of the findings are as follows:      Duodenum:     Descending: normal    Bulb: normal    Stomach:    Antrum: abnormal: mild superficial gastritic, biopsied    Body: normal    Fundus: normal    Esophagus: normal, GEJ ~ 35 cm from incisors    Larynx: normal    The stomach was desufflated and the endoscope was removed.  The patient tolerated the procedure well, and was transferred to the PACU following the procedure in stable condition.     IMPRESSION/PLAN:   F/u biopsy results  Mild superficial gastritis of antrum  No anatomic contraindication to planned bariatric surgery     Electronically signed by Robin Salcedo II, MD  on 7/29/2022 at 8:53 AM

## 2022-07-29 NOTE — H&P
History and Physical Update    Original H&P done in office on 7/12/2022 (less than 30 days ago). Pt reports the following changes in health since being seen last:  None    Vitals:    07/29/22 0650   BP: (!) 110/55   Pulse: 74   Resp: 18   Temp: (!) 96.7 °F (35.9 °C)   SpO2: 94%       Alert and oriented x 3, no apparent distress at rest  Atraumatic, normocephalic. EOMI. Breathing unlabored. RRR. Soft, non-tender, non-distended. Moves all extremities. Warm, dry. Susana Long is a 35 yo female here today for EGD    -Consent obtained in office.  -Reviewed expected pre-operative, operative, and post-operative courses. -Answered questions to patient's satisfaction.   -Reviewed risks, benefits, alternative to procedure.   -Proceed as scheduled. -The patient was counseled at length about the risks of campos Covid-19 during their perioperative period and any recovery window from their procedure. The patient was made aware that campos Covid-19  may worsen their prognosis for recovering from their procedure  and lend to a higher morbidity and/or mortality risk. All material risks, benefits, and reasonable alternatives including postponing the procedure were discussed. The patient does wish to proceed with the procedure at this time. Bert Strickland, VIOLET - CNP     Agree with above. Pt evaluated. All questions answered. Asking about going to work tonight - d/w pt that would not be recommended and ok to go tomorrow. Pt agreeable. Note to be given. Proceed as scheduled.      Diane Graves MD

## 2022-07-29 NOTE — DISCHARGE INSTRUCTIONS
EGD     [facility]    OFFICE NUMBER [phone]    FOLLOW UP APPOINTMENT AS SCHEDULED. REPEAT PROCEDURE AS  NEEDED. What to Expect at Home  Your Recovery:  The only discomfort after your EGD is generally limited to a mild soreness of the throat, which may last a day or two. Call your physician immediately if you have severe chest pain, shortness of breath or a temperature of 100 degrees or higher if taken orally. How can you care for yourself at home? Activity  Rest as much as you need to after you go home. You should be able to go back to your usual activities the day after the test.  Diet  Follow your doctor's directions for eating after the test.  Drink plenty of fluids (unless your doctor has told you not to). Medications  If you have a sore throat the day after the test, use an over-the-counter spray to numb your throat. Your doctor will tell you if and when you can restart your medicines. He or she will also give you instructions about taking any new medicines. If you take blood thinners, such as warfarin (Coumadin), clopidogrel (Plavix), or aspirin, be sure to talk to your doctor. He or she will tell you if and when to start taking those medicines again. Make sure that you understand exactly what your doctor wants you to do. If a biopsy was done during the test, your doctor may tell you not to take aspirin or other anti-inflammatory medicines for a few days. These include ibuprofen (Advil, Motrin) and naproxen (Aleve). DO NOT DRINK ANYTHING WITH ALCOHOL TODAY. Other instructions:Anesthesia  For your safety, do not drive or operate machinery for 24 hours. Do not sign legal documents or make major decisions for 24 hours. The anesthesia can make it hard for you to fully understand what you are agreeing to. Follow-up care is a key part of your treatment and safety. Be sure to make and go to all appointments, and call your doctor if you are having problems.  It's also a good idea to know your test results and keep a list of the medicines you take. When should you call for help? [address] [facility] Breanna [place] Thomas Moralesas [phone]  Call 911 anytime you think you may need emergency care. For example, call if:  You passed out (lost consciousness). You cough up blood. You vomit blood or what looks like coffee grounds. You pass maroon or very bloody stools. Call your doctor now or seek immediate medical care if:  You have trouble swallowing. You have belly pain. Your stools are black and tarlike or have streaks of blood. You are sick to your stomach or cannot keep fluids down. Watch closely for changes in your health, and be sure to contact your doctor if:  Your throat still hurts after a day or two. You do not get better as expected. Where can you learn more? Go to https://[email or web address].Leap. org and sign in to your [employer] account. Enter J215 in the [employer] box to learn more about Upper GI Endoscopy: What to Expect at Home.     If you do not have an account, please click on the Sign Up Now link. © 1570-5305 Healthwise, Incorporated. Care instructions adapted under license by [facility] ([facility]). This care instruction is for use with your licensed healthcare professional. If you have questions about a medical condition or this instruction, always ask your healthcare professional. [place] [address] any warranty or liability for your use of this information. Content Version: 18.4.763268; Current as of: November 20, 2015                 [facility]  [phone]    Do not drive, work around [address] [place] [address] or use equipment. Do not drink any alcoholic beverages. Do not smoke while alone. Avoid making important decisions. Plan to spend a quiet, relaxed evening @ home. Resume normal activities as you begin to feel better.   Eat lightly for your first meal, then gradually increase your diet to what is normal for you. In case of nausea, avoid food and drink only clear liquids. Resume food as nausea ceases. Notify your surgeon if you experience fever, chills, large amount of bleeding, difficulty breathing, persistent nausea and vomiting or any other disturbing problem. Call for a follow-up appointment with your surgeon.

## 2022-07-29 NOTE — PROGRESS NOTES
Received report from [facility] prior to transfer to endo unit. She is alert and oriented and has been NPO since midnight. She takes no beta blockers or blood thinners.  Consents are signed

## 2022-07-29 NOTE — ANESTHESIA PRE PROCEDURE
Department of Anesthesiology  Preprocedure Note       Name:  Bruno Meza   Age:  36 y.o.  :  1980                                          MRN:  [id number]         Date:  2022      Surgeon: Romaine Bentley):  Nida Beebe MD    Procedure: Procedure(s):  EGD ESOPHAGOGASTRODUODENOSCOPY WITH BX    Medications prior to admission:   Prior to Admission medications    Medication Sig Start Date End Date Taking? Authorizing Provider   cholestyramine (QUESTRAN) 4 g packet Take 1 packet by mouth 2 times daily  Patient not taking: Reported on 2022   VIOLET Jacques CNP   vitamin D (ERGOCALCIFEROL) 1.25 MG (37845 UT) CAPS capsule Take 1 capsule by mouth once a week For eight weeks only  Patient not taking: Reported on 2022 3/29/22   VIOLET Newton NP   Naproxen Sodium (ALEVE PO) Take by mouth     Historical Provider, MD       Current medications:    No current facility-administered medications for this visit. No current outpatient medications on file. Facility-Administered Medications Ordered in Other Visits   Medication Dose Route Frequency Provider Last Rate Last Admin    lactated ringers infusion   IntraVENous Continuous Christina Pu, DO 50 mL/hr at 22 0710 New Bag at 22 0710       Allergies:     Allergies   Allergen Reactions    Codeine Anaphylaxis    Dilaudid [Hydromorphone Hcl] Anaphylaxis    Hydromorphone Anaphylaxis       Problem List:    Patient Active Problem List   Diagnosis Code    Vaginal bleeding N93.9    H/O of hysterectomy with bilateral oophorectomy Z90.710, Z90.722    Panic disorder with agoraphobia F40.01    Early menopause E28.319    H/O bilateral breast reduction surgery Z98.890    Myofascial pain syndrome M79.18    Somatic dysfunction of head region M99.00    Somatic dysfunction of cervical region M99.01    Somatic dysfunction of pelvic region M99.05    Snoring R06.83    Obstructive sleep apnea on CPAP G47.33, Counseling given: Not Answered      Vital Signs (Current): There were no vitals filed for this visit. BP Readings from Last 3 Encounters:   07/29/22 (!) 110/55   07/12/22 118/72   06/14/22 112/70       NPO Status:                                                                                 BMI:   Wt Readings from Last 3 Encounters:   07/25/22 236 lb (107 kg)   07/12/22 236 lb 3.2 oz (107.1 kg)   06/14/22 236 lb 9.6 oz (107.3 kg)     There is no height or weight on file to calculate BMI.    CBC:   Lab Results   Component Value Date/Time    WBC 8.6 04/18/2022 02:11 PM    RBC 4.46 04/18/2022 02:11 PM    HGB 13.3 04/18/2022 02:11 PM    HCT 41.4 04/18/2022 02:11 PM    MCV 92.8 04/18/2022 02:11 PM    RDW 13.0 04/18/2022 02:11 PM     04/18/2022 02:11 PM       CMP:   Lab Results   Component Value Date/Time     03/11/2022 10:37 AM    K 4.4 03/11/2022 10:37 AM     03/11/2022 10:37 AM    CO2 24 03/11/2022 10:37 AM    BUN 12 03/11/2022 10:37 AM    CREATININE 1.0 03/11/2022 10:37 AM    GFRAA >60 03/11/2022 10:37 AM    AGRATIO 1.4 01/27/2021 02:00 PM    LABGLOM >60 03/11/2022 10:37 AM    GLUCOSE 97 03/11/2022 10:37 AM    PROT 6.7 03/11/2022 10:37 AM    PROT 7.7 04/15/2012 09:26 PM    CALCIUM 8.9 03/11/2022 10:37 AM    BILITOT 0.7 03/11/2022 10:37 AM    ALKPHOS 96 03/11/2022 10:37 AM    AST 28 03/11/2022 10:37 AM    ALT 22 03/11/2022 10:37 AM       POC Tests: No results for input(s): POCGLU, POCNA, POCK, POCCL, POCBUN, POCHEMO, POCHCT in the last 72 hours. Coags:   Lab Results   Component Value Date/Time    PROTIME 12.2 03/11/2022 10:37 AM    PROTIME 11.2 04/15/2012 09:26 PM    INR 0.95 03/11/2022 10:37 AM    APTT 43.3 03/11/2022 10:37 AM       HCG (If Applicable):   Lab Results   Component Value Date    PREGTESTUR NEGATIVE 03/24/2014        ABGs: No results found for: PHART, PO2ART, MQW9AZQ, DRW7YOK, BEART, R5PKCFPP     Type & Screen (If Applicable):   No results found for: Angelicajacquelinejennifer Allison    Drug/Infectious Status (If Applicable):  No results found for: HIV, HEPCAB    COVID-19 Screening (If Applicable):   Lab Results   Component Value Date/Time    COVID19 NOT DETECTED 02/09/2021 03:00 PM           Anesthesia Evaluation  Patient summary reviewed   history of anesthetic complications (Prolonged emergence from general anesthesia): PONV. Airway:           Dental:          Pulmonary:   (+) sleep apnea:            Patient did not smoke on day of surgery. ROS comment: havent used cpap yet    Cardiovascular:  Exercise tolerance: good (>4 METS),            Beta Blocker:  Not on Beta Blocker      ROS comment: Echo 4/2022:  Summary   Limited study due to patients body habitus. Left ventricular function and size is normal, EF is estimated at 55-60%. Mild left ventricular hypertrophy. Grade I diastolic dysfunction. No regional wall motion abnormalities were detected. Mildly dilated left atrium. Mild mitral and tricuspid regurgitation is present. RVSP is 27 mmHg. No evidence of pericardial effusion. Neuro/Psych:   (+) neuromuscular disease:, headaches: migraine headaches, depression/anxiety              ROS comment: Fibromyalgia  ptsd GI/Hepatic/Renal:   (+) renal disease:, morbid obesity         ROS comment: Single kidney . Endo/Other: Negative Endo/Other ROS   (+) : arthritis: OA., . Pt had no PAT visit       Abdominal:             Vascular: negative vascular ROS. Other Findings:             Anesthesia Plan      MAC     ASA 3     (Hx of prolonged emergence)  Induction: intravenous. Anesthetic plan and risks discussed with patient. Plan discussed with TYSON. Matias Saint, APRN - TYSON   7/29/2022         Pre Anesthesia Assessment complete.  Chart reviewed on 7/29/2022

## 2022-07-31 NOTE — ANESTHESIA PRE PROCEDURE
Department of Anesthesiology  Preprocedure Note       Name:  Rodriguez Benavides   Age:  36 y.o.  :  1980                                          MRN:  [id number]         Date:  2022      Surgeon: Lorie Reddy):  Katie Peacock MD    Procedure: Procedure(s):  EGD ESOPHAGOGASTRODUODENOSCOPY WITH BX    Medications prior to admission:   Prior to Admission medications    Medication Sig Start Date End Date Taking? Authorizing Provider   cholestyramine (QUESTRAN) 4 g packet Take 1 packet by mouth 2 times daily  Patient not taking: Reported on 2022   VIOLET Fernandes - CNP   vitamin D (ERGOCALCIFEROL) 1.25 MG (53034 UT) CAPS capsule Take 1 capsule by mouth once a week For eight weeks only  Patient not taking: Reported on 2022 3/29/22   VIOLET Edgar NP   Naproxen Sodium (ALEVE PO) Take by mouth     Historical Provider, MD       Current medications:    No current facility-administered medications for this encounter. Current Outpatient Medications   Medication Sig Dispense Refill    cholestyramine (QUESTRAN) 4 g packet Take 1 packet by mouth 2 times daily (Patient not taking: Reported on 2022) 90 packet 3    vitamin D (ERGOCALCIFEROL) 1.25 MG (20601 UT) CAPS capsule Take 1 capsule by mouth once a week For eight weeks only (Patient not taking: Reported on 2022) 8 capsule 0    Naproxen Sodium (ALEVE PO) Take by mouth          Allergies:     Allergies   Allergen Reactions    Codeine Anaphylaxis    Dilaudid [Hydromorphone Hcl] Anaphylaxis    Hydromorphone Anaphylaxis       Problem List:    Patient Active Problem List   Diagnosis Code    Vaginal bleeding N93.9    H/O of hysterectomy with bilateral oophorectomy Z90.710, Z90.722    Panic disorder with agoraphobia F40.01    Early menopause E27.56    H/O bilateral breast reduction surgery Z98.890    Myofascial pain syndrome M79.18    Somatic dysfunction of head region M99.00    Somatic dysfunction of cervical region M99.01    Somatic dysfunction of pelvic region M99.05    Snoring R06.83    Obstructive sleep apnea on CPAP G47.33, Z99.89    Somatic dysfunction of both upper extremities M99.07    Morbidly obese (HCC) E66.01    Paresthesia of left upper extremity R20.2    Fibromyalgia M79.7    Mixed hyperlipidemia E78.2    Carpal tunnel syndrome of left wrist G56.02    Vitamin D deficiency E55.9    Chronic depression F32. A    Anxiety F41.9    Chronic pain of left knee M25.562, G89.29    Migraine without aura and without status migrainosus, not intractable G43.009    Injury of left ankle S99.912A    Hypersomnia G47.10    Diarrhea R19.7       Past Medical History:        Diagnosis Date    Chronic depression 07/09/2021    Hx of Doppler echocardiogram 04/27/2022    EF 55-60%. Mild LV hypertrophy. Grade I diastolic dysfunction. Mildly dilated LA. Mild MR and TR.    Kidney disorder     left removed .     Migraine     Mixed hyperlipidemia 01/27/2021    PONV (postoperative nausea and vomiting)     Preop pulmonary/respiratory exam 5/24/2022    Prolonged emergence from general anesthesia     Pulmonic stenosis     Sleep apnea     Sleep apnea        Past Surgical History:        Procedure Laterality Date    BREAST BIOPSY Left 2010    benign    BREAST REDUCTION SURGERY Bilateral 2003    BREAST SURGERY      CARPAL TUNNEL RELEASE Left 2/16/2021    LEFT CARPAL TUNNEL RELEASE performed by Aayush Leonardo DO at [facility] COLONOSCOPY N/A 4/20/2022    COLONOSCOPY POLYPECTOMY SNARE/COLD BIOPSY performed by Juan Westbrook MD at [facility] Right     wrist    HAND SURGERY Bilateral     4/2014    HYSTERECTOMY (CERVIX STATUS UNKNOWN)      INNER EAR SURGERY      OTHER SURGICAL HISTORY  4/16/2012    Repair vaginal cuff    OVARY REMOVAL      PARTIAL NEPHRECTOMY Left     TOTAL NEPHRECTOMY Left     TUBAL LIGATION         Social History:    Social History     Tobacco Use  Smoking status: Never    Smokeless tobacco: Never   Substance Use Topics    Alcohol use: No                                Counseling given: Not Answered      Vital Signs (Current):   Vitals:    07/25/22 1113 07/29/22 0650 07/29/22 0906 07/29/22 0925   BP:  (!) 110/55 126/79 128/79   Pulse:  74 72 70   Resp:  18 16 16   Temp:  (!) 96.7 °F (35.9 °C) 96.8 °F (36 °C)    TempSrc:  Temporal Tympanic    SpO2:  94% 96% 99%   Weight: 236 lb (107 kg)      Height: 5' 3\" (1.6 m)                                                 BP Readings from Last 3 Encounters:   07/29/22 128/79   07/12/22 118/72   06/14/22 112/70       NPO Status: Time of last liquid consumption: 2130                        Time of last solid consumption: 1830                        Date of last liquid consumption: 07/28/22                        Date of last solid food consumption: 07/28/22    BMI:   Wt Readings from Last 3 Encounters:   07/25/22 236 lb (107 kg)   07/12/22 236 lb 3.2 oz (107.1 kg)   06/14/22 236 lb 9.6 oz (107.3 kg)     Body mass index is 41.81 kg/m².     CBC:   Lab Results   Component Value Date/Time    WBC 8.6 04/18/2022 02:11 PM    RBC 4.46 04/18/2022 02:11 PM    HGB 13.3 04/18/2022 02:11 PM    HCT 41.4 04/18/2022 02:11 PM    MCV 92.8 04/18/2022 02:11 PM    RDW 13.0 04/18/2022 02:11 PM     04/18/2022 02:11 PM       CMP:   Lab Results   Component Value Date/Time     03/11/2022 10:37 AM    K 4.4 03/11/2022 10:37 AM     03/11/2022 10:37 AM    CO2 24 03/11/2022 10:37 AM    BUN 12 03/11/2022 10:37 AM    CREATININE 1.0 03/11/2022 10:37 AM    GFRAA >60 03/11/2022 10:37 AM    AGRATIO 1.4 01/27/2021 02:00 PM    LABGLOM >60 03/11/2022 10:37 AM    GLUCOSE 97 03/11/2022 10:37 AM    PROT 6.7 03/11/2022 10:37 AM    PROT 7.7 04/15/2012 09:26 PM    CALCIUM 8.9 03/11/2022 10:37 AM    BILITOT 0.7 03/11/2022 10:37 AM    ALKPHOS 96 03/11/2022 10:37 AM    AST 28 03/11/2022 10:37 AM    ALT 22 03/11/2022 10:37 AM       POC Tests: No results for input(s): POCGLU, POCNA, POCK, POCCL, POCBUN, POCHEMO, POCHCT in the last 72 hours. Coags:   Lab Results   Component Value Date/Time    PROTIME 12.2 03/11/2022 10:37 AM    PROTIME 11.2 04/15/2012 09:26 PM    INR 0.95 03/11/2022 10:37 AM    APTT 43.3 03/11/2022 10:37 AM       HCG (If Applicable):   Lab Results   Component Value Date    PREGTESTUR NEGATIVE 03/24/2014        ABGs: No results found for: PHART, PO2ART, CEI9SAO, OFE9DFO, BEART, F9UWAFTP     Type & Screen (If Applicable):  No results found for: LABABO, LABRH    Drug/Infectious Status (If Applicable):  No results found for: HIV, HEPCAB    COVID-19 Screening (If Applicable):   Lab Results   Component Value Date/Time    COVID19 NOT DETECTED 02/09/2021 03:00 PM           Anesthesia Evaluation    Airway: Mallampati: Unable to assess / NA          Dental:          Pulmonary:                              Cardiovascular:                      Neuro/Psych:               GI/Hepatic/Renal:             Endo/Other:                     Abdominal:             Vascular:           Other Findings:           Anesthesia Plan        Adam Woodson MD   7/31/2022

## 2022-08-11 ENCOUNTER — OFFICE VISIT (OUTPATIENT)
Dept: FAMILY MEDICINE CLINIC | Age: 42
End: 2022-08-11
Payer: MEDICAID

## 2022-08-11 VITALS
HEART RATE: 74 BPM | WEIGHT: 233.2 LBS | BODY MASS INDEX: 41.32 KG/M2 | DIASTOLIC BLOOD PRESSURE: 74 MMHG | HEIGHT: 63 IN | SYSTOLIC BLOOD PRESSURE: 126 MMHG | OXYGEN SATURATION: 94 %

## 2022-08-11 DIAGNOSIS — R10.9 LEFT FLANK PAIN: ICD-10-CM

## 2022-08-11 DIAGNOSIS — N39.0 URINARY TRACT INFECTION WITHOUT HEMATURIA, SITE UNSPECIFIED: Primary | ICD-10-CM

## 2022-08-11 LAB
BILIRUBIN, POC: NEGATIVE
BLOOD URINE, POC: ABNORMAL
CLARITY, POC: ABNORMAL
COLOR, POC: YELLOW
GLUCOSE URINE, POC: NEGATIVE
KETONES, POC: NEGATIVE
LEUKOCYTE EST, POC: ABNORMAL
NITRITE, POC: ABNORMAL
PH, POC: 7
PROTEIN, POC: ABNORMAL
SPECIFIC GRAVITY, POC: 1.02
UROBILINOGEN, POC: ABNORMAL

## 2022-08-11 PROCEDURE — G8417 CALC BMI ABV UP PARAM F/U: HCPCS | Performed by: NURSE PRACTITIONER

## 2022-08-11 PROCEDURE — 99213 OFFICE O/P EST LOW 20 MIN: CPT | Performed by: NURSE PRACTITIONER

## 2022-08-11 PROCEDURE — 81002 URINALYSIS NONAUTO W/O SCOPE: CPT | Performed by: NURSE PRACTITIONER

## 2022-08-11 PROCEDURE — 1036F TOBACCO NON-USER: CPT | Performed by: NURSE PRACTITIONER

## 2022-08-11 PROCEDURE — G8427 DOCREV CUR MEDS BY ELIG CLIN: HCPCS | Performed by: NURSE PRACTITIONER

## 2022-08-11 RX ORDER — NITROFURANTOIN 25; 75 MG/1; MG/1
100 CAPSULE ORAL 2 TIMES DAILY
Qty: 14 CAPSULE | Refills: 0 | Status: SHIPPED | OUTPATIENT
Start: 2022-08-11 | End: 2022-08-18

## 2022-08-11 SDOH — ECONOMIC STABILITY: FOOD INSECURITY: WITHIN THE PAST 12 MONTHS, YOU WORRIED THAT YOUR FOOD WOULD RUN OUT BEFORE YOU GOT MONEY TO BUY MORE.: PATIENT DECLINED

## 2022-08-11 SDOH — ECONOMIC STABILITY: FOOD INSECURITY: WITHIN THE PAST 12 MONTHS, THE FOOD YOU BOUGHT JUST DIDN'T LAST AND YOU DIDN'T HAVE MONEY TO GET MORE.: PATIENT DECLINED

## 2022-08-11 ASSESSMENT — SOCIAL DETERMINANTS OF HEALTH (SDOH): HOW HARD IS IT FOR YOU TO PAY FOR THE VERY BASICS LIKE FOOD, HOUSING, MEDICAL CARE, AND HEATING?: PATIENT DECLINED

## 2022-08-11 NOTE — PROGRESS NOTES
2022     Umberto Hanson (:  1980) is a 36 y.o. female, here for evaluation of the following medical concerns:        ER visit   Nausea vomiting left flank pain dysuria frequency  Diagnosed with a UTI and given Keflex and Pyridium. States she took that for 2 days and broke out into hives. She took the last dose 2022. States she is still having left flank pain, dysuria and frequency. Denies fever sweating chills emesis. Incidental right 3 mm kidney stone on imaging                  Review of Systems    Prior to Visit Medications    Medication Sig Taking? Authorizing Provider   nitrofurantoin, macrocrystal-monohydrate, (MACROBID) 100 MG capsule Take 1 capsule by mouth in the morning and 1 capsule before bedtime. Do all this for 7 days. Yes VIOLET Smith NP   cholestyramine Vidya Crumb) 4 g packet Take 1 packet by mouth 2 times daily  Patient not taking: Reported on 2022  VIOLET Norton CNP   vitamin D (ERGOCALCIFEROL) 1.25 MG (36871 UT) CAPS capsule Take 1 capsule by mouth once a week For eight weeks only  Patient not taking: Reported on 2022  VIOLET Smith NP   Naproxen Sodium (ALEVE PO) Take by mouth   Historical Provider, MD        Social History     Tobacco Use    Smoking status: Never    Smokeless tobacco: Never   Substance Use Topics    Alcohol use: No        Vitals:    22 0900   BP: 126/74   Pulse: 74   SpO2: 94%   Weight: 233 lb 3.2 oz (105.8 kg)   Height: 5' 3\" (1.6 m)     Estimated body mass index is 41.31 kg/m² as calculated from the following:    Height as of this encounter: 5' 3\" (1.6 m). Weight as of this encounter: 233 lb 3.2 oz (105.8 kg). Physical Exam  Constitutional:       Appearance: Normal appearance. She is obese. She is not ill-appearing, toxic-appearing or diaphoretic. Abdominal:      General: There is no distension. Palpations: Abdomen is soft. There is no mass. Tenderness:  There is no abdominal tenderness. There is left CVA tenderness. There is no right CVA tenderness. Hernia: No hernia is present. Musculoskeletal:         General: Normal range of motion. Skin:     General: Skin is warm and dry. Neurological:      General: No focal deficit present. Mental Status: She is alert and oriented to person, place, and time. Mental status is at baseline. Psychiatric:         Mood and Affect: Mood normal.         Behavior: Behavior normal.         Thought Content: Thought content normal.         Judgment: Judgment normal.       ASSESSMENT/PLAN:  1. Urinary tract infection without hematuria, site unspecified    2. Left flank pain      Pyridium and Keflex added to allergy list  Start Moustapha López today and finish complete regimen  Increase water intake-water only. No caffeine  UA and culture today              All care gaps addressed     All questions answered    Discussed use, benefit, and side effects of prescribed medications. Barriers to compliance discussed. All patient questions answered. Pt voiced understanding. Present to the ER for any emergent or acute symptoms not managed at home or in office. Please note that this chart was generated using dragon dictation software. Although every effort was made to ensure the accuracy of this automated transcription, some errors in transcription may have occurred. No follow-ups on file. An electronic signature was used to authenticate this note.     --VIOLET James NP on 8/11/2022 at 9:35 AM

## 2022-08-11 NOTE — LETTER
[facility] [facility]  [address] [facility],[address] [facility]  Phone: [phone]  Fax: [phone]    VIOLET Beckman NP        August 11, 2022     Patient: Lima No   YOB: 1980   Date of Visit: 8/11/2022       To Whom It May Concern: It is my medical opinion that Racheal Estrella should return to work 8/12/22 without restriction. please excuse her absence 8/10/22 through 8/11/22. .    If you have any questions or concerns, please don't hesitate to call.     Sincerely,        VIOLET Beckman NP

## 2022-08-13 LAB
ORGANISM: ABNORMAL
URINE CULTURE, ROUTINE: ABNORMAL

## 2022-08-16 ENCOUNTER — PATIENT MESSAGE (OUTPATIENT)
Dept: FAMILY MEDICINE CLINIC | Age: 42
End: 2022-08-16
Payer: MEDICAID

## 2022-08-16 DIAGNOSIS — R10.9 LEFT FLANK PAIN: Primary | ICD-10-CM

## 2022-08-16 DIAGNOSIS — N30.00 ACUTE CYSTITIS WITHOUT HEMATURIA: ICD-10-CM

## 2022-08-16 PROCEDURE — 81003 URINALYSIS AUTO W/O SCOPE: CPT | Performed by: NURSE PRACTITIONER

## 2022-08-16 NOTE — TELEPHONE ENCOUNTER
From: Riki Rivas  To: Dejuan Worley  Sent: 8/16/2022 1:07 AM EDT  Subject: Question regarding CULTURE, URINE    Is this Contagious

## 2022-08-18 ENCOUNTER — OFFICE VISIT (OUTPATIENT)
Dept: BARIATRICS/WEIGHT MGMT | Age: 42
End: 2022-08-18
Payer: MEDICAID

## 2022-08-18 VITALS
OXYGEN SATURATION: 96 % | WEIGHT: 231 LBS | HEART RATE: 75 BPM | SYSTOLIC BLOOD PRESSURE: 134 MMHG | HEIGHT: 63 IN | DIASTOLIC BLOOD PRESSURE: 88 MMHG | BODY MASS INDEX: 40.93 KG/M2

## 2022-08-18 DIAGNOSIS — E66.01 MORBID OBESITY WITH BMI OF 40.0-44.9, ADULT (HCC): Primary | ICD-10-CM

## 2022-08-18 DIAGNOSIS — Z01.818 PRE-OP EVALUATION: ICD-10-CM

## 2022-08-18 DIAGNOSIS — E78.2 MIXED HYPERLIPIDEMIA: ICD-10-CM

## 2022-08-18 PROCEDURE — 99213 OFFICE O/P EST LOW 20 MIN: CPT | Performed by: NURSE PRACTITIONER

## 2022-08-18 ASSESSMENT — PATIENT HEALTH QUESTIONNAIRE - PHQ9
SUM OF ALL RESPONSES TO PHQ QUESTIONS 1-9: 1
SUM OF ALL RESPONSES TO PHQ9 QUESTIONS 1 & 2: 1
2. FEELING DOWN, DEPRESSED OR HOPELESS: 0
1. LITTLE INTEREST OR PLEASURE IN DOING THINGS: 1
SUM OF ALL RESPONSES TO PHQ QUESTIONS 1-9: 1

## 2022-08-18 ASSESSMENT — ENCOUNTER SYMPTOMS
ALLERGIC/IMMUNOLOGIC NEGATIVE: 1
RESPIRATORY NEGATIVE: 1
EYES NEGATIVE: 1
GASTROINTESTINAL NEGATIVE: 1

## 2022-08-18 NOTE — PROGRESS NOTES
BARIATRIC SURGERY OFFICE PROGRESS NOTE    SUBJECTIVE:    Patient presenting today referred from VIOLET Meza NP, for   Chief Complaint   Patient presents with    Weight Management     5th Surg [facility] Visit +Card, +Pulm -psych, +EGD Results 7/29/22   . Vitals:    08/18/22 1130   BP: 134/88   Pulse:    SpO2:         BMI: Body mass index is 40.92 kg/m². Weight History: Wt Readings from Last 3 Encounters:   08/18/22 231 lb (104.8 kg)   08/11/22 233 lb 3.2 oz (105.8 kg)   07/25/22 236 lb (107 kg)         Germain Zurita is a 36 y.o. female presenting in fifth bariatric PRE-OP visit    Diet Recall: Total weight loss/gain: -5.2 lbs since last visit, and -7.8 lbs since starting program     Protein: tracks protein on and off   Water Intake: gallon daily  Exercise: walks daily at work  New health problems: UTI   New medications: started on ATB  Clearances:  -- Cardiology: cleared  -- Pulmonology: cleared  -- Psychology: needs to make appt    Thoroughly reviewed the patient's medical history, family history, social history and review of systems with the patient today in the office. Please see medical record for pertinent positives. Past Medical History:   Diagnosis Date    Chronic depression 07/09/2021    Hx of Doppler echocardiogram 04/27/2022    EF 55-60%. Mild LV hypertrophy. Grade I diastolic dysfunction. Mildly dilated LA. Mild MR and TR. Kidney disorder     left removed .     Migraine     Mixed hyperlipidemia 01/27/2021    PONV (postoperative nausea and vomiting)     Preop pulmonary/respiratory exam 5/24/2022    Prolonged emergence from general anesthesia     Pulmonic stenosis     Sleep apnea     Sleep apnea         Patient Active Problem List   Diagnosis    Vaginal bleeding    H/O of hysterectomy with bilateral oophorectomy    Panic disorder with agoraphobia    Early menopause    H/O bilateral breast reduction surgery    Myofascial pain syndrome    Somatic dysfunction of head region HENT: Negative. Eyes: Negative. Respiratory: Negative. Cardiovascular: Negative. Gastrointestinal: Negative. Endocrine: Negative. Genitourinary:  Positive for dysuria and frequency. Musculoskeletal: Negative. Skin: Negative. Allergic/Immunologic: Negative. Neurological: Negative. Hematological: Negative. Psychiatric/Behavioral: Negative. OBJECTIVE:    /88 (Site: Left Upper Arm, Position: Sitting, Cuff Size: Medium Adult)   Pulse 75   Ht 5' 3\" (1.6 m)   Wt 231 lb (104.8 kg)   LMP 02/15/2012   SpO2 96%   BMI 40.92 kg/m²      Physical Exam  Constitutional:       Appearance: Normal appearance. She is obese. HENT:      Head: Normocephalic. Nose: Nose normal.      Mouth/Throat:      Pharynx: Oropharynx is clear. Eyes:      Conjunctiva/sclera: Conjunctivae normal.      Pupils: Pupils are equal, round, and reactive to light. Cardiovascular:      Rate and Rhythm: Normal rate. Pulses: Normal pulses. Pulmonary:      Effort: Pulmonary effort is normal.      Breath sounds: Normal breath sounds. Abdominal:      General: Bowel sounds are normal.   Musculoskeletal:         General: Normal range of motion. Cervical back: Normal range of motion. Skin:     General: Skin is warm and dry. Capillary Refill: Capillary refill takes less than 2 seconds. Neurological:      General: No focal deficit present. Mental Status: She is alert and oriented to person, place, and time. Psychiatric:         Mood and Affect: Mood normal.         Behavior: Behavior normal.       ASSESSMENT & PLAN:    1. Morbid obesity with BMI of 40.0-44.9, adult [facility])  -Patient was encouraged to journal all food intake.   -Keep calorie level at approximately 1200, per discussion / plan with registered dietician.  -Protein intake is to be a minimum of 60 grams per day. -Water drinking was encouraged with a goal of 64oz-128oz daily.  Beverages are to be calorie free except for milk. Avoid soda. -Continue to increase level of physical activity. 2. Mixed hyperlipidemia  - Low fat diet  - Will ideally improve with weight loss    3. Pre-op evaluation  - Pulm and cardiac cleared  - Needs to make psych appt  - EGD- mild gastritis; negative for Hpylori  - UDS and nicotine ordered  - RTC one month    This patient is a female of reproductive age and was advised she should not become pregnant during the bariatric workup process and for at least 12-18 months after surgery. The patient is agreeable to this plan. I spent 25 minutes with the patient face to face today and over 50% of the office visit today was spent in face to face counseling regarding diet and exercise, in preparation for her planned Robotic Sleeve Gastrectomy. Discussed in length complying with the dietary recommendations, complying with the preoperative workup including dietary counseling, exercise physiologist counseling, and pre-operative optimization of pulmonologist and cardiologist.    The patient expressed understanding and willingness to comply nicely; all questions and concerns addressed. No orders of the defined types were placed in this encounter. Orders Placed This Encounter   Procedures    Urine Drug Screen     Standing Status:   Future     Standing Expiration Date:   8/18/2023    Nicotine and Metabolites     Standing Status:   Future     Standing Expiration Date:   8/18/2023         Follow Up:  Return in about 1 month (around 9/18/2022).     VIOLET Bustos - CNP

## 2022-08-25 ENCOUNTER — NURSE ONLY (OUTPATIENT)
Dept: FAMILY MEDICINE CLINIC | Age: 42
End: 2022-08-25
Payer: MEDICAID

## 2022-08-25 DIAGNOSIS — N30.00 ACUTE CYSTITIS WITHOUT HEMATURIA: Primary | ICD-10-CM

## 2022-08-25 DIAGNOSIS — R10.9 LEFT FLANK PAIN: ICD-10-CM

## 2022-08-25 LAB
BILIRUBIN URINE: NEGATIVE
BLOOD, URINE: ABNORMAL
CLARITY: ABNORMAL
COLOR: YELLOW
GLUCOSE URINE: NEGATIVE MG/DL
KETONES, URINE: NEGATIVE MG/DL
LEUKOCYTE ESTERASE, URINE: ABNORMAL
MICROSCOPIC EXAMINATION: YES
NITRITE, URINE: NEGATIVE
PH UA: 6.5 (ref 5–8)
PROTEIN UA: 30 MG/DL
SPECIFIC GRAVITY UA: 1.02 (ref 1–1.03)
URINE TYPE: ABNORMAL
UROBILINOGEN, URINE: 0.2 E.U./DL

## 2022-08-25 PROCEDURE — 99211 OFF/OP EST MAY X REQ PHY/QHP: CPT | Performed by: NURSE PRACTITIONER

## 2022-08-26 LAB
BACTERIA: ABNORMAL /HPF
BUDDING YEAST: PRESENT
EPITHELIAL CELLS, UA: 6 /HPF (ref 0–5)
HYALINE CASTS: 1 /LPF (ref 0–8)
RBC UA: ABNORMAL /HPF (ref 0–4)
URINE CULTURE, ROUTINE: NORMAL
WBC UA: 905 /HPF (ref 0–5)

## 2022-08-29 ENCOUNTER — OFFICE VISIT (OUTPATIENT)
Dept: FAMILY MEDICINE CLINIC | Age: 42
End: 2022-08-29
Payer: MEDICAID

## 2022-08-29 VITALS
SYSTOLIC BLOOD PRESSURE: 110 MMHG | HEIGHT: 63 IN | OXYGEN SATURATION: 97 % | WEIGHT: 231 LBS | BODY MASS INDEX: 40.93 KG/M2 | HEART RATE: 81 BPM | DIASTOLIC BLOOD PRESSURE: 80 MMHG

## 2022-08-29 DIAGNOSIS — B37.49 CANDIDURIA: ICD-10-CM

## 2022-08-29 DIAGNOSIS — R10.9 FLANK PAIN: Primary | ICD-10-CM

## 2022-08-29 LAB
BACTERIA: ABNORMAL /HPF
BILIRUBIN URINE: NEGATIVE
BLOOD, URINE: NEGATIVE
CLARITY: ABNORMAL
COLOR: YELLOW
EPITHELIAL CELLS, UA: 1 /HPF (ref 0–5)
GLUCOSE URINE: NEGATIVE MG/DL
HYALINE CASTS: 4 /LPF (ref 0–8)
KETONES, URINE: NEGATIVE MG/DL
LEUKOCYTE ESTERASE, URINE: ABNORMAL
MICROSCOPIC EXAMINATION: YES
NITRITE, URINE: NEGATIVE
PH UA: 7.5 (ref 5–8)
PROTEIN UA: ABNORMAL MG/DL
RBC UA: 1 /HPF (ref 0–4)
SPECIFIC GRAVITY UA: 1.01 (ref 1–1.03)
URINE TYPE: ABNORMAL
UROBILINOGEN, URINE: 0.2 E.U./DL
WBC UA: 168 /HPF (ref 0–5)

## 2022-08-29 PROCEDURE — G8427 DOCREV CUR MEDS BY ELIG CLIN: HCPCS | Performed by: NURSE PRACTITIONER

## 2022-08-29 PROCEDURE — G8417 CALC BMI ABV UP PARAM F/U: HCPCS | Performed by: NURSE PRACTITIONER

## 2022-08-29 PROCEDURE — 1036F TOBACCO NON-USER: CPT | Performed by: NURSE PRACTITIONER

## 2022-08-29 PROCEDURE — 99213 OFFICE O/P EST LOW 20 MIN: CPT | Performed by: NURSE PRACTITIONER

## 2022-08-29 ASSESSMENT — ANXIETY QUESTIONNAIRES
IF YOU CHECKED OFF ANY PROBLEMS ON THIS QUESTIONNAIRE, HOW DIFFICULT HAVE THESE PROBLEMS MADE IT FOR YOU TO DO YOUR WORK, TAKE CARE OF THINGS AT HOME, OR GET ALONG WITH OTHER PEOPLE: NOT DIFFICULT AT ALL
6. BECOMING EASILY ANNOYED OR IRRITABLE: 3
5. BEING SO RESTLESS THAT IT IS HARD TO SIT STILL: 0
4. TROUBLE RELAXING: 1
3. WORRYING TOO MUCH ABOUT DIFFERENT THINGS: 3
1. FEELING NERVOUS, ANXIOUS, OR ON EDGE: 0
GAD7 TOTAL SCORE: 10
2. NOT BEING ABLE TO STOP OR CONTROL WORRYING: 0
7. FEELING AFRAID AS IF SOMETHING AWFUL MIGHT HAPPEN: 3

## 2022-08-29 ASSESSMENT — PATIENT HEALTH QUESTIONNAIRE - PHQ9
SUM OF ALL RESPONSES TO PHQ QUESTIONS 1-9: 0
SUM OF ALL RESPONSES TO PHQ QUESTIONS 1-9: 0
SUM OF ALL RESPONSES TO PHQ9 QUESTIONS 1 & 2: 0
2. FEELING DOWN, DEPRESSED OR HOPELESS: 0
1. LITTLE INTEREST OR PLEASURE IN DOING THINGS: 0
SUM OF ALL RESPONSES TO PHQ QUESTIONS 1-9: 0
SUM OF ALL RESPONSES TO PHQ QUESTIONS 1-9: 0

## 2022-08-29 NOTE — PROGRESS NOTES
2022     Umberto Hanson (:  1980) is a 36 y.o. female, here for evaluation of the following medical concerns:        Still having left flank pain. Reports lying on her left side with pressure, helps. Position change helps. Pain is not constant  Pain is aching. Water - one gallon per day   Reports Frequency urgency and dysuria  Frequency and urgency are chronic, but worsened. 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 States \"it was a dead kidney\"  Not following with nephrology or urology    Denies fever sweating chills. Denies obvious blood in the urine, lower back pain, pelvic pain      Urine culture 2022 E. Coli  Urine culture 2022 negative  Microscopic urinalysis with budding yeast 2022    Treated with Macrobid for 7 days 2022-symptoms have not changed          Review of Systems    Prior to Visit Medications    Not on File        Social History     Tobacco Use    Smoking status: Never    Smokeless tobacco: Never   Substance Use Topics    Alcohol use: No        Vitals:    22 1332   BP: 110/80   Site: Left Upper Arm   Position: Sitting   Cuff Size: Large Adult   Pulse: 81   SpO2: 97%   Weight: 231 lb (104.8 kg)   Height: 5' 3\" (1.6 m)     Estimated body mass index is 40.92 kg/m² as calculated from the following:    Height as of this encounter: 5' 3\" (1.6 m). Weight as of this encounter: 231 lb (104.8 kg). Physical Exam  Constitutional:       General: She is not in acute distress. Appearance: She is obese. She is not ill-appearing, toxic-appearing or diaphoretic. Musculoskeletal:         General: Normal range of motion. Neurological:      General: No focal deficit present. Mental Status: She is alert and oriented to person, place, and time. Mental status is at baseline. Psychiatric:         Mood and Affect: Mood normal.         Behavior: Behavior normal.         Thought Content:  Thought content normal.         Judgment: Judgment normal.       ASSESSMENT/PLAN:  1. Flank pain  - Basic Metabolic Panel; Future    2. Candiduria  - Urinalysis with Microscopic  - Culture, Urine  - Vaginal Pathogens Probes *A        Urinalysis with micro and culture stat-send out  Vaginal pathogens probe to check for trichomoniasis, BV, yeast    Will treat depending on these results  In the meantime-no soda. No NSAIDs. Increase water intake, 65 ounces water per day      May need to refer to urology      Left flank pain seems muscular in nature. There is no kidney there  Heat stretching. BMP to check renal fx      All care gaps addressed     All questions answered    Discussed use, benefit, and side effects of prescribed medications. Barriers to compliance discussed. All patient questions answered. Pt voiced understanding. Present to the ER for any emergent or acute symptoms not managed at home or in office. Please note that this chart was generated using dragon dictation software. Although every effort was made to ensure the accuracy of this automated transcription, some errors in transcription may have occurred. No follow-ups on file. An electronic signature was used to authenticate this note.     --VIOLET Huddleston - NP on 8/29/2022 at 3:19 PM

## 2022-08-30 LAB
CANDIDA SPECIES, DNA PROBE: NORMAL
GARDNERELLA VAGINALIS, DNA PROBE: NORMAL
TRICHOMONAS VAGINALIS DNA: NORMAL

## 2022-08-31 LAB
ORGANISM: ABNORMAL
URINE CULTURE, ROUTINE: ABNORMAL

## 2022-08-31 RX ORDER — CIPROFLOXACIN 500 MG/1
500 TABLET, FILM COATED ORAL 2 TIMES DAILY
Qty: 14 TABLET | Refills: 0 | Status: SHIPPED | OUTPATIENT
Start: 2022-08-31 | End: 2022-09-07

## 2022-09-14 ENCOUNTER — OFFICE VISIT (OUTPATIENT)
Dept: FAMILY MEDICINE CLINIC | Age: 42
End: 2022-09-14
Payer: MEDICAID

## 2022-09-14 VITALS
HEART RATE: 97 BPM | HEIGHT: 63 IN | OXYGEN SATURATION: 95 % | WEIGHT: 233 LBS | DIASTOLIC BLOOD PRESSURE: 76 MMHG | SYSTOLIC BLOOD PRESSURE: 114 MMHG | BODY MASS INDEX: 41.29 KG/M2

## 2022-09-14 DIAGNOSIS — N39.0 RECURRENT UTI: ICD-10-CM

## 2022-09-14 DIAGNOSIS — R00.2 PALPITATION: ICD-10-CM

## 2022-09-14 DIAGNOSIS — F32.A CHRONIC DEPRESSION: ICD-10-CM

## 2022-09-14 DIAGNOSIS — E66.01 MORBIDLY OBESE (HCC): ICD-10-CM

## 2022-09-14 DIAGNOSIS — Z90.5 SINGLE KIDNEY: ICD-10-CM

## 2022-09-14 DIAGNOSIS — G43.009 MIGRAINE WITHOUT AURA AND WITHOUT STATUS MIGRAINOSUS, NOT INTRACTABLE: ICD-10-CM

## 2022-09-14 DIAGNOSIS — M54.41 CHRONIC BILATERAL LOW BACK PAIN WITH BILATERAL SCIATICA: ICD-10-CM

## 2022-09-14 DIAGNOSIS — E55.9 VITAMIN D DEFICIENCY: ICD-10-CM

## 2022-09-14 DIAGNOSIS — M54.42 CHRONIC BILATERAL LOW BACK PAIN WITH BILATERAL SCIATICA: ICD-10-CM

## 2022-09-14 DIAGNOSIS — F41.9 ANXIETY: Primary | ICD-10-CM

## 2022-09-14 DIAGNOSIS — Z80.0 FH: COLON CANCER: ICD-10-CM

## 2022-09-14 DIAGNOSIS — M79.7 FIBROMYALGIA: ICD-10-CM

## 2022-09-14 DIAGNOSIS — G89.29 CHRONIC BILATERAL LOW BACK PAIN WITH BILATERAL SCIATICA: ICD-10-CM

## 2022-09-14 DIAGNOSIS — E78.2 MIXED HYPERLIPIDEMIA: ICD-10-CM

## 2022-09-14 LAB
ANION GAP SERPL CALCULATED.3IONS-SCNC: 12 MMOL/L (ref 3–16)
BUN BLDV-MCNC: 11 MG/DL (ref 7–20)
CALCIUM SERPL-MCNC: 9.2 MG/DL (ref 8.3–10.6)
CHLORIDE BLD-SCNC: 103 MMOL/L (ref 99–110)
CO2: 24 MMOL/L (ref 21–32)
CREAT SERPL-MCNC: 1 MG/DL (ref 0.6–1.1)
GFR AFRICAN AMERICAN: >60
GFR NON-AFRICAN AMERICAN: >60
GLUCOSE BLD-MCNC: 145 MG/DL (ref 70–99)
MAGNESIUM: 2.1 MG/DL (ref 1.8–2.4)
POTASSIUM SERPL-SCNC: 4.1 MMOL/L (ref 3.5–5.1)
SODIUM BLD-SCNC: 139 MMOL/L (ref 136–145)
VITAMIN D 25-HYDROXY: 23.8 NG/ML

## 2022-09-14 PROCEDURE — G8427 DOCREV CUR MEDS BY ELIG CLIN: HCPCS | Performed by: NURSE PRACTITIONER

## 2022-09-14 PROCEDURE — 1036F TOBACCO NON-USER: CPT | Performed by: NURSE PRACTITIONER

## 2022-09-14 PROCEDURE — G8417 CALC BMI ABV UP PARAM F/U: HCPCS | Performed by: NURSE PRACTITIONER

## 2022-09-14 PROCEDURE — 99214 OFFICE O/P EST MOD 30 MIN: CPT | Performed by: NURSE PRACTITIONER

## 2022-09-14 NOTE — PATIENT INSTRUCTIONS
[facility] - [place], [address] [facility] [address], [address] [facility]  [phone]

## 2022-09-14 NOTE — PROGRESS NOTES
2022     Umberto Hanson (:  1980) is a 36 y.o. female, here for evaluation of the following medical concerns:      6 month follow up     Recurrent UTI with ECOLI   Last treated with cipro 22  Left flank pain has now traveled to the right side. Intermittent - every other day  (not present yesterday)   Nothing makes it worse or better. Certain movement makes it worse. (Leaning over)  1-2 soda per week   Has increased water intake. Single kidney     Previously seeing dr Emeka Durbin - urology. Not recent     EMILEE- has CPAP. Does not wear it due to the mask feeling suffocating for her.  scheduled for sleep study oct 2022. Migraines-not medicated. Has never seen neurology. 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 No history of work-up of her lower back pain  Reports 20 years of back pain. Hx of \"tailbone fracture\" after falling on her back porch.   Has not seen pain management or spine specialist, Ortho              Panic disorder, anxiety, depression  Not medicated. Reports she will never take medication for mental health  Has only been on xanax in the past.   Follows with therapist -- Lexy Marion. See's him once \"every couple months\"  Reports insomnia. Denies any suicidal thought plan idea. Obesity- following with   Planning the gastric sleeve. Has been working on diet, but no exercise. Vitamin D deficiency- 16 in 2022, not taking supp. \"I dont like the way they taste and it makes me feel weird\"      Elevated cholesterol- mild. Not medicated. Chronic left knee pain--- follows with [facility]  Carpal tunnel - bilateral surgery.        Mammogram - ordered 2022- not done. Reports palpitations over the last two weeks. Lasts 20 seconds while sitting resting.  Not after caffeine. She does have anxiety and depression - no medicine. Does follow with cardio           Review of Systems    Prior to Visit Medications    Medication Sig Taking? Authorizing Provider   cyclobenzaprine (FLEXERIL) 5 MG tablet Take 1 tablet by mouth nightly as needed for Muscle spasms  VIOLET Marino NP   vitamin D (ERGOCALCIFEROL) 1.25 MG (27003 UT) CAPS capsule Take 1 capsule by mouth once a week For eight weeks only  VIOLET Marino NP        Social History     Tobacco Use    Smoking status: Never    Smokeless tobacco: Never   Substance Use Topics    Alcohol use: No        Vitals:    09/14/22 1332   BP: 114/76   Pulse: 97   SpO2: 95%   Weight: 233 lb (105.7 kg)   Height: 5' 3\" (1.6 m)     Estimated body mass index is 41.27 kg/m² as calculated from the following:    Height as of this encounter: 5' 3\" (1.6 m). Weight as of this encounter: 233 lb (105.7 kg). Physical Exam  Vitals reviewed. Constitutional:       General: She is not in acute distress. Appearance: Normal appearance. She is not ill-appearing, toxic-appearing or diaphoretic. HENT:      Head: Normocephalic and atraumatic. Nose: Nose normal.   Eyes:      Extraocular Movements: Extraocular movements intact. Pupils: Pupils are equal, round, and reactive to light. Cardiovascular:      Rate and Rhythm: Normal rate and regular rhythm. Heart sounds: Normal heart sounds. Pulmonary:      Effort: Pulmonary effort is normal.      Breath sounds: Normal breath sounds. Abdominal:      General: Bowel sounds are normal. There is no distension. Palpations: Abdomen is soft. There is no mass. Tenderness: There is no abdominal tenderness. Hernia: No hernia is present. Musculoskeletal:         General: No tenderness. Normal range of motion. Cervical back: Normal range of motion and neck supple. Skin:     General: Skin is warm and dry.    Neurological:      General: No focal deficit present. Mental Status: She is alert and oriented to person, place, and time. Mental status is at baseline. Psychiatric:         Mood and Affect: Mood normal.         Behavior: Behavior normal.         Thought Content: Thought content normal.         Judgment: Judgment normal.       ASSESSMENT/PLAN:  1. Anxiety    2. Fibromyalgia    3. Mixed hyperlipidemia    4. Morbidly obese ([address]    5. FH: colon cancer    6. Chronic depression    7. Vitamin D deficiency  - Vitamin D 25 Hydroxy; Future    8. Migraine without aura and without status migrainosus, not intractable    9. Chronic bilateral low back pain with bilateral sciatica  - Amb External Referral To Pain Medicine    10. Recurrent UTI  - Basic Metabolic Panel; Future  - Culture, Urine  - AFL - Enrique Flores MD, Urology, [facility]    11. Single kidney  - Basic Metabolic Panel; Future  - AFL - Enrique Flores MD, Urology, [facility]. Palpitation  - Magnesium; Future      Refer back to urology   UA and culture - will try muscle relaxer if normal -- pain likely muscular in nature. Heat and stretching. Labs   Referral to PM for chronic pain   Let cardio know about palps and if having CP, go to the ER. All care gaps addressed     All questions answered    Discussed use, benefit, and side effects of prescribed medications. Barriers to compliance discussed. All patient questions answered. Pt voiced understanding. Present to the ER for any emergent or acute symptoms not managed at home or in office. Please note that this chart was generated using dragon dictation software. Although every effort was made to ensure the accuracy of this automated transcription, some errors in transcription may have occurred. No follow-ups on file. An electronic signature was used to authenticate this note.     --VIOLET Rees NP on 9/20/2022 at 10:49 PM

## 2022-09-15 LAB — URINE CULTURE, ROUTINE: NORMAL

## 2022-09-20 ENCOUNTER — PATIENT MESSAGE (OUTPATIENT)
Dept: FAMILY MEDICINE CLINIC | Age: 42
End: 2022-09-20

## 2022-09-20 RX ORDER — CYCLOBENZAPRINE HCL 5 MG
5 TABLET ORAL NIGHTLY PRN
Qty: 10 TABLET | Refills: 0 | Status: SHIPPED | OUTPATIENT
Start: 2022-09-20 | End: 2022-09-30

## 2022-09-20 RX ORDER — ERGOCALCIFEROL 1.25 MG/1
50000 CAPSULE ORAL WEEKLY
Qty: 8 CAPSULE | Refills: 0 | Status: SHIPPED | OUTPATIENT
Start: 2022-09-20

## 2022-09-21 ENCOUNTER — TELEPHONE (OUTPATIENT)
Dept: BARIATRICS/WEIGHT MGMT | Age: 42
End: 2022-09-21

## 2022-09-21 ENCOUNTER — HOSPITAL ENCOUNTER (OUTPATIENT)
Age: 42
Setting detail: SPECIMEN
Discharge: HOME | End: 2022-09-21
Payer: MEDICAID

## 2022-09-21 LAB
AMPHETAMINES: NEGATIVE
BARBITURATE SCREEN URINE: NEGATIVE
BENZODIAZEPINE SCREEN, URINE: NEGATIVE
CANNABINOID SCREEN URINE: NEGATIVE
COCAINE METABOLITE: NEGATIVE
OPIATES, URINE: NEGATIVE
OXYCODONE: NEGATIVE
PHENCYCLIDINE, URINE: NEGATIVE

## 2022-09-21 PROCEDURE — 80307 DRUG TEST PRSMV CHEM ANLYZR: CPT

## 2022-09-21 PROCEDURE — G0480 DRUG TEST DEF 1-7 CLASSES: HCPCS

## 2022-09-21 NOTE — TELEPHONE ENCOUNTER
Patient called and verified her apt on 09/27/22 with Corrie. She asked where she could get the labs done and I told her the imaging center and they should have the lab in the computer. I informed the patient to go today tho because the nicotine test has a 6-7 day turn around.

## 2022-09-21 NOTE — TELEPHONE ENCOUNTER
From: Lima No  To: Earnest Garcia  Sent: 9/20/2022 10:55 PM EDT  Subject: Question regarding CULTURE, URINE    I will get a ct scan if you recommend it

## 2022-09-26 ENCOUNTER — HOSPITAL ENCOUNTER (OUTPATIENT)
Age: 42
Setting detail: SPECIMEN
Discharge: HOME | End: 2022-09-26
Payer: MEDICAID

## 2022-09-26 ENCOUNTER — OFFICE VISIT (OUTPATIENT)
Dept: FAMILY MEDICINE CLINIC | Age: 42
End: 2022-09-26
Payer: MEDICAID

## 2022-09-26 VITALS
BODY MASS INDEX: 41.46 KG/M2 | HEART RATE: 100 BPM | OXYGEN SATURATION: 99 % | TEMPERATURE: 98.2 F | HEIGHT: 63 IN | WEIGHT: 234 LBS

## 2022-09-26 DIAGNOSIS — Z20.822 CLOSE EXPOSURE TO COVID-19 VIRUS: ICD-10-CM

## 2022-09-26 DIAGNOSIS — J06.9 VIRAL URI WITH COUGH: Primary | ICD-10-CM

## 2022-09-26 LAB
3-OH-COTININE URINE: <50 NG/ML
ANABASINE URINE: <5 NG/ML
COTININE, URINE: 26 NG/ML
NICOTINE AND METABOLITES: <15 NG/ML

## 2022-09-26 PROCEDURE — 99213 OFFICE O/P EST LOW 20 MIN: CPT | Performed by: NURSE PRACTITIONER

## 2022-09-26 PROCEDURE — U0003 INFECTIOUS AGENT DETECTION BY NUCLEIC ACID (DNA OR RNA); SEVERE ACUTE RESPIRATORY SYNDROME CORONAVIRUS 2 (SARS-COV-2) (CORONAVIRUS DISEASE [COVID-19]), AMPLIFIED PROBE TECHNIQUE, MAKING USE OF HIGH THROUGHPUT TECHNOLOGIES AS DESCRIBED BY CMS-2020-01-R: HCPCS

## 2022-09-26 PROCEDURE — 1036F TOBACCO NON-USER: CPT | Performed by: NURSE PRACTITIONER

## 2022-09-26 PROCEDURE — G8427 DOCREV CUR MEDS BY ELIG CLIN: HCPCS | Performed by: NURSE PRACTITIONER

## 2022-09-26 PROCEDURE — G8417 CALC BMI ABV UP PARAM F/U: HCPCS | Performed by: NURSE PRACTITIONER

## 2022-09-26 PROCEDURE — U0005 INFEC AGEN DETEC AMPLI PROBE: HCPCS

## 2022-09-26 NOTE — PROGRESS NOTES
9/26/2022    HPI:  Chief complaint and history of present illness as per medical assistant/nurse documented today in the Flu/COVID-19 clinic. Patient is here with complaints of chills, cough, nasal congestion, headache, sore throat, and nausea x 2 days. Patient states she has been in close contact with her daughter that has recently tested positive for covid. Patient states she has not had her covid vaccine. MEDICATIONS:  Prior to Visit Medications    Medication Sig Taking? Authorizing Provider   cyclobenzaprine (FLEXERIL) 5 MG tablet Take 1 tablet by mouth nightly as needed for Muscle spasms  VIOLET Huddleston - NP   vitamin D (ERGOCALCIFEROL) 1.25 MG (80980 UT) CAPS capsule Take 1 capsule by mouth once a week For eight weeks only  VIOLET Boles NP       Allergies   Allergen Reactions    Codeine Anaphylaxis    Dilaudid [Hydromorphone Hcl] Anaphylaxis    Hydromorphone Anaphylaxis    Keflex [Cephalexin] Hives    Pyridium [Phenazopyridine Hcl] Hives   ,   Past Medical History:   Diagnosis Date    Chronic depression 07/09/2021    Hx of Doppler echocardiogram 04/27/2022    EF 55-60%. Mild LV hypertrophy. Grade I diastolic dysfunction. Mildly dilated LA. Mild MR and TR. Kidney disorder     left removed .     Migraine     Mixed hyperlipidemia 01/27/2021    PONV (postoperative nausea and vomiting)     Preop pulmonary/respiratory exam 5/24/2022    Prolonged emergence from general anesthesia     Pulmonic stenosis     Sleep apnea     Sleep apnea    ,   Past Surgical History:   Procedure Laterality Date    BREAST BIOPSY Left 2010    benign    BREAST REDUCTION SURGERY Bilateral 2003    BREAST SURGERY      CARPAL TUNNEL RELEASE Left 2/16/2021    LEFT CARPAL TUNNEL RELEASE performed by Renae Aceves DO at [address] N/A 4/20/2022    COLONOSCOPY POLYPECTOMY SNARE/COLD BIOPSY performed by Jessica Otoole MD at [place] [address] Right     wrist    HAND SURGERY Bilateral     4/2014    HYSTERECTOMY (CERVIX STATUS UNKNOWN)      INNER EAR SURGERY      OTHER SURGICAL HISTORY  4/16/2012    Repair vaginal cuff    OVARY REMOVAL      PARTIAL NEPHRECTOMY Left     TOTAL NEPHRECTOMY Left     TUBAL LIGATION      UPPER GASTROINTESTINAL ENDOSCOPY N/A 7/29/2022    EGD ESOPHAGOGASTRODUODENOSCOPY WITH BX performed by Jay Robins MD at [address] [facility] ENDOSCOPY   ,   Social History     Tobacco Use    Smoking status: Never    Smokeless tobacco: Never   Vaping Use    Vaping Use: Never used   Substance Use Topics    Alcohol use: No    Drug use: No   ,   Family History   Problem Relation Age of Onset    Cancer Mother     Migraines Mother     Cancer Maternal Grandmother     Diabetes Maternal Grandmother     Allergies Son     Allergies Daughter     Bleeding Prob Daughter     Bleeding Prob Son     Asthma Daughter     Asthma Son    ,   There is no immunization history on file for this patient.,   Health Maintenance   Topic Date Due    COVID-19 Vaccine (1) Never done    DTaP/Tdap/Td vaccine (1 - Tdap) Never done    Flu vaccine (1) Never done    Depression Monitoring  08/29/2023    Diabetes screen  03/11/2025    Lipids  03/11/2027    Hepatitis C screen  Completed    HIV screen  Completed    Hepatitis A vaccine  Aged Out    Hepatitis B vaccine  Aged Out    Hib vaccine  Aged Out    Meningococcal (ACWY) vaccine  Aged Out    Pneumococcal 0-64 years Vaccine  Aged Out       PHYSICAL EXAM:  Physical Exam  Constitutional:       Appearance: Normal appearance. HENT:      Head: Normocephalic. Right Ear: Tympanic membrane, ear canal and external ear normal.      Left Ear: Tympanic membrane, ear canal and external ear normal.      Nose: Congestion (slight) present. Mouth/Throat:      Lips: Pink. Mouth: Mucous membranes are moist.      Pharynx: Oropharynx is clear. Cardiovascular:      Rate and Rhythm: Normal rate and regular rhythm. Heart sounds: Normal heart sounds.    Pulmonary:      Effort: Pulmonary effort is normal.      Breath sounds: Normal breath sounds. Musculoskeletal:      Cervical back: Neck supple. Skin:     General: Skin is warm and dry. Neurological:      Mental Status: She is alert and oriented to person, place, and time. Psychiatric:         Mood and Affect: Mood normal.         Behavior: Behavior normal.       ASSESSMENT/PLAN:  1. Viral URI with cough  Your COVID 19 test can take 1-5 days for the results to come back. We ask that you make a Mychart page and view your test results this way. You will need to Self quarantine until you know your results. If positive, please work on contact tracing. Increase fluids and rest  Saline nasal spray as needed for nasal congestion  Warm salt gargles as needed for throat discomfort  Monitor temperature twice a day  Tylenol as needed for fevers and/or discomfort. Big deep breaths periodically throughout the day  Regular Mucinex over the counter as needed for chest congestion  If symptoms worsen -Go to the ER. Follow up with your primary care provider  - COVID-19    2. Close exposure to COVID-19 virus  - COVID-19      FOLLOW-UP:  Return if symptoms worsen or fail to improve.     In addition to other information, the printed after visit summary provided to the patient includes:  [x] COVID-19 Self care instructions  [x] COVID-19 General patient information

## 2022-09-26 NOTE — PROGRESS NOTES
9/26/22  Umberto Hanson  1980    FLU/COVID-19 CLINIC EVALUATION    HPI SYMPTOMS:    Employer:  [id number]    [] Fevers  [x] Chills  [x] Cough  [] Coughing up blood  [] Chest Congestion  [x] Nasal Congestion  [] Feeling short of breath  [] Sometimes  [] Frequently  [] All the time  [] Chest pain  [x] Headaches  []Tolerable  [x] Severe  [x] Sore throat  [] Muscle aches  [x] Nausea  [] Vomiting  []Unable to keep fluids down  [] Diarrhea  []Severe    [] OTHER SYMPTOMS:      Symptom Duration:   [] 1  [x] 2   [] 3   [] 4    [] 5   [] 6   [] 7   [] 8   [] 9   [] 10   [] 11   [] 12   [] 13   [] 14   [] Longer than 14 days    Symptom course:   [] Worsening     [] Stable     [] Improving    RISK FACTORS:    [] Pregnant or possibly pregnant  [] Age over 61  [] Diabetes  [] Heart disease  [] Asthma  [] COPD/Other chronic lung diseases  [] Active Cancer  [] On Chemotherapy  [] Taking oral steroids  [] History Lymphoma/Leukemia  [x] Close contact with a lab confirmed COVID-19 patient within 14 days of symptom onset  Daughter    [] History of travel from affected geographical areas within 14 days of symptom onset       VITALS:  There were no vitals filed for this visit. TESTS:    POCT FLU:  [] Positive     []Negative    ASSESSMENT:    [] Flu  [] Possible COVID-19  [] Strep    PLAN:    [] Discharge home with written instructions for:  [] Flu management  [] Possible COVID-19 infection with self-quarantine and management of symptoms  [] Follow-up with primary care physician or emergency department if worsens  [] Evaluation per physician/NP/PA in clinic  [] Sent to ER       An  electronic signature was used to authenticate this note.      --Esperanza Angel LPN on 8/73/2661 at 95:88 AM

## 2022-09-26 NOTE — PATIENT INSTRUCTIONS
Your COVID 19 test can take 1-5 days for the results to come back. We ask that you make a Mychart page and view your test results this way. You will need to Self quarantine until you know your results. If positive, please work on contact tracing. Increase fluids and rest  Saline nasal spray as needed for nasal congestion  Warm salt gargles as needed for throat discomfort  Monitor temperature twice a day  Tylenol as needed for fevers and/or discomfort. Big deep breaths periodically throughout the day  Regular Mucinex over the counter as needed for chest congestion  If symptoms worsen -Go to the ER. Follow up with your primary care provider      To Whom it May Concern:    Olivia Rg was tested for COVID-19 9/26/2022. He/she must stay home until test results are back. If test is positive, isolate for a total of 5 days, starting from day 1 of symptom onset. After 5 days, if fever-free for 24 hours and there has been a gradual improvement in symptoms, may come out of isolation, but must consistently wear a mask when around other people for 5 additional days. (5 days isolation, 5 days mask-wearing). We do not recommend retesting as patients may continue to test positive for months even though no longer contagious. It is suggested you call [address] [facility] or [address] with any questions regarding isolation timeframe/return to work/school details.

## 2022-09-27 LAB
SARS-COV-2: DETECTED
SOURCE: ABNORMAL

## 2022-10-13 ENCOUNTER — OFFICE VISIT (OUTPATIENT)
Dept: CARDIOLOGY CLINIC | Age: 42
End: 2022-10-13
Payer: MEDICAID

## 2022-10-13 ENCOUNTER — NURSE ONLY (OUTPATIENT)
Dept: CARDIOLOGY CLINIC | Age: 42
End: 2022-10-13
Payer: MEDICAID

## 2022-10-13 VITALS
HEART RATE: 84 BPM | WEIGHT: 228 LBS | HEIGHT: 63 IN | SYSTOLIC BLOOD PRESSURE: 118 MMHG | DIASTOLIC BLOOD PRESSURE: 74 MMHG | BODY MASS INDEX: 40.4 KG/M2

## 2022-10-13 DIAGNOSIS — E66.01 MORBIDLY OBESE (HCC): Primary | ICD-10-CM

## 2022-10-13 DIAGNOSIS — E78.5 DYSLIPIDEMIA: ICD-10-CM

## 2022-10-13 DIAGNOSIS — R07.89 OTHER CHEST PAIN: ICD-10-CM

## 2022-10-13 DIAGNOSIS — R42 DIZZINESS: ICD-10-CM

## 2022-10-13 DIAGNOSIS — R00.2 PALPITATION: ICD-10-CM

## 2022-10-13 DIAGNOSIS — E66.01 MORBIDLY OBESE (HCC): ICD-10-CM

## 2022-10-13 DIAGNOSIS — R00.2 PALPITATION: Primary | ICD-10-CM

## 2022-10-13 PROCEDURE — G8417 CALC BMI ABV UP PARAM F/U: HCPCS | Performed by: NURSE PRACTITIONER

## 2022-10-13 PROCEDURE — G8427 DOCREV CUR MEDS BY ELIG CLIN: HCPCS | Performed by: NURSE PRACTITIONER

## 2022-10-13 PROCEDURE — 93225 XTRNL ECG REC<48 HRS REC: CPT | Performed by: INTERNAL MEDICINE

## 2022-10-13 PROCEDURE — 1036F TOBACCO NON-USER: CPT | Performed by: NURSE PRACTITIONER

## 2022-10-13 PROCEDURE — G8484 FLU IMMUNIZE NO ADMIN: HCPCS | Performed by: NURSE PRACTITIONER

## 2022-10-13 PROCEDURE — 99214 OFFICE O/P EST MOD 30 MIN: CPT | Performed by: NURSE PRACTITIONER

## 2022-10-13 ASSESSMENT — ENCOUNTER SYMPTOMS: SHORTNESS OF BREATH: 1

## 2022-10-13 NOTE — ASSESSMENT & PLAN NOTE
-At or near goal No, patient is not currently on medication.     -The nature of cardiac risk has been fully discussed with this patient. I have made her aware of her LDL target goal given her cardiovascular risk analysis. I have discussed the appropriate diet. The need for lifelong compliance in order to reduce risk is stressed. A regular exercise program is recommended to help achieve and maintain normal body weight, fitness and improve lipid balance. A written copy of a low fat, low cholesterol diet has been given to the patient.

## 2022-10-13 NOTE — PROGRESS NOTES
[facility] ([facility] PHYSICAL [facility]    [address]  Phone: [phone]    Fax [phone]                  Jermaine Raya MD, Miguel Ángel Carrillo MD, Eugenio Dietrich MD, MD Ya Grajeda MD, Heather Vasques MD, Ya Genao MD, Jerzy Bhakta, APRN       Zaina Solorzano, APRROSSI Casarez, APRROSSI Lr, APRROSSI        Cardiology Progress Note      10/13/2022    RE: Susan Crisostomo  (1980)                             Primary cardiologist: Dr. Ya Truong       Subjective:  CC:   1. Morbidly obese ([address]    2. Dyslipidemia    3. Dizziness    4. Palpitation    5. Other chest pain        HPI: Susan Crisostomo, who is a  36y.o. year old female with a past medical history as listed below. Patient presents to the office for follow up on dyslipidemia, palpitations, chest pain, dizziness, and Obesity. It is awaiting gastric sleeve. Echocardiogram that was normal.  Patient reports new chest pain and palpitations over the last month. She reports chest pain midsternal, nonradiating, alleviates at times with rest, and is tender to touch at times. She has been having ongoing dizziness with a history of vertigo. Palpitations are occurring daily but denies any syncope. Past Medical History:   Diagnosis Date    Chronic depression 07/09/2021    Hx of Doppler echocardiogram 04/27/2022    EF 55-60%. Mild LV hypertrophy. Grade I diastolic dysfunction. Mildly dilated LA. Mild MR and TR. Kidney disorder     left removed .     Migraine     Mixed hyperlipidemia 01/27/2021    PONV (postoperative nausea and vomiting)     Preop pulmonary/respiratory exam 5/24/2022    Prolonged emergence from general anesthesia     Pulmonic stenosis     Sleep apnea     Sleep apnea        Current Outpatient Medications   Medication Sig Dispense Refill    vitamin D (ERGOCALCIFEROL) 1.25 MG (79107 UT) CAPS capsule Take 1 capsule by mouth once a week For eight weeks only 8 capsule 0     No current facility-administered medications for this visit. Review of Systems:  Review of Systems   Respiratory:  Positive for shortness of breath. Cardiovascular:  Positive for chest pain and leg swelling. Negative for palpitations. Musculoskeletal: Negative. Skin: Negative. Neurological:  Positive for dizziness. Negative for weakness. All other systems reviewed and are negative. Objective:      Physical Exam:  /74 (Site: Right Lower Arm, Position: Sitting, Cuff Size: Medium Adult)   Pulse 84   Ht 5' 3\" (1.6 m)   Wt 228 lb (103.4 kg)   LMP 02/15/2012   BMI 40.39 kg/m²   Wt Readings from Last 3 Encounters:   10/13/22 228 lb (103.4 kg)   09/26/22 234 lb (106.1 kg)   09/14/22 233 lb (105.7 kg)     Body mass index is 40.39 kg/m². Physical exam:  Physical Exam  Constitutional:       Appearance: She is well-developed. She is obese. Cardiovascular:      Rate and Rhythm: Normal rate and regular rhythm. Pulses: Intact distal pulses. Dorsalis pedis pulses are 2+ on the right side and 2+ on the left side. Posterior tibial pulses are 2+ on the right side and 2+ on the left side. Heart sounds: Normal heart sounds, S1 normal and S2 normal.   Pulmonary:      Effort: Pulmonary effort is normal.      Breath sounds: Normal breath sounds. Musculoskeletal:         General: Normal range of motion. Skin:     General: Skin is warm and dry. Neurological:      Mental Status: She is alert and oriented to person, place, and time.         DATA:  Lab Results   Component Value Date/Time    TROPONINI <0.006 03/24/2014 06:00 PM     BNP:  No results found for: BNP  PT/INR:  No results found for: PTINR  Lab Results   Component Value Date    LABA1C 5.6 03/11/2022    LABA1C 5.6 03/11/2022     Lab Results   Component Value Date    CHOL 202 (H) 03/11/2022    TRIG 147 03/11/2022    HDL 46 03/11/2022    LDLCALC 127 (H) 03/11/2022     Lab Results Component Value Date    ALT 22 03/11/2022    AST 28 03/11/2022     TSH:    Lab Results   Component Value Date/Time    TSH 1.33 08/19/2020 10:06 AM       Vitals:    10/13/22 1552   BP: 118/74   Pulse: 84       Echo:4/27/22  Limited study due to patients body habitus. Left ventricular function and size is normal, EF is estimated at 55-60%. Mild left ventricular hypertrophy. Grade I diastolic dysfunction. No regional wall motion abnormalities were detected. Mildly dilated left atrium. Mild mitral and tricuspid regurgitation is present. RVSP is 27 mmHg. No evidence of pericardial effusion. The 10-year ASCVD risk score (Dulce ENGLE, et al., 2019) is: 0.8%    Values used to calculate the score:      Age: 36 years      Sex: Female      Is Non- : No      Diabetic: No      Tobacco smoker: No      Systolic Blood Pressure: 152 mmHg      Is BP treated: No      HDL Cholesterol: 46 MG/DL      Total Cholesterol: 202 MG/DL      Assessment/ Plan: Morbidly obese ([address]   -Discussed the importance of diet and exercise and assisting with weight loss. Patient informed of ideal body weight and high risk mortality associated with obesity. Patient voices understanding.    -Patient had gastric sleeve. Dyslipidemia   -At or near goal No, patient is not currently on medication.     -The nature of cardiac risk has been fully discussed with this patient. I have made her aware of her LDL target goal given her cardiovascular risk analysis. I have discussed the appropriate diet. The need for lifelong compliance in order to reduce risk is stressed. A regular exercise program is recommended to help achieve and maintain normal body weight, fitness and improve lipid balance. A written copy of a low fat, low cholesterol diet has been given to the patient. Dizziness   -Occasional dizziness that is unchanged from baseline. Palpitation   -Reports palpitations are ongoing.  Will place 24 hour monitor. Other chest pain   -Chest pain on palpation, atypical.  Patient reports intense at times without any alleviating factors. Will check treadmill stress test      Patient seen, interviewed and examined. Testing was reviewed. Patient is encouraged to exercise even a brisk walk for 30 minutes at least 3 to 4 times a week. Lifestyle and risk factor modificatons discussed. Various goals are discussed and questions answered. Continue current medications. Appropriate prescriptions are addressed. Questions answered and patient verbalizes understanding. Call for any problems, questions, or concerns. Pt is to follow up 1 month for Cardiac management    Electronically signed by Melonie Tena.  Jason Carias, APRN - CNP on 10/13/2022 at 4:49 PM

## 2022-10-13 NOTE — ASSESSMENT & PLAN NOTE
-Discussed the importance of diet and exercise and assisting with weight loss. Patient informed of ideal body weight and high risk mortality associated with obesity. Patient voices understanding.    -Patient had gastric sleeve.

## 2022-10-13 NOTE — PROGRESS NOTES
24 hour holter monitor applied with Serial # [id number]. Instructed patient on monitor and proper use. Instructed on diary. When to remove and bring it back. Must leave the holter monitor on  without removing for the duration of time ordered. Answered all questions the patient had. Instructed patient to call [facility] at [phone] with any questions or concerns with the monitor.

## 2022-10-14 PROCEDURE — 93227 XTRNL ECG REC<48 HR R&I: CPT | Performed by: INTERNAL MEDICINE

## 2022-10-17 ENCOUNTER — OFFICE VISIT (OUTPATIENT)
Dept: BARIATRICS/WEIGHT MGMT | Age: 42
End: 2022-10-17
Payer: MEDICAID

## 2022-10-17 VITALS
BODY MASS INDEX: 40.11 KG/M2 | SYSTOLIC BLOOD PRESSURE: 124 MMHG | HEIGHT: 63 IN | OXYGEN SATURATION: 97 % | HEART RATE: 84 BPM | WEIGHT: 226.4 LBS | DIASTOLIC BLOOD PRESSURE: 82 MMHG

## 2022-10-17 DIAGNOSIS — E78.2 MIXED HYPERLIPIDEMIA: ICD-10-CM

## 2022-10-17 DIAGNOSIS — Z01.818 PRE-OP EVALUATION: ICD-10-CM

## 2022-10-17 DIAGNOSIS — E66.01 MORBID OBESITY WITH BMI OF 40.0-44.9, ADULT (HCC): Primary | ICD-10-CM

## 2022-10-17 PROCEDURE — G8484 FLU IMMUNIZE NO ADMIN: HCPCS | Performed by: NURSE PRACTITIONER

## 2022-10-17 PROCEDURE — 99213 OFFICE O/P EST LOW 20 MIN: CPT | Performed by: NURSE PRACTITIONER

## 2022-10-17 PROCEDURE — G8417 CALC BMI ABV UP PARAM F/U: HCPCS | Performed by: NURSE PRACTITIONER

## 2022-10-17 PROCEDURE — G8427 DOCREV CUR MEDS BY ELIG CLIN: HCPCS | Performed by: NURSE PRACTITIONER

## 2022-10-17 PROCEDURE — 1036F TOBACCO NON-USER: CPT | Performed by: NURSE PRACTITIONER

## 2022-10-17 ASSESSMENT — PATIENT HEALTH QUESTIONNAIRE - PHQ9
SUM OF ALL RESPONSES TO PHQ QUESTIONS 1-9: 1
SUM OF ALL RESPONSES TO PHQ9 QUESTIONS 1 & 2: 1
SUM OF ALL RESPONSES TO PHQ QUESTIONS 1-9: 1
1. LITTLE INTEREST OR PLEASURE IN DOING THINGS: 0
2. FEELING DOWN, DEPRESSED OR HOPELESS: 1
SUM OF ALL RESPONSES TO PHQ QUESTIONS 1-9: 1
SUM OF ALL RESPONSES TO PHQ QUESTIONS 1-9: 1

## 2022-10-17 ASSESSMENT — ENCOUNTER SYMPTOMS
EYES NEGATIVE: 1
ALLERGIC/IMMUNOLOGIC NEGATIVE: 1
RESPIRATORY NEGATIVE: 1
GASTROINTESTINAL NEGATIVE: 1

## 2022-10-17 NOTE — PROGRESS NOTES
BARIATRIC SURGERY OFFICE PROGRESS NOTE    SUBJECTIVE:    Patient presenting today referred from VIOLET Mckeon NP, for   Chief Complaint   Patient presents with    Weight Management     6th surg wm visit. .    Vitals:    10/17/22 1329   BP: 124/82   Pulse: 84   SpO2: 97%        BMI: Body mass index is 40.1 kg/m². Weight History: Wt Readings from Last 3 Encounters:   10/17/22 226 lb 6.4 oz (102.7 kg)   10/13/22 228 lb (103.4 kg)   09/26/22 234 lb (106.1 kg)         Autumn Levin is a 36 y.o. female presenting in sixth bariatric PRE-OP visit    Diet Recall: Total weight loss/gain: -4.6 lbs since last visit, and -12.4 lbs since starting program     Protein: tracks calories most days  Water Intake: a gallon daily  Exercise: walks daily  New health problems: recent covid  New medications: OTC remedies  Clearances:  -- Cardiology: cleared  -- Pulmonology: cleared  -- Psychology: cleared  -- EGD- mild gastritis; negative for Hpylori  -- UDS and nicotine completed and negative    -Patient had covid infection last month - symptoms now resolved    Thoroughly reviewed the patient's medical history, family history, social history and review of systems with the patient today in the office. Please see medical record for pertinent positives. Past Medical History:   Diagnosis Date    Chronic depression 07/09/2021    Hx of Doppler echocardiogram 04/27/2022    EF 55-60%. Mild LV hypertrophy. Grade I diastolic dysfunction. Mildly dilated LA. Mild MR and TR. Kidney disorder     left removed .     Migraine     Mixed hyperlipidemia 01/27/2021    PONV (postoperative nausea and vomiting)     Preop pulmonary/respiratory exam 5/24/2022    Prolonged emergence from general anesthesia     Pulmonic stenosis     Sleep apnea     Sleep apnea         Patient Active Problem List   Diagnosis    Vaginal bleeding    H/O of hysterectomy with bilateral oophorectomy    Panic disorder with agoraphobia    Early menopause H/O bilateral breast reduction surgery    Myofascial pain syndrome    Somatic dysfunction of head region    Somatic dysfunction of cervical region    Somatic dysfunction of pelvic region    Snoring    Obstructive sleep apnea on CPAP    Somatic dysfunction of both upper extremities    Morbidly obese (HCC)    Paresthesia of left upper extremity    Fibromyalgia    Mixed hyperlipidemia    Carpal tunnel syndrome of left wrist    Vitamin D deficiency    Chronic depression    Anxiety    Chronic pain of left knee    Migraine without aura and without status migrainosus, not intractable    Injury of left ankle    Hypersomnia    Diarrhea    Dyslipidemia    Palpitation    Dizziness    Other chest pain       Past Surgical History:   Procedure Laterality Date    BREAST BIOPSY Left 2010    benign    BREAST REDUCTION SURGERY Bilateral 2003    BREAST SURGERY      CARPAL TUNNEL RELEASE Left 2/16/2021    LEFT CARPAL TUNNEL RELEASE performed by Soledad Vargas DO at [address]  N/A 4/20/2022    COLONOSCOPY POLYPECTOMY SNARE/COLD BIOPSY performed by Garfield Bahena MD at [place] [address] Right     wrist    HAND SURGERY Bilateral     4/2014    HYSTERECTOMY (CERVIX STATUS UNKNOWN)      INNER EAR SURGERY      OTHER SURGICAL HISTORY  4/16/2012    Repair vaginal cuff    OVARY REMOVAL      PARTIAL NEPHRECTOMY Left     TOTAL NEPHRECTOMY Left     TUBAL LIGATION      UPPER GASTROINTESTINAL ENDOSCOPY N/A 7/29/2022    EGD ESOPHAGOGASTRODUODENOSCOPY WITH BX performed by Tigre Larios MD at [facility] ENDOSCOPY       Current Outpatient Medications   Medication Sig Dispense Refill    vitamin D (ERGOCALCIFEROL) 1.25 MG (82231 UT) CAPS capsule Take 1 capsule by mouth once a week For eight weeks only 8 capsule 0     No current facility-administered medications for this visit.         Allergies   Allergen Reactions    Codeine Anaphylaxis    Dilaudid [Hydromorphone Hcl] Anaphylaxis    Hydromorphone Anaphylaxis Keflex [Cephalexin] Hives    Pyridium [Phenazopyridine Hcl] Hives         Review of Systems   Constitutional: Negative. HENT: Negative. Eyes: Negative. Respiratory: Negative. Cardiovascular: Negative. Gastrointestinal: Negative. Endocrine: Negative. Genitourinary: Negative. Musculoskeletal: Negative. Skin: Negative. Allergic/Immunologic: Negative. Neurological: Negative. Hematological: Negative. Psychiatric/Behavioral: Negative. OBJECTIVE:    /82 (Site: Left Upper Arm, Position: Sitting, Cuff Size: Medium Adult)   Pulse 84   Ht 5' 3\" (1.6 m)   Wt 226 lb 6.4 oz (102.7 kg)   LMP 02/15/2012   SpO2 97%   BMI 40.10 kg/m²      Physical Exam  Constitutional:       Appearance: Normal appearance. She is obese. HENT:      Head: Normocephalic. Nose: Nose normal.      Mouth/Throat:      Pharynx: Oropharynx is clear. Eyes:      Conjunctiva/sclera: Conjunctivae normal.      Pupils: Pupils are equal, round, and reactive to light. Cardiovascular:      Rate and Rhythm: Normal rate. Pulses: Normal pulses. Pulmonary:      Effort: Pulmonary effort is normal.   Abdominal:      Palpations: Abdomen is soft. Musculoskeletal:         General: Normal range of motion. Cervical back: Normal range of motion. Skin:     General: Skin is warm and dry. Capillary Refill: Capillary refill takes less than 2 seconds. Neurological:      General: No focal deficit present. Mental Status: She is alert and oriented to person, place, and time. Psychiatric:         Mood and Affect: Mood normal.         Behavior: Behavior normal.       ASSESSMENT & PLAN:    1. Morbid obesity with BMI of 40.0-44.9, adult [facility])  -Patient was encouraged to journal all food intake.   -Keep calorie level at approximately 1200, per discussion / plan with registered dietician.  -Protein intake is to be a minimum of 60 grams per day.    -Water drinking was encouraged with a goal of 64oz-128oz daily. Beverages are to be calorie free except for milk. Avoid soda. -Continue to increase level of physical activity. 2. Mixed hyperlipidemia  - Low fat diet  - Will ideally improve with weight loss  - Will follow post-operatively    3. Pre-op evaluation  - All clearances met  - EGD- mild gastritis; negative for Hpylori  - UDS and nicotine completed and negative  - Had Covid infection in September- now resolved    - Patient is an excellent candidate for bariatric surgery and has completed all necessary requirements of  program and has lost a total of 12.4 lbs throughout the bariatric program. The patient has had all pre-operative clearances completed and will send file to insurance for approval.     - Discussed pre-surgical liver shrinking diet, hospital course, and post op diet stages today with patient in length. Reviewed surgical pictures and all questions answered. Will be scheduled with bariatric surgeon prior to surgery as well, to discuss consent and answer all questions. Patient aware to review new patient binder and come prepared with any further surgical questions. This patient is a female of reproductive age and was advised she should not become pregnant during the bariatric workup process and for at least 12-18 months after surgery. The patient is agreeable to this plan. I spent 25 minutes with the patient face to face today and over 50% of the office visit today was spent in face to face counseling regarding diet and exercise, in preparation for her planned Robotic Sleeve Gastrectomy. Discussed in length complying with the dietary recommendations, complying with the preoperative workup including dietary counseling, exercise physiologist counseling, and pre-operative optimization of pulmonologist and cardiologist.    The patient expressed understanding and willingness to comply nicely; all questions and concerns addressed.     No orders of the defined types were placed in this encounter. No orders of the defined types were placed in this encounter. Follow Up:  Return for send file to insurance.     VIOLET Khan - CNP

## 2022-10-25 ENCOUNTER — TELEPHONE (OUTPATIENT)
Dept: BARIATRICS/WEIGHT MGMT | Age: 42
End: 2022-10-25

## 2022-10-25 ENCOUNTER — PROCEDURE VISIT (OUTPATIENT)
Dept: CARDIOLOGY CLINIC | Age: 42
End: 2022-10-25
Payer: MEDICAID

## 2022-10-25 VITALS
SYSTOLIC BLOOD PRESSURE: 120 MMHG | HEIGHT: 63 IN | DIASTOLIC BLOOD PRESSURE: 80 MMHG | WEIGHT: 228 LBS | BODY MASS INDEX: 40.4 KG/M2 | HEART RATE: 84 BPM

## 2022-10-25 DIAGNOSIS — R00.2 PALPITATION: ICD-10-CM

## 2022-10-25 DIAGNOSIS — R42 DIZZINESS: ICD-10-CM

## 2022-10-25 DIAGNOSIS — E78.5 DYSLIPIDEMIA: ICD-10-CM

## 2022-10-25 DIAGNOSIS — R06.02 SOB (SHORTNESS OF BREATH): ICD-10-CM

## 2022-10-25 DIAGNOSIS — E66.01 MORBIDLY OBESE (HCC): ICD-10-CM

## 2022-10-25 DIAGNOSIS — R07.89 OTHER CHEST PAIN: Primary | ICD-10-CM

## 2022-10-25 PROCEDURE — 93015 CV STRESS TEST SUPVJ I&R: CPT | Performed by: INTERNAL MEDICINE

## 2022-10-25 NOTE — TELEPHONE ENCOUNTER
CASE DETAILS  NOTIFICATION/PRIOR AUTHORIZATION NUMBER CASE STATUS CASE STATUS REASON PRIMARY CARE PHYSICIAN  [id number] Anticipated Admission Complete Awaiting Admission Godwin Carrillo NOTIFY DATE/TIME ADMISSION NOTIFY DATE/TIME EXPECTED DATES OF SERVICE ACTUAL DATES OF SERVICE  10/18/2022 08:36 AM CDT - 11/21/2022 - 11/22/2022 -  COVERAGE STATUS  OVERALL COVERAGE STATUS  Covered/Approved  1-2 CODE DESCRIPTION COVERAGE  STATUS DECISION DATE  [facility]  Coverage determination is reflected for the facility admission and is not a guarantee of payment for ongoing services. Covered/Approved 10/24/2022 1 [id number] Laparoscopy, surgical, gastric restricti more Covered/Approved 10/24/2022 2 [id number] Laparoscopy, surgical, gastric restricti more Covered/Approved 10/24/2022   VIEW PRIOR AUTHORIZATION LETTERS IN DOCUMENT LIBRARY      CLINICAL NOTES  Add a clinical note  ATTACH CLINICAL DOCUMENTATION   Please click on the 'View Questionnaire' or 'Complete Questionnaire' link in the Procedure Details section to provide missing information and add attachments. DATE FILE NAME STATUS   10/18/2022 [id number]. pdf [id number] Initial Clinical 1 View PDF  PATIENT DETAILS  PATIENT NAME RELATIONSHIP VERBAL LANGUAGE PREFERENCE MESSAGE  Umberto Margie Employee English A future timeline may be available for this member. For future coverage please call the telephone number located on the back of the [facility] Medical ID card.   MEMBER NUMBER EFFECTIVE DATE [employer] PREFERENCE  [id number] 07/01/2022 -  GROUP NUMBER TERMINATION DATE  [facility] 12/31/9999  PRODUCT INSURANCE TYPE  - Medicaid  SUBMITTING PROVIDER DETAILS  NAME ADDRESS   [facility], [address]  TAX ID STATUS  [id number]  In-Network  ADMITTING/ATTENDING PHYSICIAN DETAILS   NAME [address], [address]  TAX ID STATUS  [id number]  [facility]  FACILITY DETAILS  NAME [address], [facility], [address] [facility]  ID NUMBER STATUS COVERAGE  [id number]  [facility]  Covered/Approved   Coverage determination is reflected for the facility admission and is not a guarantee of payment for ongoing services. SERVICE DETAILS  PLACE OF SERVICE  SERVICE DETAILS   [facility]   FACILITY SERVICE DATES DETAILS  Has the patient been admitted today? YES   NO  EXPECTED ADMISSION DATE* SERVICE DESCRIPTION*   11/21/2022  . ..  mm/dd/yyyy  Scheduled  DIAGNOSIS DETAILS  1-2 CODE DESCRIPTION  1 Z68.41 BODY MASS INDEX BMI 40.0-44.9 ADULT  2 E66.01 MORBID SEVERE OBESITY DUE TO EXCESS CALORIES   PROCEDURE DETAILS   Not Started   In Progress   New Version Released  1-2 CODE DESCRIPTION SERVICING PROVIDER NAME,  TAX ID, STATUS, ADDRESS  COVERAGE  STATUS QUESTIONNAIRE  1 [id number] Laparoscopy, surgical, gastric restricti [facility],  [id number], In-Network,  [address] [facility], [address] [facility] Covered/Approved View  Questionnaire 2 [id number] Laparoscopy, surgical, gastric restricti [facility],  [id number], [address],  [address] [place] [address],  [facility], [address] [facility]

## 2022-10-26 ENCOUNTER — TELEPHONE (OUTPATIENT)
Dept: BARIATRICS/WEIGHT MGMT | Age: 42
End: 2022-10-26

## 2022-10-26 NOTE — TELEPHONE ENCOUNTER
Scheduled sleeve gasrectomy at [facility]  Surgery:  11/21/22 at 730am .. Consent appt:  11/10/22 at 9am  this will include ordered labs that must be done that day. PAT: by phone  Preop diet:  11/8/22 at [facility] 38 in:  11/18/22 at 915am  P/O appt:  11/29/22 at 945am  Prep: NPO after midnight. Hold Blood thinners if taking any  Called and spoke with [facility], gave dates and times.

## 2022-11-08 ENCOUNTER — OFFICE VISIT (OUTPATIENT)
Dept: BARIATRICS/WEIGHT MGMT | Age: 42
End: 2022-11-08
Payer: MEDICAID

## 2022-11-08 VITALS
WEIGHT: 227.1 LBS | OXYGEN SATURATION: 97 % | DIASTOLIC BLOOD PRESSURE: 78 MMHG | SYSTOLIC BLOOD PRESSURE: 126 MMHG | HEIGHT: 63 IN | HEART RATE: 71 BPM | BODY MASS INDEX: 40.24 KG/M2

## 2022-11-08 DIAGNOSIS — E66.01 MORBID OBESITY WITH BMI OF 40.0-44.9, ADULT (HCC): Primary | ICD-10-CM

## 2022-11-08 PROCEDURE — 99215 OFFICE O/P EST HI 40 MIN: CPT | Performed by: NURSE PRACTITIONER

## 2022-11-08 PROCEDURE — 1036F TOBACCO NON-USER: CPT | Performed by: NURSE PRACTITIONER

## 2022-11-08 PROCEDURE — G8417 CALC BMI ABV UP PARAM F/U: HCPCS | Performed by: NURSE PRACTITIONER

## 2022-11-08 PROCEDURE — G8428 CUR MEDS NOT DOCUMENT: HCPCS | Performed by: NURSE PRACTITIONER

## 2022-11-08 PROCEDURE — G8484 FLU IMMUNIZE NO ADMIN: HCPCS | Performed by: NURSE PRACTITIONER

## 2022-11-08 NOTE — PROGRESS NOTES
Umberto Hanson  1980  36 y.o. SUBJECT MELODY:  HPI: Radha Nguyen presents today for PREOP DIET class by this provider. Patient has successfully met the criteria for weight loss surgery and is here for the weight management diet class. Patient agrees to follow the diet guidelines provided for successful weight loss. No new medications, recent illnesses or new medical history this appointment. Chief Complaint   Patient presents with    Weight Management     class     Past Medical History:   Diagnosis Date    Chronic depression 07/09/2021    Hx of Doppler echocardiogram 04/27/2022    EF 55-60%. Mild LV hypertrophy. Grade I diastolic dysfunction. Mildly dilated LA. Mild MR and TR. Kidney disorder     left removed .     Migraine     Mixed hyperlipidemia 01/27/2021    PONV (postoperative nausea and vomiting)     Preop pulmonary/respiratory exam 5/24/2022    Prolonged emergence from general anesthesia     Pulmonic stenosis     Sleep apnea     Sleep apnea      Past Surgical History:   Procedure Laterality Date    BREAST BIOPSY Left 2010    benign    BREAST REDUCTION SURGERY Bilateral 2003    BREAST SURGERY      CARPAL TUNNEL RELEASE Left 2/16/2021    LEFT CARPAL TUNNEL RELEASE performed by Anna Pina DO at [address] N/A 4/20/2022    COLONOSCOPY POLYPECTOMY SNARE/COLD BIOPSY performed by Kamryn Lee MD at [place] [address] Right     wrist    HAND SURGERY Bilateral     4/2014    HYSTERECTOMY (CERVIX STATUS UNKNOWN)      INNER EAR SURGERY      OTHER SURGICAL HISTORY  4/16/2012    Repair vaginal cuff    OVARY REMOVAL      PARTIAL NEPHRECTOMY Left     TOTAL NEPHRECTOMY Left     TUBAL LIGATION      UPPER GASTROINTESTINAL ENDOSCOPY N/A 7/29/2022    EGD ESOPHAGOGASTRODUODENOSCOPY WITH BX performed by Yoli Jang MD at [facility] ENDOSCOPY     Current Outpatient Medications   Medication Sig Dispense Refill    vitamin D (ERGOCALCIFEROL) 1.25 MG (57925 UT) CAPS capsule Take 1 capsule by mouth once a week For eight weeks only 8 capsule 0     No current facility-administered medications for this visit. Family History   Problem Relation Age of Onset    Cancer Mother     Migraines Mother     Cancer Maternal Grandmother     Diabetes Maternal Grandmother     Allergies Son     Allergies Daughter     Bleeding Prob Daughter     Bleeding Prob Son     Asthma Daughter     Asthma Son      Allergies   Allergen Reactions    Codeine Anaphylaxis    Dilaudid [Hydromorphone Hcl] Anaphylaxis    Hydromorphone Anaphylaxis    Keflex [Cephalexin] Hives    Pyridium [Phenazopyridine Hcl] Hives        OBJECTIVE:     /78   Pulse 71   Ht 5' 3\" (1.6 m)   Wt 227 lb 1.6 oz (103 kg)   LMP 02/15/2012   SpO2 97%   BMI 40.23 kg/m²   Wt Readings from Last 3 Encounters:   11/08/22 227 lb 1.6 oz (103 kg)   10/25/22 228 lb (103.4 kg)   10/17/22 226 lb 6.4 oz (102.7 kg)       - Patient attended the surgical PREOP Diet group class today. - The purpose of the pre-surgery diet is designed to reduce body fat, preserve lean body mass, and prepare the body for post-surgery recovery. - Patient here to start 2 week liver shrinking diet in preparation for weight loss surgery on 11/21/22    - Patient instructed on pre-op diet and complete post-op diet progression; including tips for N/V, fluid and activity logs, recipes, and vitamins.    - Patient asked pertinent questions throughout session and answers given. - Patient informed that compliance generates a successful weight loss partnership.    - Patient voiced understanding of all information provided. - Patient is to refer to surgical handbook or to call office if questions arise.     - Will follow up in 2 weeks for weigh-in. Patient was seen with total face to face time of 40 minutes in a group setting.  More than 50% of this visit was counseling on obesity, strict diet recommendations, nutrition and education as above in my assessment and plan section of my note.

## 2022-11-09 ENCOUNTER — OFFICE VISIT (OUTPATIENT)
Dept: CARDIOLOGY CLINIC | Age: 42
End: 2022-11-09
Payer: MEDICAID

## 2022-11-09 VITALS
HEART RATE: 85 BPM | WEIGHT: 227.6 LBS | OXYGEN SATURATION: 97 % | HEIGHT: 63 IN | BODY MASS INDEX: 40.33 KG/M2 | DIASTOLIC BLOOD PRESSURE: 62 MMHG | SYSTOLIC BLOOD PRESSURE: 118 MMHG

## 2022-11-09 DIAGNOSIS — R42 DIZZINESS: ICD-10-CM

## 2022-11-09 DIAGNOSIS — R07.89 OTHER CHEST PAIN: ICD-10-CM

## 2022-11-09 DIAGNOSIS — R00.2 PALPITATION: Primary | ICD-10-CM

## 2022-11-09 DIAGNOSIS — E78.5 DYSLIPIDEMIA: ICD-10-CM

## 2022-11-09 PROCEDURE — 99214 OFFICE O/P EST MOD 30 MIN: CPT | Performed by: NURSE PRACTITIONER

## 2022-11-09 PROCEDURE — G8484 FLU IMMUNIZE NO ADMIN: HCPCS | Performed by: NURSE PRACTITIONER

## 2022-11-09 PROCEDURE — G8427 DOCREV CUR MEDS BY ELIG CLIN: HCPCS | Performed by: NURSE PRACTITIONER

## 2022-11-09 PROCEDURE — G8417 CALC BMI ABV UP PARAM F/U: HCPCS | Performed by: NURSE PRACTITIONER

## 2022-11-09 PROCEDURE — 1036F TOBACCO NON-USER: CPT | Performed by: NURSE PRACTITIONER

## 2022-11-09 ASSESSMENT — ENCOUNTER SYMPTOMS: SHORTNESS OF BREATH: 0

## 2022-11-09 NOTE — PROGRESS NOTES
[facility] [address] [facility],[address]  Phone: [phone]    Fax [phone]                  Payal Watkins MD, Syble Severance, MD, [address] [facility], MD, MD Grey Burnham MD, Jose Alvarez MD, Gaurav Love MD, [address], APRN       Em More, APRN  Heather Souza, APRN       Ashley Cintron, APRN        Cardiology Progress Note      11/9/2022    RE: Endy Echavarria  (1980)                             Primary cardiologist: Dr. Grey Ramirez       Subjective:  CC:   1. Palpitation    2. Other chest pain    3. Dyslipidemia    4. Dizziness        HPI: Endy Echavarria, who is a  36y.o. year old female with a past medical history as listed below. Patient presents to the office for follow up on dyslipidemia, palpitations, chest pain, dizziness, and Obesity. It is awaiting gastric sleeve. Patient reports new chest pain and palpitations over the last month. She reports chest pain midsternal, nonradiating, alleviates at times with rest, and is tender to touch at times. She has been having ongoing dizziness with a history of vertigo. Palpitations are occurring daily but denies any syncope. Wore Holter monitor did not show any arrhythmias and stress test was normal.    Past Medical History:   Diagnosis Date    Chronic depression 07/09/2021    Hx of Doppler echocardiogram 04/27/2022    EF 55-60%. Mild LV hypertrophy. Grade I diastolic dysfunction. Mildly dilated LA. Mild MR and TR. Kidney disorder     left removed .     Migraine     Mixed hyperlipidemia 01/27/2021    PONV (postoperative nausea and vomiting)     Preop pulmonary/respiratory exam 5/24/2022    Prolonged emergence from general anesthesia     Pulmonic stenosis     Sleep apnea     Sleep apnea        Current Outpatient Medications   Medication Sig Dispense Refill    vitamin D (ERGOCALCIFEROL) 1.25 MG (03979 UT) CAPS capsule Take 1 capsule by mouth once a week For eight weeks only 8 capsule 0     No current facility-administered medications for this visit. Review of Systems:  Review of Systems   Respiratory:  Negative for shortness of breath. Cardiovascular:  Negative for chest pain, palpitations and leg swelling. Musculoskeletal: Negative. Skin: Negative. Neurological:  Positive for dizziness. Negative for weakness. All other systems reviewed and are negative. Objective:      Physical Exam:  /62 (Site: Right Upper Arm, Position: Sitting, Cuff Size: Large Adult)   Pulse 85   Ht 5' 3\" (1.6 m)   Wt 227 lb 9.6 oz (103.2 kg)   LMP 02/15/2012   SpO2 97%   BMI 40.32 kg/m²   Wt Readings from Last 3 Encounters:   11/09/22 227 lb 9.6 oz (103.2 kg)   11/08/22 227 lb 1.6 oz (103 kg)   10/25/22 228 lb (103.4 kg)     Body mass index is 40.32 kg/m². Physical exam:  Physical Exam  Constitutional:       Appearance: She is well-developed. She is obese. Cardiovascular:      Rate and Rhythm: Normal rate and regular rhythm. Pulses: Intact distal pulses. Dorsalis pedis pulses are 2+ on the right side and 2+ on the left side. Posterior tibial pulses are 2+ on the right side and 2+ on the left side. Heart sounds: Normal heart sounds, S1 normal and S2 normal.   Pulmonary:      Effort: Pulmonary effort is normal.      Breath sounds: Normal breath sounds. Musculoskeletal:         General: Normal range of motion. Skin:     General: Skin is warm and dry. Neurological:      Mental Status: She is alert and oriented to person, place, and time.         DATA:  Lab Results   Component Value Date/Time    TROPONINI <0.006 03/24/2014 06:00 PM     BNP:  No results found for: BNP  PT/INR:  No results found for: PTINR  Lab Results   Component Value Date    LABA1C 5.6 03/11/2022    LABA1C 5.6 03/11/2022     Lab Results   Component Value Date    CHOL 202 (H) 03/11/2022    TRIG 147 03/11/2022    HDL 46 03/11/2022    LDLCALC 127 (H) 03/11/2022     Lab Results   Component Value Date    ALT 22 03/11/2022    AST 28 03/11/2022     TSH:    Lab Results   Component Value Date/Time    TSH 1.33 08/19/2020 10:06 AM       Vitals:    11/09/22 0950   BP: 118/62   Pulse: 85   SpO2: 97%       Echo:4/27/22  Limited study due to patients body habitus. Left ventricular function and size is normal, EF is estimated at 55-60%. Mild left ventricular hypertrophy. Grade I diastolic dysfunction. No regional wall motion abnormalities were detected. Mildly dilated left atrium. Mild mitral and tricuspid regurgitation is present. RVSP is 27 mmHg. No evidence of pericardial effusion. Stress test:10/25/22  No ischemia     The 10-year ASCVD risk score (Dulce ENGLE, et al., 2019) is: 0.8%    Values used to calculate the score:      Age: 36 years      Sex: Female      Is Non- : No      Diabetic: No      Tobacco smoker: No      Systolic Blood Pressure: 401 mmHg      Is BP treated: No      HDL Cholesterol: 46 MG/DL      Total Cholesterol: 202 MG/DL      Assessment/ Plan: Morbidly obese ([address]   -Discussed the importance of diet and exercise and assisting with weight loss. Patient informed of ideal body weight and high risk mortality associated with obesity. Patient voices understanding.    -Patient is awaiting gastric sleeve. Dyslipidemia   -At or near goal No, patient is not currently on medication.     -The nature of cardiac risk has been fully discussed with this patient. I have made her aware of her LDL target goal given her cardiovascular risk analysis. I have discussed the appropriate diet. The need for lifelong compliance in order to reduce risk is stressed. A regular exercise program is recommended to help achieve and maintain normal body weight, fitness and improve lipid balance. A written copy of a low fat, low cholesterol diet has been given to the patient.      Dizziness   -Occasional dizziness that is unchanged from baseline. No arrhythmias seen on monitor. Palpitation   -Reports palpitations are ongoing. 24-hour Holter monitor, base rhythm is sinus minimum heart rate 68 bpm, average heart rate 90 bpm, maximum heart rate 144 bpm, patient has symptoms of lightheadedness and dizziness during sinus rhythm and interestingly no symptoms were provided with a heart rate of 144, continue present care. Other chest pain   -Chest pain on palpation, atypical.  Patient reports intense at times without any alleviating factors. Stress test normal.      Patient seen, interviewed and examined. Testing was reviewed. Patient is encouraged to exercise even a brisk walk for 30 minutes at least 3 to 4 times a week. Lifestyle and risk factor modificatons discussed. Various goals are discussed and questions answered. Continue current medications. Appropriate prescriptions are addressed. Questions answered and patient verbalizes understanding. Call for any problems, questions, or concerns. Pt is to follow up 6 month for Cardiac management    Electronically signed by Nacho Simpson.  VIOLET Cassidy CNP on 11/9/2022 at 10:02 AM

## 2022-11-10 ENCOUNTER — OFFICE VISIT (OUTPATIENT)
Dept: BARIATRICS/WEIGHT MGMT | Age: 42
End: 2022-11-10
Payer: MEDICAID

## 2022-11-10 VITALS
HEART RATE: 67 BPM | SYSTOLIC BLOOD PRESSURE: 122 MMHG | WEIGHT: 225.6 LBS | DIASTOLIC BLOOD PRESSURE: 74 MMHG | BODY MASS INDEX: 39.97 KG/M2 | HEIGHT: 63 IN | OXYGEN SATURATION: 100 %

## 2022-11-10 DIAGNOSIS — Z01.818 PRE-OPERATIVE CLEARANCE: Primary | ICD-10-CM

## 2022-11-10 DIAGNOSIS — E55.9 VITAMIN D DEFICIENCY: ICD-10-CM

## 2022-11-10 DIAGNOSIS — E66.01 MORBID OBESITY DUE TO EXCESS CALORIES (HCC): ICD-10-CM

## 2022-11-10 PROCEDURE — G8417 CALC BMI ABV UP PARAM F/U: HCPCS | Performed by: SURGERY

## 2022-11-10 PROCEDURE — 99214 OFFICE O/P EST MOD 30 MIN: CPT | Performed by: SURGERY

## 2022-11-10 PROCEDURE — G8427 DOCREV CUR MEDS BY ELIG CLIN: HCPCS | Performed by: SURGERY

## 2022-11-10 PROCEDURE — G8484 FLU IMMUNIZE NO ADMIN: HCPCS | Performed by: SURGERY

## 2022-11-10 PROCEDURE — 1036F TOBACCO NON-USER: CPT | Performed by: SURGERY

## 2022-11-10 RX ORDER — SODIUM CHLORIDE, SODIUM LACTATE, POTASSIUM CHLORIDE, CALCIUM CHLORIDE 600; 310; 30; 20 MG/100ML; MG/100ML; MG/100ML; MG/100ML
INJECTION, SOLUTION INTRAVENOUS CONTINUOUS
Status: CANCELLED | OUTPATIENT
Start: 2022-11-10

## 2022-11-10 RX ORDER — HEPARIN SODIUM 5000 [USP'U]/ML
5000 INJECTION, SOLUTION INTRAVENOUS; SUBCUTANEOUS ONCE
Status: CANCELLED | OUTPATIENT
Start: 2022-11-10 | End: 2022-11-10

## 2022-11-10 RX ORDER — SODIUM CHLORIDE 9 MG/ML
INJECTION, SOLUTION INTRAVENOUS PRN
Status: CANCELLED | OUTPATIENT
Start: 2022-11-10

## 2022-11-10 RX ORDER — SODIUM CHLORIDE 0.9 % (FLUSH) 0.9 %
5-40 SYRINGE (ML) INJECTION EVERY 12 HOURS SCHEDULED
Status: CANCELLED | OUTPATIENT
Start: 2022-11-10

## 2022-11-10 RX ORDER — APREPITANT 80 MG/1
80 CAPSULE ORAL ONCE
Qty: 10 CAPSULE | Refills: 2 | Status: SHIPPED | OUTPATIENT
Start: 2022-11-10 | End: 2022-11-10

## 2022-11-10 RX ORDER — ONDANSETRON 2 MG/ML
4 INJECTION INTRAMUSCULAR; INTRAVENOUS ONCE
Status: CANCELLED | OUTPATIENT
Start: 2022-11-10 | End: 2022-11-10

## 2022-11-10 RX ORDER — SCOLOPAMINE TRANSDERMAL SYSTEM 1 MG/1
1 PATCH, EXTENDED RELEASE TRANSDERMAL ONCE
Status: CANCELLED | OUTPATIENT
Start: 2022-11-10 | End: 2022-11-10

## 2022-11-10 RX ORDER — SODIUM CHLORIDE 0.9 % (FLUSH) 0.9 %
5-40 SYRINGE (ML) INJECTION PRN
Status: CANCELLED | OUTPATIENT
Start: 2022-11-10

## 2022-11-10 ASSESSMENT — PATIENT HEALTH QUESTIONNAIRE - PHQ9
2. FEELING DOWN, DEPRESSED OR HOPELESS: 1
SUM OF ALL RESPONSES TO PHQ QUESTIONS 1-9: 1
SUM OF ALL RESPONSES TO PHQ QUESTIONS 1-9: 1
SUM OF ALL RESPONSES TO PHQ9 QUESTIONS 1 & 2: 1
SUM OF ALL RESPONSES TO PHQ QUESTIONS 1-9: 1
SUM OF ALL RESPONSES TO PHQ QUESTIONS 1-9: 1
1. LITTLE INTEREST OR PLEASURE IN DOING THINGS: 0

## 2022-11-10 NOTE — PROGRESS NOTES
11/10/22 - . LM with my call-back # concerning  surgery @ [facility] on  11/21/22. Please call the PAT Nurse for a phone assessment, surgery instructions and to set a day for pre-surgery labs.

## 2022-11-10 NOTE — PROGRESS NOTES
BARIATRIC SURGERY OFFICE PROGRESS NOTE    SUBJECTIVE:    Patient presenting today referred from VIOLET Rodarte NP, for   Chief Complaint   Patient presents with    Weight Management     Consent    . Vitals:    11/10/22 0904   BP: 122/74   Pulse: 67   SpO2: 100%        BMI: Body mass index is 39.96 kg/m². Weight History: Wt Readings from Last 3 Encounters:   11/10/22 225 lb 9.6 oz (102.3 kg)   11/09/22 227 lb 9.6 oz (103.2 kg)   11/08/22 227 lb 1.6 oz (103 kg)       If within 30 days of bariatric surgery date, have you been to the ED: Yin Joel is a 36 y.o. female presenting in  consent  bariatric pre-OP visit. Weight change:     -1.5 lbs since last visit. N/a lbs since surgery.    -12.6 lbs since starting program.    Changes in health since last visit: None. Pre-op clearances completed: Yes. Pt tracking calories/protein: Yes. Approximately ~8257-9873 maria del carmen / d. Approximately not tracking g protein / d. Pt exercising: \"walking while at work\"    Pt taking vitamins: Yes    Fluid intake: Appropriate    Past Medical History:   Diagnosis Date    Chronic depression 07/09/2021    Hx of Doppler echocardiogram 04/27/2022    EF 55-60%. Mild LV hypertrophy. Grade I diastolic dysfunction. Mildly dilated LA. Mild MR and TR. Kidney disorder     left removed .     Migraine     Mixed hyperlipidemia 01/27/2021    PONV (postoperative nausea and vomiting)     Preop pulmonary/respiratory exam 5/24/2022    Prolonged emergence from general anesthesia     Pulmonic stenosis     Sleep apnea     Sleep apnea         Patient Active Problem List   Diagnosis    Vaginal bleeding    H/O of hysterectomy with bilateral oophorectomy    Panic disorder with agoraphobia    Early menopause    H/O bilateral breast reduction surgery    Myofascial pain syndrome    Somatic dysfunction of head region    Somatic dysfunction of cervical region    Somatic dysfunction of pelvic region    Snoring    Obstructive sleep apnea on CPAP    Somatic dysfunction of both upper extremities    Morbidly obese (HCC)    Paresthesia of left upper extremity    Fibromyalgia    Mixed hyperlipidemia    Carpal tunnel syndrome of left wrist    Vitamin D deficiency    Chronic depression    Anxiety    Chronic pain of left knee    Migraine without aura and without status migrainosus, not intractable    Injury of left ankle    Hypersomnia    Diarrhea    Dyslipidemia    Palpitation    Dizziness    Other chest pain    SOB (shortness of breath)       Past Surgical History:   Procedure Laterality Date    BREAST BIOPSY Left 2010    benign    BREAST REDUCTION SURGERY Bilateral 2003    BREAST SURGERY      CARPAL TUNNEL RELEASE Left 2/16/2021    LEFT CARPAL TUNNEL RELEASE performed by Glenis Ortega DO at [address] N/A 4/20/2022    COLONOSCOPY POLYPECTOMY SNARE/COLD BIOPSY performed by Aneta García MD at [facility] Right     wrist    HAND SURGERY Bilateral     4/2014    HYSTERECTOMY (CERVIX STATUS UNKNOWN)      INNER EAR SURGERY      OTHER SURGICAL HISTORY  4/16/2012    Repair vaginal cuff    OVARY REMOVAL      PARTIAL NEPHRECTOMY Left     TOTAL NEPHRECTOMY Left     TUBAL LIGATION      UPPER GASTROINTESTINAL ENDOSCOPY N/A 7/29/2022    EGD ESOPHAGOGASTRODUODENOSCOPY WITH BX performed by Chip Ramires MD at [address] [place]. \[facility].\"" ENDOSCOPY       Current Outpatient Medications   Medication Sig Dispense Refill    aprepitant (EMEND) 80 MG capsule Take 1 capsule by mouth once for 1 dose 10 capsule 2    vitamin D (ERGOCALCIFEROL) 1.25 MG (55654 UT) CAPS capsule Take 1 capsule by mouth once a week For eight weeks only 8 capsule 0     No current facility-administered medications for this visit.         Allergies   Allergen Reactions    Codeine Anaphylaxis    Dilaudid [Hydromorphone Hcl] Anaphylaxis    Hydromorphone Anaphylaxis    Keflex [Cephalexin] Hives    Pyridium [Phenazopyridine Hcl] Hives         Review of Systems Constitutional:  Positive for fatigue. All other systems reviewed and are negative. OBJECTIVE:    /74 (Site: Left Upper Arm, Position: Sitting, Cuff Size: Medium Adult)   Pulse 67   Ht 5' 3\" (1.6 m)   Wt 225 lb 9.6 oz (102.3 kg)   LMP 02/15/2012   SpO2 100%   BMI 39.96 kg/m²      Physical Exam  Vitals reviewed. Constitutional:       General: She is not in acute distress. Appearance: She is obese. She is not ill-appearing, toxic-appearing or diaphoretic. HENT:      Head: Normocephalic and atraumatic. Right Ear: External ear normal.      Left Ear: External ear normal.      Nose: Nose normal.   Eyes:      General:         Right eye: No discharge. Left eye: No discharge. Extraocular Movements: Extraocular movements intact. Cardiovascular:      Rate and Rhythm: Normal rate. Pulmonary:      Effort: No respiratory distress. Abdominal:      Palpations: Abdomen is soft. Musculoskeletal:         General: No swelling. Skin:     General: Skin is warm. Neurological:      General: No focal deficit present. Mental Status: She is alert. Psychiatric:         Mood and Affect: Mood normal.       ASSESSMENT & PLAN:    1. Pre-operative clearance  - Hemoglobin A1C; Future  -Orders placed for day of surgery. 2. Morbid obesity due to excess calories ([address]  -Cleared for surgery.  -Consent obtained. Reviewed in detail with the patient and/or family the expected pre-operative, operative, and post-operative courses including risks, benefits, and alternatives to the procedure. The patient's questions were answered in detail and agreed to proceed with the procedure.   -Liver shrinking diet. -Reviewed diagram with pt showing surgical anatomy and d/w pt surgery specific complications (ie leak, staple line issues, N/V)  -Pt with known PONV so will order Emend.     3. Vitamin D deficiency  -PO Vit D   -Monitor vitamin levels after surgery             As of current visit, regarding obesity-related co-morbid conditions:  EMILEE [] compliant [] no longer using [] resolved per sleep study; hypertension [] medications; hyperlipidemia [] medications; GERD [] medications; DM [] insulin [] non-insulin [] no meds       Patient was encouraged to track all PO intake. Calories should be approximately 1200, per discussion and plan with registered dietician. Protein intake is to be a minimum of 40-50 grams per day. Water drinking was encouraged with a goal of 64oz-128oz daily. Beverages are to be calorie free except for milk. Avoid soda and other carbonated beverages. Continue to increase level of physical activity. I spent 25 minutes with the patient face to face today and over 50% of the office visit today was spent in face to face counseling regarding diet and exercise, in preparation for her planned robotic-assisted laparoscopic sleeve gastrectomy. Discussed complying with the dietary recommendations, complying with the preoperative workup including dietary counseling , exercise physiologist (PT) counseling , and pre-operative optimization of pulmonologist  and cardiologist .    The patient expressed understanding and willingness to comply nicely; all questions and concerns addressed. Orders Placed This Encounter   Medications    aprepitant (EMEND) 80 MG capsule     Sig: Take 1 capsule by mouth once for 1 dose     Dispense:  10 capsule     Refill:  2     Orders Placed This Encounter   Procedures    Hemoglobin A1C     Standing Status:   Future     Standing Expiration Date:   11/10/2023    Initiate PAT Protocol     Standing Status:   Future     Standing Expiration Date:   1/9/2023       Follow Up:  No follow-ups on file.     Da Vargas MD

## 2022-11-11 NOTE — PROGRESS NOTES
Patient to come in 11/15/22 for pre-surgery labs & to  soaps    Surgery @ [facility] on 11/21/22 you will be called 11/18/22 with times               1. Do not eat or drink anything after midnight - unless instructed by your doctor prior to surgery. This includes                   no water, chewing gum or mints. 2. Follow your directions as prescribed by the doctor for your procedure and medications. Do not take any medications morning of surgery. 3. Check with your Doctor regarding stopping vitamins, supplements, blood thinners and follow their instructions. Stop vitamins, supplements and NSAIDS: 11/14/22   4. Do not smoke, vape or use chewing tobacco morning of surgery. Do not drink any alcoholic beverages 24 hours prior to surgery. This includes NA Beer. No street drugs 7 days prior to surgery. 5. You may brush your teeth and gargle the morning of surgery. DO NOT SWALLOW WATER   6. You MUST make arrangements for a responsible adult to take you home after your surgery and be able to check on you every couple                   hours for the day. You will not be allowed to leave alone or drive yourself home. It is strongly suggested someone stay with you the first 24                   hrs. Your surgery will be cancelled if you do not have a ride home. 7. Please wear simple, loose fitting clothing to the hospital.  Juliana Beckford not bring valuables (money, credit cards, checkbooks, etc.) Do not wear any                   makeup (including no eye makeup) or nail polish on your fingers or toes. 8. DO NOT wear any jewelry or piercings on day of surgery. All body piercing jewelry must be removed. 9. If you have dentures, they will be removed before going to the OR; we will provide you a container. If you wear contact lenses or glasses,                  they will be removed; please bring a case for them.            10. If you  have a Living Will and Durable Power of [employer] for Healthcare, please bring in a copy. 11. Please bring picture ID,  insurance card, paperwork from the doctors office    (H & P, Consent, & card for implantable devices). 12. Take a shower the morning of your procedure with soaps provided. Do not apply any make-up, deodorant, lotion, oil or powder. 15.  Enter thru the main entrance on the day of surgery.

## 2022-11-16 NOTE — PROGRESS NOTES
11/16/22 - Called patient, re: lab work not done. Patient states she forgot but will come in today to do the lab work for surgery 11/21/22.

## 2022-11-17 ENCOUNTER — HOSPITAL ENCOUNTER (OUTPATIENT)
Age: 42
Discharge: HOME | End: 2022-11-17
Payer: MEDICAID

## 2022-11-17 DIAGNOSIS — E66.01 MORBIDLY OBESE (HCC): ICD-10-CM

## 2022-11-17 LAB
ALBUMIN SERPL-MCNC: 4.2 GM/DL (ref 3.4–5)
ALP BLD-CCNC: 69 IU/L (ref 40–128)
ALT SERPL-CCNC: 24 U/L (ref 10–40)
ANION GAP SERPL CALCULATED.3IONS-SCNC: 13 MMOL/L (ref 4–16)
AST SERPL-CCNC: 29 IU/L (ref 15–37)
BASOPHILS ABSOLUTE: 0 K/CU MM
BASOPHILS RELATIVE PERCENT: 0.5 % (ref 0–1)
BILIRUB SERPL-MCNC: 0.9 MG/DL (ref 0–1)
BUN BLDV-MCNC: 14 MG/DL (ref 6–23)
CALCIUM SERPL-MCNC: 9.5 MG/DL (ref 8.3–10.6)
CHLORIDE BLD-SCNC: 103 MMOL/L (ref 99–110)
CO2: 24 MMOL/L (ref 21–32)
CREAT SERPL-MCNC: 0.8 MG/DL (ref 0.6–1.1)
DIFFERENTIAL TYPE: ABNORMAL
EOSINOPHILS ABSOLUTE: 0.2 K/CU MM
EOSINOPHILS RELATIVE PERCENT: 2.6 % (ref 0–3)
GFR SERPL CREATININE-BSD FRML MDRD: >60 ML/MIN/1.73M2
GLUCOSE BLD-MCNC: 93 MG/DL (ref 70–99)
HCT VFR BLD CALC: 40.6 % (ref 37–47)
HEMOGLOBIN: 13.4 GM/DL (ref 12.5–16)
IMMATURE NEUTROPHIL %: 0.2 % (ref 0–0.43)
LYMPHOCYTES ABSOLUTE: 2 K/CU MM
LYMPHOCYTES RELATIVE PERCENT: 29.5 % (ref 24–44)
MCH RBC QN AUTO: 29.3 PG (ref 27–31)
MCHC RBC AUTO-ENTMCNC: 33 % (ref 32–36)
MCV RBC AUTO: 88.6 FL (ref 78–100)
MONOCYTES ABSOLUTE: 0.4 K/CU MM
MONOCYTES RELATIVE PERCENT: 5.8 % (ref 0–4)
NUCLEATED RBC %: 0 %
PDW BLD-RTO: 12.5 % (ref 11.7–14.9)
PLATELET # BLD: 252 K/CU MM (ref 140–440)
PMV BLD AUTO: 9.7 FL (ref 7.5–11.1)
POTASSIUM SERPL-SCNC: 4.6 MMOL/L (ref 3.5–5.1)
RBC # BLD: 4.58 M/CU MM (ref 4.2–5.4)
SEGMENTED NEUTROPHILS ABSOLUTE COUNT: 4.1 K/CU MM
SEGMENTED NEUTROPHILS RELATIVE PERCENT: 61.4 % (ref 36–66)
SODIUM BLD-SCNC: 140 MMOL/L (ref 135–145)
TOTAL IMMATURE NEUTOROPHIL: 0.01 K/CU MM
TOTAL NUCLEATED RBC: 0 K/CU MM
TOTAL PROTEIN: 7.2 GM/DL (ref 6.4–8.2)
WBC # BLD: 6.6 K/CU MM (ref 4–10.5)

## 2022-11-17 PROCEDURE — 85025 COMPLETE CBC W/AUTO DIFF WBC: CPT

## 2022-11-17 PROCEDURE — 36415 COLL VENOUS BLD VENIPUNCTURE: CPT

## 2022-11-17 PROCEDURE — 80053 COMPREHEN METABOLIC PANEL: CPT

## 2022-11-18 ENCOUNTER — OFFICE VISIT (OUTPATIENT)
Dept: BARIATRICS/WEIGHT MGMT | Age: 42
End: 2022-11-18
Payer: MEDICAID

## 2022-11-18 ENCOUNTER — ANESTHESIA EVENT (OUTPATIENT)
Dept: OPERATING ROOM | Age: 42
DRG: 403 | End: 2022-11-18
Payer: MEDICAID

## 2022-11-18 VITALS
OXYGEN SATURATION: 100 % | HEART RATE: 82 BPM | DIASTOLIC BLOOD PRESSURE: 76 MMHG | HEIGHT: 63 IN | BODY MASS INDEX: 38.08 KG/M2 | WEIGHT: 214.9 LBS | SYSTOLIC BLOOD PRESSURE: 124 MMHG

## 2022-11-18 DIAGNOSIS — E66.01 MORBID OBESITY DUE TO EXCESS CALORIES (HCC): Primary | ICD-10-CM

## 2022-11-18 DIAGNOSIS — Z01.818 PRE-OP EVALUATION: ICD-10-CM

## 2022-11-18 DIAGNOSIS — E55.9 VITAMIN D DEFICIENCY: ICD-10-CM

## 2022-11-18 PROCEDURE — 1036F TOBACCO NON-USER: CPT | Performed by: NURSE PRACTITIONER

## 2022-11-18 PROCEDURE — G8427 DOCREV CUR MEDS BY ELIG CLIN: HCPCS | Performed by: NURSE PRACTITIONER

## 2022-11-18 PROCEDURE — 99213 OFFICE O/P EST LOW 20 MIN: CPT | Performed by: NURSE PRACTITIONER

## 2022-11-18 PROCEDURE — G8484 FLU IMMUNIZE NO ADMIN: HCPCS | Performed by: NURSE PRACTITIONER

## 2022-11-18 PROCEDURE — G8417 CALC BMI ABV UP PARAM F/U: HCPCS | Performed by: NURSE PRACTITIONER

## 2022-11-18 ASSESSMENT — PATIENT HEALTH QUESTIONNAIRE - PHQ9
1. LITTLE INTEREST OR PLEASURE IN DOING THINGS: 0
SUM OF ALL RESPONSES TO PHQ QUESTIONS 1-9: 0
SUM OF ALL RESPONSES TO PHQ QUESTIONS 1-9: 0
2. FEELING DOWN, DEPRESSED OR HOPELESS: 0
SUM OF ALL RESPONSES TO PHQ QUESTIONS 1-9: 0
SUM OF ALL RESPONSES TO PHQ QUESTIONS 1-9: 0
SUM OF ALL RESPONSES TO PHQ9 QUESTIONS 1 & 2: 0

## 2022-11-18 NOTE — PROGRESS NOTES
Umberto Maldonadope  1980  36 y.o. [place] is here for pre op weigh in. Patient was started on liver shrinking diet for two weeks ago resulting in weight loss of 12.2  pounds  and 23.9 pounds overall. Current Body mass index is Body mass index is 38.07 kg/m². Ya [place] BMI started at 42.3    - All labs reviewed and questions answered. - Aware to continue liver shrinking diet until night before surgery and educated on post op diet stages. - Covid test on no longer necessary    SUBJECT MELODY:    Chief Complaint   Patient presents with    Weight Management     Weigh in, [address] 11/21/2022     Past Medical History:   Diagnosis Date    Chronic depression 07/09/2021    No meds as of 11/11/22    Hx of Doppler echocardiogram 04/27/2022    EF 55-60%. Mild LV hypertrophy. Grade I diastolic dysfunction. Mildly dilated LA. Mild MR and TR. Kidney disorder     left removed .     Migraine 11/11/2022    Mixed hyperlipidemia 01/27/2021    PONV (postoperative nausea and vomiting)     Preop pulmonary/respiratory exam 05/24/2022    Prolonged emergence from general anesthesia     Pulmonic stenosis     Sleep apnea      Past Surgical History:   Procedure Laterality Date    BREAST BIOPSY Left 2010    benign    BREAST REDUCTION SURGERY Bilateral 2003    BREAST SURGERY      CARPAL TUNNEL RELEASE Left 2/16/2021    LEFT CARPAL TUNNEL RELEASE performed by Brice Casas DO at [facility] N/A 4/20/2022    COLONOSCOPY POLYPECTOMY SNARE/COLD BIOPSY performed by Laura Marshall MD at [place] [address] Right     wrist    HAND SURGERY Bilateral     4/2014    HYSTERECTOMY (CERVIX STATUS UNKNOWN)      INNER EAR SURGERY      OTHER SURGICAL HISTORY  4/16/2012    Repair vaginal cuff    OVARY REMOVAL      PARTIAL NEPHRECTOMY Left     TOTAL NEPHRECTOMY Left     TUBAL LIGATION      UPPER GASTROINTESTINAL ENDOSCOPY N/A 7/29/2022    EGD ESOPHAGOGASTRODUODENOSCOPY WITH BX performed by Nesha Ramos MD at [address] [facility] ENDOSCOPY     Current Outpatient Medications   Medication Sig Dispense Refill    vitamin D (ERGOCALCIFEROL) 1.25 MG (25794 UT) CAPS capsule Take 1 capsule by mouth once a week For eight weeks only 8 capsule 0     No current facility-administered medications for this visit. Family History   Problem Relation Age of Onset    Cancer Mother     Migraines Mother     Cancer Maternal Grandmother     Diabetes Maternal Grandmother     Allergies Son     Allergies Daughter     Bleeding Prob Daughter     Bleeding Prob Son     Asthma Daughter     Asthma Son      Allergies   Allergen Reactions    Codeine Anaphylaxis    Dilaudid [Hydromorphone Hcl] Anaphylaxis    Hydromorphone Anaphylaxis    Keflex [Cephalexin] Hives    Pyridium [Phenazopyridine Hcl] Hives        Review of Systems   All other systems reviewed and are negative. OBJECTIVE:     /76 (Site: Left Upper Arm, Position: Sitting, Cuff Size: Medium Adult)   Pulse 82   Ht 5' 3\" (1.6 m)   Wt 214 lb 14.4 oz (97.5 kg)   LMP 02/15/2012   SpO2 100%   BMI 38.07 kg/m²   Wt Readings from Last 3 Encounters:   11/18/22 214 lb 14.4 oz (97.5 kg)   11/10/22 225 lb 9.6 oz (102.3 kg)   11/09/22 227 lb 9.6 oz (103.2 kg)       Physical Exam  Constitutional:       Appearance: Normal appearance. She is obese. HENT:      Head: Normocephalic. Nose: Nose normal.      Mouth/Throat:      Pharynx: Oropharynx is clear. Eyes:      Conjunctiva/sclera: Conjunctivae normal.      Pupils: Pupils are equal, round, and reactive to light. Cardiovascular:      Rate and Rhythm: Normal rate. Pulses: Normal pulses. Pulmonary:      Effort: Pulmonary effort is normal.   Abdominal:      Palpations: Abdomen is soft. Musculoskeletal:         General: Normal range of motion. Cervical back: Normal range of motion. Skin:     General: Skin is warm and dry. Capillary Refill: Capillary refill takes less than 2 seconds. Neurological:      General: No focal deficit present. Mental Status: She is alert and oriented to person, place, and time. Psychiatric:         Mood and Affect: Mood normal.         Behavior: Behavior normal.         ASSESSMENT/ PLAN:    1. Morbid obesity due to excess calories ([address]  - Continue with planned weight loss surgery    2. Vitamin D deficiency  - Continue supplements  - Will follow level post-operatively    3. Pre-op evaluation  - Continue with bariatric surgery  - Scheduled for planned robot assisted sleeve gastrectomy with possible hiatal hernia repair on 11/21/22.   - Discussed pre surgical drink and to drink it at 8 pm the night before surgery  - NPO after midnight on the night before surgery  - Did well on 2 week pre op diet; down -12.2 lbs since starting and -23.9 overall  - BMI 38.07 now and started at 42.30.   - Labs reviewed. - Discussed surgery and post op course along with diet stages following surgery. - Discharge checklist provided and reviewed  - Questions answered  - Patient voiced understanding  - Call with any questions or concerns. No orders of the defined types were placed in this encounter. Return for initial post op visit.     Martha Monzon, APRN - CNP

## 2022-11-18 NOTE — PROGRESS NOTES
11/18/22 - Notified patient surgery @ [facility] on 11/21/22 @ 0730, arrival 0600. Understanding verbalized.

## 2022-11-18 NOTE — ANESTHESIA PRE PROCEDURE
Department of Anesthesiology  Preprocedure Note       Name:  Radha Nguyen   Age:  36 y.o.  :  1980                                          MRN:  [id number]         Date:  2022      Surgeon: Naida Patton):  Yloi Jang MD    Procedure: Procedure(s):  GASTRECTOMY SLEEVE LAPAROSCOPIC ROBOTIC POSS HITAL HERNIA REPAIR, EGD    Medications prior to admission:   Prior to Admission medications    Medication Sig Start Date End Date Taking? Authorizing Provider   vitamin D (ERGOCALCIFEROL) 1.25 MG (52978 UT) CAPS capsule Take 1 capsule by mouth once a week For eight weeks only 22   VIOLET Rodarte - NP       Current medications:    No current facility-administered medications for this encounter. Current Outpatient Medications   Medication Sig Dispense Refill    vitamin D (ERGOCALCIFEROL) 1.25 MG (63255 UT) CAPS capsule Take 1 capsule by mouth once a week For eight weeks only 8 capsule 0       Allergies:     Allergies   Allergen Reactions    Codeine Anaphylaxis    Dilaudid [Hydromorphone Hcl] Anaphylaxis    Hydromorphone Anaphylaxis    Keflex [Cephalexin] Hives    Pyridium [Phenazopyridine Hcl] Hives       Problem List:    Patient Active Problem List   Diagnosis Code    Vaginal bleeding N93.9    H/O of hysterectomy with bilateral oophorectomy Z90.710, Z90.722    Panic disorder with agoraphobia F40.01    Early menopause E28.319    H/O bilateral breast reduction surgery Z98.890    Myofascial pain syndrome M79.18    Somatic dysfunction of head region M99.00    Somatic dysfunction of cervical region M99.01    Somatic dysfunction of pelvic region M99.05    Snoring R06.83    Obstructive sleep apnea on CPAP G47.33, Z99.89    Somatic dysfunction of both upper extremities M99.07    Morbidly obese (HCC) E66.01    Paresthesia of left upper extremity R20.2    Fibromyalgia M79.7    Mixed hyperlipidemia E78.2    Carpal tunnel syndrome of left wrist G56.02    Vitamin D deficiency E55.9    Chronic depression F32. A    Anxiety F41.9    Chronic pain of left knee M25.562, G89.29    Migraine without aura and without status migrainosus, not intractable G43.009    Injury of left ankle S99.912A    Hypersomnia G47.10    Diarrhea R19.7    Dyslipidemia E78.5    Palpitation R00.2    Dizziness R42    Other chest pain R07.89    SOB (shortness of breath) R06.02       Past Medical History:        Diagnosis Date    Chronic depression 07/09/2021    No meds as of 11/11/22    Hx of Doppler echocardiogram 04/27/2022    EF 55-60%. Mild LV hypertrophy. Grade I diastolic dysfunction. Mildly dilated LA. Mild MR and TR.    Kidney disorder     left removed .  Migraine 11/11/2022    Mixed hyperlipidemia 01/27/2021    PONV (postoperative nausea and vomiting)     Preop pulmonary/respiratory exam 05/24/2022    Prolonged emergence from general anesthesia     Pulmonic stenosis     Sleep apnea        Past Surgical History:        Procedure Laterality Date    BREAST BIOPSY Left 2010    benign    BREAST REDUCTION SURGERY Bilateral 2003    BREAST SURGERY      CARPAL TUNNEL RELEASE Left 2/16/2021    LEFT CARPAL TUNNEL RELEASE performed by Cassy Bentley DO at [address] COLONOSCOPY N/A 4/20/2022    COLONOSCOPY POLYPECTOMY SNARE/COLD BIOPSY performed by Alejandra Roy MD at [place] [address] Right     wrist    HAND SURGERY Bilateral     4/2014    HYSTERECTOMY (CERVIX STATUS UNKNOWN)      INNER EAR SURGERY      OTHER SURGICAL HISTORY  4/16/2012    Repair vaginal cuff    OVARY REMOVAL      PARTIAL NEPHRECTOMY Left     TOTAL NEPHRECTOMY Left     TUBAL LIGATION      UPPER GASTROINTESTINAL ENDOSCOPY N/A 7/29/2022    EGD ESOPHAGOGASTRODUODENOSCOPY WITH BX performed by Yon Hurtado MD at [facility] ENDOSCOPY       Social History:    Social History     Tobacco Use    Smoking status: Never    Smokeless tobacco: Never   Substance Use Topics    Alcohol use:  No Counseling given: Not Answered      Vital Signs (Current):   Vitals:    11/11/22 1211   Weight: 225 lb (102.1 kg)   Height: 5' 3\" (1.6 m)                                              BP Readings from Last 3 Encounters:   11/10/22 122/74   11/09/22 118/62   11/08/22 126/78       NPO Status:                                                                                 BMI:   Wt Readings from Last 3 Encounters:   11/10/22 225 lb 9.6 oz (102.3 kg)   11/09/22 227 lb 9.6 oz (103.2 kg)   11/08/22 227 lb 1.6 oz (103 kg)     Body mass index is 39.86 kg/m². CBC:   Lab Results   Component Value Date/Time    WBC 6.6 11/17/2022 10:36 AM    RBC 4.58 11/17/2022 10:36 AM    HGB 13.4 11/17/2022 10:36 AM    HCT 40.6 11/17/2022 10:36 AM    MCV 88.6 11/17/2022 10:36 AM    RDW 12.5 11/17/2022 10:36 AM     11/17/2022 10:36 AM       CMP:   Lab Results   Component Value Date/Time     11/17/2022 10:36 AM    K 4.6 11/17/2022 10:36 AM     11/17/2022 10:36 AM    CO2 24 11/17/2022 10:36 AM    BUN 14 11/17/2022 10:36 AM    CREATININE 0.8 11/17/2022 10:36 AM    GFRAA >60 09/14/2022 02:36 PM    AGRATIO 1.4 01/27/2021 02:00 PM    LABGLOM >60 11/17/2022 10:36 AM    GLUCOSE 93 11/17/2022 10:36 AM    PROT 7.2 11/17/2022 10:36 AM    PROT 7.7 04/15/2012 09:26 PM    CALCIUM 9.5 11/17/2022 10:36 AM    BILITOT 0.9 11/17/2022 10:36 AM    ALKPHOS 69 11/17/2022 10:36 AM    AST 29 11/17/2022 10:36 AM    ALT 24 11/17/2022 10:36 AM       POC Tests: No results for input(s): POCGLU, POCNA, POCK, POCCL, POCBUN, POCHEMO, POCHCT in the last 72 hours.     Coags:   Lab Results   Component Value Date/Time    PROTIME 12.2 03/11/2022 10:37 AM    PROTIME 11.2 04/15/2012 09:26 PM    INR 0.95 03/11/2022 10:37 AM    APTT 43.3 03/11/2022 10:37 AM       HCG (If Applicable):   Lab Results   Component Value Date    PREGTESTUR NEGATIVE 03/24/2014        ABGs: No results found for: PHART, PO2ART, JIF9YON, VIR1HQW, BEART, L9PQEWTJ     Type & Screen (If Applicable):  No results found for: LABABO, LABRH    Drug/Infectious Status (If Applicable):  No results found for: HIV, HEPCAB    COVID-19 Screening (If Applicable):   Lab Results   Component Value Date/Time    COVID19 DETECTED 09/26/2022 10:30 AM           Anesthesia Evaluation  Patient summary reviewed   history of anesthetic complications (Prolonged emergence from general anesthesia): PONV. Airway: Mallampati: I  TM distance: <3 FB   Neck ROM: full  Mouth opening: > = 3 FB   Dental:    (+) edentulous      Pulmonary:   (+) sleep apnea:                             Cardiovascular:    (+) hyperlipidemia         Beta Blocker:  Not on Beta Blocker      ROS comment: Stress test 10/2022;  Summary    Overall Impression:normal stress test, THR achieved        Functional Capacity: Fair Exercise Tolerance.        Conclusion: normal stress test     Echo 4/2022:  Summary   Limited study due to patients body habitus. Left ventricular function and size is normal, EF is estimated at 55-60%. Mild left ventricular hypertrophy. Grade I diastolic dysfunction. No regional wall motion abnormalities were detected. Mildly dilated left atrium. Mild mitral and tricuspid regurgitation is present. RVSP is 27 mmHg. No evidence of pericardial effusion. Neuro/Psych:   (+) headaches: migraine headaches, depression/anxiety             GI/Hepatic/Renal:   (+) morbid obesity          Endo/Other: Negative Endo/Other ROS                    Abdominal:             Vascular: negative vascular ROS. Other Findings:           Anesthesia Plan      general     ASA 2       Induction: intravenous. VIOLET Barrera - TYSON   11/18/2022         Pre Anesthesia Assessment complete.  Chart reviewed on 11/18/2022

## 2022-11-21 ENCOUNTER — HOSPITAL ENCOUNTER (INPATIENT)
Age: 42
LOS: 1 days | Discharge: HOME | DRG: 403 | End: 2022-11-22
Attending: SURGERY | Admitting: SURGERY
Payer: MEDICAID

## 2022-11-21 ENCOUNTER — ANESTHESIA (OUTPATIENT)
Dept: OPERATING ROOM | Age: 42
DRG: 403 | End: 2022-11-21
Payer: MEDICAID

## 2022-11-21 DIAGNOSIS — E66.01 MORBID OBESITY (HCC): ICD-10-CM

## 2022-11-21 DIAGNOSIS — Z01.818 PRE-OP TESTING: Primary | ICD-10-CM

## 2022-11-21 LAB
BACTERIA: ABNORMAL /HPF
BILIRUBIN URINE: ABNORMAL MG/DL
BLOOD, URINE: ABNORMAL
CLARITY: CLEAR
COLOR: YELLOW
GLUCOSE, URINE: NEGATIVE MG/DL
HYALINE CASTS: 2 /LPF
KETONES, URINE: >80 MG/DL
LEUKOCYTE ESTERASE, URINE: NEGATIVE
MUCUS: ABNORMAL HPF
NITRITE URINE, QUANTITATIVE: NEGATIVE
PH, URINE: 7 (ref 5–8)
PROTEIN UA: ABNORMAL MG/DL
RBC URINE: 2 /HPF (ref 0–6)
SPECIFIC GRAVITY UA: 1.02 (ref 1–1.03)
SQUAMOUS EPITHELIAL: 4 /HPF
TRICHOMONAS: ABNORMAL /HPF
UROBILINOGEN, URINE: 1 MG/DL (ref 0.2–1)
WBC CLUMP: ABNORMAL /HPF
WBC UA: 8 /HPF (ref 0–5)

## 2022-11-21 PROCEDURE — 8E0W4CZ ROBOTIC ASSISTED PROCEDURE OF TRUNK REGION, PERCUTANEOUS ENDOSCOPIC APPROACH: ICD-10-PCS | Performed by: SURGERY

## 2022-11-21 PROCEDURE — 6370000000 HC RX 637 (ALT 250 FOR IP): Performed by: SURGERY

## 2022-11-21 PROCEDURE — 94761 N-INVAS EAR/PLS OXIMETRY MLT: CPT

## 2022-11-21 PROCEDURE — 2580000003 HC RX 258: Performed by: SURGERY

## 2022-11-21 PROCEDURE — 6360000002 HC RX W HCPCS: Performed by: SURGERY

## 2022-11-21 PROCEDURE — 7100000001 HC PACU RECOVERY - ADDTL 15 MIN: Performed by: SURGERY

## 2022-11-21 PROCEDURE — 3600000019 HC SURGERY ROBOT ADDTL 15MIN: Performed by: SURGERY

## 2022-11-21 PROCEDURE — 43775 LAP SLEEVE GASTRECTOMY: CPT | Performed by: SURGERY

## 2022-11-21 PROCEDURE — 2500000003 HC RX 250 WO HCPCS

## 2022-11-21 PROCEDURE — A4216 STERILE WATER/SALINE, 10 ML: HCPCS | Performed by: SURGERY

## 2022-11-21 PROCEDURE — 6360000002 HC RX W HCPCS

## 2022-11-21 PROCEDURE — 2720000010 HC SURG SUPPLY STERILE: Performed by: SURGERY

## 2022-11-21 PROCEDURE — 43775 LAP SLEEVE GASTRECTOMY: CPT | Performed by: NURSE PRACTITIONER

## 2022-11-21 PROCEDURE — 81001 URINALYSIS AUTO W/SCOPE: CPT

## 2022-11-21 PROCEDURE — 6360000002 HC RX W HCPCS: Performed by: ANESTHESIOLOGY

## 2022-11-21 PROCEDURE — 1200000000 HC SEMI PRIVATE

## 2022-11-21 PROCEDURE — 87086 URINE CULTURE/COLONY COUNT: CPT

## 2022-11-21 PROCEDURE — 0DB64Z3 EXCISION OF STOMACH, PERCUTANEOUS ENDOSCOPIC APPROACH, VERTICAL: ICD-10-PCS | Performed by: SURGERY

## 2022-11-21 PROCEDURE — 2500000003 HC RX 250 WO HCPCS: Performed by: SURGERY

## 2022-11-21 PROCEDURE — 7100000000 HC PACU RECOVERY - FIRST 15 MIN: Performed by: SURGERY

## 2022-11-21 PROCEDURE — 6370000000 HC RX 637 (ALT 250 FOR IP)

## 2022-11-21 PROCEDURE — 3700000001 HC ADD 15 MINUTES (ANESTHESIA): Performed by: SURGERY

## 2022-11-21 PROCEDURE — APPNB180 APP NON BILLABLE TIME > 60 MINS: Performed by: NURSE PRACTITIONER

## 2022-11-21 PROCEDURE — 88307 TISSUE EXAM BY PATHOLOGIST: CPT | Performed by: PATHOLOGY

## 2022-11-21 PROCEDURE — 2709999900 HC NON-CHARGEABLE SUPPLY: Performed by: SURGERY

## 2022-11-21 PROCEDURE — 3600000009 HC SURGERY ROBOT BASE: Performed by: SURGERY

## 2022-11-21 PROCEDURE — 3700000000 HC ANESTHESIA ATTENDED CARE: Performed by: SURGERY

## 2022-11-21 PROCEDURE — S2900 ROBOTIC SURGICAL SYSTEM: HCPCS | Performed by: SURGERY

## 2022-11-21 RX ORDER — BUPIVACAINE HYDROCHLORIDE 5 MG/ML
INJECTION, SOLUTION EPIDURAL; INTRACAUDAL
Status: COMPLETED | OUTPATIENT
Start: 2022-11-21 | End: 2022-11-21

## 2022-11-21 RX ORDER — CELECOXIB 200 MG/1
400 CAPSULE ORAL ONCE
Status: DISCONTINUED | OUTPATIENT
Start: 2022-11-21 | End: 2022-11-21 | Stop reason: HOSPADM

## 2022-11-21 RX ORDER — METOCLOPRAMIDE HYDROCHLORIDE 5 MG/ML
10 INJECTION INTRAMUSCULAR; INTRAVENOUS EVERY 6 HOURS PRN
Status: DISCONTINUED | OUTPATIENT
Start: 2022-11-21 | End: 2022-11-22 | Stop reason: HOSPADM

## 2022-11-21 RX ORDER — SODIUM CHLORIDE 9 MG/ML
INJECTION, SOLUTION INTRAVENOUS PRN
Status: DISCONTINUED | OUTPATIENT
Start: 2022-11-21 | End: 2022-11-21 | Stop reason: HOSPADM

## 2022-11-21 RX ORDER — SODIUM CHLORIDE, SODIUM LACTATE, POTASSIUM CHLORIDE, CALCIUM CHLORIDE 600; 310; 30; 20 MG/100ML; MG/100ML; MG/100ML; MG/100ML
INJECTION, SOLUTION INTRAVENOUS CONTINUOUS
Status: DISCONTINUED | OUTPATIENT
Start: 2022-11-21 | End: 2022-11-21 | Stop reason: HOSPADM

## 2022-11-21 RX ORDER — IPRATROPIUM BROMIDE AND ALBUTEROL SULFATE 2.5; .5 MG/3ML; MG/3ML
1 SOLUTION RESPIRATORY (INHALATION)
Status: DISCONTINUED | OUTPATIENT
Start: 2022-11-21 | End: 2022-11-21 | Stop reason: HOSPADM

## 2022-11-21 RX ORDER — SODIUM CHLORIDE 0.9 % (FLUSH) 0.9 %
5-40 SYRINGE (ML) INJECTION EVERY 12 HOURS SCHEDULED
Status: DISCONTINUED | OUTPATIENT
Start: 2022-11-21 | End: 2022-11-21 | Stop reason: HOSPADM

## 2022-11-21 RX ORDER — OXYCODONE HYDROCHLORIDE 5 MG/1
10 TABLET ORAL PRN
Status: DISCONTINUED | OUTPATIENT
Start: 2022-11-21 | End: 2022-11-21 | Stop reason: HOSPADM

## 2022-11-21 RX ORDER — METHOCARBAMOL 500 MG/1
1000 TABLET, FILM COATED ORAL ONCE
Status: DISCONTINUED | OUTPATIENT
Start: 2022-11-21 | End: 2022-11-21 | Stop reason: HOSPADM

## 2022-11-21 RX ORDER — MAGNESIUM SULFATE IN WATER 40 MG/ML
2000 INJECTION, SOLUTION INTRAVENOUS PRN
Status: DISCONTINUED | OUTPATIENT
Start: 2022-11-21 | End: 2022-11-22 | Stop reason: HOSPADM

## 2022-11-21 RX ORDER — ONDANSETRON 2 MG/ML
4 INJECTION INTRAMUSCULAR; INTRAVENOUS EVERY 6 HOURS PRN
Status: DISCONTINUED | OUTPATIENT
Start: 2022-11-21 | End: 2022-11-22 | Stop reason: HOSPADM

## 2022-11-21 RX ORDER — KETAMINE HCL 50MG/ML(1)
SYRINGE (ML) INTRAVENOUS PRN
Status: DISCONTINUED | OUTPATIENT
Start: 2022-11-21 | End: 2022-11-21 | Stop reason: SDUPTHER

## 2022-11-21 RX ORDER — ACETAMINOPHEN 160 MG/5ML
650 SOLUTION ORAL
Status: DISCONTINUED | OUTPATIENT
Start: 2022-11-21 | End: 2022-11-21

## 2022-11-21 RX ORDER — ACETAMINOPHEN 500 MG
1000 TABLET ORAL ONCE
Status: COMPLETED | OUTPATIENT
Start: 2022-11-21 | End: 2022-11-21

## 2022-11-21 RX ORDER — OXYCODONE HYDROCHLORIDE AND ACETAMINOPHEN 5; 325 MG/1; MG/1
0.5 TABLET ORAL EVERY 4 HOURS PRN
Status: DISCONTINUED | OUTPATIENT
Start: 2022-11-21 | End: 2022-11-22 | Stop reason: HOSPADM

## 2022-11-21 RX ORDER — SCOLOPAMINE TRANSDERMAL SYSTEM 1 MG/1
1 PATCH, EXTENDED RELEASE TRANSDERMAL ONCE
Status: DISCONTINUED | OUTPATIENT
Start: 2022-11-21 | End: 2022-11-22 | Stop reason: HOSPADM

## 2022-11-21 RX ORDER — ONDANSETRON 2 MG/ML
4 INJECTION INTRAMUSCULAR; INTRAVENOUS
Status: COMPLETED | OUTPATIENT
Start: 2022-11-21 | End: 2022-11-21

## 2022-11-21 RX ORDER — DROPERIDOL 2.5 MG/ML
0.62 INJECTION, SOLUTION INTRAMUSCULAR; INTRAVENOUS
Status: COMPLETED | OUTPATIENT
Start: 2022-11-21 | End: 2022-11-21

## 2022-11-21 RX ORDER — LIDOCAINE HYDROCHLORIDE 20 MG/ML
INJECTION, SOLUTION INTRAVENOUS PRN
Status: DISCONTINUED | OUTPATIENT
Start: 2022-11-21 | End: 2022-11-21 | Stop reason: SDUPTHER

## 2022-11-21 RX ORDER — SODIUM CHLORIDE 0.9 % (FLUSH) 0.9 %
5-40 SYRINGE (ML) INJECTION EVERY 12 HOURS SCHEDULED
Status: DISCONTINUED | OUTPATIENT
Start: 2022-11-21 | End: 2022-11-22 | Stop reason: HOSPADM

## 2022-11-21 RX ORDER — SODIUM CHLORIDE 9 MG/ML
25 INJECTION, SOLUTION INTRAVENOUS PRN
Status: DISCONTINUED | OUTPATIENT
Start: 2022-11-21 | End: 2022-11-21 | Stop reason: HOSPADM

## 2022-11-21 RX ORDER — ROCURONIUM BROMIDE 10 MG/ML
INJECTION, SOLUTION INTRAVENOUS PRN
Status: DISCONTINUED | OUTPATIENT
Start: 2022-11-21 | End: 2022-11-21 | Stop reason: SDUPTHER

## 2022-11-21 RX ORDER — SODIUM CHLORIDE 9 MG/ML
INJECTION, SOLUTION INTRAVENOUS CONTINUOUS
Status: DISCONTINUED | OUTPATIENT
Start: 2022-11-21 | End: 2022-11-22 | Stop reason: HOSPADM

## 2022-11-21 RX ORDER — POTASSIUM CHLORIDE 7.45 MG/ML
10 INJECTION INTRAVENOUS PRN
Status: DISCONTINUED | OUTPATIENT
Start: 2022-11-21 | End: 2022-11-22 | Stop reason: HOSPADM

## 2022-11-21 RX ORDER — SODIUM CHLORIDE 0.9 % (FLUSH) 0.9 %
10 SYRINGE (ML) INJECTION PRN
Status: DISCONTINUED | OUTPATIENT
Start: 2022-11-21 | End: 2022-11-22 | Stop reason: HOSPADM

## 2022-11-21 RX ORDER — HEPARIN SODIUM 5000 [USP'U]/ML
5000 INJECTION, SOLUTION INTRAVENOUS; SUBCUTANEOUS EVERY 8 HOURS SCHEDULED
Status: DISCONTINUED | OUTPATIENT
Start: 2022-11-21 | End: 2022-11-22 | Stop reason: HOSPADM

## 2022-11-21 RX ORDER — DEXAMETHASONE SODIUM PHOSPHATE 4 MG/ML
INJECTION, SOLUTION INTRA-ARTICULAR; INTRALESIONAL; INTRAMUSCULAR; INTRAVENOUS; SOFT TISSUE PRN
Status: DISCONTINUED | OUTPATIENT
Start: 2022-11-21 | End: 2022-11-21 | Stop reason: SDUPTHER

## 2022-11-21 RX ORDER — HYDRALAZINE HYDROCHLORIDE 20 MG/ML
10 INJECTION INTRAMUSCULAR; INTRAVENOUS
Status: DISCONTINUED | OUTPATIENT
Start: 2022-11-21 | End: 2022-11-21 | Stop reason: HOSPADM

## 2022-11-21 RX ORDER — ESMOLOL HYDROCHLORIDE 10 MG/ML
INJECTION INTRAVENOUS PRN
Status: DISCONTINUED | OUTPATIENT
Start: 2022-11-21 | End: 2022-11-21 | Stop reason: SDUPTHER

## 2022-11-21 RX ORDER — SODIUM CHLORIDE 0.9 % (FLUSH) 0.9 %
5-40 SYRINGE (ML) INJECTION PRN
Status: DISCONTINUED | OUTPATIENT
Start: 2022-11-21 | End: 2022-11-21 | Stop reason: HOSPADM

## 2022-11-21 RX ORDER — HEPARIN SODIUM 5000 [USP'U]/ML
5000 INJECTION, SOLUTION INTRAVENOUS; SUBCUTANEOUS ONCE
Status: COMPLETED | OUTPATIENT
Start: 2022-11-21 | End: 2022-11-21

## 2022-11-21 RX ORDER — OXYCODONE HYDROCHLORIDE 5 MG/1
5 TABLET ORAL PRN
Status: DISCONTINUED | OUTPATIENT
Start: 2022-11-21 | End: 2022-11-21 | Stop reason: HOSPADM

## 2022-11-21 RX ORDER — KETOROLAC TROMETHAMINE 30 MG/ML
15 INJECTION, SOLUTION INTRAMUSCULAR; INTRAVENOUS EVERY 6 HOURS
Status: COMPLETED | OUTPATIENT
Start: 2022-11-21 | End: 2022-11-22

## 2022-11-21 RX ORDER — MORPHINE SULFATE 2 MG/ML
2 INJECTION, SOLUTION INTRAMUSCULAR; INTRAVENOUS EVERY 4 HOURS PRN
Status: DISCONTINUED | OUTPATIENT
Start: 2022-11-21 | End: 2022-11-22 | Stop reason: HOSPADM

## 2022-11-21 RX ORDER — POTASSIUM CHLORIDE 20 MEQ/1
40 TABLET, EXTENDED RELEASE ORAL PRN
Status: DISCONTINUED | OUTPATIENT
Start: 2022-11-21 | End: 2022-11-22 | Stop reason: HOSPADM

## 2022-11-21 RX ORDER — HEPARIN SODIUM 5000 [USP'U]/ML
5000 INJECTION, SOLUTION INTRAVENOUS; SUBCUTANEOUS ONCE
Status: DISCONTINUED | OUTPATIENT
Start: 2022-11-21 | End: 2022-11-21

## 2022-11-21 RX ORDER — ONDANSETRON 2 MG/ML
4 INJECTION INTRAMUSCULAR; INTRAVENOUS ONCE
Status: COMPLETED | OUTPATIENT
Start: 2022-11-21 | End: 2022-11-21

## 2022-11-21 RX ORDER — MIDAZOLAM HYDROCHLORIDE 1 MG/ML
INJECTION INTRAMUSCULAR; INTRAVENOUS PRN
Status: DISCONTINUED | OUTPATIENT
Start: 2022-11-21 | End: 2022-11-21 | Stop reason: SDUPTHER

## 2022-11-21 RX ORDER — FENTANYL CITRATE 50 UG/ML
25 INJECTION, SOLUTION INTRAMUSCULAR; INTRAVENOUS EVERY 5 MIN PRN
Status: DISCONTINUED | OUTPATIENT
Start: 2022-11-21 | End: 2022-11-21 | Stop reason: HOSPADM

## 2022-11-21 RX ORDER — PROPOFOL 10 MG/ML
INJECTION, EMULSION INTRAVENOUS PRN
Status: DISCONTINUED | OUTPATIENT
Start: 2022-11-21 | End: 2022-11-21 | Stop reason: SDUPTHER

## 2022-11-21 RX ORDER — PHENYLEPHRINE HCL IN 0.9% NACL 1 MG/10 ML
SYRINGE (ML) INTRAVENOUS PRN
Status: DISCONTINUED | OUTPATIENT
Start: 2022-11-21 | End: 2022-11-21 | Stop reason: SDUPTHER

## 2022-11-21 RX ORDER — LABETALOL HYDROCHLORIDE 5 MG/ML
10 INJECTION, SOLUTION INTRAVENOUS
Status: DISCONTINUED | OUTPATIENT
Start: 2022-11-21 | End: 2022-11-21 | Stop reason: HOSPADM

## 2022-11-21 RX ADMIN — SODIUM CHLORIDE: 9 INJECTION, SOLUTION INTRAVENOUS at 10:30

## 2022-11-21 RX ADMIN — OXYCODONE AND ACETAMINOPHEN 0.5 TABLET: 5; 325 TABLET ORAL at 11:58

## 2022-11-21 RX ADMIN — Medication 0.2 MG: at 08:08

## 2022-11-21 RX ADMIN — HEPARIN SODIUM 5000 UNITS: 5000 INJECTION INTRAVENOUS; SUBCUTANEOUS at 22:07

## 2022-11-21 RX ADMIN — SODIUM CHLORIDE: 9 INJECTION, SOLUTION INTRAVENOUS at 09:54

## 2022-11-21 RX ADMIN — PROPOFOL 150 MG: 10 INJECTION, EMULSION INTRAVENOUS at 07:30

## 2022-11-21 RX ADMIN — FENTANYL CITRATE 25 MCG: 50 INJECTION INTRAMUSCULAR; INTRAVENOUS at 09:13

## 2022-11-21 RX ADMIN — SUGAMMADEX 200 MG: 100 INJECTION, SOLUTION INTRAVENOUS at 08:28

## 2022-11-21 RX ADMIN — Medication 0.2 MG: at 08:19

## 2022-11-21 RX ADMIN — MIDAZOLAM 2 MG: 1 INJECTION INTRAMUSCULAR; INTRAVENOUS at 07:20

## 2022-11-21 RX ADMIN — KETOROLAC TROMETHAMINE 15 MG: 30 INJECTION, SOLUTION INTRAMUSCULAR; INTRAVENOUS at 22:06

## 2022-11-21 RX ADMIN — KETOROLAC TROMETHAMINE 15 MG: 30 INJECTION, SOLUTION INTRAMUSCULAR; INTRAVENOUS at 14:04

## 2022-11-21 RX ADMIN — LIDOCAINE HYDROCHLORIDE 100 MG: 20 INJECTION, SOLUTION INTRAVENOUS at 07:30

## 2022-11-21 RX ADMIN — Medication 0.2 MG: at 08:23

## 2022-11-21 RX ADMIN — ACETAMINOPHEN 1000 MG: 500 TABLET ORAL at 06:48

## 2022-11-21 RX ADMIN — ONDANSETRON 4 MG: 2 INJECTION INTRAMUSCULAR; INTRAVENOUS at 08:57

## 2022-11-21 RX ADMIN — SODIUM CHLORIDE, PRESERVATIVE FREE 20 MG: 5 INJECTION INTRAVENOUS at 22:06

## 2022-11-21 RX ADMIN — ESMOLOL HYDROCHLORIDE 50 MG: 100 INJECTION, SOLUTION INTRAVENOUS at 07:40

## 2022-11-21 RX ADMIN — METOCLOPRAMIDE 10 MG: 5 INJECTION, SOLUTION INTRAMUSCULAR; INTRAVENOUS at 11:58

## 2022-11-21 RX ADMIN — Medication 25 MG: at 07:30

## 2022-11-21 RX ADMIN — HEPARIN SODIUM 5000 UNITS: 5000 INJECTION INTRAVENOUS; SUBCUTANEOUS at 06:41

## 2022-11-21 RX ADMIN — DROPERIDOL 0.62 MG: 2.5 INJECTION, SOLUTION INTRAMUSCULAR; INTRAVENOUS at 09:03

## 2022-11-21 RX ADMIN — Medication 25 MG: at 07:47

## 2022-11-21 RX ADMIN — ONDANSETRON 4 MG: 2 INJECTION INTRAMUSCULAR; INTRAVENOUS at 06:42

## 2022-11-21 RX ADMIN — SODIUM CHLORIDE, POTASSIUM CHLORIDE, SODIUM LACTATE AND CALCIUM CHLORIDE: 600; 310; 30; 20 INJECTION, SOLUTION INTRAVENOUS at 06:35

## 2022-11-21 RX ADMIN — SODIUM CHLORIDE, PRESERVATIVE FREE 20 MG: 5 INJECTION INTRAVENOUS at 14:04

## 2022-11-21 RX ADMIN — ROCURONIUM BROMIDE 50 MG: 10 INJECTION, SOLUTION INTRAVENOUS at 07:30

## 2022-11-21 RX ADMIN — DEXAMETHASONE SODIUM PHOSPHATE 8 MG: 4 INJECTION, SOLUTION INTRAMUSCULAR; INTRAVENOUS at 07:44

## 2022-11-21 RX ADMIN — HEPARIN SODIUM 5000 UNITS: 5000 INJECTION INTRAVENOUS; SUBCUTANEOUS at 14:04

## 2022-11-21 RX ADMIN — Medication 0.2 MG: at 08:11

## 2022-11-21 RX ADMIN — VANCOMYCIN HYDROCHLORIDE 1500 MG: 5 INJECTION, POWDER, LYOPHILIZED, FOR SOLUTION INTRAVENOUS at 07:38

## 2022-11-21 RX ADMIN — FENTANYL CITRATE 25 MCG: 50 INJECTION INTRAMUSCULAR; INTRAVENOUS at 09:08

## 2022-11-21 ASSESSMENT — PAIN DESCRIPTION - DESCRIPTORS
DESCRIPTORS: DISCOMFORT
DESCRIPTORS: ACHING
DESCRIPTORS: DISCOMFORT
DESCRIPTORS: DISCOMFORT

## 2022-11-21 ASSESSMENT — PAIN DESCRIPTION - PAIN TYPE
TYPE: SURGICAL PAIN

## 2022-11-21 ASSESSMENT — PAIN DESCRIPTION - FREQUENCY
FREQUENCY: CONTINUOUS

## 2022-11-21 ASSESSMENT — PAIN DESCRIPTION - LOCATION
LOCATION: ABDOMEN

## 2022-11-21 ASSESSMENT — PAIN - FUNCTIONAL ASSESSMENT
PAIN_FUNCTIONAL_ASSESSMENT: PREVENTS OR INTERFERES SOME ACTIVE ACTIVITIES AND ADLS
PAIN_FUNCTIONAL_ASSESSMENT: ACTIVITIES ARE NOT PREVENTED
PAIN_FUNCTIONAL_ASSESSMENT: PREVENTS OR INTERFERES SOME ACTIVE ACTIVITIES AND ADLS
PAIN_FUNCTIONAL_ASSESSMENT: 0-10
PAIN_FUNCTIONAL_ASSESSMENT: PREVENTS OR INTERFERES SOME ACTIVE ACTIVITIES AND ADLS

## 2022-11-21 ASSESSMENT — PAIN DESCRIPTION - ORIENTATION
ORIENTATION: MID

## 2022-11-21 ASSESSMENT — PAIN SCALES - GENERAL
PAINLEVEL_OUTOF10: 6
PAINLEVEL_OUTOF10: 9
PAINLEVEL_OUTOF10: 10
PAINLEVEL_OUTOF10: 10
PAINLEVEL_OUTOF10: 8
PAINLEVEL_OUTOF10: 3

## 2022-11-21 ASSESSMENT — PAIN DESCRIPTION - ONSET
ONSET: AWAKENED FROM SLEEP
ONSET: ON-GOING
ONSET: AWAKENED FROM SLEEP
ONSET: AWAKENED FROM SLEEP

## 2022-11-21 NOTE — CONSULTS
Nephrology Service Consultation    Patient:  Claire Wilkins  MRN: [id number]  Consulting physician:  Chana De Leon MD  Reason for Consult: Single kidney risk of acute renal failure    History Obtained From:  patient, spouse, electronic medical record  PCP: VIOLET Han NP    HISTORY OF PRESENT ILLNESS:   The patient is a 36 y.o. female who presents with elective gastric sleeve tolerated well today. Patient's prior history of recurrent UTIs requiring nephrectomy with Dr. Orville Núñez left nephrectomy in September 2009 for chronic cystitis and since that time UTIs have been minimal, last UTI was August 31, 2022. Patient has obstructive sleep apnea wearing CPAP chronic migraines with history of depression low back pain with sciatica  Give much history of seen post surgery today. Information more from the spouse as well as the mother. Patient tolerated surgery well has not really had any issues in the 1 working kidney and main issue is depression and above setting of weight gain. With a concern of possible dehydration postsurgery or wrist to the kidney renal was asked to evaluate and set up outpatient care as well. Past Medical History:        Diagnosis Date    Chronic depression 07/09/2021    No meds as of 11/11/22    Hx of Doppler echocardiogram 04/27/2022    EF 55-60%. Mild LV hypertrophy. Grade I diastolic dysfunction. Mildly dilated LA. Mild MR and TR. Kidney disorder     left removed .     Migraine 11/11/2022    Mixed hyperlipidemia 01/27/2021    PONV (postoperative nausea and vomiting)     Preop pulmonary/respiratory exam 05/24/2022    Prolonged emergence from general anesthesia     Pulmonic stenosis     Sleep apnea        Past Surgical History:        Procedure Laterality Date    BREAST BIOPSY Left 2010    benign    BREAST REDUCTION SURGERY Bilateral 2003    BREAST SURGERY      CARPAL TUNNEL RELEASE Left 2/16/2021    LEFT CARPAL TUNNEL RELEASE performed by Teddy Olszewski, DO at [facility] OR    CHOLECYSTECTOMY      COLONOSCOPY N/A 4/20/2022    COLONOSCOPY POLYPECTOMY SNARE/COLD BIOPSY performed by Alejandra Roy MD at [facility] Right     wrist    HAND SURGERY Bilateral     4/2014    HYSTERECTOMY (CERVIX STATUS UNKNOWN)      INNER EAR SURGERY      OTHER SURGICAL HISTORY  4/16/2012    Repair vaginal cuff    OVARY REMOVAL      PARTIAL NEPHRECTOMY Left     TOTAL NEPHRECTOMY Left     TUBAL LIGATION      UPPER GASTROINTESTINAL ENDOSCOPY N/A 7/29/2022    EGD ESOPHAGOGASTRODUODENOSCOPY WITH BX performed by Yon Hurtado MD at [facility] ENDOSCOPY       Medications:   Scheduled Meds:   scopolamine  1 patch TransDERmal Once    acetaminophen  650 mg Oral 6 times per day    ketorolac  15 mg IntraVENous Q6H    sodium chloride flush  5-40 mL IntraVENous 2 times per day    famotidine (PEPCID) injection  20 mg IntraVENous BID    heparin (porcine)  5,000 Units SubCUTAneous Once     Continuous Infusions:   sodium chloride      sodium chloride 100 mL/hr at 11/21/22 0954     PRN Meds:.sodium chloride flush, potassium chloride **OR** potassium alternative oral replacement **OR** potassium chloride, magnesium sulfate, ondansetron, metoclopramide, oxyCODONE-acetaminophen    Allergies:  Codeine, Dilaudid [hydromorphone hcl], Hydromorphone, Keflex [cephalexin], and Pyridium [phenazopyridine hcl]    Social History:   TOBACCO:   reports that she has never smoked. She has never used smokeless tobacco.  ETOH:   reports no history of alcohol use. OCCUPATION:      Family History:       Problem Relation Age of Onset    Cancer Mother     Migraines Mother     Cancer Maternal Grandmother     Diabetes Maternal Grandmother     Allergies Son     Allergies Daughter     Bleeding Prob Daughter     Bleeding Prob Son     Asthma Daughter     Asthma Son        REVIEW OF SYSTEMS:  Negative except for weak tired status post weight loss surgery    Physical Exam:    I/O: No intake/output data recorded.     Vitals: BP (!) 140/85   Pulse 88   Temp 98 °F (36.7 °C) (Temporal)   Resp 17   Ht 5' 3\" (1.6 m)   Wt 209 lb 14.4 oz (95.2 kg)   LMP 02/15/2012   SpO2 100%   BMI 37.18 kg/m²   General appearance: awake weak  HEENT: Head: Normal, normocephalic, atraumatic. Neck: supple, symmetrical, trachea midline  Lungs: diminished breath sounds bilaterally  Heart: S1, S2 normal  Abdomen: abnormal findings:  soft mildly tender  Extremities: edema trace  Neurologic: Mental status: alertness: Arousable but sleepy      CBC: No results for input(s): WBC, HGB, PLT in the last 72 hours. BMP:  No results for input(s): NA, K, CL, CO2, BUN, CREATININE, GLUCOSE in the last 72 hours. Hepatic: No results for input(s): AST, ALT, ALB, BILITOT, ALKPHOS in the last 72 hours. Troponin: No results for input(s): TROPONINI in the last 72 hours. BNP: No results for input(s): BNP in the last 72 hours. Lipids: No results for input(s): CHOL, HDL in the last 72 hours. Invalid input(s): LDLCALCU  ABGs: No results found for: PHART, PO2ART, NQP2EJA  INR: No results for input(s): INR in the last 72 hours.   Renal Labs  Albumin:    Lab Results   Component Value Date/Time    LABALBU 4.2 11/17/2022 10:36 AM     Calcium:    Lab Results   Component Value Date/Time    CALCIUM 9.5 11/17/2022 10:36 AM     Phosphorus:  No results found for: PHOS  U/A:    Lab Results   Component Value Date/Time    NITRU Negative 08/29/2022 02:17 PM    COLORU Yellow 08/29/2022 02:17 PM    PHUR 7.5 08/29/2022 02:17 PM    LABCAST 1-3 Hyaline 10/31/2019 01:57 PM    WBCUA 168 08/29/2022 02:17 PM    RBCUA 1 08/29/2022 02:17 PM    RBCUA 14 03/24/2014 06:50 PM    MUCUS NEGATIVE 03/24/2014 06:50 PM    BACTERIA 4+ 08/29/2022 02:17 PM    CLARITYU CLOUDY 08/29/2022 02:17 PM    SPECGRAV 1.012 08/29/2022 02:17 PM    UROBILINOGEN 0.2 08/29/2022 02:17 PM    BILIRUBINUR Negative 08/29/2022 02:17 PM    BILIRUBINUR negative 08/11/2022 09:38 AM    BLOODU Negative 08/29/2022 02:17 PM    GLUCOSEU Negative 08/29/2022 02:17 PM    KETUA Negative 08/29/2022 02:17 PM    AMORPHOUS Rare 10/31/2019 01:57 PM     ABG:  No results found for: PHART, JLK8KMH, PO2ART, LDA1AKL, BEART, THGBART, GUM8KWL, C3PCBSRT  HgBA1c:    Lab Results   Component Value Date/Time    LABA1C 5.6 03/11/2022 10:37 AM    LABA1C 5.6 03/11/2022 10:37 AM     Microalbumen/Creatinine ratio:  No components found for: RUCREAT  TSH:    Lab Results   Component Value Date/Time    TSH 1.33 08/19/2020 10:06 AM     IRON:    Lab Results   Component Value Date/Time    IRON 72 03/11/2022 10:37 AM     Iron Saturation:  No components found for: PERCENTFE  TIBC:    Lab Results   Component Value Date/Time    TIBC 247 03/11/2022 10:37 AM     FERRITIN:  No results found for: FERRITIN  RPR:  No results found for: RPR  FREDA:  No results found for: ANATITER, FREDA  24 Hour Urine for Creatinine Clearance:  No components found for: CREAT4, UHRS10, UTV10  -----------------------------------------------------------------  Last labs  11/17/2022  Sodium 140 potassium 4.6 creatinine 0.8 albumin 4.2 hemoglobin 13.4    UA in August 29, 2022 which was cloudy and treated for UTI at that time    Assessment and Recommendations     Patient Active Problem List   Diagnosis Code    Vaginal bleeding N93.9    H/O of hysterectomy with bilateral oophorectomy Z90.710, Z90.722    Panic disorder with agoraphobia F40.01    Early menopause E28.319    H/O bilateral breast reduction surgery Z98.890    Myofascial pain syndrome M79.18    Somatic dysfunction of head region M99.00    Somatic dysfunction of cervical region M99.01    Somatic dysfunction of pelvic region M99.05    Snoring R06.83    Obstructive sleep apnea on CPAP G47.33, Z99.89    Somatic dysfunction of both upper extremities M99.07    Morbidly obese (HCC) E66.01    Paresthesia of left upper extremity R20.2    Fibromyalgia M79.7    Mixed hyperlipidemia E78.2    Carpal tunnel syndrome of left wrist G56.02    Vitamin D deficiency E55.9    Chronic depression F32. A    Anxiety F41.9    Chronic pain of left knee M25.562, G89.29    Migraine without aura and without status migrainosus, not intractable G43.009    Injury of left ankle S99.912A    Hypersomnia G47.10    Diarrhea R19.7    Dyslipidemia E78.5    Palpitation R00.2    Dizziness R42    Other chest pain R07.89    SOB (shortness of breath) R06.02    Morbid obesity (HCC) E66.01     Impression plan  #1 obesity  #2 left nephrectomy with recurrent cystitis  #3 depression  #4 migraines  #5 hypertension  #6 sleep apnea    Plan  #1 status post gastric sleeve November 21, 2022  #2 status post surgery 2009 had a recent UTI about 2 months ago we will check a UA and urine culture now 1 working kidney creatinine has been less than 1 has been working stable until now we will monitor maintain hydration avoid NSAIDs if able  #3 monitor affect and depression in above setting likely will improve with weight loss  #4 monitor migraines and headaches recommend neurology follow-up outpatient and hopefully will improve in the setting of weight loss and less depression  #5 blood pressure slightly increased today but also in pain will monitor  #6 maintain with CPAP machine at night if able  We will follow supportive care monitor closely appreciate the consult no other acute changes for now  Maintain normal saline IV fluids until able to eat on her own  Electronically signed by Shen Adkins MD on 11/21/2022 at 10:24 AM

## 2022-11-21 NOTE — PROGRESS NOTES
0840 Patient arrived to PACU, monitors applied and alarms on. Received report from Mohinder Head/Gato MEHTA. Patient nauseated and vomitting clear blood tinged sputum. Drowsy from anesthesia. Patient orally suctioned x 3 times, tolerated well. 9440 Medicated for nausea. 0900 Patient continues to have nausea. Orally suctioned as needed. 0982 Medicated for nausea. Requested a towel to cover here her eyes. Area also kept dim per patient request.    7966 Patient states nausea is improving. Medicated for pain at this time. 3893 Medicated for pain for continued abdominal pain. 0930 Patient resting with eyes closed. Turned and repositioned, tolerated well. Complained of nausea, however fell back to sleep. 2382 Report called to [address]    1000 Transported to room [id number] with all belongings. Family aware of room assignment and headed to room.

## 2022-11-21 NOTE — H&P
History and Physical Update    Original H&P done in office on 11/18/22 (less than 30 days ago). Pt reports the following changes in health since being seen last:    None    Vitals:    11/21/22 0616   BP: (!) 136/90   Pulse: 94   Resp: 16   Temp: 97.5 °F (36.4 °C)   SpO2: 96%       Alert and oriented x 3, no apparent distress at rest  Atraumatic, normocephalic. EOMI. Breathing unlabored. RRR. Soft, non-tender, non-distended. Moves all extremities. Warm, dry. 35 y/o F with morbid obesity here for elective bariatric surgery    -Consent obtained in office. -Abx ordered in office.  -Reviewed expected pre-operative, operative, and post-operative courses. -Answered questions to patient's satisfaction.   -Reviewed risks, benefits, alternative to procedure.   -Proceed as scheduled. -The patient was counseled at length about the risks of campos Covid-19 during their perioperative period and any recovery window from their procedure. The patient was made aware that campos Covid-19  may worsen their prognosis for recovering from their procedure  and lend to a higher morbidity and/or mortality risk. All material risks, benefits, and reasonable alternatives including postponing the procedure were discussed. The patient does wish to proceed with the procedure at this time.         Luh Fall MD

## 2022-11-21 NOTE — CONSULTS
V2.0  [facility] Consult Note      Name:  Katie Sandoval /Age/Sex: 1980  (36 y.o. female)   MRN & CSN:  [id number] & [id number] Encounter Date/Time: 2022 11:22 AM EST   [facility] PCP: VIOLET Hammer NP     Attending:Victor Hugo Ward MD  Consulting Provider: Keagan Otero [facility] Day: 1    Assessment and Recommendations   Katie Sandoval is a 36 y.o. female with pmh of depression, nephrectomy, migraine, EMILEE who presented for elective bariatric surgery. Hospital medicine consulted for postoperative medical management. Recommendations: Morbid obesity status post robotic assisted laparoscopic sleeve gastrectomy per Dr. Christina Lopez   -Postoperative management including DVT prophylaxis, pain control, diet and disposition per primary  - CBC and CMP in AM    History of L nephrectomy   - in    - check labs  - avoid nephrotoxins  - nephrology following     Depression   -not on home medications    Migraines   - not on home medications    Hypertension   - BP mildly elevated, not on home meds  - likely secondary to pain   - monitor BP, start anti-hypertensive if warranted     EMILEE  - noncompliant with CPAP    Hospital Problems             Last Modified POA    * (Principal) Morbid obesity ([address] 2022 Yes       Diet BARIATRIC DIET; Bariatric Clear Liquid   DVT Prophylaxis [] Lovenox, [x]  Heparin, [] SCDs, [] Ambulation,  [] Eliquis, [] Xarelto   Code Status Full Code   Surrogate Decision Maker/ POA       History Obtained From:    patient    History of Present Illness:     Chief Complaint: Morbid obesity ([address]    Katie Sandoval is a 36 y.o. female who is seen today by the hospitalist team at the request of Dr. Christina Lopez, with a Chief Complaint of morbid obesity. Patient seen and examined at bedside after surgery. She is complaining of 9 out of 10 pain and nausea currently. She is not currently on any medication at home other than a vitamin D supplement.  She confirms her history of nephrectomy in 2009, does not have a nephrologist.       Review of Systems:    Ten point ROS reviewed and negative, unless otherwise noted above    Objective: Intake/Output Summary (Last 24 hours) at 11/21/2022 1122  Last data filed at 11/21/2022 1003  Gross per 24 hour   Intake 1020.83 ml   Output 29 ml   Net 991.83 ml      Vitals:   Vitals:    11/21/22 0938 11/21/22 0940 11/21/22 0945 11/21/22 1020   BP: (!) 140/85  (!) 140/85    Pulse: 80 90 88 77   Resp: 16  17    Temp: 98 °F (36.7 °C)      TempSrc: Temporal      SpO2: 100%  100%    Weight:       Height:           Medications Prior to Admission     Prior to Admission medications    Medication Sig Start Date End Date Taking? Authorizing Provider   vitamin D (ERGOCALCIFEROL) 1.25 MG (28156 UT) CAPS capsule Take 1 capsule by mouth once a week For eight weeks only 9/20/22   Rubens Robertson APRN - NP       Physical Exam:    General: NAD, obese. Appears in pain. Eyes: EOMI  ENT: neck supple  Cardiovascular: Regular rate and rhythm  Respiratory: Clear to auscultation bilaterally, respirations even and unlabored RA  Gastrointestinal: abdomen soft, nontender. No rebound or guarding. Bowel sounds active  Genitourinary: no suprapubic tenderness  Musculoskeletal: No edema  Skin: warm, dry  Neuro: Alert. Psych: Mood appropriate. Past Medical History:   PMHx   Past Medical History:   Diagnosis Date    Chronic depression 07/09/2021    No meds as of 11/11/22    Hx of Doppler echocardiogram 04/27/2022    EF 55-60%. Mild LV hypertrophy. Grade I diastolic dysfunction. Mildly dilated LA. Mild MR and TR. Kidney disorder     left removed .     Migraine 11/11/2022    Mixed hyperlipidemia 01/27/2021    PONV (postoperative nausea and vomiting)     Preop pulmonary/respiratory exam 05/24/2022    Prolonged emergence from general anesthesia     Pulmonic stenosis     Sleep apnea      PSHX:  has a past surgical history that includes Inner ear surgery; total nephrectomy (Left); Breast surgery; partial nephrectomy (Left); Tubal ligation; Cholecystectomy; cyst removal (Right); Hysterectomy; other surgical history (4/16/2012); Hand surgery (Bilateral); Breast biopsy (Left, 2010); Ovary removal; Breast reduction surgery (Bilateral, 2003); Carpal tunnel release (Left, 2/16/2021); Colonoscopy (N/A, 4/20/2022); and Upper gastrointestinal endoscopy (N/A, 7/29/2022). Allergies: Allergies   Allergen Reactions    Codeine Anaphylaxis    Dilaudid [Hydromorphone Hcl] Anaphylaxis    Hydromorphone Anaphylaxis    Keflex [Cephalexin] Hives    Pyridium [Phenazopyridine Hcl] Hives     Fam HX: family history includes Allergies in her daughter and son; Asthma in her daughter and son; Bleeding Prob in her daughter and son; Cancer in her maternal grandmother and mother; Diabetes in her maternal grandmother; Migraines in her mother.   Soc HX:   Social History     Socioeconomic History    Marital status:      Spouse name: None    Number of children: None    Years of education: None    Highest education level: None   Tobacco Use    Smoking status: Never    Smokeless tobacco: Never   Vaping Use    Vaping Use: Never used   Substance and Sexual Activity    Alcohol use: No    Drug use: No   Social History Narrative    ** Merged History Encounter **          Social Determinants of Health     Financial Resource Strain: Unknown    Difficulty of Paying Living Expenses: Patient refused   Food Insecurity: Unknown    Worried About Running Out of Food in the Last Year: Patient refused    Ran Out of Food in the Last Year: Patient refused       Medications:   Medications:    scopolamine  1 patch TransDERmal Once    acetaminophen  650 mg Oral 6 times per day    ketorolac  15 mg IntraVENous Q6H    sodium chloride flush  5-40 mL IntraVENous 2 times per day    famotidine (PEPCID) injection  20 mg IntraVENous BID    heparin (porcine)  5,000 Units SubCUTAneous 3 times per day      Infusions:    sodium chloride      sodium chloride 100 mL/hr at 11/21/22 0954     PRN Meds: sodium chloride flush, 10 mL, PRN  potassium chloride, 40 mEq, PRN   Or  potassium alternative oral replacement, 40 mEq, PRN   Or  potassium chloride, 10 mEq, PRN  magnesium sulfate, 2,000 mg, PRN  ondansetron, 4 mg, Q6H PRN  metoclopramide, 10 mg, Q6H PRN  oxyCODONE-acetaminophen, 0.5 tablet, Q4H PRN        Labs and Imaging   No results found. CBC: No results for input(s): WBC, HGB, PLT in the last 72 hours. BMP:  No results for input(s): NA, K, CL, CO2, BUN, CREATININE, GLUCOSE in the last 72 hours. Hepatic: No results for input(s): AST, ALT, ALB, BILITOT, ALKPHOS in the last 72 hours. Lipids:   Lab Results   Component Value Date/Time    CHOL 202 03/11/2022 10:37 AM    HDL 46 03/11/2022 10:37 AM    TRIG 147 03/11/2022 10:37 AM     Hemoglobin A1C:   Lab Results   Component Value Date/Time    LABA1C 5.6 03/11/2022 10:37 AM    LABA1C 5.6 03/11/2022 10:37 AM     TSH:   Lab Results   Component Value Date/Time    TSH 1.33 08/19/2020 10:06 AM     Troponin:   Lab Results   Component Value Date/Time    TROPONINT <0.010 04/19/2017 02:00 PM     Lactic Acid: No results for input(s): LACTA in the last 72 hours. BNP: No results for input(s): PROBNP in the last 72 hours.   UA:  Lab Results   Component Value Date/Time    NITRU Negative 08/29/2022 02:17 PM    COLORU Yellow 08/29/2022 02:17 PM    PHUR 7.5 08/29/2022 02:17 PM    LABCAST 1-3 Hyaline 10/31/2019 01:57 PM    WBCUA 168 08/29/2022 02:17 PM    RBCUA 1 08/29/2022 02:17 PM    RBCUA 14 03/24/2014 06:50 PM    MUCUS NEGATIVE 03/24/2014 06:50 PM    BACTERIA 4+ 08/29/2022 02:17 PM    CLARITYU CLOUDY 08/29/2022 02:17 PM    SPECGRAV 1.012 08/29/2022 02:17 PM    LEUKOCYTESUR LARGE 08/29/2022 02:17 PM    UROBILINOGEN 0.2 08/29/2022 02:17 PM    BILIRUBINUR Negative 08/29/2022 02:17 PM    BILIRUBINUR negative 08/11/2022 09:38 AM    BLOODU Negative 08/29/2022 02:17 PM    GLUCOSEU Negative 08/29/2022 02:17 PM KETUA Negative 08/29/2022 02:17 PM    AMORPHOUS Rare 10/31/2019 01:57 PM     Urine Cultures:   Lab Results   Component Value Date/Time    LABURIN No growth at 18 to 36 hours 09/14/2022 02:36 PM     Blood Cultures: No results found for: BC  No results found for: BLOODCULT2  Organism:   Lab Results   Component Value Date/Time    ORG Escherichia coli 08/29/2022 02:17 PM       Personally reviewed Lab Studies, Imaging.     Electronically signed by Jair Smith PA-C on 11/21/2022 at 11:22 AM

## 2022-11-21 NOTE — OP NOTE
Operative Report    Patient: Claire Wilkins  YOB: 1980  MRN: [id number]    Date of Surgery:  11/21/22    Surgeon:  Bernard Mcintyre MD, LOUIE, JERONIMO    Assistant(s):  Mayra Figueroa CNP - The skilled assistance of the CNP was necessary for the successful completion of this case. She was essential for the proper positioning, manipulation of instruments, proper exposure, manipulation of tissue, and wound closure. Preoperative Diagnosis: 1. Morbid obesity      2. Hyperlipidemia     Postoperative Diagnosis:  1. Same as above    Procedure(s) Performed:  1. Robotic-assisted laparoscopic sleeve gastrectomy    IVF: 300 mL crystalloid    Estimated Blood Loss: Less than 30 mL    Anesthesia: General endotracheal    Specimen(s):   1. Sleeve stomach    Drain(s): None    Findings: Consistent with postoperative diagnosis    Complications: None apparent    Indication for Procedure: This patient is a 36 y.o. female with a history of morbid obesity. The patient currently has a BMI of 37.18, initially started the program with BMI over 42. The patient attended an informational seminar for bariatric surgery, was determined to be an appropriate candidate, and underwent an extensive workup prior to being approved for surgery. The patient made the decision to undergo the above listed procedure(s). All of the patient's questions related to the surgery--preoperative, operative, and postoperative--were answered previously. An informed consent was obtained after discussing in detail with the patient the risks, benefits, and alternatives to surgery. Description of Procedure: On 11/21/22, the patient was brought to the operating room from the preoperative holding area. In the preoperative holding area, the patient received heparin. The patient was placed on the operating room table in the supine position. The patient was secured to the operating room table using multiple straps and a foot board.  All pressure points were well-padded. The patient was intubated endotracheally after SCD boots were placed bilaterally. The patient received IV antibiotics in accordance with national protocol. The abdomen was then prepped and draped in a standard fashion. An approved timeout was held with all members of the operating room team present and in agreement. Using an #11 blade, a 5 mm incision was made in the left lower mid abdomen. The abdomen was entered under direct vision using a 5 mm 0 degree laparoscope housed in a 5 mm optical trocar. Pneumoperitoneum was established using CO2 to 15 mm Hg. Once inside the abdominal cavity, an inspection ensued. The remaining ports were placed under direct vision. The ports placed were the following sizes and locations: three 8 mm ports across the mid abdomen in a lateral orientation, the initial 5 mm port was upsized to an 8 mm port, a 12 mm assistant port in the right lower mid abdomen, and a 5 mm liver retractor port in the subxiphoid location. There was no injury to any intra-abdominal structures during port placement. The robot was brought to the operating room table on the patient's left side and docked. The patient was placed in the steep reverse Trendelenburg. A liver retractor was secured to the table and passed into the abdominal cavity. The liver was retracted cephalad and anteriorly, exposing the esophageal hiatus. The anterior gastric fat pad was identified. The angle of His was bluntly mobilized. The esophageal hiatus was inspected and it was determined that there was no evidence of an hiatal hernia. Attention was directed toward the pylorus. The vein of reno was used to confirm the position. Approximately six centimeters proximal to the pylorus, the vessel sealer was used to divide the gastrocolic omentum. The lesser sac was opened and the division of this omentum was carried up all the way to the angle of His.  The short gastrics were identified and carefully cauterized, using the vessel sealer. After completing the division of the greater curvature attachments, a 38 Maltese bougie was passed toward the pylorus. Sequential 60mm firings of a da Jaron SureForm stapler were performed (2 blue and 4 white), taking care not to overly narrow the pouch, specifically at the incisura. At the angle of His, an outpuching of tissue was left to ensure that the staple line did not encroach on the esophagus. The staple line was examined found to be hemostatic. An intraoperative leak test was performed which demonstrated no leak. The robot was undocked and moved away from the operating room table. The 12 mm port site was then widened with a Maile clamp and the gastric sleeve specimen was removed intact and passed off for permanent pathology. The extraction site was closed with several trans-fascial 0-Vicryl sutures. All skin incisions were closed using a 4-0 Vicryl in a subcuticular fashion. Dermabond was used to cover the incisions. The patient was extubated and moved to the recovery room in stable condition. At the end of the case, the needle, instrument, and sponge counts were correct x 2. Dr. Mine Tierney was present, scrubbed, and supervised the entire case. Dr. Mine Tierney personally informed the family of the outcome of the procedure.     Mani Vogt MD

## 2022-11-22 VITALS
RESPIRATION RATE: 21 BRPM | HEIGHT: 63 IN | OXYGEN SATURATION: 95 % | DIASTOLIC BLOOD PRESSURE: 70 MMHG | HEART RATE: 84 BPM | TEMPERATURE: 98.6 F | SYSTOLIC BLOOD PRESSURE: 115 MMHG | WEIGHT: 209.9 LBS | BODY MASS INDEX: 37.19 KG/M2

## 2022-11-22 LAB
ALBUMIN SERPL-MCNC: 3.6 GM/DL (ref 3.4–5)
ANION GAP SERPL CALCULATED.3IONS-SCNC: 9 MMOL/L (ref 4–16)
BASOPHILS ABSOLUTE: 0 K/CU MM
BASOPHILS RELATIVE PERCENT: 0.2 % (ref 0–1)
BUN BLDV-MCNC: 14 MG/DL (ref 6–23)
CALCIUM SERPL-MCNC: 8.9 MG/DL (ref 8.3–10.6)
CHLORIDE BLD-SCNC: 108 MMOL/L (ref 99–110)
CO2: 22 MMOL/L (ref 21–32)
CREAT SERPL-MCNC: 0.8 MG/DL (ref 0.6–1.1)
CULTURE: NORMAL
DIFFERENTIAL TYPE: ABNORMAL
EOSINOPHILS ABSOLUTE: 0 K/CU MM
EOSINOPHILS RELATIVE PERCENT: 0 % (ref 0–3)
GFR SERPL CREATININE-BSD FRML MDRD: >60 ML/MIN/1.73M2
GLUCOSE BLD-MCNC: 119 MG/DL (ref 70–99)
HCT VFR BLD CALC: 35 % (ref 37–47)
HEMOGLOBIN: 11.7 GM/DL (ref 12.5–16)
IMMATURE NEUTROPHIL %: 0.5 % (ref 0–0.43)
LYMPHOCYTES ABSOLUTE: 1.1 K/CU MM
LYMPHOCYTES RELATIVE PERCENT: 18 % (ref 24–44)
Lab: NORMAL
MCH RBC QN AUTO: 29.2 PG (ref 27–31)
MCHC RBC AUTO-ENTMCNC: 33.4 % (ref 32–36)
MCV RBC AUTO: 87.3 FL (ref 78–100)
MONOCYTES ABSOLUTE: 0.5 K/CU MM
MONOCYTES RELATIVE PERCENT: 7.9 % (ref 0–4)
NUCLEATED RBC %: 0 %
PDW BLD-RTO: 12.4 % (ref 11.7–14.9)
PHOSPHORUS: 4.3 MG/DL (ref 2.5–4.9)
PLATELET # BLD: 209 K/CU MM (ref 140–440)
PMV BLD AUTO: 10.3 FL (ref 7.5–11.1)
POTASSIUM SERPL-SCNC: 4.4 MMOL/L (ref 3.5–5.1)
RBC # BLD: 4.01 M/CU MM (ref 4.2–5.4)
SEGMENTED NEUTROPHILS ABSOLUTE COUNT: 4.3 K/CU MM
SEGMENTED NEUTROPHILS RELATIVE PERCENT: 73.4 % (ref 36–66)
SODIUM BLD-SCNC: 139 MMOL/L (ref 135–145)
SPECIMEN: NORMAL
TOTAL IMMATURE NEUTOROPHIL: 0.03 K/CU MM
TOTAL NUCLEATED RBC: 0 K/CU MM
WBC # BLD: 5.8 K/CU MM (ref 4–10.5)

## 2022-11-22 PROCEDURE — 36415 COLL VENOUS BLD VENIPUNCTURE: CPT

## 2022-11-22 PROCEDURE — 94761 N-INVAS EAR/PLS OXIMETRY MLT: CPT

## 2022-11-22 PROCEDURE — 99024 POSTOP FOLLOW-UP VISIT: CPT | Performed by: NURSE PRACTITIONER

## 2022-11-22 PROCEDURE — 6360000002 HC RX W HCPCS: Performed by: NURSE PRACTITIONER

## 2022-11-22 PROCEDURE — 6360000002 HC RX W HCPCS: Performed by: SURGERY

## 2022-11-22 PROCEDURE — 2580000003 HC RX 258: Performed by: SURGERY

## 2022-11-22 PROCEDURE — 80069 RENAL FUNCTION PANEL: CPT

## 2022-11-22 PROCEDURE — 80053 COMPREHEN METABOLIC PANEL: CPT

## 2022-11-22 PROCEDURE — 2500000003 HC RX 250 WO HCPCS: Performed by: SURGERY

## 2022-11-22 PROCEDURE — 85025 COMPLETE CBC W/AUTO DIFF WBC: CPT

## 2022-11-22 PROCEDURE — APPNB30 APP NON BILLABLE TIME 0-30 MINS: Performed by: NURSE PRACTITIONER

## 2022-11-22 RX ORDER — PROCHLORPERAZINE EDISYLATE 5 MG/ML
10 INJECTION INTRAMUSCULAR; INTRAVENOUS ONCE
Status: COMPLETED | OUTPATIENT
Start: 2022-11-22 | End: 2022-11-22

## 2022-11-22 RX ORDER — SENNA PLUS 8.6 MG/1
1 TABLET ORAL 2 TIMES DAILY
Qty: 60 TABLET | Refills: 11 | Status: SHIPPED | OUTPATIENT
Start: 2022-11-22 | End: 2023-11-22

## 2022-11-22 RX ORDER — DIPHENHYDRAMINE HYDROCHLORIDE 50 MG/ML
25 INJECTION INTRAMUSCULAR; INTRAVENOUS ONCE
Status: COMPLETED | OUTPATIENT
Start: 2022-11-22 | End: 2022-11-22

## 2022-11-22 RX ORDER — HYDROCODONE BITARTRATE AND ACETAMINOPHEN 5; 325 MG/1; MG/1
1 TABLET ORAL EVERY 6 HOURS PRN
Qty: 20 TABLET | Refills: 0 | Status: SHIPPED | OUTPATIENT
Start: 2022-11-22 | End: 2022-11-27

## 2022-11-22 RX ORDER — ONDANSETRON 4 MG/1
4 TABLET, ORALLY DISINTEGRATING ORAL 3 TIMES DAILY PRN
Qty: 21 TABLET | Refills: 2 | Status: SHIPPED | OUTPATIENT
Start: 2022-11-22

## 2022-11-22 RX ORDER — PANTOPRAZOLE SODIUM 20 MG/1
20 TABLET, DELAYED RELEASE ORAL 2 TIMES DAILY
Qty: 60 TABLET | Refills: 3 | Status: SHIPPED | OUTPATIENT
Start: 2022-11-22

## 2022-11-22 RX ADMIN — HEPARIN SODIUM 5000 UNITS: 5000 INJECTION INTRAVENOUS; SUBCUTANEOUS at 13:46

## 2022-11-22 RX ADMIN — KETOROLAC TROMETHAMINE 15 MG: 30 INJECTION, SOLUTION INTRAMUSCULAR; INTRAVENOUS at 02:08

## 2022-11-22 RX ADMIN — SODIUM CHLORIDE: 9 INJECTION, SOLUTION INTRAVENOUS at 05:19

## 2022-11-22 RX ADMIN — PROCHLORPERAZINE EDISYLATE 10 MG: 5 INJECTION INTRAMUSCULAR; INTRAVENOUS at 13:45

## 2022-11-22 RX ADMIN — MORPHINE SULFATE 2 MG: 2 INJECTION, SOLUTION INTRAMUSCULAR; INTRAVENOUS at 05:14

## 2022-11-22 RX ADMIN — SODIUM CHLORIDE, PRESERVATIVE FREE 20 MG: 5 INJECTION INTRAVENOUS at 09:50

## 2022-11-22 RX ADMIN — SODIUM CHLORIDE, PRESERVATIVE FREE 10 ML: 5 INJECTION INTRAVENOUS at 09:56

## 2022-11-22 RX ADMIN — HEPARIN SODIUM 5000 UNITS: 5000 INJECTION INTRAVENOUS; SUBCUTANEOUS at 05:14

## 2022-11-22 RX ADMIN — DIPHENHYDRAMINE HYDROCHLORIDE 25 MG: 50 INJECTION, SOLUTION INTRAMUSCULAR; INTRAVENOUS at 13:44

## 2022-11-22 RX ADMIN — KETOROLAC TROMETHAMINE 15 MG: 30 INJECTION, SOLUTION INTRAMUSCULAR; INTRAVENOUS at 09:49

## 2022-11-22 RX ADMIN — KETOROLAC TROMETHAMINE 15 MG: 30 INJECTION, SOLUTION INTRAMUSCULAR; INTRAVENOUS at 14:44

## 2022-11-22 ASSESSMENT — PAIN SCALES - GENERAL
PAINLEVEL_OUTOF10: 6
PAINLEVEL_OUTOF10: 5
PAINLEVEL_OUTOF10: 8
PAINLEVEL_OUTOF10: 2
PAINLEVEL_OUTOF10: 6

## 2022-11-22 ASSESSMENT — ENCOUNTER SYMPTOMS
ALLERGIC/IMMUNOLOGIC NEGATIVE: 1
GASTROINTESTINAL NEGATIVE: 1
EYES NEGATIVE: 1
RESPIRATORY NEGATIVE: 1

## 2022-11-22 ASSESSMENT — PAIN - FUNCTIONAL ASSESSMENT
PAIN_FUNCTIONAL_ASSESSMENT: ACTIVITIES ARE NOT PREVENTED
PAIN_FUNCTIONAL_ASSESSMENT: PREVENTS OR INTERFERES SOME ACTIVE ACTIVITIES AND ADLS

## 2022-11-22 ASSESSMENT — PAIN DESCRIPTION - DESCRIPTORS
DESCRIPTORS: ACHING
DESCRIPTORS: ACHING
DESCRIPTORS: PENETRATING;ACHING

## 2022-11-22 ASSESSMENT — PAIN DESCRIPTION - LOCATION
LOCATION: ABDOMEN

## 2022-11-22 ASSESSMENT — PAIN DESCRIPTION - FREQUENCY: FREQUENCY: CONTINUOUS

## 2022-11-22 ASSESSMENT — PAIN DESCRIPTION - ORIENTATION
ORIENTATION: MID;LEFT
ORIENTATION: LEFT;RIGHT;MID
ORIENTATION: RIGHT;LEFT;MID

## 2022-11-22 ASSESSMENT — PAIN DESCRIPTION - PAIN TYPE: TYPE: SURGICAL PAIN

## 2022-11-22 ASSESSMENT — PAIN DESCRIPTION - ONSET: ONSET: ON-GOING

## 2022-11-22 NOTE — DISCHARGE SUMMARY
Discharge Summary     Patient ID:  Torsten Fournier  [id number]  83 y.o.  1980    Admit date: 11/21/2022    Discharge date: 11/22/2022     Admitting Physician: Aydee Monterroso MD     Admission Diagnoses: Morbid obesity ([facility] [address] [E66.01]  Patient Active Problem List   Diagnosis    Vaginal bleeding    H/O of hysterectomy with bilateral oophorectomy    Panic disorder with agoraphobia    Early menopause    H/O bilateral breast reduction surgery    Myofascial pain syndrome    Somatic dysfunction of head region    Somatic dysfunction of cervical region    Somatic dysfunction of pelvic region    Snoring    Obstructive sleep apnea on CPAP    Somatic dysfunction of both upper extremities    Morbidly obese (HCC)    Paresthesia of left upper extremity    Fibromyalgia    Mixed hyperlipidemia    Carpal tunnel syndrome of left wrist    Vitamin D deficiency    Chronic depression    Anxiety    Chronic pain of left knee    Migraine without aura and without status migrainosus, not intractable    Injury of left ankle    Hypersomnia    Diarrhea    Dyslipidemia    Palpitation    Dizziness    Other chest pain    SOB (shortness of breath)    Morbid obesity ([facility] [address]    Pre-op testing     Past Medical History:   Diagnosis Date    Chronic depression 07/09/2021    No meds as of 11/11/22    Hx of Doppler echocardiogram 04/27/2022    EF 55-60%. Mild LV hypertrophy. Grade I diastolic dysfunction. Mildly dilated LA. Mild MR and TR. Kidney disorder     left removed . Migraine 11/11/2022    Mixed hyperlipidemia 01/27/2021    PONV (postoperative nausea and vomiting)     Preop pulmonary/respiratory exam 05/24/2022    Prolonged emergence from general anesthesia     Pulmonic stenosis     Sleep apnea        Discharge Diagnoses: same    Admission Condition: Good. Discharged Condition: Good. Indication for Admission: Elective bariatric surgery.     Hospital Course: Patient had an uneventful hospital course after her bariatric procedure that went uneventfully: robot assisted sleeve gastrectomy and was tolerating bariatric stage II diet and ambulating without difficulty at the time of discharge. Consults: Hospitalist / PCP. Treatments: IV hydration, antibiotics, analgesia, LMW heparin, respiratory therapy: O2 and incentive spirometry and surgery: robot assisted sleeve gastrectomy. Discharge Exam:  - See daily progress note    Discharge vitals: Wt Readings from Last 3 Encounters:   11/21/22 209 lb 14.4 oz (95.2 kg)   11/18/22 214 lb 14.4 oz (97.5 kg)   11/10/22 225 lb 9.6 oz (102.3 kg)   ,  Temp Readings from Last 3 Encounters:   11/22/22 98.2 °F (36.8 °C) (Oral)   09/26/22 98.2 °F (36.8 °C) (Infrared)   07/29/22 96.8 °F (36 °C) (Tympanic)   ,  BP Readings from Last 3 Encounters:   11/22/22 112/65   11/18/22 124/76   11/10/22 122/74   ,  Pulse Readings from Last 3 Encounters:   11/22/22 73   11/18/22 82   11/10/22 67        Disposition: home. Patient Instructions:   Current Discharge Medication List        START taking these medications    Details   HYDROcodone-acetaminophen (NORCO) 5-325 MG per tablet Take 1 tablet by mouth every 6 hours as needed for Pain for up to 5 days. Intended supply: 5 days. Take lowest dose possible to manage pain  Qty: 20 tablet, Refills: 0    Comments: Reduce doses taken as pain becomes manageable  Associated Diagnoses:  Morbid obesity (HCC)      pantoprazole (PROTONIX) 20 MG tablet Take 1 tablet by mouth 2 times daily  Qty: 60 tablet, Refills: 3      ondansetron (ZOFRAN-ODT) 4 MG disintegrating tablet Take 1 tablet by mouth 3 times daily as needed for Nausea or Vomiting  Qty: 21 tablet, Refills: 2      senna (SENOKOT) 8.6 MG tablet Take 1 tablet by mouth 2 times daily  Qty: 60 tablet, Refills: 11           STOP taking these medications       vitamin D (ERGOCALCIFEROL) 1.25 MG (57108 UT) CAPS capsule Comments:   Reason for Stopping:             Activity: no lifting > 20 Lbs, or Strenuous exercise for 3 weeks. Diet: encourage fluids and bariatric stage II diet for 2 wks.   Wound Care: keep wound clean and dry    Call  [phone] to make a follow-up appointment  with Dr Leela Cowan in 1 week.    ____________________________________________    Signed:    VIOLET Mack - CNP, VIOLET-CNP    11/22/2022  1:57 PM

## 2022-11-22 NOTE — PROGRESS NOTES
Nephrology Progress Note  11/22/2022 11:49 AM  Subjective: Interval History: Jojo Mike is a 36 y.o. female with overall doing better no distress more awake today        Data:   Scheduled Meds:   scopolamine  1 patch TransDERmal Once    ketorolac  15 mg IntraVENous Q6H    sodium chloride flush  5-40 mL IntraVENous 2 times per day    famotidine (PEPCID) injection  20 mg IntraVENous BID    heparin (porcine)  5,000 Units SubCUTAneous 3 times per day     Continuous Infusions:   sodium chloride 100 mL/hr at 11/21/22 1030    sodium chloride 100 mL/hr at 11/22/22 0519         CBC   Recent Labs     11/22/22  0545   WBC 5.8   HGB 11.7*   HCT 35.0*         BMP   Recent Labs     11/22/22  0545      K 4.4      CO2 22   PHOS 4.3   BUN 14   CREATININE 0.8     Hepatic: No results for input(s): AST, ALT, ALB, BILITOT, ALKPHOS in the last 72 hours. Troponin: No results for input(s): TROPONINI in the last 72 hours. BNP: No results for input(s): BNP in the last 72 hours. Lipids: No results for input(s): CHOL, HDL in the last 72 hours. Invalid input(s): LDLCALCU  ABGs: No results found for: PHART, PO2ART, PJA7LXY  INR: No results for input(s): INR in the last 72 hours.   Renal Labs  Albumin:    Lab Results   Component Value Date/Time    LABALBU 3.6 11/22/2022 05:45 AM     Calcium:    Lab Results   Component Value Date/Time    CALCIUM 8.9 11/22/2022 05:45 AM     Phosphorus:    Lab Results   Component Value Date/Time    PHOS 4.3 11/22/2022 05:45 AM     U/A:    Lab Results   Component Value Date/Time    NITRU NEGATIVE 11/21/2022 12:00 PM    NITRU Negative 08/29/2022 02:17 PM    COLORU YELLOW 11/21/2022 12:00 PM    PHUR 7.5 08/29/2022 02:17 PM    LABCAST 1-3 Hyaline 10/31/2019 01:57 PM    WBCUA 8 11/21/2022 12:00 PM    RBCUA 2 11/21/2022 12:00 PM    MUCUS RARE 11/21/2022 12:00 PM    TRICHOMONAS NONE SEEN 11/21/2022 12:00 PM    BACTERIA RARE 11/21/2022 12:00 PM    CLARITYU CLEAR 11/21/2022 12:00 PM SPECGRAV 1.020 11/21/2022 12:00 PM    UROBILINOGEN 1.0 11/21/2022 12:00 PM    BILIRUBINUR SMALL NUMBER OR AMOUNT OBSERVED 11/21/2022 12:00 PM    BILIRUBINUR negative 08/11/2022 09:38 AM    BLOODU TRACE 11/21/2022 12:00 PM    GLUCOSEU Negative 08/29/2022 02:17 PM    KETUA >80 11/21/2022 12:00 PM    AMORPHOUS Rare 10/31/2019 01:57 PM     ABG:  No results found for: PHART, XQJ6AII, PO2ART, UGD6DZX, BEART, THGBART, JBV1NLX, L5TGYAGY  HgBA1c:    Lab Results   Component Value Date/Time    LABA1C 5.6 03/11/2022 10:37 AM    LABA1C 5.6 03/11/2022 10:37 AM     Microalbumen/Creatinine ratio:  No components found for: RUCREAT  TSH:    Lab Results   Component Value Date/Time    TSH 1.33 08/19/2020 10:06 AM     IRON:    Lab Results   Component Value Date/Time    IRON 72 03/11/2022 10:37 AM     Iron Saturation:  No components found for: PERCENTFE  TIBC:    Lab Results   Component Value Date/Time    TIBC 247 03/11/2022 10:37 AM     FERRITIN:  No results found for: FERRITIN  RPR:  No results found for: RPR  FREDA:  No results found for: ANATITER, FREDA  24 Hour Urine for Creatinine Clearance:  No components found for: CREAT4, UHRS10, UTV10      Objective:   I/O: 11/21 0701 - 11/22 0700  In: 1260.8 [P.O.:240; I.V.:520.8]  Out: 8179 [Urine:1650]  I/O last 3 completed shifts: In: 1260.8 [P.O.:240; I.V.:520.8; IV Piggyback:500]  Out: 3977 [Urine:1650; Blood:29]  No intake/output data recorded. Vitals: /65   Pulse 73   Temp 98.2 °F (36.8 °C) (Oral)   Resp 18   Ht 5' 3\" (1.6 m)   Wt 209 lb 14.4 oz (95.2 kg)   LMP 02/15/2012   SpO2 97%   BMI 37.18 kg/m²  {  General appearance: awake weak  HEENT: Head: Normal, normocephalic, atraumatic.   Neck: supple, symmetrical, trachea midline  Lungs: diminished breath sounds bilaterally  Heart: S1, S2 normal  Abdomen: abnormal findings:  soft nt status post surgery  Extremities: edema trace  Neurologic: Mental status: alertness: alert        Assessment and Plan:      IMP:  #1 obesity  #2 left nephrectomy with recurrent cystitis  #3 depression  #4 migraines  #5 hypertension  #6 sleep apnea    Plan     #1 status post gastric sleeve  #2 1 working kidney holding stable mild proteinuria hematuria but no active infection  #3 monitor affect holding stable  #4 not complaining of migraines today  #5 blood pressure controlled  #6 monitor oxygenation  Stable to discharge today           Yang Mayfield MD, MD

## 2022-11-22 NOTE — CARE COORDINATION
CM reviewed and screened pt for discharge planning. Pt is from home with family. Pt has a PCP. Pt has insurance and able to obtain prescriptions. Pt is independent at home. CM team will remain available if any needs arise.   0840 CM into see pt to initiate a safe discharge plan. Cm  introduced self and explained role of CM. Pt is kind, alert and oriented. Pt lives with her  and dtr. Very loved and supported by family/ mom. Discharge plan as above. No needs noted. CM provided card and encouraged to call for any needs or concern. CM is available if any needs arise.

## 2022-11-22 NOTE — DISCHARGE INSTRUCTIONS
HOME CARE INSTRUCTIONS FOLLOWING SURGERY     Diet:  Slowly advance diet to stage II FULL LIQUIDS BARIATRIC diet as tolerated x 2 wks. Increase your fluids, as pain medications may cause constipation. No alcoholic beverages. Activities:  No strenuous activity  No lifting greater than 20 pounds  No driving while taking pain medication    Wound Care:  No tub baths, swimming, or hot tubs for 6 wks. May shower 24 hours after surgery. Ice to incision the first 24 hrs as needed for pain; after that may use dry warm compresses as needed. Allow Dermaond (the skin glue) to fall off on its own. Pain control:  Norco 5/325 m tablet by mouth every 4-6 hours as needed for pain. It is best to avoid narcotics if able. Recommend taking tylenol around the clock and only take Norco for breakthrough pain. When taking narcotic pain medication it increases the risk of constipation and addictive habits. Antacid:  Protonix as prescribed twice daily for 3 months. Stool softener:  Senna 8.6 mg with Docusate Sodium 50 mg (Senokot-S): Two Capsules by mouth every day while on Percocet (or any narcotics) to prevent constipation. (May take 2-twice a day if needed). Laxative:  Milk of magnesia 30-60 ml by mouth every day as needed for constipation. Please call the office at: [phone]  to make a follow-up with Dr Charli Ring in 1 week. Call Surgeon if you have:  Temperature greater than 101. Persistent nausea and vomiting. Severe uncontrolled pain. Redness, tenderness, or signs of infection (pain, swelling, redness, odor or green/yellow discharge around the site). Difficulty breathing, headache or visual disturbances. Hives. Persistent dizziness or light-headedness. Extreme fatigue. Any other questions or concerns you may have after discharge. In an emergency, call 911 or go to an Emergency Department.       It is important to bring a complete, current list of your medications to any medical appointments or hospitalizations.   ____________________________________________    Signed:    VIOLET Gallardo - CNP, VIOLET-CNP    11/22/2022  1:56 PM

## 2022-11-22 NOTE — PROGRESS NOTES
Outpatient Pharmacy Progress Note for Meds-to-Beds    Total number of Prescriptions Filled: 4  The following medications were dispensed to the patient during the discharge process:  HYDROcodone-acetaminophen  ondansetron  pantoprazole  senna    Additional Documentation:  Medication(s) were delivered to the patient's room prior to discharge      Thank you for letting us serve your patients.   [facility]    [address], [address] [facility]    Phone: [phone]    Fax: [phone]

## 2022-11-22 NOTE — CONSULTS
V2.0  [facility] Hospitalist Progress Note      Name:  Denver Mendosa /Age/Sex: 1980  (36 y.o. female)   MRN & CSN:  [id number] & [id number] Encounter Date/Time: 2022 9:46 AM EST    Location:  [facility] PCP: VIOLET Gamez NP       Hospital Day: 2    Assessment and Plan:   Denver Mendosa is a 36 y.o. female with pmh of depression, nephrectomy, migraine, EMILEE, obese who was admitted by general surgery for elective bariatric surgery. Dr. Yasmani Dang performed the surgery on . Internal medicine was consulted for postop medical management. Plan: Morbid obesity   status post robotic assisted laparoscopic sleeve gastrectomy per Dr. Yasmani Dang   -First day postop  -Patient reported mild pain in the abdomen, tolerated it well clear liquid this morning  -Diet management and dispo per surgery    Acute anemia  -Likely due to blood loss in surgery  -Hemoglobin 11.7, baseline 13  -Outpatient follow-up with PCP     History of L nephrectomy   - in    -CMP normal  - avoid nephrotoxins  - nephrology following      Depression   -not on home medications     Migraines   - not on home medications     Hypertension   - BP mildly elevated, not on home meds  - likely secondary to pain   -Blood pressure normal today at 112/65     EMILEE  - noncompliant with CPAP       Diet BARIATRIC DIET; Bariatric Full Liquid   DVT Prophylaxis [x] Lovenox, []  Heparin, [] SCDs, [] Ambulation,  [] Eliquis, [] Xarelto  [] Coumadin   Code Status Full Code   Disposition From: home  Expected Disposition: home  Estimated Date of Discharge: per general surgery  Patient requires continued admission due to medical condition   Surrogate Decision Maker/ POA      Subjective:     Chief Complaint: No chief complaint on file. Denver Mendosa is a 36 y.o. female is evaluated in the room. Patient is alert and oriented. Reported very mild uncomfortable in abdomen. Tolerated well clear liquid. Surgical wound is clean and dry. Review of Systems:    Review of Systems   Constitutional: Negative. HENT: Negative. Eyes: Negative. Respiratory: Negative. Cardiovascular: Negative. Gastrointestinal: Negative. Endocrine: Negative. Genitourinary: Negative. Musculoskeletal: Negative. Skin:  Positive for wound. Allergic/Immunologic: Negative. Neurological: Negative. Hematological: Negative. Psychiatric/Behavioral: Negative. Objective: Intake/Output Summary (Last 24 hours) at 11/22/2022 0956  Last data filed at 11/22/2022 0351  Gross per 24 hour   Intake 490 ml   Output 1650 ml   Net -1160 ml        Vitals:   Vitals:    11/22/22 0806   BP: 112/65   Pulse: 73   Resp: 18   Temp: 98.2 °F (36.8 °C)   SpO2: 97%       Physical Exam:     General: NAD  Eyes: EOMI  ENT: neck supple  Cardiovascular: Regular rate. Respiratory: Clear to auscultation  Gastrointestinal: Soft, mildly tender to palpation. Genitourinary: no suprapubic tenderness  Musculoskeletal: No edema  Skin: Surgical wound is clean and dry, no signs of infection  Neuro: Alert. Psych: Mood appropriate.      Medications:   Medications:    scopolamine  1 patch TransDERmal Once    ketorolac  15 mg IntraVENous Q6H    sodium chloride flush  5-40 mL IntraVENous 2 times per day    famotidine (PEPCID) injection  20 mg IntraVENous BID    heparin (porcine)  5,000 Units SubCUTAneous 3 times per day      Infusions:    sodium chloride 100 mL/hr at 11/21/22 1030    sodium chloride 100 mL/hr at 11/22/22 0519     PRN Meds: sodium chloride flush, 10 mL, PRN  potassium chloride, 40 mEq, PRN   Or  potassium alternative oral replacement, 40 mEq, PRN   Or  potassium chloride, 10 mEq, PRN  magnesium sulfate, 2,000 mg, PRN  ondansetron, 4 mg, Q6H PRN  metoclopramide, 10 mg, Q6H PRN  oxyCODONE-acetaminophen, 0.5 tablet, Q4H PRN  morphine, 2 mg, Q4H PRN      Labs      Recent Results (from the past 24 hour(s))   Urinalysis    Collection Time: 11/21/22 12:00 PM Result Value Ref Range    Color, UA YELLOW YELLOW    Clarity, UA CLEAR CLEAR    Glucose, Urine NEGATIVE NEGATIVE MG/DL    Bilirubin Urine SMALL NUMBER OR AMOUNT OBSERVED (A) NEGATIVE MG/DL    Ketones, Urine >80 (A) NEGATIVE MG/DL    Specific Gravity, UA 1.020 1.001 - 1.035    Blood, Urine TRACE (A) NEGATIVE    pH, Urine 7.0 5.0 - 8.0    Protein, UA TRACE (A) NEGATIVE MG/DL    Urobilinogen, Urine 1.0 0.2 - 1.0 MG/DL    Nitrite Urine, Quantitative NEGATIVE NEGATIVE    Leukocyte Esterase, Urine NEGATIVE NEGATIVE   Microscopic Urinalysis    Collection Time: 11/21/22 12:00 PM   Result Value Ref Range    RBC, UA 2 0 - 6 /HPF    WBC, UA 8 (H) 0 - 5 /HPF    Bacteria, UA RARE (A) NEGATIVE /HPF    WBC Clumps, UA RARE /HPF    Squam Epithel, UA 4 /HPF    Mucus, UA RARE (A) NEGATIVE HPF    Trichomonas, UA NONE SEEN NONE SEEN /HPF    Hyaline Casts, UA 2 /LPF   CBC with Auto Differential    Collection Time: 11/22/22  5:45 AM   Result Value Ref Range    WBC 5.8 4.0 - 10.5 K/CU MM    RBC 4.01 (L) 4.2 - 5.4 M/CU MM    Hemoglobin 11.7 (L) 12.5 - 16.0 GM/DL    Hematocrit 35.0 (L) 37 - 47 %    MCV 87.3 78 - 100 FL    MCH 29.2 27 - 31 PG    MCHC 33.4 32.0 - 36.0 %    RDW 12.4 11.7 - 14.9 %    Platelets 250 400 - 022 K/CU MM    MPV 10.3 7.5 - 11.1 FL    Differential Type AUTOMATED DIFFERENTIAL     Segs Relative 73.4 (H) 36 - 66 %    Lymphocytes % 18.0 (L) 24 - 44 %    Monocytes % 7.9 (H) 0 - 4 %    Eosinophils % 0.0 0 - 3 %    Basophils % 0.2 0 - 1 %    Segs Absolute 4.3 K/CU MM    Lymphocytes Absolute 1.1 K/CU MM    Monocytes Absolute 0.5 K/CU MM    Eosinophils Absolute 0.0 K/CU MM    Basophils Absolute 0.0 K/CU MM    Nucleated RBC % 0.0 %    Total Nucleated RBC 0.0 K/CU MM    Total Immature Neutrophil 0.03 K/CU MM    Immature Neutrophil % 0.5 (H) 0 - 0.43 %   Renal Function Panel    Collection Time: 11/22/22  5:45 AM   Result Value Ref Range    Sodium 139 135 - 145 MMOL/L    Potassium 4.4 3.5 - 5.1 MMOL/L    Chloride 108 99 - 110 mMol/L    CO2 22 21 - 32 MMOL/L    Anion Gap 9 4 - 16    BUN 14 6 - 23 MG/DL    Creatinine 0.8 0.6 - 1.1 MG/DL    Est, Glom Filt Rate >60 >60 mL/min/1.73m2    Glucose 119 (H) 70 - 99 MG/DL    Calcium 8.9 8.3 - 10.6 MG/DL    Albumin 3.6 3.4 - 5.0 GM/DL    Phosphorus 4.3 2.5 - 4.9 MG/DL        Imaging/Diagnostics Last 24 Hours   No results found.     Electronically signed by Ally Leong CNP on 11/22/2022 at 9:56 AM

## 2022-11-22 NOTE — PROGRESS NOTES
BARIATRIC SURGERY PROGRESS NOTE    HPI: Denver Mendosa is a 36 y.o. female who is POD # 1 status post robot assisted sleeve gastrectomy. Doing well this morning. Tolerating clears. Denies nausea and vomiting. Has been up to the bathroom but not out in the hallways. Using IS. Vitals:    11/21/22 2144 11/22/22 0201 11/22/22 0353 11/22/22 0508   BP:  122/79  126/75   Pulse: 80 85  71   Resp:  20  18   Temp:  98.4 °F (36.9 °C)  97.4 °F (36.3 °C)   TempSrc:  Oral  Oral   SpO2:  94%  99%   Weight:       Height:   5' 3\" (1.6 m)      I/O last 3 completed shifts: In: 1260.8 [P.O.:240; I.V.:520.8; IV Piggyback:500]  Out: 6407 [Urine:1650; Blood:29]  No intake/output data recorded. BARIATRIC DIET;  Bariatric Clear Liquid    Recent Results (from the past 48 hour(s))   Urinalysis    Collection Time: 11/21/22 12:00 PM   Result Value Ref Range    Color, UA YELLOW YELLOW    Clarity, UA CLEAR CLEAR    Glucose, Urine NEGATIVE NEGATIVE MG/DL    Bilirubin Urine SMALL NUMBER OR AMOUNT OBSERVED (A) NEGATIVE MG/DL    Ketones, Urine >80 (A) NEGATIVE MG/DL    Specific Gravity, UA 1.020 1.001 - 1.035    Blood, Urine TRACE (A) NEGATIVE    pH, Urine 7.0 5.0 - 8.0    Protein, UA TRACE (A) NEGATIVE MG/DL    Urobilinogen, Urine 1.0 0.2 - 1.0 MG/DL    Nitrite Urine, Quantitative NEGATIVE NEGATIVE    Leukocyte Esterase, Urine NEGATIVE NEGATIVE   Microscopic Urinalysis    Collection Time: 11/21/22 12:00 PM   Result Value Ref Range    RBC, UA 2 0 - 6 /HPF    WBC, UA 8 (H) 0 - 5 /HPF    Bacteria, UA RARE (A) NEGATIVE /HPF    WBC Clumps, UA RARE /HPF    Squam Epithel, UA 4 /HPF    Mucus, UA RARE (A) NEGATIVE HPF    Trichomonas, UA NONE SEEN NONE SEEN /HPF    Hyaline Casts, UA 2 /LPF   CBC with Auto Differential    Collection Time: 11/22/22  5:45 AM   Result Value Ref Range    WBC 5.8 4.0 - 10.5 K/CU MM    RBC 4.01 (L) 4.2 - 5.4 M/CU MM    Hemoglobin 11.7 (L) 12.5 - 16.0 GM/DL    Hematocrit 35.0 (L) 37 - 47 %    MCV 87.3 78 - 100 FL MCH 29.2 27 - 31 PG    MCHC 33.4 32.0 - 36.0 %    RDW 12.4 11.7 - 14.9 %    Platelets 155 859 - 848 K/CU MM    MPV 10.3 7.5 - 11.1 FL    Differential Type AUTOMATED DIFFERENTIAL     Segs Relative 73.4 (H) 36 - 66 %    Lymphocytes % 18.0 (L) 24 - 44 %    Monocytes % 7.9 (H) 0 - 4 %    Eosinophils % 0.0 0 - 3 %    Basophils % 0.2 0 - 1 %    Segs Absolute 4.3 K/CU MM    Lymphocytes Absolute 1.1 K/CU MM    Monocytes Absolute 0.5 K/CU MM    Eosinophils Absolute 0.0 K/CU MM    Basophils Absolute 0.0 K/CU MM    Nucleated RBC % 0.0 %    Total Nucleated RBC 0.0 K/CU MM    Total Immature Neutrophil 0.03 K/CU MM    Immature Neutrophil % 0.5 (H) 0 - 0.43 %   Renal Function Panel    Collection Time: 11/22/22  5:45 AM   Result Value Ref Range    Sodium 139 135 - 145 MMOL/L    Potassium 4.4 3.5 - 5.1 MMOL/L    Chloride 108 99 - 110 mMol/L    CO2 22 21 - 32 MMOL/L    Anion Gap 9 4 - 16    BUN 14 6 - 23 MG/DL    Creatinine 0.8 0.6 - 1.1 MG/DL    Est, Glom Filt Rate >60 >60 mL/min/1.73m2    Glucose 119 (H) 70 - 99 MG/DL    Calcium 8.9 8.3 - 10.6 MG/DL    Albumin 3.6 3.4 - 5.0 GM/DL    Phosphorus 4.3 2.5 - 4.9 MG/DL       Scheduled Meds:   scopolamine  1 patch TransDERmal Once    ketorolac  15 mg IntraVENous Q6H    sodium chloride flush  5-40 mL IntraVENous 2 times per day    famotidine (PEPCID) injection  20 mg IntraVENous BID    heparin (porcine)  5,000 Units SubCUTAneous 3 times per day     Continuous Infusions:   sodium chloride 100 mL/hr at 11/21/22 1030    sodium chloride 100 mL/hr at 11/22/22 0519       Physical Exam:  HEENT: Anicteric sclerae, Oropharyngeal mucosae moist, pink and intact. Heart:  Normal S1 and S2, RRR  Lungs: Clear to auscultation bilaterally, No audible Wheezes or Rales. Extremities: No edema. Neuro: Alert and Oriented x 3, Non focal.  Abdomen: Soft, appropriately tender, Non distended, Positive bowel sounds.   Incision: Nicely healing: No erythema, No discharge, glue intact      Principal Problem: Morbid obesity ([address]  Active Problems:    Pre-op testing  Resolved Problems:    * No resolved hospital problems. *      Assessment and Plan:  Elli Hameed is a 36 y.o. female who is POD # 1 status post robot assisted sleeve gastrectomy. - Pain and nausea under adequate control.  - Labs reviewed  - Will advance to bariatric full liquids  - Increase ambulation to at least 4x/day walk in the hallways with assistance. Respiratory maribell-operative care: encourage incentive Spirometry / deep breathing and - coughing 10x/hr while awake. - Continue DVT prophylaxis with Teds and SCDs and SC Lovenox. - Continue GI prophylaxis with Protonix IV till able to tolerate PO.  - Will plan for discharge later this afternoon once she nears 32oz fluid goal.  Discussed discharge, medications, restrictions, postop care, and diet stages. Patient verbalizes understanding.      VIOLET Gallardo - CNP, [address] [facility]    11/22/2022  7:55 AM  ___________________________________________

## 2022-11-23 ENCOUNTER — SCHEDULED TELEPHONE ENCOUNTER (OUTPATIENT)
Dept: BARIATRICS/WEIGHT MGMT | Age: 42
End: 2022-11-23

## 2022-11-23 DIAGNOSIS — E66.01 MORBID OBESITY DUE TO EXCESS CALORIES (HCC): Primary | ICD-10-CM

## 2022-11-23 PROCEDURE — 99024 POSTOP FOLLOW-UP VISIT: CPT | Performed by: NURSE PRACTITIONER

## 2022-11-23 NOTE — PROGRESS NOTES
Called Umberto Hanson to see how they were doing post-operatively. Protein intake is around 25 grams per day. Reports good hydration  30 oz and urine is clear. Incision sites are healing well and denies any erythema or drainage. Educated on post operative care. Pain medication regimen prn  and denies any nausea or vomiting. Bowel movements are normal and last BM was 11/23/22. Encouraged exercise and getting up and moving around at least every hour to prevent pneumonia/DVT's. Confirmed post op appointment with Dr. Ninoska Spring and aware to call with any questions or concerns.

## 2022-11-27 ENCOUNTER — HOSPITAL ENCOUNTER (EMERGENCY)
Age: 42
Discharge: HOME | End: 2022-11-27
Attending: EMERGENCY MEDICINE
Payer: MEDICAID

## 2022-11-27 ENCOUNTER — APPOINTMENT (OUTPATIENT)
Dept: CT IMAGING | Age: 42
End: 2022-11-27
Payer: MEDICAID

## 2022-11-27 VITALS
BODY MASS INDEX: 35.61 KG/M2 | TEMPERATURE: 98.2 F | HEART RATE: 94 BPM | OXYGEN SATURATION: 95 % | SYSTOLIC BLOOD PRESSURE: 128 MMHG | WEIGHT: 201 LBS | DIASTOLIC BLOOD PRESSURE: 84 MMHG | HEIGHT: 63 IN | RESPIRATION RATE: 21 BRPM

## 2022-11-27 DIAGNOSIS — G89.18 POST-OP PAIN: Primary | ICD-10-CM

## 2022-11-27 DIAGNOSIS — R10.9 ABDOMINAL PAIN, UNSPECIFIED ABDOMINAL LOCATION: ICD-10-CM

## 2022-11-27 LAB
ALBUMIN SERPL-MCNC: 4.3 GM/DL (ref 3.4–5)
ALP BLD-CCNC: 75 IU/L (ref 40–129)
ALT SERPL-CCNC: 36 U/L (ref 10–40)
ANION GAP SERPL CALCULATED.3IONS-SCNC: 18 MMOL/L (ref 4–16)
AST SERPL-CCNC: 31 IU/L (ref 15–37)
BACTERIA: NEGATIVE /HPF
BASOPHILS ABSOLUTE: 0 K/CU MM
BASOPHILS RELATIVE PERCENT: 0.5 % (ref 0–1)
BILIRUB SERPL-MCNC: 1 MG/DL (ref 0–1)
BILIRUBIN URINE: ABNORMAL MG/DL
BLOOD, URINE: ABNORMAL
BUN BLDV-MCNC: 22 MG/DL (ref 6–23)
CALCIUM SERPL-MCNC: 9.9 MG/DL (ref 8.3–10.6)
CHLORIDE BLD-SCNC: 100 MMOL/L (ref 99–110)
CLARITY: ABNORMAL
CO2: 20 MMOL/L (ref 21–32)
COLOR: YELLOW
CREAT SERPL-MCNC: 0.8 MG/DL (ref 0.6–1.1)
DIFFERENTIAL TYPE: ABNORMAL
EOSINOPHILS ABSOLUTE: 0.1 K/CU MM
EOSINOPHILS RELATIVE PERCENT: 1.3 % (ref 0–3)
GFR SERPL CREATININE-BSD FRML MDRD: >60 ML/MIN/1.73M2
GLUCOSE BLD-MCNC: 86 MG/DL (ref 70–99)
GLUCOSE, URINE: NEGATIVE MG/DL
HCT VFR BLD CALC: 44.7 % (ref 37–47)
HEMOGLOBIN: 14.6 GM/DL (ref 12.5–16)
IMMATURE NEUTROPHIL %: 0.2 % (ref 0–0.43)
KETONES, URINE: >80 MG/DL
LACTATE: 1.1 MMOL/L (ref 0.4–2)
LEUKOCYTE ESTERASE, URINE: ABNORMAL
LIPASE: 230 IU/L (ref 13–60)
LYMPHOCYTES ABSOLUTE: 2 K/CU MM
LYMPHOCYTES RELATIVE PERCENT: 23.1 % (ref 24–44)
MCH RBC QN AUTO: 28.2 PG (ref 27–31)
MCHC RBC AUTO-ENTMCNC: 32.7 % (ref 32–36)
MCV RBC AUTO: 86.5 FL (ref 78–100)
MONOCYTES ABSOLUTE: 0.5 K/CU MM
MONOCYTES RELATIVE PERCENT: 5.9 % (ref 0–4)
MUCUS: ABNORMAL HPF
NITRITE URINE, QUANTITATIVE: NEGATIVE
NUCLEATED RBC %: 0 %
PDW BLD-RTO: 12.4 % (ref 11.7–14.9)
PH, URINE: 6 (ref 5–8)
PLATELET # BLD: 228 K/CU MM (ref 140–440)
PMV BLD AUTO: 10.9 FL (ref 7.5–11.1)
POTASSIUM SERPL-SCNC: 4.2 MMOL/L (ref 3.5–5.1)
PROTEIN UA: ABNORMAL MG/DL
RBC # BLD: 5.17 M/CU MM (ref 4.2–5.4)
RBC URINE: 29 /HPF (ref 0–6)
SEGMENTED NEUTROPHILS ABSOLUTE COUNT: 6.1 K/CU MM
SEGMENTED NEUTROPHILS RELATIVE PERCENT: 69 % (ref 36–66)
SODIUM BLD-SCNC: 138 MMOL/L (ref 135–145)
SPECIFIC GRAVITY UA: >1.03 (ref 1–1.03)
SQUAMOUS EPITHELIAL: 8 /HPF
TOTAL IMMATURE NEUTOROPHIL: 0.02 K/CU MM
TOTAL NUCLEATED RBC: 0 K/CU MM
TOTAL PROTEIN: 8.2 GM/DL (ref 6.4–8.2)
TRICHOMONAS: ABNORMAL /HPF
UROBILINOGEN, URINE: 1 MG/DL (ref 0.2–1)
WBC # BLD: 8.8 K/CU MM (ref 4–10.5)
WBC UA: 58 /HPF (ref 0–5)

## 2022-11-27 PROCEDURE — 81001 URINALYSIS AUTO W/SCOPE: CPT

## 2022-11-27 PROCEDURE — 99285 EMERGENCY DEPT VISIT HI MDM: CPT

## 2022-11-27 PROCEDURE — 80053 COMPREHEN METABOLIC PANEL: CPT

## 2022-11-27 PROCEDURE — 2500000003 HC RX 250 WO HCPCS: Performed by: EMERGENCY MEDICINE

## 2022-11-27 PROCEDURE — 96374 THER/PROPH/DIAG INJ IV PUSH: CPT

## 2022-11-27 PROCEDURE — 6360000002 HC RX W HCPCS: Performed by: EMERGENCY MEDICINE

## 2022-11-27 PROCEDURE — 6360000004 HC RX CONTRAST MEDICATION: Performed by: EMERGENCY MEDICINE

## 2022-11-27 PROCEDURE — 96375 TX/PRO/DX INJ NEW DRUG ADDON: CPT

## 2022-11-27 PROCEDURE — 2580000003 HC RX 258: Performed by: EMERGENCY MEDICINE

## 2022-11-27 PROCEDURE — 83690 ASSAY OF LIPASE: CPT

## 2022-11-27 PROCEDURE — 96372 THER/PROPH/DIAG INJ SC/IM: CPT

## 2022-11-27 PROCEDURE — 85025 COMPLETE CBC W/AUTO DIFF WBC: CPT

## 2022-11-27 PROCEDURE — 83605 ASSAY OF LACTIC ACID: CPT

## 2022-11-27 PROCEDURE — 74177 CT ABD & PELVIS W/CONTRAST: CPT

## 2022-11-27 RX ORDER — FENTANYL CITRATE 50 UG/ML
50 INJECTION, SOLUTION INTRAMUSCULAR; INTRAVENOUS
Status: DISCONTINUED | OUTPATIENT
Start: 2022-11-27 | End: 2022-11-27 | Stop reason: HOSPADM

## 2022-11-27 RX ORDER — ONDANSETRON 4 MG/1
4 TABLET, ORALLY DISINTEGRATING ORAL EVERY 8 HOURS PRN
Qty: 15 TABLET | Refills: 0 | Status: SHIPPED | OUTPATIENT
Start: 2022-11-27

## 2022-11-27 RX ORDER — NAPROXEN 500 MG/1
500 TABLET ORAL 2 TIMES DAILY
Qty: 60 TABLET | Refills: 0 | Status: SHIPPED | OUTPATIENT
Start: 2022-11-27

## 2022-11-27 RX ORDER — ONDANSETRON 2 MG/ML
4 INJECTION INTRAMUSCULAR; INTRAVENOUS EVERY 30 MIN PRN
Status: DISCONTINUED | OUTPATIENT
Start: 2022-11-27 | End: 2022-11-27 | Stop reason: HOSPADM

## 2022-11-27 RX ORDER — CALCIUM CARBONATE 200(500)MG
1 TABLET,CHEWABLE ORAL DAILY
Qty: 30 TABLET | Refills: 0 | Status: SHIPPED | OUTPATIENT
Start: 2022-11-27 | End: 2022-12-27

## 2022-11-27 RX ORDER — 0.9 % SODIUM CHLORIDE 0.9 %
1000 INTRAVENOUS SOLUTION INTRAVENOUS ONCE
Status: COMPLETED | OUTPATIENT
Start: 2022-11-27 | End: 2022-11-27

## 2022-11-27 RX ORDER — DICYCLOMINE HYDROCHLORIDE 10 MG/1
10 CAPSULE ORAL 3 TIMES DAILY
Qty: 15 CAPSULE | Refills: 3 | Status: SHIPPED | OUTPATIENT
Start: 2022-11-27

## 2022-11-27 RX ORDER — FAMOTIDINE 20 MG/1
20 TABLET, FILM COATED ORAL 2 TIMES DAILY
Qty: 14 TABLET | Refills: 0 | Status: SHIPPED | OUTPATIENT
Start: 2022-11-27

## 2022-11-27 RX ORDER — DICYCLOMINE HYDROCHLORIDE 10 MG/ML
20 INJECTION INTRAMUSCULAR ONCE
Status: COMPLETED | OUTPATIENT
Start: 2022-11-27 | End: 2022-11-27

## 2022-11-27 RX ADMIN — IOPAMIDOL 75 ML: 755 INJECTION, SOLUTION INTRAVENOUS at 16:48

## 2022-11-27 RX ADMIN — ONDANSETRON 4 MG: 2 INJECTION INTRAMUSCULAR; INTRAVENOUS at 15:49

## 2022-11-27 RX ADMIN — DICYCLOMINE HYDROCHLORIDE 20 MG: 20 INJECTION INTRAMUSCULAR at 15:54

## 2022-11-27 RX ADMIN — FENTANYL CITRATE 50 MCG: 50 INJECTION INTRAMUSCULAR; INTRAVENOUS at 15:45

## 2022-11-27 RX ADMIN — SODIUM CHLORIDE 1000 ML: 9 INJECTION, SOLUTION INTRAVENOUS at 15:42

## 2022-11-27 RX ADMIN — Medication 20 MG: at 15:46

## 2022-11-27 ASSESSMENT — ENCOUNTER SYMPTOMS
RESPIRATORY NEGATIVE: 1
EYES NEGATIVE: 1
ABDOMINAL PAIN: 1
NAUSEA: 1
ALLERGIC/IMMUNOLOGIC NEGATIVE: 1

## 2022-11-27 ASSESSMENT — PAIN DESCRIPTION - LOCATION
LOCATION: ABDOMEN
LOCATION: ABDOMEN

## 2022-11-27 ASSESSMENT — PAIN DESCRIPTION - DESCRIPTORS
DESCRIPTORS: ACHING;DISCOMFORT
DESCRIPTORS: DISCOMFORT

## 2022-11-27 ASSESSMENT — LIFESTYLE VARIABLES
HOW MANY STANDARD DRINKS CONTAINING ALCOHOL DO YOU HAVE ON A TYPICAL DAY: PATIENT DOES NOT DRINK
HOW OFTEN DO YOU HAVE A DRINK CONTAINING ALCOHOL: NEVER

## 2022-11-27 ASSESSMENT — PAIN SCALES - GENERAL
PAINLEVEL_OUTOF10: 8
PAINLEVEL_OUTOF10: 8

## 2022-11-27 ASSESSMENT — PAIN DESCRIPTION - ORIENTATION
ORIENTATION: RIGHT;UPPER
ORIENTATION: RIGHT;UPPER

## 2022-11-27 NOTE — CARE COORDINATION
Patient is possible readmission. Patient was admitted to hospital 11/21/22 - 11/22/22 for Vaginal bleeding. Patient to ED today and presents with post op problem from gastric sleeve placement. No Acute findings. Patient discharged home. (1) flexion of extremities

## 2022-11-27 NOTE — Clinical Note
Emily Gar was seen and treated in our emergency department on 11/27/2022. She may return to work on 12/02/2022. If you have any questions or concerns, please don't hesitate to call.       WishGenie, DO

## 2022-11-27 NOTE — ED TRIAGE NOTES
Upper right quadrant pain in abdomin, had gastric sleeve surgery on 11/21. Pain started Friday, eating causes severe pain, pt has only been able to drink a little water since Friday.

## 2022-11-27 NOTE — ANESTHESIA POSTPROCEDURE EVALUATION
Department of Anesthesiology  Postprocedure Note    Patient: Iman Frazier  MRN: [id number]  YOB: 1980  Date of evaluation: 11/26/2022      Procedure Summary     Date: 11/21/22 Room / Location: [address] [place] [address]    Anesthesia Start: 4894 Anesthesia Stop: 3544    Procedure: GASTRECTOMY SLEEVE LAPAROSCOPIC ROBOTIC (Abdomen) Diagnosis:       Morbid obesity ([address]      (Morbid obesity ([facility] [address] [E66.01])    Surgeons: Medardo Elam MD Responsible Provider: Aleksey Duran MD    Anesthesia Type: General ASA Status: 2          Anesthesia Type: General    Waldo Phase I: Waldo Score: 10    Waldo Phase II:        Anesthesia Post Evaluation    Patient location during evaluation: PACU  Patient participation: complete - patient participated  Level of consciousness: awake and alert  Pain score: 3  Airway patency: patent  Nausea & Vomiting: no vomiting and nausea  Complications: no  Cardiovascular status: blood pressure returned to baseline  Respiratory status: acceptable  Hydration status: euvolemic

## 2022-11-27 NOTE — ED NOTES
[facility] paged Dr Neymar Pham  11/27/22 1840    1900 repaged Dr Carmichael Coma covering Dr Wilton Bence  11/27/22 1901    1904 Dr Carmicheal Coma returned call      Kaylyn Trujillo  11/27/22 1905

## 2022-11-27 NOTE — ED PROVIDER NOTES
[facility]      TRIAGE CHIEF COMPLAINT:   Post-op Problem (Gastric sleeve placed 11/21. C/O abd pain since that has progressively worsened)      [place] IRA:  Fawn Patel is a 36 y.o. female that presents with complaint of postop abdominal pain. Patient had gastric sleeve with general surgery on 11/21/2022. Ever since and having progressively worse abdominal pain. She is on Vicodin at home. Denies any fever she has had nausea no vomiting or chest pain or shortness of breath no urine or other bowel complaints. Denies other questions or concerns. [place] REVIEW OF SYSTEMS:  At least 10 systems reviewed and otherwise acutely negative except as in the [address] Review of Systems   Constitutional: Negative. HENT: Negative. Eyes: Negative. Respiratory: Negative. Cardiovascular: Negative. Gastrointestinal:  Positive for abdominal pain and nausea. Endocrine: Negative. Genitourinary: Negative. Musculoskeletal: Negative. Skin: Negative. Allergic/Immunologic: Negative. Neurological: Negative. Hematological: Negative. Psychiatric/Behavioral: Negative. All other systems reviewed and are negative. Past Medical History:   Diagnosis Date    Chronic depression 07/09/2021    No meds as of 11/11/22    Hx of Doppler echocardiogram 04/27/2022    EF 55-60%. Mild LV hypertrophy. Grade I diastolic dysfunction. Mildly dilated LA. Mild MR and TR. Kidney disorder     left removed .     Migraine 11/11/2022    Mixed hyperlipidemia 01/27/2021    PONV (postoperative nausea and vomiting)     Preop pulmonary/respiratory exam 05/24/2022    Prolonged emergence from general anesthesia     Pulmonic stenosis     Sleep apnea      Past Surgical History:   Procedure Laterality Date    BREAST BIOPSY Left 2010    benign    BREAST REDUCTION SURGERY Bilateral 2003    BREAST SURGERY      CARPAL TUNNEL RELEASE Left 2/16/2021    LEFT CARPAL TUNNEL RELEASE performed by Juanpablo Girard DO at [facility] OR    CHOLECYSTECTOMY      COLONOSCOPY N/A 4/20/2022    COLONOSCOPY POLYPECTOMY SNARE/COLD BIOPSY performed by Cortez Emery MD at [place] [address] Right     wrist    HAND SURGERY Bilateral     4/2014    HYSTERECTOMY (CERVIX STATUS UNKNOWN)      INNER EAR SURGERY      OTHER SURGICAL HISTORY  4/16/2012    Repair vaginal cuff    OVARY REMOVAL      PARTIAL NEPHRECTOMY Left     SLEEVE GASTRECTOMY N/A 11/21/2022    GASTRECTOMY SLEEVE LAPAROSCOPIC ROBOTIC performed by Sara Michelle MD at [facility] Left     TUBAL LIGATION      UPPER GASTROINTESTINAL ENDOSCOPY N/A 7/29/2022    EGD ESOPHAGOGASTRODUODENOSCOPY WITH BX performed by Sara Michelle MD at [facility] ENDOSCOPY     Family History   Problem Relation Age of Onset    Cancer Mother     Migraines Mother     Cancer Maternal Grandmother     Diabetes Maternal Grandmother     Allergies Son     Allergies Daughter     Bleeding Prob Daughter     Bleeding Prob Son     Asthma Daughter     Asthma Son      Social History     Socioeconomic History    Marital status:      Spouse name: Not on file    Number of children: Not on file    Years of education: Not on file    Highest education level: Not on file   Occupational History    Not on file   Tobacco Use    Smoking status: Never    Smokeless tobacco: Never   Vaping Use    Vaping Use: Never used   Substance and Sexual Activity    Alcohol use: No    Drug use: No    Sexual activity: Not on file   Other Topics Concern    Not on file   Social History Narrative    ** Merged History Encounter **          Social Determinants of Health     Financial Resource Strain: Unknown    Difficulty of Paying Living Expenses: Patient refused   Food Insecurity: Unknown    Worried About Running Out of Food in the Last Year: Patient refused    Ran Out of Food in the Last Year: Patient refused   Transportation Needs: Not on file   Physical Activity: Not on file   Stress: Not on file   Social Connections: Not on file   Intimate Partner Violence: Not on file   Housing Stability: Not on file     Current Facility-Administered Medications   Medication Dose Route Frequency Provider Last Rate Last Admin    fentaNYL (SUBLIMAZE) injection 50 mcg  50 mcg IntraVENous Q1H PRN Gale Avers, DO   50 mcg at 11/27/22 1545    ondansetron (ZOFRAN) injection 4 mg  4 mg IntraVENous Q30 Min PRN Gale Avers, DO   4 mg at 11/27/22 1549     Current Outpatient Medications   Medication Sig Dispense Refill    ondansetron (ZOFRAN ODT) 4 MG disintegrating tablet Take 1 tablet by mouth every 8 hours as needed for Nausea 15 tablet 0    famotidine (PEPCID) 20 MG tablet Take 1 tablet by mouth 2 times daily 14 tablet 0    dicyclomine (BENTYL) 10 MG capsule Take 1 capsule by mouth 3 times daily As needed for abdominal pain 15 capsule 3    calcium carbonate (ANTACID) 500 MG chewable tablet Take 1 tablet by mouth daily 30 tablet 0    naproxen (NAPROSYN) 500 MG tablet Take 1 tablet by mouth 2 times daily 60 tablet 0    HYDROcodone-acetaminophen (NORCO) 5-325 MG per tablet Take 1 tablet by mouth every 6 hours as needed for Pain for up to 5 days. Intended supply: 5 days.  Take lowest dose possible to manage pain (Patient not taking: Reported on 11/27/2022) 20 tablet 0    pantoprazole (PROTONIX) 20 MG tablet Take 1 tablet by mouth 2 times daily 60 tablet 3    ondansetron (ZOFRAN-ODT) 4 MG disintegrating tablet Take 1 tablet by mouth 3 times daily as needed for Nausea or Vomiting 21 tablet 2    senna (SENOKOT) 8.6 MG tablet Take 1 tablet by mouth 2 times daily (Patient not taking: Reported on 11/27/2022) 60 tablet 11      Allergies   Allergen Reactions    Codeine Anaphylaxis    Dilaudid [Hydromorphone Hcl] Anaphylaxis     Morphine ok    Hydromorphone Anaphylaxis     Morphone ok verbal per pt    Keflex [Cephalexin] Hives    Pyridium [Phenazopyridine Hcl] Hives     Current Facility-Administered Medications   Medication Dose Route Frequency Provider Last Rate Last Admin    fentaNYL (SUBLIMAZE) injection 50 mcg  50 mcg IntraVENous Q1H PRN Donzella Chuy, DO   50 mcg at 11/27/22 1545    ondansetron (ZOFRAN) injection 4 mg  4 mg IntraVENous Q30 Min PRN Donzella Chuy, DO   4 mg at 11/27/22 1549     Current Outpatient Medications   Medication Sig Dispense Refill    ondansetron (ZOFRAN ODT) 4 MG disintegrating tablet Take 1 tablet by mouth every 8 hours as needed for Nausea 15 tablet 0    famotidine (PEPCID) 20 MG tablet Take 1 tablet by mouth 2 times daily 14 tablet 0    dicyclomine (BENTYL) 10 MG capsule Take 1 capsule by mouth 3 times daily As needed for abdominal pain 15 capsule 3    calcium carbonate (ANTACID) 500 MG chewable tablet Take 1 tablet by mouth daily 30 tablet 0    naproxen (NAPROSYN) 500 MG tablet Take 1 tablet by mouth 2 times daily 60 tablet 0    HYDROcodone-acetaminophen (NORCO) 5-325 MG per tablet Take 1 tablet by mouth every 6 hours as needed for Pain for up to 5 days. Intended supply: 5 days. Take lowest dose possible to manage pain (Patient not taking: Reported on 11/27/2022) 20 tablet 0    pantoprazole (PROTONIX) 20 MG tablet Take 1 tablet by mouth 2 times daily 60 tablet 3    ondansetron (ZOFRAN-ODT) 4 MG disintegrating tablet Take 1 tablet by mouth 3 times daily as needed for Nausea or Vomiting 21 tablet 2    senna (SENOKOT) 8.6 MG tablet Take 1 tablet by mouth 2 times daily (Patient not taking: Reported on 11/27/2022) 60 tablet 11       Nursing Notes Reviewed    VITAL SIGNS:  ED Triage Vitals [11/27/22 1315]   Enc Vitals Group      BP (!) 113/92      Heart Rate 100      Resp 18      Temp 98.2 °F (36.8 °C)      Temp src       SpO2 100 %      Weight 201 lb (91.2 kg)      Height 5' 3\" (1.6 m)      Head Circumference       Peak Flow       Pain Score       Pain Loc       Pain Edu? Excl. in [address]? PHYSICAL EXAM:  Physical Exam  Vitals and nursing note reviewed. Constitutional:       General: She is not in acute distress.      Appearance: Normal appearance. She is not ill-appearing, toxic-appearing or diaphoretic. HENT:      Head: Normocephalic and atraumatic. Right Ear: External ear normal.      Left Ear: External ear normal.   Eyes:      General: No scleral icterus. Right eye: No discharge. Left eye: No discharge. Extraocular Movements: Extraocular movements intact. Conjunctiva/sclera: Conjunctivae normal.   Cardiovascular:      Rate and Rhythm: Normal rate and regular rhythm. Pulses: Normal pulses. Heart sounds: Normal heart sounds. Pulmonary:      Effort: Pulmonary effort is normal. No respiratory distress. Breath sounds: Normal breath sounds. No stridor. No wheezing, rhonchi or rales. Abdominal:      General: Bowel sounds are normal. There is no distension. Palpations: Abdomen is soft. There is no mass. Tenderness: There is abdominal tenderness. There is no guarding or rebound. Hernia: No hernia is present. Musculoskeletal:         General: No swelling, tenderness, deformity or signs of injury. Normal range of motion. Cervical back: Normal range of motion. No rigidity. Right lower leg: No edema. Left lower leg: No edema. Skin:     General: Skin is warm. Coloration: Skin is not jaundiced or pale. Findings: No bruising, erythema, lesion or rash. Neurological:      General: No focal deficit present. Mental Status: She is alert and oriented to person, place, and time. Cranial Nerves: No cranial nerve deficit. Motor: No weakness. Psychiatric:         Mood and Affect: Mood normal.         Behavior: Behavior normal.         Thought Content:  Thought content normal.         I have reviewed andinterpreted all of the currently available lab results from this visit (if applicable):    Results for orders placed or performed during the hospital encounter of 11/27/22   CBC with Auto Differential   Result Value Ref Range    WBC 8.8 4.0 - 10.5 K/CU MM    RBC 5.17 4.2 - 5.4 M/CU MM    Hemoglobin 14.6 12.5 - 16.0 GM/DL    Hematocrit 44.7 37 - 47 %    MCV 86.5 78 - 100 FL    MCH 28.2 27 - 31 PG    MCHC 32.7 32.0 - 36.0 %    RDW 12.4 11.7 - 14.9 %    Platelets 189 620 - 042 K/CU MM    MPV 10.9 7.5 - 11.1 FL    Differential Type AUTOMATED DIFFERENTIAL     Segs Relative 69.0 (H) 36 - 66 %    Lymphocytes % 23.1 (L) 24 - 44 %    Monocytes % 5.9 (H) 0 - 4 %    Eosinophils % 1.3 0 - 3 %    Basophils % 0.5 0 - 1 %    Segs Absolute 6.1 K/CU MM    Lymphocytes Absolute 2.0 K/CU MM    Monocytes Absolute 0.5 K/CU MM    Eosinophils Absolute 0.1 K/CU MM    Basophils Absolute 0.0 K/CU MM    Nucleated RBC % 0.0 %    Total Nucleated RBC 0.0 K/CU MM    Total Immature Neutrophil 0.02 K/CU MM    Immature Neutrophil % 0.2 0 - 0.43 %   Comprehensive Metabolic Panel   Result Value Ref Range    Sodium 138 135 - 145 MMOL/L    Potassium 4.2 3.5 - 5.1 MMOL/L    Chloride 100 99 - 110 mMol/L    CO2 20 (L) 21 - 32 MMOL/L    BUN 22 6 - 23 MG/DL    Creatinine 0.8 0.6 - 1.1 MG/DL    Est, Glom Filt Rate >60 >60 mL/min/1.73m2    Glucose 86 70 - 99 MG/DL    Calcium 9.9 8.3 - 10.6 MG/DL    Albumin 4.3 3.4 - 5.0 GM/DL    Total Protein 8.2 6.4 - 8.2 GM/DL    Total Bilirubin 1.0 0.0 - 1.0 MG/DL    ALT 36 10 - 40 U/L    AST 31 15 - 37 IU/L    Alkaline Phosphatase 75 40 - 129 IU/L    Anion Gap 18 (H) 4 - 16   Lipase   Result Value Ref Range    Lipase 230 (H) 13 - 60 IU/L   Lactic Acid   Result Value Ref Range    Lactate 1.1 0.4 - 2.0 mMOL/L   Urinalysis   Result Value Ref Range    Color, UA YELLOW YELLOW    Clarity, UA SLIGHTLY CLOUDY (A) CLEAR    Glucose, Urine NEGATIVE NEGATIVE MG/DL    Bilirubin Urine MODERATE NUMBER OR AMOUNT OBSERVED (A) NEGATIVE MG/DL    Ketones, Urine >80 (A) NEGATIVE MG/DL    Specific Gravity, UA >1.030 1.001 - 1.035    Blood, Urine MODERATE NUMBER OR AMOUNT OBSERVED (A) NEGATIVE    pH, Urine 6.0 5.0 - 8.0    Protein, UA TRACE (A) NEGATIVE MG/DL    Urobilinogen, Urine 1.0 0.2 - 1.0 MG/DL    Nitrite Urine, Quantitative NEGATIVE NEGATIVE    Leukocyte Esterase, Urine SMALL NUMBER OR AMOUNT OBSERVED (A) NEGATIVE   Microscopic Urinalysis   Result Value Ref Range    RBC, UA 29 (H) 0 - 6 /HPF    WBC, UA 58 (H) 0 - 5 /HPF    Bacteria, UA NEGATIVE NEGATIVE /HPF    Squam Epithel, UA 8 /HPF    Mucus, UA RARE (A) NEGATIVE HPF    Trichomonas, UA NONE SEEN NONE SEEN /HPF        Radiographs (if obtained):  [] The following radiograph was interpreted by myself in the absence of a radiologist:  [x] Radiologist's Report Reviewed:    CT Abd/pelv      EKG (if obtained): (All EKG's are interpreted by myself in the absence of a cardiologist)    MDM:    Patient with complaint of abdominal pain nausea after surgery. Again she states on 11/21/2022 she had gastric sleeve with general surgery here. Ever since and having worsening pain. Denies any fevers vomiting chest pain shortness of breath urine or other bowel complaints. She appears otherwise well vital signs are stable. She does have some mild generalized pain worse epigastrium. I did do labs, imaging white count normal no fever CT scan shows no acute problem. Lipase was mildly elevated around 230 roughly. I did talk to general surgeon on-call and explained results and findings okay with outpatient follow-up will discharge home with GI medications nausea medication she is on Vicodin at home already. She understands and agrees she is okay with outpatient follow-up and plan currently on reevaluation her pain is improved. She appears nontoxic nonseptic she is ready go home. Patient stable discharge. Okay with plan.     CLINICAL IMPRESSION:  Final diagnoses:   Post-op pain   Abdominal pain, unspecified abdominal location       (Please note that portions of this note may have been completed with a voice recognition program. Efforts were made to edit the dictations but occasionally words aremis-transcribed.)    DISPOSITION REFERRAL (if applicable):  Cleve Gold Dalton, APRN - NP  [address] [id number]  [phone]    Schedule an appointment as soon as possible for a visit in 1 day      [facility] Emergency Department  [address] [phone]  [phone]    If symptoms worsen    Chanda Bentley II, MD  [address]  [phone]    Schedule an appointment as soon as possible for a visit in 1 day      DISPOSITION MEDICATIONS (if applicable):  New Prescriptions    CALCIUM CARBONATE (ANTACID) 500 MG CHEWABLE TABLET    Take 1 tablet by mouth daily    DICYCLOMINE (BENTYL) 10 MG CAPSULE    Take 1 capsule by mouth 3 times daily As needed for abdominal pain    FAMOTIDINE (PEPCID) 20 MG TABLET    Take 1 tablet by mouth 2 times daily    NAPROXEN (NAPROSYN) 500 MG TABLET    Take 1 tablet by mouth 2 times daily    ONDANSETRON (ZOFRAN ODT) 4 MG DISINTEGRATING TABLET    Take 1 tablet by mouth every 8 hours as needed for Nausea          DO Radha Murphy DO  11/27/22 1950

## 2022-11-28 RX ORDER — ERGOCALCIFEROL 1.25 MG/1
CAPSULE ORAL
Qty: 8 CAPSULE | Refills: 0 | Status: SHIPPED | OUTPATIENT
Start: 2022-11-28

## 2022-11-28 NOTE — ED NOTES
Report received from [facility]. Assuming pt care at this time.      Rashaun Mcclendon RN  11/27/22 1926

## 2022-11-29 ENCOUNTER — OFFICE VISIT (OUTPATIENT)
Dept: BARIATRICS/WEIGHT MGMT | Age: 42
End: 2022-11-29

## 2022-11-29 VITALS
SYSTOLIC BLOOD PRESSURE: 128 MMHG | DIASTOLIC BLOOD PRESSURE: 76 MMHG | HEIGHT: 63 IN | WEIGHT: 198.8 LBS | BODY MASS INDEX: 35.22 KG/M2 | OXYGEN SATURATION: 96 % | HEART RATE: 100 BPM

## 2022-11-29 DIAGNOSIS — Z98.84 STATUS POST LAPAROSCOPIC SLEEVE GASTRECTOMY: Primary | ICD-10-CM

## 2022-11-29 PROCEDURE — 99024 POSTOP FOLLOW-UP VISIT: CPT | Performed by: SURGERY

## 2022-11-29 RX ORDER — ACETAMINOPHEN 500 MG
500 TABLET ORAL EVERY 6 HOURS PRN
COMMUNITY

## 2022-11-29 ASSESSMENT — PATIENT HEALTH QUESTIONNAIRE - PHQ9
SUM OF ALL RESPONSES TO PHQ9 QUESTIONS 1 & 2: 1
SUM OF ALL RESPONSES TO PHQ QUESTIONS 1-9: 1
2. FEELING DOWN, DEPRESSED OR HOPELESS: 0
1. LITTLE INTEREST OR PLEASURE IN DOING THINGS: 1
SUM OF ALL RESPONSES TO PHQ QUESTIONS 1-9: 1
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
## 2022-11-30 ENCOUNTER — TELEPHONE (OUTPATIENT)
Dept: BARIATRICS/WEIGHT MGMT | Age: 42
End: 2022-11-30

## 2022-11-30 DIAGNOSIS — E66.9 OBESITY (BMI 30-39.9): Primary | ICD-10-CM

## 2022-11-30 NOTE — TELEPHONE ENCOUNTER
Nutrition Counseling    REASON FOR VISIT: Post-Op F/U    Chief Complaint:    Chief Complaint   Patient presents with    Weight Management       SUBJECTIVE:  Spoke with Pt via telephone today. Pt is s/p sleeve gastrectomy 11/21/22. She has had some abdominal pain and nausea post-op, but reports today she is feeling a lot better. Is tolerating [employer] with almond milk, yogurt, sugar free jello/pudding/ice cream. Drinking lots of water and taking chewable MVIs now. The patient is a 36 y.o. female being seen for morbid obesity,s/p weight loss surgery; [facility],  ,  , Current There is no height or weight on file to calculate BMI. The patient's PCP is VIOLET Diop NP    Comorbid Conditions:diseases affecting this patient are   Past Medical History:   Diagnosis Date    Chronic depression 07/09/2021    No meds as of 11/11/22    Hx of Doppler echocardiogram 04/27/2022    EF 55-60%. Mild LV hypertrophy. Grade I diastolic dysfunction. Mildly dilated LA. Mild MR and TR. Kidney disorder     left removed . Migraine 11/11/2022    Mixed hyperlipidemia 01/27/2021    PONV (postoperative nausea and vomiting)     Preop pulmonary/respiratory exam 05/24/2022    Prolonged emergence from general anesthesia     Pulmonic stenosis     Sleep apnea    . Review of Systems - [unfilled]  Otherwise per HPI. Allergies:   Allergies   Allergen Reactions    Codeine Anaphylaxis    Dilaudid [Hydromorphone Hcl] Anaphylaxis     Morphine ok    Hydromorphone Anaphylaxis     Morphone ok verbal per pt    Keflex [Cephalexin] Hives    Pyridium [Phenazopyridine Hcl] Hives       Past Surgical History:  Past Surgical History:   Procedure Laterality Date    BREAST BIOPSY Left 2010    benign    BREAST REDUCTION SURGERY Bilateral 2003    BREAST SURGERY      CARPAL TUNNEL RELEASE Left 2/16/2021    LEFT CARPAL TUNNEL RELEASE performed by Patrick Catalan DO at [address] N/A 4/20/2022 COLONOSCOPY POLYPECTOMY SNARE/COLD BIOPSY performed by Caitlyn Madrid MD at [place] [address] Right     wrist    HAND SURGERY Bilateral     4/2014    HYSTERECTOMY (CERVIX STATUS UNKNOWN)      INNER EAR SURGERY      OTHER SURGICAL HISTORY  4/16/2012    Repair vaginal cuff    OVARY REMOVAL      PARTIAL NEPHRECTOMY Left     SLEEVE GASTRECTOMY N/A 11/21/2022    GASTRECTOMY SLEEVE LAPAROSCOPIC ROBOTIC performed by Gaetano Falcon MD at [address] [facility] Left     TUBAL LIGATION      UPPER GASTROINTESTINAL ENDOSCOPY N/A 7/29/2022    EGD ESOPHAGOGASTRODUODENOSCOPY WITH BX performed by Gaetano Falcon MD at [address] [facility] ENDOSCOPY       Family History:  Family History   Problem Relation Age of Onset    Cancer Mother     Migraines Mother     Cancer Maternal Grandmother     Diabetes Maternal Grandmother     Allergies Son     Allergies Daughter     Bleeding Prob Daughter     Bleeding Prob Son     Asthma Daughter     Asthma Son        Social History:  Social History     Socioeconomic History    Marital status:      Spouse name: Not on file    Number of children: Not on file    Years of education: Not on file    Highest education level: Not on file   Occupational History    Not on file   Tobacco Use    Smoking status: Never    Smokeless tobacco: Never   Vaping Use    Vaping Use: Never used   Substance and Sexual Activity    Alcohol use: No    Drug use: No    Sexual activity: Not on file   Other Topics Concern    Not on file   Social History Narrative    ** Merged History Encounter **          Social Determinants of Health     Financial Resource Strain: Unknown    Difficulty of Paying Living Expenses: Patient refused   Food Insecurity: Unknown    Worried About Running Out of Food in the Last Year: Patient refused    Ran Out of Food in the Last Year: Patient refused   Transportation Needs: Not on file   Physical Activity: Not on file   Stress: Not on file   Social Connections: Not on file   Intimate Partner Violence: Not on file   Housing Stability: Not on file         OBJECTIVE:  Physical Exam   @VS@       NUTRITION DIAGNOSIS: Overweight / Obesity   Problem: Increased adiposity compared to reference standard or established norms   Etiology: Excess intake compared to output over time   S/S: Ht: 5' 3\" WT: 198 lb 12.8 oz BMI: 35.22    NUTRITION INTERVENTIONS:    Individualized treatment goals to address nutrition diagnosis:   Instructed on 600-800 kcal diet for weight loss   Provided sample menus, healthy foods list, and meal planning handouts   Encouraged record keeping and physical activity as approved by physician    MONITORING/ EVALUATION/ PLAN:   Pt verbalized understanding of all materials covered   Pt asked pertinent questions throughout the session - expect compliance with nutrition guidelines presented   Provided pt with contact information should questions arise prior to next visit   Will f/u with pt prn  Bernard Doyle, SYEDAc, RD, LD

## 2022-12-08 ENCOUNTER — TELEPHONE (OUTPATIENT)
Dept: SURGERY | Age: 42
End: 2022-12-08

## 2022-12-08 NOTE — TELEPHONE ENCOUNTER
Ami called stating her incision is red and scabey. Told Patient to clean with soap and water to call if becomes sore and draining.

## 2022-12-14 ENCOUNTER — OFFICE VISIT (OUTPATIENT)
Dept: BARIATRICS/WEIGHT MGMT | Age: 42
End: 2022-12-14
Payer: MEDICAID

## 2022-12-14 VITALS
OXYGEN SATURATION: 98 % | WEIGHT: 194.5 LBS | HEIGHT: 63 IN | DIASTOLIC BLOOD PRESSURE: 80 MMHG | BODY MASS INDEX: 34.46 KG/M2 | SYSTOLIC BLOOD PRESSURE: 120 MMHG | HEART RATE: 76 BPM

## 2022-12-14 DIAGNOSIS — Z98.84 STATUS POST LAPAROSCOPIC SLEEVE GASTRECTOMY: Primary | ICD-10-CM

## 2022-12-14 PROCEDURE — G8417 CALC BMI ABV UP PARAM F/U: HCPCS | Performed by: NURSE PRACTITIONER

## 2022-12-14 PROCEDURE — G8427 DOCREV CUR MEDS BY ELIG CLIN: HCPCS | Performed by: NURSE PRACTITIONER

## 2022-12-14 PROCEDURE — G8484 FLU IMMUNIZE NO ADMIN: HCPCS | Performed by: NURSE PRACTITIONER

## 2022-12-14 PROCEDURE — 99213 OFFICE O/P EST LOW 20 MIN: CPT | Performed by: NURSE PRACTITIONER

## 2022-12-14 PROCEDURE — 1036F TOBACCO NON-USER: CPT | Performed by: NURSE PRACTITIONER

## 2022-12-14 PROCEDURE — 1111F DSCHRG MED/CURRENT MED MERGE: CPT | Performed by: NURSE PRACTITIONER

## 2022-12-14 NOTE — LETTER
[address] [facility]  [facility]  Phone: [phone]  Fax: [phone]    VIOLET Villafana CNP        December 14, 2022     Patient: Claire Wilkins   YOB: 1980   Date of Visit: 12/14/2022       To Whom It May Concern: It is my medical opinion that Princess Adam may return to work on 12/30/2022. If you have any questions or concerns, please don't hesitate to call.     Sincerely,        VIOLET Villafana CNP

## 2022-12-21 PROBLEM — Z01.818 PRE-OP TESTING: Status: RESOLVED | Noted: 2022-11-21 | Resolved: 2022-12-21

## 2023-01-12 ENCOUNTER — HOSPITAL ENCOUNTER (OUTPATIENT)
Age: 43
Discharge: HOME | End: 2023-01-12
Payer: MEDICAID

## 2023-01-12 ENCOUNTER — HOSPITAL ENCOUNTER (OUTPATIENT)
Dept: GENERAL RADIOLOGY | Age: 43
Discharge: HOME | End: 2023-01-12
Payer: MEDICAID

## 2023-01-12 DIAGNOSIS — M46.1 SACROILIITIS, NOT ELSEWHERE CLASSIFIED (HCC): ICD-10-CM

## 2023-01-12 DIAGNOSIS — M47.816 LUMBAR SPONDYLOSIS: ICD-10-CM

## 2023-01-12 PROCEDURE — 72120 X-RAY BEND ONLY L-S SPINE: CPT

## 2023-01-12 PROCEDURE — 72170 X-RAY EXAM OF PELVIS: CPT

## 2023-01-16 ENCOUNTER — OFFICE VISIT (OUTPATIENT)
Dept: BARIATRICS/WEIGHT MGMT | Age: 43
End: 2023-01-16
Payer: MEDICAID

## 2023-01-16 VITALS
WEIGHT: 182.8 LBS | DIASTOLIC BLOOD PRESSURE: 76 MMHG | HEIGHT: 63 IN | HEART RATE: 102 BPM | SYSTOLIC BLOOD PRESSURE: 122 MMHG | BODY MASS INDEX: 32.39 KG/M2 | OXYGEN SATURATION: 100 %

## 2023-01-16 DIAGNOSIS — Z98.84 STATUS POST LAPAROSCOPIC SLEEVE GASTRECTOMY: Primary | ICD-10-CM

## 2023-01-16 PROCEDURE — 99213 OFFICE O/P EST LOW 20 MIN: CPT | Performed by: NURSE PRACTITIONER

## 2023-01-16 PROCEDURE — G8417 CALC BMI ABV UP PARAM F/U: HCPCS | Performed by: NURSE PRACTITIONER

## 2023-01-16 PROCEDURE — G8427 DOCREV CUR MEDS BY ELIG CLIN: HCPCS | Performed by: NURSE PRACTITIONER

## 2023-01-16 PROCEDURE — G8484 FLU IMMUNIZE NO ADMIN: HCPCS | Performed by: NURSE PRACTITIONER

## 2023-01-16 PROCEDURE — 1036F TOBACCO NON-USER: CPT | Performed by: NURSE PRACTITIONER

## 2023-01-16 RX ORDER — FAMOTIDINE 20 MG/1
20 TABLET, FILM COATED ORAL 2 TIMES DAILY
Qty: 60 TABLET | Refills: 3 | Status: SHIPPED | OUTPATIENT
Start: 2023-01-16

## 2023-01-16 ASSESSMENT — PATIENT HEALTH QUESTIONNAIRE - PHQ9
SUM OF ALL RESPONSES TO PHQ QUESTIONS 1-9: 1
SUM OF ALL RESPONSES TO PHQ QUESTIONS 1-9: 1
1. LITTLE INTEREST OR PLEASURE IN DOING THINGS: 0
SUM OF ALL RESPONSES TO PHQ QUESTIONS 1-9: 1
SUM OF ALL RESPONSES TO PHQ9 QUESTIONS 1 & 2: 1
2. FEELING DOWN, DEPRESSED OR HOPELESS: 1
SUM OF ALL RESPONSES TO PHQ QUESTIONS 1-9: 1

## 2023-01-16 NOTE — PROGRESS NOTES
BARIATRIC SURGERY OFFICE PROGRESS NOTE    SUBJECTIVE:    Patient presenting today referred from VIOLET Lyn NP, for   Chief Complaint   Patient presents with    Weight Management     3rd PO Gastric Sleeve @ [facility] 11/21/2022   . Vitals:    01/16/23 1036   BP: 122/76   Pulse: (!) 102   SpO2: 100%        BMI: Body mass index is 32.38 kg/m². Weight History: Wt Readings from Last 3 Encounters:   01/16/23 182 lb 12.8 oz (82.9 kg)   12/14/22 194 lb 8 oz (88.2 kg)   11/29/22 198 lb 12.8 oz (90.2 kg)         Gunner Munoz is a 36 y.o. female presenting in third bariatric 2 months POST-OP visit. Total weight loss/gain:   -11.7 lbs since last visit  -32.1 lbs since surgery  -55.0 lbs since starting program    Changes in health since last visit: increased dizziness    Pt tracking calories: 675 calories daily about 60 gms protein     Pt exercising: walking    Pt taking vitamins: stopped     Fluid intake: good     Past Medical History:   Diagnosis Date    Chronic depression 07/09/2021    No meds as of 11/11/22    Hx of Doppler echocardiogram 04/27/2022    EF 55-60%. Mild LV hypertrophy. Grade I diastolic dysfunction. Mildly dilated LA. Mild MR and TR. Kidney disorder     left removed .     Migraine 11/11/2022    Mixed hyperlipidemia 01/27/2021    PONV (postoperative nausea and vomiting)     Preop pulmonary/respiratory exam 05/24/2022    Prolonged emergence from general anesthesia     Pulmonic stenosis     Sleep apnea         Patient Active Problem List   Diagnosis    Vaginal bleeding    H/O of hysterectomy with bilateral oophorectomy    Panic disorder with agoraphobia    Early menopause    H/O bilateral breast reduction surgery    Myofascial pain syndrome    Somatic dysfunction of head region    Somatic dysfunction of cervical region    Somatic dysfunction of pelvic region    Snoring    Obstructive sleep apnea on CPAP    Somatic dysfunction of both upper extremities    Morbidly obese ([address] Paresthesia of left upper extremity    Fibromyalgia    Mixed hyperlipidemia    Carpal tunnel syndrome of left wrist    Vitamin D deficiency    Chronic depression    Anxiety    Chronic pain of left knee    Migraine without aura and without status migrainosus, not intractable    Injury of left ankle    Hypersomnia    Diarrhea    Dyslipidemia    Palpitation    Dizziness    Other chest pain    SOB (shortness of breath)    Morbid obesity ([address]       Past Surgical History:   Procedure Laterality Date    BREAST BIOPSY Left 2010    benign    BREAST REDUCTION SURGERY Bilateral 2003    BREAST SURGERY      CARPAL TUNNEL RELEASE Left 2/16/2021    LEFT CARPAL TUNNEL RELEASE performed by Quinton Montes De Oca DO at [address] [facility] N/A 4/20/2022    COLONOSCOPY POLYPECTOMY SNARE/COLD BIOPSY performed by Kristyn Becerra MD at [place] [address] Right     wrist    HAND SURGERY Bilateral     4/2014    HYSTERECTOMY (CERVIX STATUS UNKNOWN)      INNER EAR SURGERY      OTHER SURGICAL HISTORY  4/16/2012    Repair vaginal cuff    OVARY REMOVAL      PARTIAL NEPHRECTOMY Left     SLEEVE GASTRECTOMY N/A 11/21/2022    GASTRECTOMY SLEEVE LAPAROSCOPIC ROBOTIC performed by Linda Aceves MD at [address] [facility] Left     TUBAL LIGATION      UPPER GASTROINTESTINAL ENDOSCOPY N/A 7/29/2022    EGD ESOPHAGOGASTRODUODENOSCOPY WITH BX performed by Linda Aceves MD at [address] [facility] ENDOSCOPY       Current Outpatient Medications   Medication Sig Dispense Refill    acetaminophen (TYLENOL) 500 MG tablet Take 500 mg by mouth every 6 hours as needed for Pain      ondansetron (ZOFRAN ODT) 4 MG disintegrating tablet Take 1 tablet by mouth every 8 hours as needed for Nausea 15 tablet 0    famotidine (PEPCID) 20 MG tablet Take 1 tablet by mouth 2 times daily (Patient taking differently: Take 20 mg by mouth as needed) 14 tablet 0    pantoprazole (PROTONIX) 20 MG tablet Take 1 tablet by mouth 2 times daily (Patient taking differently: Take 20 mg by mouth as needed) 60 tablet 3    ondansetron (ZOFRAN-ODT) 4 MG disintegrating tablet Take 1 tablet by mouth 3 times daily as needed for Nausea or Vomiting 21 tablet 2    vitamin D (ERGOCALCIFEROL) 1.25 MG (79324 UT) CAPS capsule TAKE 1 CAPSULE BY MOUTH 1 TIME A WEEK FOR 8 WEEKS (Patient not taking: Reported on 1/16/2023) 8 capsule 0    dicyclomine (BENTYL) 10 MG capsule Take 1 capsule by mouth 3 times daily As needed for abdominal pain (Patient not taking: Reported on 1/16/2023) 15 capsule 3    naproxen (NAPROSYN) 500 MG tablet Take 1 tablet by mouth 2 times daily (Patient not taking: Reported on 1/16/2023) 60 tablet 0    senna (SENOKOT) 8.6 MG tablet Take 1 tablet by mouth 2 times daily (Patient not taking: No sig reported) 60 tablet 11     No current facility-administered medications for this visit. Allergies   Allergen Reactions    Codeine Anaphylaxis    Dilaudid [Hydromorphone Hcl] Anaphylaxis     Morphine ok    Hydromorphone Anaphylaxis     Morphone ok verbal per pt    Keflex [Cephalexin] Hives    Pyridium [Phenazopyridine Hcl] Hives         Review of Systems   Gastrointestinal:         Acid reflux   All other systems reviewed and are negative. OBJECTIVE:    /76 (Site: Left Upper Arm, Position: Sitting, Cuff Size: Large Adult)   Pulse (!) 102   Ht 5' 3\" (1.6 m)   Wt 182 lb 12.8 oz (82.9 kg)   LMP 02/15/2012   SpO2 100%   BMI 32.38 kg/m²      Physical Exam  Constitutional:       Appearance: Normal appearance. She is obese. HENT:      Head: Normocephalic. Nose: Nose normal.      Mouth/Throat:      Pharynx: Oropharynx is clear. Eyes:      Conjunctiva/sclera: Conjunctivae normal.      Pupils: Pupils are equal, round, and reactive to light. Cardiovascular:      Rate and Rhythm: Normal rate. Pulses: Normal pulses. Pulmonary:      Effort: Pulmonary effort is normal.   Abdominal:      Palpations: Abdomen is soft.    Musculoskeletal:         General: Normal range of motion. Cervical back: Normal range of motion. Skin:     General: Skin is warm and dry. Capillary Refill: Capillary refill takes less than 2 seconds. Neurological:      General: No focal deficit present. Mental Status: She is alert and oriented to person, place, and time. Psychiatric:         Mood and Affect: Mood normal.         Behavior: Behavior normal.       ASSESSMENT & PLAN:    1. Status post laparoscopic sleeve gastrectomy  - Doing well; Down 32.1 pounds since surgery  - Incisions well healed  - Normal Bm  - Increasing activity as tolerated  - recently lost her job  - Stopped vitamins/ will restart a womens one a day vitamin  - Tolerating regular diet with some increased acid reflux  - Will re-start pepcid as needed RX sent    - Patient was encouraged to journal all food intake. - Keep calorie level at approximately 600-800, per discussion / plan with registered dietician. - Protein intake is to be a minimum of 50-60 grams per day. - Water drinking was encouraged with a goal of 64oz-128oz daily. Beverages are to be calorie free except for milk. Avoid soda. - 3 month labs ordered  - RTC in one month       As of current visit, regarding obesity-related co-morbid conditions:  EMILEE [] compliant [] no longer using [] resolved per sleep study; hypertension [] medications; hyperlipidemia [] medications; GERD [] medications; DM [] insulin [] non-insulin [] no meds      The patient expressed understanding and willingness to comply nicely; all questions and concerns addressed. No orders of the defined types were placed in this encounter. No orders of the defined types were placed in this encounter. Follow Up:  Return in about 1 month (around 2/16/2023).     VIOLET Law - CNP

## 2023-01-23 ENCOUNTER — CLINICAL DOCUMENTATION (OUTPATIENT)
Dept: FAMILY MEDICINE CLINIC | Age: 43
End: 2023-01-23

## 2023-01-23 NOTE — PROGRESS NOTES
Advance Care Planning   Ambulatory ACP Specialist Patient Outreach    Date:  01/20/2023  ACP Specialist:  Sammy Silver    Outreach:  Patient visiting her grandfather in hospital and requested Advance Directives. [x] PCP  [] Provider   [] Ambulatory Care Management [] Other for Reason:    [x] Advance Directive Assistance  [] Code Status Discussion  [] Complete Portable DNR Order  [] Discuss Goals of Care  [] Complete POST/MOST  [] Early ACP Decision-Making  [] Other    Date Referral Received:01/19/2023    Today's Outreach:  [x] First   [] Second  [] Third                               Third outreach made by []  phone  [] email []   [employer]     Intervention:  [] Spoke with Patient  [] Left VM requesting return call      Outcome: Full discussion on advance directives and all questions answered to applicant's satisfaction. Documents were completed. Next Step:   x ACP scheduled conversation  [] Outreach again in one week               [] Email / Mail ACP Info Sheets  [] Email / Mail Advance Directive            [x] Close Referral. Routing closure to referring provider/staff and to ACP Specialist . [] Closure Letter mailed to Patient with Invitation to Contact ACP Specialist if/when ready.     Thank you for this referral.

## 2023-01-30 RX ORDER — ERGOCALCIFEROL 1.25 MG/1
CAPSULE ORAL
Qty: 8 CAPSULE | Refills: 0 | Status: SHIPPED | OUTPATIENT
Start: 2023-01-30

## 2023-02-02 ENCOUNTER — HOSPITAL ENCOUNTER (OUTPATIENT)
Age: 43
Discharge: HOME | End: 2023-02-02
Payer: MEDICAID

## 2023-02-02 DIAGNOSIS — Z98.84 STATUS POST LAPAROSCOPIC SLEEVE GASTRECTOMY: ICD-10-CM

## 2023-02-02 LAB
ALBUMIN SERPL-MCNC: 3.9 GM/DL (ref 3.4–5)
ALP BLD-CCNC: 71 IU/L (ref 40–129)
ALT SERPL-CCNC: 13 U/L (ref 10–40)
ANION GAP SERPL CALCULATED.3IONS-SCNC: 9 MMOL/L (ref 4–16)
AST SERPL-CCNC: 20 IU/L (ref 15–37)
BASOPHILS ABSOLUTE: 0 K/CU MM
BASOPHILS RELATIVE PERCENT: 0.4 % (ref 0–1)
BILIRUB SERPL-MCNC: 0.6 MG/DL (ref 0–1)
BUN SERPL-MCNC: 16 MG/DL (ref 6–23)
CALCIUM SERPL-MCNC: 9.6 MG/DL (ref 8.3–10.6)
CHLORIDE BLD-SCNC: 105 MMOL/L (ref 99–110)
CO2: 26 MMOL/L (ref 21–32)
CREAT SERPL-MCNC: 0.6 MG/DL (ref 0.6–1.1)
DIFFERENTIAL TYPE: ABNORMAL
EOSINOPHILS ABSOLUTE: 0.1 K/CU MM
EOSINOPHILS RELATIVE PERCENT: 2.1 % (ref 0–3)
ESTIMATED AVERAGE GLUCOSE: 80 MG/DL
FOLATE SERPL-MCNC: 8.5 NG/ML (ref 3.1–17.5)
GFR SERPL CREATININE-BSD FRML MDRD: >60 ML/MIN/1.73M2
GLUCOSE SERPL-MCNC: 122 MG/DL (ref 70–99)
HBA1C MFR BLD: 4.4 % (ref 4.2–6.3)
HCT VFR BLD CALC: 36.1 % (ref 37–47)
HEMOGLOBIN: 11.6 GM/DL (ref 12.5–16)
IMMATURE NEUTROPHIL %: 0.2 % (ref 0–0.43)
IRON: 65 UG/DL (ref 37–145)
LYMPHOCYTES ABSOLUTE: 1.5 K/CU MM
LYMPHOCYTES RELATIVE PERCENT: 28.6 % (ref 24–44)
MCH RBC QN AUTO: 29.1 PG (ref 27–31)
MCHC RBC AUTO-ENTMCNC: 32.1 % (ref 32–36)
MCV RBC AUTO: 90.5 FL (ref 78–100)
MONOCYTES ABSOLUTE: 0.2 K/CU MM
MONOCYTES RELATIVE PERCENT: 3.6 % (ref 0–4)
NUCLEATED RBC %: 0 %
PCT TRANSFERRIN: 31 % (ref 10–44)
PDW BLD-RTO: 12.8 % (ref 11.7–14.9)
PLATELET # BLD: 223 K/CU MM (ref 140–440)
PMV BLD AUTO: 11.4 FL (ref 7.5–11.1)
POTASSIUM SERPL-SCNC: 4.2 MMOL/L (ref 3.5–5.1)
RBC # BLD: 3.99 M/CU MM (ref 4.2–5.4)
SEGMENTED NEUTROPHILS ABSOLUTE COUNT: 3.4 K/CU MM
SEGMENTED NEUTROPHILS RELATIVE PERCENT: 65.1 % (ref 36–66)
SODIUM BLD-SCNC: 140 MMOL/L (ref 135–145)
TOTAL IMMATURE NEUTOROPHIL: 0.01 K/CU MM
TOTAL IRON BINDING CAPACITY: 208 UG/DL (ref 250–450)
TOTAL NUCLEATED RBC: 0 K/CU MM
TOTAL PROTEIN: 6.5 GM/DL (ref 6.4–8.2)
UNSATURATED IRON BINDING CAPACITY: 143 UG/DL (ref 110–370)
VITAMIN B-12: 349.1 PG/ML (ref 211–911)
WBC # BLD: 5.2 K/CU MM (ref 4–10.5)

## 2023-02-02 PROCEDURE — 82607 VITAMIN B-12: CPT

## 2023-02-02 PROCEDURE — 83540 ASSAY OF IRON: CPT

## 2023-02-02 PROCEDURE — 36415 COLL VENOUS BLD VENIPUNCTURE: CPT

## 2023-02-02 PROCEDURE — 83036 HEMOGLOBIN GLYCOSYLATED A1C: CPT

## 2023-02-02 PROCEDURE — 83550 IRON BINDING TEST: CPT

## 2023-02-02 PROCEDURE — 80053 COMPREHEN METABOLIC PANEL: CPT

## 2023-02-02 PROCEDURE — 82746 ASSAY OF FOLIC ACID SERUM: CPT

## 2023-02-02 PROCEDURE — 85025 COMPLETE CBC W/AUTO DIFF WBC: CPT

## 2023-02-15 ENCOUNTER — OFFICE VISIT (OUTPATIENT)
Dept: BARIATRICS/WEIGHT MGMT | Age: 43
End: 2023-02-15
Payer: MEDICAID

## 2023-02-15 VITALS
DIASTOLIC BLOOD PRESSURE: 70 MMHG | BODY MASS INDEX: 30.81 KG/M2 | HEIGHT: 63 IN | WEIGHT: 173.9 LBS | SYSTOLIC BLOOD PRESSURE: 116 MMHG | HEART RATE: 68 BPM | OXYGEN SATURATION: 98 %

## 2023-02-15 DIAGNOSIS — Z98.84 STATUS POST LAPAROSCOPIC SLEEVE GASTRECTOMY: Primary | ICD-10-CM

## 2023-02-15 PROCEDURE — 1036F TOBACCO NON-USER: CPT | Performed by: NURSE PRACTITIONER

## 2023-02-15 PROCEDURE — G8417 CALC BMI ABV UP PARAM F/U: HCPCS | Performed by: NURSE PRACTITIONER

## 2023-02-15 PROCEDURE — G8427 DOCREV CUR MEDS BY ELIG CLIN: HCPCS | Performed by: NURSE PRACTITIONER

## 2023-02-15 PROCEDURE — 99213 OFFICE O/P EST LOW 20 MIN: CPT | Performed by: NURSE PRACTITIONER

## 2023-02-15 PROCEDURE — G8484 FLU IMMUNIZE NO ADMIN: HCPCS | Performed by: NURSE PRACTITIONER

## 2023-02-15 NOTE — PROGRESS NOTES
BARIATRIC SURGERY OFFICE PROGRESS NOTE    SUBJECTIVE:    Patient presenting today referred from VIOLET Valdes NP, for   Chief Complaint   Patient presents with    Weight Management     4th s/g s/g 11/21/22    . Vitals:    02/15/23 1030   BP: 116/70   Pulse: 68   SpO2: 98%        BMI: Body mass index is 30.81 kg/m². Weight History: Wt Readings from Last 3 Encounters:   02/15/23 173 lb 14.4 oz (78.9 kg)   01/16/23 182 lb 12.8 oz (82.9 kg)   12/14/22 194 lb 8 oz (88.2 kg)         Josey Wolfe is a 36 y.o. female presenting in fourth bariatric 3 months POST-OP visit. Total weight loss/gain:   -8.9 lbs since last visit  -41 lbs since surgery  -63.9 lbs since starting program    Changes in health since last visit: feeling weak and shaky at times    Pt tracking calories: calories around 600; protein count 40-60    Pt exercising: daily walking    Pt taking vitamins: Equate MVI only    Fluid intake: good    Past Medical History:   Diagnosis Date    Chronic depression 07/09/2021    No meds as of 11/11/22    Hx of Doppler echocardiogram 04/27/2022    EF 55-60%. Mild LV hypertrophy. Grade I diastolic dysfunction. Mildly dilated LA. Mild MR and TR. Kidney disorder     left removed .     Migraine 11/11/2022    Mixed hyperlipidemia 01/27/2021    PONV (postoperative nausea and vomiting)     Preop pulmonary/respiratory exam 05/24/2022    Prolonged emergence from general anesthesia     Pulmonic stenosis     Sleep apnea         Patient Active Problem List   Diagnosis    Vaginal bleeding    H/O of hysterectomy with bilateral oophorectomy    Panic disorder with agoraphobia    Early menopause    H/O bilateral breast reduction surgery    Myofascial pain syndrome    Somatic dysfunction of head region    Somatic dysfunction of cervical region    Somatic dysfunction of pelvic region    Snoring    Obstructive sleep apnea on CPAP    Somatic dysfunction of both upper extremities    Morbidly obese (HCC)    Paresthesia of left upper extremity    Fibromyalgia    Mixed hyperlipidemia    Carpal tunnel syndrome of left wrist    Vitamin D deficiency    Chronic depression    Anxiety    Chronic pain of left knee    Migraine without aura and without status migrainosus, not intractable    Injury of left ankle    Hypersomnia    Diarrhea    Dyslipidemia    Palpitation    Dizziness    Other chest pain    SOB (shortness of breath)    Morbid obesity ([address]       Past Surgical History:   Procedure Laterality Date    BREAST BIOPSY Left 2010    benign    BREAST REDUCTION SURGERY Bilateral 2003    BREAST SURGERY      CARPAL TUNNEL RELEASE Left 2/16/2021    LEFT CARPAL TUNNEL RELEASE performed by Baljinder Jimenes DO at [address] N/A 4/20/2022    COLONOSCOPY POLYPECTOMY SNARE/COLD BIOPSY performed by Harper Hodgkin, MD at [place] [address] Right     wrist    HAND SURGERY Bilateral     4/2014    HYSTERECTOMY (CERVIX STATUS UNKNOWN)      INNER EAR SURGERY      OTHER SURGICAL HISTORY  4/16/2012    Repair vaginal cuff    OVARY REMOVAL      PARTIAL NEPHRECTOMY Left     SLEEVE GASTRECTOMY N/A 11/21/2022    GASTRECTOMY SLEEVE LAPAROSCOPIC ROBOTIC performed by Laurence Da Silva MD at [address] [place] [address] Left     TUBAL LIGATION      UPPER GASTROINTESTINAL ENDOSCOPY N/A 7/29/2022    EGD ESOPHAGOGASTRODUODENOSCOPY WITH BX performed by Laurence Da Silva MD at [facility] ENDOSCOPY       Current Outpatient Medications   Medication Sig Dispense Refill    vitamin D (ERGOCALCIFEROL) 1.25 MG (18931 UT) CAPS capsule TAKE 1 CAPSULE BY MOUTH 1 TIME A WEEK FOR 8 WEEKS 8 capsule 0    famotidine (PEPCID) 20 MG tablet Take 1 tablet by mouth 2 times daily 60 tablet 3    acetaminophen (TYLENOL) 500 MG tablet Take 500 mg by mouth every 6 hours as needed for Pain      ondansetron (ZOFRAN ODT) 4 MG disintegrating tablet Take 1 tablet by mouth every 8 hours as needed for Nausea 15 tablet 0    famotidine (PEPCID) 20 MG tablet Take 1 tablet by mouth 2 times daily (Patient taking differently: Take 20 mg by mouth as needed) 14 tablet 0    pantoprazole (PROTONIX) 20 MG tablet Take 1 tablet by mouth 2 times daily (Patient taking differently: Take 20 mg by mouth as needed) 60 tablet 3    ondansetron (ZOFRAN-ODT) 4 MG disintegrating tablet Take 1 tablet by mouth 3 times daily as needed for Nausea or Vomiting 21 tablet 2    dicyclomine (BENTYL) 10 MG capsule Take 1 capsule by mouth 3 times daily As needed for abdominal pain (Patient not taking: No sig reported) 15 capsule 3    naproxen (NAPROSYN) 500 MG tablet Take 1 tablet by mouth 2 times daily (Patient not taking: No sig reported) 60 tablet 0    senna (SENOKOT) 8.6 MG tablet Take 1 tablet by mouth 2 times daily (Patient not taking: No sig reported) 60 tablet 11     No current facility-administered medications for this visit. Allergies   Allergen Reactions    Codeine Anaphylaxis    Dilaudid [Hydromorphone Hcl] Anaphylaxis     Morphine ok    Hydromorphone Anaphylaxis     Morphone ok verbal per pt    Keflex [Cephalexin] Hives    Pyridium [Phenazopyridine Hcl] Hives         Review of Systems   Neurological:  Positive for weakness. Shaky and weak at times   All other systems reviewed and are negative. OBJECTIVE:    /70   Pulse 68   Ht 5' 3\" (1.6 m)   Wt 173 lb 14.4 oz (78.9 kg)   LMP 02/15/2012   SpO2 98%   BMI 30.81 kg/m²      Physical Exam  Constitutional:       Appearance: Normal appearance. She is obese. HENT:      Head: Normocephalic. Nose: Nose normal.      Mouth/Throat:      Pharynx: Oropharynx is clear. Eyes:      Conjunctiva/sclera: Conjunctivae normal.      Pupils: Pupils are equal, round, and reactive to light. Cardiovascular:      Rate and Rhythm: Normal rate. Pulses: Normal pulses. Pulmonary:      Effort: Pulmonary effort is normal.   Abdominal:      Palpations: Abdomen is soft. Musculoskeletal:         General: Normal range of motion. Cervical back: Normal range of motion. Skin:     General: Skin is warm and dry. Capillary Refill: Capillary refill takes less than 2 seconds. Neurological:      General: No focal deficit present. Mental Status: She is alert and oriented to person, place, and time. Psychiatric:         Mood and Affect: Mood normal.         Behavior: Behavior normal.       ASSESSMENT & PLAN:    1. Status post laparoscopic sleeve gastrectomy    - CBC with Auto Differential; Future  - Comprehensive Metabolic Panel; Future  - Hemoglobin A1C; Future  - Iron and TIBC; Future  - Lipid Panel; Future  - Magnesium; Future  - Zinc; Future  - Vitamin D 25 Hydroxy; Future  - Vitamin B12 & Folate; Future  - Vitamin B1; Future  - TSH with Reflex; Future    - Doing well; Down 41 pounds since surgery  - Feeling weak and shaky at times  - Decreased appetite- so not eating much   - Protein count low most days  - Normal Bm  - Increasing activity as tolerated  - Tolerating regular diet  - Taking supplements     Recommendations and Orders:  - Patient was encouraged to journal all food intake. - Keep calorie level at approximately 600-800, per discussion / plan with registered dietician.  - INCREASE Protein intake to a minimum of 50-60 grams per day. - Water drinking was encouraged with a goal of 64oz-128oz daily. Beverages are to be calorie free except for milk. Avoid soda. - Continue supplements as discussed  - Follow basic dietary guidelines  - Eat 5-6 small meals/ snacks daily  - Recent Labs reviewed- no concerns    - RTC in 3 months with new lab results       As of current visit, regarding obesity-related co-morbid conditions:  EMILEE [] compliant [] no longer using [] resolved per sleep study; hypertension [] medications; hyperlipidemia [] medications; GERD [] medications; DM [] insulin [] non-insulin [] no meds      The patient expressed understanding and willingness to comply nicely; all questions and concerns addressed.     No orders of the defined types were placed in this encounter. Orders Placed This Encounter   Procedures    CBC with Auto Differential     Standing Status:   Future     Standing Expiration Date:   2/15/2024    Comprehensive Metabolic Panel     Standing Status:   Future     Standing Expiration Date:   2/15/2024    Hemoglobin A1C     Standing Status:   Future     Standing Expiration Date:   2/15/2024    Iron and TIBC     Standing Status:   Future     Standing Expiration Date:   2/15/2024    Lipid Panel     Standing Status:   Future     Standing Expiration Date:   2/15/2024    Magnesium     Standing Status:   Future     Standing Expiration Date:   2/15/2024    Zinc     Standing Status:   Future     Standing Expiration Date:   2/15/2024    Vitamin D 25 Hydroxy     Standing Status:   Future     Standing Expiration Date:   2/15/2024    Vitamin B12 & Folate     Standing Status:   Future     Standing Expiration Date:   2/15/2024    Vitamin B1     Standing Status:   Future     Standing Expiration Date:   2/15/2024    TSH with Reflex     Standing Status:   Future     Standing Expiration Date:   2/15/2024         Follow Up:  Return in about 3 months (around 5/15/2023).     Jacqueline Benavidez, APRN - CNP

## 2023-02-17 ENCOUNTER — HOSPITAL ENCOUNTER (OUTPATIENT)
Dept: WOMENS IMAGING | Age: 43
Discharge: HOME | End: 2023-02-17
Payer: MEDICAID

## 2023-02-17 DIAGNOSIS — Z12.31 ENCOUNTER FOR SCREENING MAMMOGRAM FOR BREAST CANCER: ICD-10-CM

## 2023-02-17 PROCEDURE — 77067 SCR MAMMO BI INCL CAD: CPT

## 2023-03-03 ENCOUNTER — HOSPITAL ENCOUNTER (EMERGENCY)
Age: 43
Discharge: HOME | End: 2023-03-03
Attending: EMERGENCY MEDICINE
Payer: MEDICAID

## 2023-03-03 VITALS
OXYGEN SATURATION: 99 % | HEIGHT: 63 IN | BODY MASS INDEX: 30.12 KG/M2 | TEMPERATURE: 98 F | DIASTOLIC BLOOD PRESSURE: 83 MMHG | HEART RATE: 105 BPM | WEIGHT: 170 LBS | SYSTOLIC BLOOD PRESSURE: 123 MMHG | RESPIRATION RATE: 35 BRPM

## 2023-03-03 DIAGNOSIS — R10.84 GENERALIZED ABDOMINAL PAIN: ICD-10-CM

## 2023-03-03 DIAGNOSIS — R11.2 NAUSEA AND VOMITING, UNSPECIFIED VOMITING TYPE: Primary | ICD-10-CM

## 2023-03-03 DIAGNOSIS — R19.7 DIARRHEA, UNSPECIFIED TYPE: ICD-10-CM

## 2023-03-03 LAB
ALBUMIN SERPL-MCNC: 3.8 GM/DL (ref 3.4–5)
ALP BLD-CCNC: 78 IU/L (ref 40–129)
ALT SERPL-CCNC: 11 U/L (ref 10–40)
ANION GAP SERPL CALCULATED.3IONS-SCNC: 15 MMOL/L (ref 4–16)
AST SERPL-CCNC: 17 IU/L (ref 15–37)
BASOPHILS ABSOLUTE: 0 K/CU MM
BASOPHILS RELATIVE PERCENT: 0.2 % (ref 0–1)
BILIRUB SERPL-MCNC: 1 MG/DL (ref 0–1)
BUN SERPL-MCNC: 16 MG/DL (ref 6–23)
CALCIUM SERPL-MCNC: 9.7 MG/DL (ref 8.3–10.6)
CHLORIDE BLD-SCNC: 100 MMOL/L (ref 99–110)
CO2: 23 MMOL/L (ref 21–32)
CREAT SERPL-MCNC: 0.6 MG/DL (ref 0.6–1.1)
DIFFERENTIAL TYPE: ABNORMAL
EOSINOPHILS ABSOLUTE: 0 K/CU MM
EOSINOPHILS RELATIVE PERCENT: 0 % (ref 0–3)
GFR SERPL CREATININE-BSD FRML MDRD: >60 ML/MIN/1.73M2
GLUCOSE SERPL-MCNC: 121 MG/DL (ref 70–99)
HCT VFR BLD CALC: 34.2 % (ref 37–47)
HEMOGLOBIN: 11.2 GM/DL (ref 12.5–16)
IMMATURE NEUTROPHIL %: 0.2 % (ref 0–0.43)
LACTATE: 1.1 MMOL/L (ref 0.5–1.9)
LIPASE: 25 IU/L (ref 13–60)
LYMPHOCYTES ABSOLUTE: 0.4 K/CU MM
LYMPHOCYTES RELATIVE PERCENT: 8.6 % (ref 24–44)
MCH RBC QN AUTO: 28.6 PG (ref 27–31)
MCHC RBC AUTO-ENTMCNC: 32.7 % (ref 32–36)
MCV RBC AUTO: 87.5 FL (ref 78–100)
MONOCYTES ABSOLUTE: 0.3 K/CU MM
MONOCYTES RELATIVE PERCENT: 5.6 % (ref 0–4)
NUCLEATED RBC %: 0 %
PDW BLD-RTO: 12.3 % (ref 11.7–14.9)
PLATELET # BLD: 183 K/CU MM (ref 140–440)
PMV BLD AUTO: 10.7 FL (ref 7.5–11.1)
POTASSIUM SERPL-SCNC: 3.8 MMOL/L (ref 3.5–5.1)
RBC # BLD: 3.91 M/CU MM (ref 4.2–5.4)
SARS-COV-2 RDRP RESP QL NAA+PROBE: NOT DETECTED
SEGMENTED NEUTROPHILS ABSOLUTE COUNT: 4 K/CU MM
SEGMENTED NEUTROPHILS RELATIVE PERCENT: 85.4 % (ref 36–66)
SODIUM BLD-SCNC: 138 MMOL/L (ref 135–145)
SOURCE: NORMAL
TOTAL IMMATURE NEUTOROPHIL: 0.01 K/CU MM
TOTAL NUCLEATED RBC: 0 K/CU MM
TOTAL PROTEIN: 6.7 GM/DL (ref 6.4–8.2)
WBC # BLD: 4.6 K/CU MM (ref 4–10.5)

## 2023-03-03 PROCEDURE — 96372 THER/PROPH/DIAG INJ SC/IM: CPT

## 2023-03-03 PROCEDURE — 87635 SARS-COV-2 COVID-19 AMP PRB: CPT

## 2023-03-03 PROCEDURE — 80053 COMPREHEN METABOLIC PANEL: CPT

## 2023-03-03 PROCEDURE — 83605 ASSAY OF LACTIC ACID: CPT

## 2023-03-03 PROCEDURE — 83690 ASSAY OF LIPASE: CPT

## 2023-03-03 PROCEDURE — 85025 COMPLETE CBC W/AUTO DIFF WBC: CPT

## 2023-03-03 PROCEDURE — 6360000002 HC RX W HCPCS: Performed by: NURSE PRACTITIONER

## 2023-03-03 PROCEDURE — 99284 EMERGENCY DEPT VISIT MOD MDM: CPT

## 2023-03-03 PROCEDURE — 2580000003 HC RX 258: Performed by: EMERGENCY MEDICINE

## 2023-03-03 PROCEDURE — 96374 THER/PROPH/DIAG INJ IV PUSH: CPT

## 2023-03-03 PROCEDURE — 2580000003 HC RX 258: Performed by: NURSE PRACTITIONER

## 2023-03-03 RX ORDER — 0.9 % SODIUM CHLORIDE 0.9 %
1000 INTRAVENOUS SOLUTION INTRAVENOUS ONCE
Status: COMPLETED | OUTPATIENT
Start: 2023-03-03 | End: 2023-03-03

## 2023-03-03 RX ORDER — ONDANSETRON 4 MG/1
4 TABLET, ORALLY DISINTEGRATING ORAL EVERY 8 HOURS PRN
Qty: 20 TABLET | Refills: 0 | Status: SHIPPED | OUTPATIENT
Start: 2023-03-03 | End: 2023-03-10

## 2023-03-03 RX ORDER — ONDANSETRON 2 MG/ML
4 INJECTION INTRAMUSCULAR; INTRAVENOUS EVERY 6 HOURS PRN
Status: DISCONTINUED | OUTPATIENT
Start: 2023-03-03 | End: 2023-03-03 | Stop reason: HOSPADM

## 2023-03-03 RX ORDER — 0.9 % SODIUM CHLORIDE 0.9 %
1000 INTRAVENOUS SOLUTION INTRAVENOUS ONCE
Status: DISCONTINUED | OUTPATIENT
Start: 2023-03-03 | End: 2023-03-03

## 2023-03-03 RX ORDER — DICYCLOMINE HYDROCHLORIDE 10 MG/ML
20 INJECTION INTRAMUSCULAR ONCE
Status: COMPLETED | OUTPATIENT
Start: 2023-03-03 | End: 2023-03-03

## 2023-03-03 RX ORDER — DICYCLOMINE HYDROCHLORIDE 10 MG/1
10 CAPSULE ORAL EVERY 6 HOURS PRN
Qty: 20 CAPSULE | Refills: 0 | Status: SHIPPED | OUTPATIENT
Start: 2023-03-03 | End: 2023-03-08

## 2023-03-03 RX ADMIN — SODIUM CHLORIDE 1000 ML: 9 INJECTION, SOLUTION INTRAVENOUS at 10:46

## 2023-03-03 RX ADMIN — ONDANSETRON 4 MG: 2 INJECTION INTRAMUSCULAR; INTRAVENOUS at 09:05

## 2023-03-03 RX ADMIN — DICYCLOMINE HYDROCHLORIDE 20 MG: 20 INJECTION INTRAMUSCULAR at 09:05

## 2023-03-03 RX ADMIN — SODIUM CHLORIDE 1000 ML: 9 INJECTION, SOLUTION INTRAVENOUS at 09:06

## 2023-03-03 ASSESSMENT — PAIN DESCRIPTION - LOCATION: LOCATION: ABDOMEN

## 2023-03-03 ASSESSMENT — ENCOUNTER SYMPTOMS
VOMITING: 1
DIARRHEA: 1
BACK PAIN: 0
CHEST TIGHTNESS: 0
NAUSEA: 1
SHORTNESS OF BREATH: 0
ABDOMINAL PAIN: 1
CONSTIPATION: 0

## 2023-03-03 ASSESSMENT — PAIN DESCRIPTION - ORIENTATION: ORIENTATION: LOWER

## 2023-03-03 ASSESSMENT — PAIN - FUNCTIONAL ASSESSMENT: PAIN_FUNCTIONAL_ASSESSMENT: ACTIVITIES ARE NOT PREVENTED

## 2023-03-03 ASSESSMENT — PAIN DESCRIPTION - DESCRIPTORS: DESCRIPTORS: CRAMPING

## 2023-03-03 ASSESSMENT — PAIN SCALES - GENERAL: PAINLEVEL_OUTOF10: 8

## 2023-03-03 NOTE — ED PROVIDER NOTES
[address] ENCOUNTER      Pt Name: Grecia Israel  MRN: [id number]  [place] 1980  Date of evaluation: 3/3/2023  Provider: Frankey Cara, APRN - CNP  PCP: VIOLET Resendiz NP  Note Started: 8:35 AM EST 3/3/23    CHIEF COMPLAINT       Chief Complaint   Patient presents with    Emesis    Diarrhea     States she thinks she has food poisoning       HISTORY OF PRESENT ILLNESS: 1 or more Elements   Grecia Israel is a 36 y.o. female who presents to the ER with chief complaint of nausea and vomiting that started last night. The patient states that she thinks that maybe she has food poisoning because it started after she ate at potato soup last night however the rest of her family ate the same food and no one else has similar symptoms. She reports that she has had over 10 episodes of vomiting since onset and more than 30 episodes of diarrhea. She reports mild upper quadrant and epigastric pain. She has had previous cholecystectomy. I have reviewed the nursing triage documentation and agree unless otherwise noted. REVIEW OF SYSTEMS :    Review of Systems   Constitutional:  Negative for appetite change, chills, fatigue, fever and unexpected weight change. HENT:  Negative for hearing loss. Respiratory:  Negative for chest tightness and shortness of breath. Cardiovascular:  Negative for chest pain and leg swelling. Gastrointestinal:  Positive for abdominal pain, diarrhea, nausea and vomiting. Negative for constipation. Genitourinary:  Negative for decreased urine volume, difficulty urinating, flank pain, frequency, hematuria, vaginal bleeding and vaginal discharge. Musculoskeletal:  Negative for back pain and myalgias. Skin:  Negative for rash. Neurological:  Negative for headaches. Positives and Pertinent negatives as per HPI.    SURGICAL HISTORY     Past Surgical History:   Procedure Laterality Date    BREAST BIOPSY Left 2010    benign    BREAST REDUCTION SURGERY Bilateral 2003    BREAST SURGERY      CARPAL TUNNEL RELEASE Left 2/16/2021    LEFT CARPAL TUNNEL RELEASE performed by Gavi Sood DO at [address] N/A 4/20/2022    COLONOSCOPY POLYPECTOMY SNARE/COLD BIOPSY performed by Judy Kelsey MD at [place] [address] Right     wrist    HAND SURGERY Bilateral     4/2014    HYSTERECTOMY (CERVIX STATUS UNKNOWN)      INNER EAR SURGERY      OTHER SURGICAL HISTORY  4/16/2012    Repair vaginal cuff    OVARY REMOVAL      PARTIAL NEPHRECTOMY Left     SLEEVE GASTRECTOMY N/A 11/21/2022    GASTRECTOMY SLEEVE LAPAROSCOPIC ROBOTIC performed by Marcus Lanza MD at [facility] Left     TUBAL LIGATION      UPPER GASTROINTESTINAL ENDOSCOPY N/A 7/29/2022    EGD ESOPHAGOGASTRODUODENOSCOPY WITH BX performed by Marcus Lanza MD at [facility]       Previous Medications    ACETAMINOPHEN (TYLENOL) 500 MG TABLET    Take 500 mg by mouth every 6 hours as needed for Pain    FAMOTIDINE (PEPCID) 20 MG TABLET    Take 1 tablet by mouth 2 times daily    FAMOTIDINE (PEPCID) 20 MG TABLET    Take 1 tablet by mouth 2 times daily    NAPROXEN (NAPROSYN) 500 MG TABLET    Take 1 tablet by mouth 2 times daily    PANTOPRAZOLE (PROTONIX) 20 MG TABLET    Take 1 tablet by mouth 2 times daily    SENNA (SENOKOT) 8.6 MG TABLET    Take 1 tablet by mouth 2 times daily    VITAMIN D (ERGOCALCIFEROL) 1.25 MG (40821 UT) CAPS CAPSULE    TAKE 1 CAPSULE BY MOUTH 1 TIME A WEEK FOR 8 WEEKS       ALLERGIES     Codeine, Dilaudid [hydromorphone hcl], Hydromorphone, Keflex [cephalexin], and Pyridium [phenazopyridine hcl]    FAMILYHISTORY       Family History   Problem Relation Age of Onset    Cancer Mother     Migraines Mother     Allergies Daughter     Bleeding Prob Daughter     Asthma Daughter     Allergies Son     Bleeding Prob Son     Asthma Son     Cancer Maternal Grandmother     Diabetes Maternal Grandmother     Breast Cancer Neg Hx     Ovarian Cancer Neg Hx         SOCIAL HISTORY       Social History     Tobacco Use    Smoking status: Never    Smokeless tobacco: Never   Vaping Use    Vaping Use: Never used   Substance Use Topics    Alcohol use: No    Drug use: No       SCREENINGS       PHYSICAL EXAM:      ED Triage Vitals [03/03/23 0820]   BP Temp Temp src Heart Rate Resp SpO2 Height Weight   (!) 149/93 98 °F (36.7 °C) -- 97 18 99 % 5' 3\" (1.6 m) 170 lb (77.1 kg)      Physical Exam    Constitutional:  Well developed, Well nourished. No distress  HENT:  Normocephalic, Atraumatic, PERRL. EOMI. Sclera clear. Conjunctiva normal, No discharge. Neck/Lymphatics: supple, no JVD, no swollen nodes  Cardiovascular:   RRR,  no murmurs/rubs/gallops. Respiratory:   Nonlabored breathing. Normal breath sounds, No wheezing  ABDOMEN: Soft, mild tenderness in the epigastric and RUQ area. is not distended. Positive bowel sounds. Negative Downing's Sign. Negative McBurney's. Negative peritoneal signs. Negative palpable pulsatile masses. Musculoskeletal:    Bilateral upper and lower extremity ROM intact without pain or obvious deficit  Integument:  Warm, Pink, Dry  Neurologic:  Alert & oriented , No focal deficits noted. Cranial nerves II through XII grossly intact. Normal gross motor coordination & motor strength bilateral upper and lower extremities  Sensation intact.   Psychiatric:  Affect normal, Mood normal.     DIAGNOSTIC RESULTS     Labs Reviewed   CBC WITH AUTO DIFFERENTIAL - Abnormal; Notable for the following components:       Result Value    RBC 3.91 (*)     Hemoglobin 11.2 (*)     Hematocrit 34.2 (*)     Segs Relative 85.4 (*)     Lymphocytes % 8.6 (*)     Monocytes % 5.6 (*)     All other components within normal limits   COMPREHENSIVE METABOLIC PANEL W/ REFLEX TO MG FOR LOW K - Abnormal; Notable for the following components:    Glucose 121 (*)     All other components within normal limits   LIPASE   LACTIC ACID     I have reviewed and interpreted all of the currently available lab results from this visit (if applicable) Only abnormal lab results are displayed. All other labs were within normal range or not returned as of this dictation. EKG: When ordered, EKG's are interpreted by the Emergency Department Physician in the absence of a cardiologist.  Please see their note for interpretation of EKG. Radiographs (if obtained) as interpreted by me and confirmed with a radiologist report  No orders to display       Chart review shows recent radiograph(s):  [facility] CAROLYN DIGITAL SCREEN BILATERAL    Result Date: 2/17/2023  EXAMINATION: SCREENING DIGITAL BILATERAL MAMMOGRAM WITH TOMOSYNTHESIS 2/17/2023 TECHNIQUE: Screening mammography was performed with tomosynthesis including MLO and CC views of the bilateral breasts. Computer aided detection was used for the interpretation of this exam. COMPARISON: 02/05/2021 HISTORY: Screening. FINDINGS: There are scattered areas of fibroglandular density. There is no new dominant mass, suspicious microcalcification, or area of architectural distortion. No mammographic evidence of malignancy. BIRADS: BIRADS - CATEGORY 1 Negative, no evidence of malignancy. Normal interval follow-up is recommended in 12 months. OVERALL ASSESSMENT - NEGATIVE A letter of notification will be sent to the patient regarding the results. The [employer] recommends annual mammograms for women 40 years and older.       PROCEDURES   Unless otherwise noted below, none     Procedures    CRITICAL CARE TIME (.cctime)     PAST MEDICAL HISTORY AND CHRONIC CONDITIONS AFFECTING CARE   Pt  has a past medical history of Chronic depression (07/09/2021), Doppler echocardiogram (04/27/2022), Kidney disorder, Migraine (11/11/2022), Mixed hyperlipidemia (01/27/2021), PONV (postoperative nausea and vomiting), Preop pulmonary/respiratory exam (05/24/2022), Prolonged emergence from general anesthesia, Pulmonic stenosis, and Sleep apnea. CC/HPI, SUMMARY, DDx, ED COURSE, AND REASSESSMENT   I am the Primary Clinician of Record. I have seen and evaluated this patient with my supervising physician Allison Rico MD.    Johnny Rainesa:    03/03/23 0820   BP: (!) 149/93   Pulse: 97   Resp: 18   Temp: 98 °F (36.7 °C)   SpO2: 99%   Weight: 170 lb (77.1 kg)   Height: 5' 3\" (1.6 m)       Is this patient to be included in the SEP-1 Core Measure due to severe sepsis or septic shock? No   Exclusion criteria - the patient is NOT to be included for SEP-1 Core Measure due to:  Viral etiology found or highly suspected (including COVID-19) without concomitant bacterial infection    Umberto Hanson is an alert and oriented x4, 36 y.o. female who presents to the ER with chief complaint of nausea and vomiting since last night. Information obtained from patient. Additional clinical information contributed from mother who was at bedside who stated patient with a history of gastric sleeve. Emergent etiologies considered. Lelia Mejía has nonlabored respirations. Upon my assessment the patient had a heart rate of 112. Other vital signs within acceptable parameters. Patient is afebrile. Pt hemodynamically stable. Cardiac monitoring and continuous pulse ox ordered to ensure patient remains hemodynamically stable particularly given any drug therapy. Will treat patient for symptoms. Patient was treated the following medications:  Medications   0.9 % sodium chloride bolus (1,000 mLs IntraVENous New Bag 3/3/23 0906)   ondansetron (ZOFRAN) injection 4 mg (4 mg IntraVENous Given 3/3/23 0905)   dicyclomine (BENTYL) injection 20 mg (20 mg IntraMUSCular Given 3/3/23 0905)     Labs including cbc to evaluate for anemia, leukocytosis, leukopenia, thrombocytosis or thrombocytopenia unremarkable. Metabolic panel with no electrolyte abnormality. No liver or renal dysfunction.   Lactic acid within normal limits so little concern for sepsis. CONSULTS: None      Upon reassessment the patient is sleeping comfortably on the stretcher. She awakens easily. She reports that she is feeling improved a little bit better with medications. Is asking for ice chips. Labs reviewed with the patient. We will provide medications for symptomatic management. Strict ED return guidelines reviewed and the patient is discharged in good condition. FINAL IMPRESSION      1. Nausea and vomiting, unspecified vomiting type    2. Diarrhea, unspecified type    3. Generalized abdominal pain          DISPOSITION/PLAN   DISPOSITION Decision To Discharge 03/03/2023 09:51:32 AM    PATIENT REFERRED TO:  Dow Aase, APRN - NP  [address] [facility] [address]  [phone]      DISCHARGE MEDICATIONS:  New Prescriptions    DICYCLOMINE (BENTYL) 10 MG CAPSULE    Take 1 capsule by mouth every 6 hours as needed (cramps)    ONDANSETRON (ZOFRAN-ODT) 4 MG DISINTEGRATING TABLET    Place 1 tablet under the tongue every 8 hours as needed for Nausea or Vomiting May Sub regular tablet (non-ODT) if insurance does not cover ODT.      DISCONTINUED MEDICATIONS:  Discontinued Medications    DICYCLOMINE (BENTYL) 10 MG CAPSULE    Take 1 capsule by mouth 3 times daily As needed for abdominal pain    ONDANSETRON (ZOFRAN ODT) 4 MG DISINTEGRATING TABLET    Take 1 tablet by mouth every 8 hours as needed for Nausea    ONDANSETRON (ZOFRAN-ODT) 4 MG DISINTEGRATING TABLET    Take 1 tablet by mouth 3 times daily as needed for Nausea or Vomiting         (Please note that portions of this note were completed with a voice recognition program.  Efforts were made to edit the dictations but occasionally words are mis-transcribed.)    VIOLET Ryan CNP (electronically signed)       VIOLET Ryan CNP  03/03/23 [id number]

## 2023-04-07 ENCOUNTER — HOSPITAL ENCOUNTER (EMERGENCY)
Age: 43
Discharge: HOME | End: 2023-04-07
Payer: MEDICAID

## 2023-04-07 VITALS
OXYGEN SATURATION: 100 % | DIASTOLIC BLOOD PRESSURE: 87 MMHG | TEMPERATURE: 97.6 F | WEIGHT: 154 LBS | SYSTOLIC BLOOD PRESSURE: 143 MMHG | BODY MASS INDEX: 27.29 KG/M2 | HEART RATE: 99 BPM | HEIGHT: 63 IN | RESPIRATION RATE: 18 BRPM

## 2023-04-07 DIAGNOSIS — S16.1XXA STRAIN OF NECK MUSCLE, INITIAL ENCOUNTER: Primary | ICD-10-CM

## 2023-04-07 DIAGNOSIS — S39.012A STRAIN OF LUMBAR REGION, INITIAL ENCOUNTER: ICD-10-CM

## 2023-04-07 PROCEDURE — 99283 EMERGENCY DEPT VISIT LOW MDM: CPT

## 2023-04-07 RX ORDER — CYCLOBENZAPRINE HCL 10 MG
10 TABLET ORAL 3 TIMES DAILY PRN
Qty: 21 TABLET | Refills: 0 | Status: SHIPPED | OUTPATIENT
Start: 2023-04-07 | End: 2023-04-17

## 2023-04-07 RX ORDER — PREDNISONE 20 MG/1
20 TABLET ORAL 2 TIMES DAILY
Qty: 10 TABLET | Refills: 0 | Status: SHIPPED | OUTPATIENT
Start: 2023-04-07 | End: 2023-04-12

## 2023-04-07 ASSESSMENT — PAIN DESCRIPTION - PAIN TYPE: TYPE: ACUTE PAIN

## 2023-04-07 ASSESSMENT — PAIN DESCRIPTION - DESCRIPTORS: DESCRIPTORS: ACHING;THROBBING

## 2023-04-07 ASSESSMENT — PAIN DESCRIPTION - FREQUENCY: FREQUENCY: CONTINUOUS

## 2023-04-07 ASSESSMENT — PAIN DESCRIPTION - LOCATION: LOCATION: BACK;NECK

## 2023-04-07 ASSESSMENT — PAIN SCALES - GENERAL: PAINLEVEL_OUTOF10: 9

## 2023-04-07 NOTE — ED PROVIDER NOTES
general anesthesia     Pulmonic stenosis     Sleep apnea        SURGICAL HISTORY     Past Surgical History:   Procedure Laterality Date    BREAST BIOPSY Left 2010    benign    BREAST REDUCTION SURGERY Bilateral 2003    BREAST SURGERY      CARPAL TUNNEL RELEASE Left 2/16/2021    LEFT CARPAL TUNNEL RELEASE performed by Brice Casas DO at [address] [facility] [address] N/A 4/20/2022    COLONOSCOPY POLYPECTOMY SNARE/COLD BIOPSY performed by Laura Marshall MD at [place] [address] Right     wrist    HAND SURGERY Bilateral     4/2014    HYSTERECTOMY (CERVIX STATUS UNKNOWN)      INNER EAR SURGERY      OTHER SURGICAL HISTORY  4/16/2012    Repair vaginal cuff    OVARY REMOVAL      PARTIAL NEPHRECTOMY Left     SLEEVE GASTRECTOMY N/A 11/21/2022    GASTRECTOMY SLEEVE LAPAROSCOPIC ROBOTIC performed by Nesha Ramos MD at [facility] Left     TUBAL LIGATION      UPPER GASTROINTESTINAL ENDOSCOPY N/A 7/29/2022    EGD ESOPHAGOGASTRODUODENOSCOPY WITH BX performed by Nesha Ramos MD at [facility]       Previous Medications    ACETAMINOPHEN (TYLENOL) 500 MG TABLET    Take 500 mg by mouth every 6 hours as needed for Pain    DICYCLOMINE (BENTYL) 10 MG CAPSULE    Take 1 capsule by mouth every 6 hours as needed (cramps)    FAMOTIDINE (PEPCID) 20 MG TABLET    Take 1 tablet by mouth 2 times daily    FAMOTIDINE (PEPCID) 20 MG TABLET    Take 1 tablet by mouth 2 times daily    NAPROXEN (NAPROSYN) 500 MG TABLET    Take 1 tablet by mouth 2 times daily    PANTOPRAZOLE (PROTONIX) 20 MG TABLET    Take 1 tablet by mouth 2 times daily    SENNA (SENOKOT) 8.6 MG TABLET    Take 1 tablet by mouth 2 times daily    VITAMIN D (ERGOCALCIFEROL) 1.25 MG (29246 UT) CAPS CAPSULE    TAKE 1 CAPSULE BY MOUTH 1 TIME A WEEK FOR 8 WEEKS       ALLERGIES     Codeine, Dilaudid [hydromorphone hcl], Hydromorphone, Keflex [cephalexin], and Pyridium [phenazopyridine hcl]    FAMILYHISTORY

## 2023-04-07 NOTE — Clinical Note
Emily Gar was seen and treated in our emergency department on 4/7/2023. She may return to work on 04/08/2023. If you have any questions or concerns, please don't hesitate to call.       Alberto Lozano, APRN - CNP

## 2023-04-12 ENCOUNTER — OFFICE VISIT (OUTPATIENT)
Dept: FAMILY MEDICINE CLINIC | Age: 43
End: 2023-04-12
Payer: MEDICAID

## 2023-04-12 VITALS
HEART RATE: 89 BPM | BODY MASS INDEX: 27.57 KG/M2 | OXYGEN SATURATION: 99 % | HEIGHT: 63 IN | WEIGHT: 155.6 LBS | DIASTOLIC BLOOD PRESSURE: 74 MMHG | SYSTOLIC BLOOD PRESSURE: 122 MMHG

## 2023-04-12 DIAGNOSIS — Z90.5 SINGLE KIDNEY: ICD-10-CM

## 2023-04-12 DIAGNOSIS — G89.29 CHRONIC BILATERAL LOW BACK PAIN WITH BILATERAL SCIATICA: ICD-10-CM

## 2023-04-12 DIAGNOSIS — F32.A CHRONIC DEPRESSION: ICD-10-CM

## 2023-04-12 DIAGNOSIS — Z90.3 S/P GASTRIC SLEEVE PROCEDURE: ICD-10-CM

## 2023-04-12 DIAGNOSIS — E55.9 VITAMIN D DEFICIENCY: ICD-10-CM

## 2023-04-12 DIAGNOSIS — M25.562 CHRONIC PAIN OF LEFT KNEE: ICD-10-CM

## 2023-04-12 DIAGNOSIS — F41.9 ANXIETY: ICD-10-CM

## 2023-04-12 DIAGNOSIS — G43.109 MIGRAINE WITH AURA AND WITHOUT STATUS MIGRAINOSUS, NOT INTRACTABLE: Primary | ICD-10-CM

## 2023-04-12 DIAGNOSIS — M79.7 FIBROMYALGIA: ICD-10-CM

## 2023-04-12 DIAGNOSIS — R00.2 PALPITATION: ICD-10-CM

## 2023-04-12 DIAGNOSIS — G47.33 OSA (OBSTRUCTIVE SLEEP APNEA): ICD-10-CM

## 2023-04-12 DIAGNOSIS — E78.2 MIXED HYPERLIPIDEMIA: ICD-10-CM

## 2023-04-12 DIAGNOSIS — G89.29 CHRONIC PAIN OF LEFT KNEE: ICD-10-CM

## 2023-04-12 DIAGNOSIS — M54.42 CHRONIC BILATERAL LOW BACK PAIN WITH BILATERAL SCIATICA: ICD-10-CM

## 2023-04-12 DIAGNOSIS — M54.41 CHRONIC BILATERAL LOW BACK PAIN WITH BILATERAL SCIATICA: ICD-10-CM

## 2023-04-12 PROCEDURE — 99214 OFFICE O/P EST MOD 30 MIN: CPT | Performed by: NURSE PRACTITIONER

## 2023-04-12 PROCEDURE — G8417 CALC BMI ABV UP PARAM F/U: HCPCS | Performed by: NURSE PRACTITIONER

## 2023-04-12 PROCEDURE — 1036F TOBACCO NON-USER: CPT | Performed by: NURSE PRACTITIONER

## 2023-04-12 PROCEDURE — G8427 DOCREV CUR MEDS BY ELIG CLIN: HCPCS | Performed by: NURSE PRACTITIONER

## 2023-04-12 SDOH — ECONOMIC STABILITY: FOOD INSECURITY: WITHIN THE PAST 12 MONTHS, THE FOOD YOU BOUGHT JUST DIDN'T LAST AND YOU DIDN'T HAVE MONEY TO GET MORE.: PATIENT DECLINED

## 2023-04-12 SDOH — ECONOMIC STABILITY: INCOME INSECURITY: HOW HARD IS IT FOR YOU TO PAY FOR THE VERY BASICS LIKE FOOD, HOUSING, MEDICAL CARE, AND HEATING?: PATIENT DECLINED

## 2023-04-12 SDOH — ECONOMIC STABILITY: FOOD INSECURITY: WITHIN THE PAST 12 MONTHS, YOU WORRIED THAT YOUR FOOD WOULD RUN OUT BEFORE YOU GOT MONEY TO BUY MORE.: PATIENT DECLINED

## 2023-04-12 SDOH — ECONOMIC STABILITY: HOUSING INSECURITY
IN THE LAST 12 MONTHS, WAS THERE A TIME WHEN YOU DID NOT HAVE A STEADY PLACE TO SLEEP OR SLEPT IN A SHELTER (INCLUDING NOW)?: PATIENT REFUSED

## 2023-04-12 ASSESSMENT — ANXIETY QUESTIONNAIRES
GAD7 TOTAL SCORE: 21
2. NOT BEING ABLE TO STOP OR CONTROL WORRYING: 3
3. WORRYING TOO MUCH ABOUT DIFFERENT THINGS: 3
6. BECOMING EASILY ANNOYED OR IRRITABLE: 3
7. FEELING AFRAID AS IF SOMETHING AWFUL MIGHT HAPPEN: 3
IF YOU CHECKED OFF ANY PROBLEMS ON THIS QUESTIONNAIRE, HOW DIFFICULT HAVE THESE PROBLEMS MADE IT FOR YOU TO DO YOUR WORK, TAKE CARE OF THINGS AT HOME, OR GET ALONG WITH OTHER PEOPLE: VERY DIFFICULT
1. FEELING NERVOUS, ANXIOUS, OR ON EDGE: 3
5. BEING SO RESTLESS THAT IT IS HARD TO SIT STILL: 3
4. TROUBLE RELAXING: 3

## 2023-04-12 ASSESSMENT — PATIENT HEALTH QUESTIONNAIRE - PHQ9
SUM OF ALL RESPONSES TO PHQ QUESTIONS 1-9: 22
1. LITTLE INTEREST OR PLEASURE IN DOING THINGS: 3
10. IF YOU CHECKED OFF ANY PROBLEMS, HOW DIFFICULT HAVE THESE PROBLEMS MADE IT FOR YOU TO DO YOUR WORK, TAKE CARE OF THINGS AT HOME, OR GET ALONG WITH OTHER PEOPLE: 2
SUM OF ALL RESPONSES TO PHQ QUESTIONS 1-9: 22
8. MOVING OR SPEAKING SO SLOWLY THAT OTHER PEOPLE COULD HAVE NOTICED. OR THE OPPOSITE, BEING SO FIGETY OR RESTLESS THAT YOU HAVE BEEN MOVING AROUND A LOT MORE THAN USUAL: 3
SUM OF ALL RESPONSES TO PHQ QUESTIONS 1-9: 22
4. FEELING TIRED OR HAVING LITTLE ENERGY: 3
3. TROUBLE FALLING OR STAYING ASLEEP: 3
7. TROUBLE CONCENTRATING ON THINGS, SUCH AS READING THE NEWSPAPER OR WATCHING TELEVISION: 3
SUM OF ALL RESPONSES TO PHQ QUESTIONS 1-9: 22
5. POOR APPETITE OR OVEREATING: 3
SUM OF ALL RESPONSES TO PHQ9 QUESTIONS 1 & 2: 5
9. THOUGHTS THAT YOU WOULD BE BETTER OFF DEAD, OR OF HURTING YOURSELF: 0
2. FEELING DOWN, DEPRESSED OR HOPELESS: 2
6. FEELING BAD ABOUT YOURSELF - OR THAT YOU ARE A FAILURE OR HAVE LET YOURSELF OR YOUR FAMILY DOWN: 2

## 2023-04-12 ASSESSMENT — COLUMBIA-SUICIDE SEVERITY RATING SCALE - C-SSRS
1. WITHIN THE PAST MONTH, HAVE YOU WISHED YOU WERE DEAD OR WISHED YOU COULD GO TO SLEEP AND NOT WAKE UP?: NO
2. HAVE YOU ACTUALLY HAD ANY THOUGHTS OF KILLING YOURSELF?: NO
6. HAVE YOU EVER DONE ANYTHING, STARTED TO DO ANYTHING, OR PREPARED TO DO ANYTHING TO END YOUR LIFE?: NO

## 2023-04-18 PROBLEM — R07.89 OTHER CHEST PAIN: Status: RESOLVED | Noted: 2022-10-13 | Resolved: 2023-04-18

## 2023-04-18 PROBLEM — R06.02 SOB (SHORTNESS OF BREATH): Status: RESOLVED | Noted: 2022-10-25 | Resolved: 2023-04-18

## 2023-04-18 PROBLEM — Z90.3 S/P GASTRIC SLEEVE PROCEDURE: Status: ACTIVE | Noted: 2023-04-18

## 2023-04-18 PROBLEM — S99.912A INJURY OF LEFT ANKLE: Status: RESOLVED | Noted: 2022-01-10 | Resolved: 2023-04-18

## 2023-04-18 PROBLEM — M54.41 CHRONIC BILATERAL LOW BACK PAIN WITH BILATERAL SCIATICA: Status: ACTIVE | Noted: 2023-04-18

## 2023-04-18 PROBLEM — F40.01 PANIC DISORDER WITH AGORAPHOBIA: Status: RESOLVED | Noted: 2019-10-31 | Resolved: 2023-04-18

## 2023-04-18 PROBLEM — Z98.890 H/O BILATERAL BREAST REDUCTION SURGERY: Status: RESOLVED | Noted: 2019-10-31 | Resolved: 2023-04-18

## 2023-04-18 PROBLEM — G47.33 OBSTRUCTIVE SLEEP APNEA ON CPAP: Status: RESOLVED | Noted: 2020-01-12 | Resolved: 2023-04-18

## 2023-04-18 PROBLEM — Z99.89 OBSTRUCTIVE SLEEP APNEA ON CPAP: Status: RESOLVED | Noted: 2020-01-12 | Resolved: 2023-04-18

## 2023-04-18 PROBLEM — E66.01 MORBIDLY OBESE (HCC): Status: RESOLVED | Noted: 2020-08-19 | Resolved: 2023-04-18

## 2023-04-18 PROBLEM — G89.29 CHRONIC BILATERAL LOW BACK PAIN WITH BILATERAL SCIATICA: Status: ACTIVE | Noted: 2023-04-18

## 2023-04-18 PROBLEM — E78.5 DYSLIPIDEMIA: Status: RESOLVED | Noted: 2022-10-13 | Resolved: 2023-04-18

## 2023-04-18 PROBLEM — Z90.5 SINGLE KIDNEY: Status: ACTIVE | Noted: 2023-04-18

## 2023-04-18 PROBLEM — E66.01 MORBID OBESITY (HCC): Status: RESOLVED | Noted: 2022-11-21 | Resolved: 2023-04-18

## 2023-04-18 PROBLEM — G43.009 MIGRAINE WITHOUT AURA AND WITHOUT STATUS MIGRAINOSUS, NOT INTRACTABLE: Status: RESOLVED | Noted: 2022-01-10 | Resolved: 2023-04-18

## 2023-04-18 PROBLEM — M54.42 CHRONIC BILATERAL LOW BACK PAIN WITH BILATERAL SCIATICA: Status: ACTIVE | Noted: 2023-04-18

## 2023-04-18 PROBLEM — G43.109 MIGRAINE WITH AURA AND WITHOUT STATUS MIGRAINOSUS, NOT INTRACTABLE: Status: ACTIVE | Noted: 2023-04-18

## 2023-04-18 PROBLEM — R06.83 SNORING: Status: RESOLVED | Noted: 2019-12-20 | Resolved: 2023-04-18

## 2023-04-20 ENCOUNTER — PATIENT MESSAGE (OUTPATIENT)
Dept: FAMILY MEDICINE CLINIC | Age: 43
End: 2023-04-20

## 2023-04-20 DIAGNOSIS — M54.41 CHRONIC BILATERAL LOW BACK PAIN WITH BILATERAL SCIATICA: ICD-10-CM

## 2023-04-20 DIAGNOSIS — M79.7 FIBROMYALGIA: Primary | ICD-10-CM

## 2023-04-20 DIAGNOSIS — M54.42 CHRONIC BILATERAL LOW BACK PAIN WITH BILATERAL SCIATICA: ICD-10-CM

## 2023-04-20 DIAGNOSIS — G89.29 CHRONIC BILATERAL LOW BACK PAIN WITH BILATERAL SCIATICA: ICD-10-CM

## 2023-04-24 ENCOUNTER — TELEPHONE (OUTPATIENT)
Dept: FAMILY MEDICINE CLINIC | Age: 43
End: 2023-04-24

## 2023-04-24 NOTE — TELEPHONE ENCOUNTER
Pt called and would like a referral to PT for her leg. She mentioned [facility] on [address].  Please advise

## 2023-04-28 ENCOUNTER — HOSPITAL ENCOUNTER (OUTPATIENT)
Dept: MRI IMAGING | Age: 43
Discharge: HOME | End: 2023-04-28
Payer: MEDICAID

## 2023-04-28 DIAGNOSIS — G43.109 MIGRAINE WITH AURA AND WITHOUT STATUS MIGRAINOSUS, NOT INTRACTABLE: ICD-10-CM

## 2023-04-28 PROCEDURE — 70551 MRI BRAIN STEM W/O DYE: CPT

## 2023-05-03 NOTE — TELEPHONE ENCOUNTER
Pain management sent the PT referral to [facility].  Pt can call there to schedule or I can send new referral to [facility].

## 2023-05-10 ENCOUNTER — HOSPITAL ENCOUNTER (OUTPATIENT)
Age: 43
Discharge: HOME | End: 2023-05-10
Payer: MEDICAID

## 2023-05-10 DIAGNOSIS — Z98.84 STATUS POST LAPAROSCOPIC SLEEVE GASTRECTOMY: ICD-10-CM

## 2023-05-10 LAB
25(OH)D3 SERPL-MCNC: 18.07 NG/ML
ALBUMIN SERPL-MCNC: 3.7 GM/DL (ref 3.4–5)
ALP BLD-CCNC: 103 IU/L (ref 40–129)
ALT SERPL-CCNC: 9 U/L (ref 10–40)
ANION GAP SERPL CALCULATED.3IONS-SCNC: 8 MMOL/L (ref 4–16)
AST SERPL-CCNC: 15 IU/L (ref 15–37)
BASOPHILS ABSOLUTE: 0 K/CU MM
BASOPHILS RELATIVE PERCENT: 0.5 % (ref 0–1)
BILIRUB SERPL-MCNC: 0.5 MG/DL (ref 0–1)
BUN SERPL-MCNC: 16 MG/DL (ref 6–23)
CALCIUM SERPL-MCNC: 9.9 MG/DL (ref 8.3–10.6)
CHLORIDE BLD-SCNC: 106 MMOL/L (ref 99–110)
CHOLEST SERPL-MCNC: 127 MG/DL
CO2: 27 MMOL/L (ref 21–32)
CREAT SERPL-MCNC: 0.6 MG/DL (ref 0.6–1.1)
DIFFERENTIAL TYPE: ABNORMAL
EOSINOPHILS ABSOLUTE: 0.1 K/CU MM
EOSINOPHILS RELATIVE PERCENT: 1.9 % (ref 0–3)
ESTIMATED AVERAGE GLUCOSE: 82 MG/DL
FOLATE SERPL-MCNC: 6.5 NG/ML (ref 3.1–17.5)
GFR SERPL CREATININE-BSD FRML MDRD: >60 ML/MIN/1.73M2
GLUCOSE SERPL-MCNC: 102 MG/DL (ref 70–99)
HBA1C MFR BLD: 4.5 % (ref 4.2–6.3)
HCT VFR BLD CALC: 33.4 % (ref 37–47)
HDLC SERPL-MCNC: 39 MG/DL
HEMOGLOBIN: 10.8 GM/DL (ref 12.5–16)
IMMATURE NEUTROPHIL %: 0.2 % (ref 0–0.43)
IRON: 57 UG/DL (ref 37–145)
LDLC SERPL CALC-MCNC: 49 MG/DL
LYMPHOCYTES ABSOLUTE: 1.7 K/CU MM
LYMPHOCYTES RELATIVE PERCENT: 30.3 % (ref 24–44)
MAGNESIUM: 1.9 MG/DL (ref 1.8–2.4)
MCH RBC QN AUTO: 28.3 PG (ref 27–31)
MCHC RBC AUTO-ENTMCNC: 32.3 % (ref 32–36)
MCV RBC AUTO: 87.7 FL (ref 78–100)
MONOCYTES ABSOLUTE: 0.4 K/CU MM
MONOCYTES RELATIVE PERCENT: 7 % (ref 0–4)
NUCLEATED RBC %: 0 %
PCT TRANSFERRIN: 29 % (ref 10–44)
PDW BLD-RTO: 13.5 % (ref 11.7–14.9)
PLATELET # BLD: 228 K/CU MM (ref 140–440)
PMV BLD AUTO: 10.6 FL (ref 7.5–11.1)
POTASSIUM SERPL-SCNC: 4.1 MMOL/L (ref 3.5–5.1)
RBC # BLD: 3.81 M/CU MM (ref 4.2–5.4)
SEGMENTED NEUTROPHILS ABSOLUTE COUNT: 3.5 K/CU MM
SEGMENTED NEUTROPHILS RELATIVE PERCENT: 60.1 % (ref 36–66)
SODIUM BLD-SCNC: 141 MMOL/L (ref 135–145)
T4 FREE SERPL-MCNC: 4.03 NG/DL (ref 0.9–1.8)
TOTAL IMMATURE NEUTOROPHIL: 0.01 K/CU MM
TOTAL IRON BINDING CAPACITY: 199 UG/DL (ref 250–450)
TOTAL NUCLEATED RBC: 0 K/CU MM
TOTAL PROTEIN: 6.2 GM/DL (ref 6.4–8.2)
TRIGL SERPL-MCNC: 195 MG/DL
TSH SERPL DL<=0.005 MIU/L-ACNC: <0.01 UIU/ML (ref 0.27–4.2)
UNSATURATED IRON BINDING CAPACITY: 142 UG/DL (ref 110–370)
VITAMIN B-12: 325.7 PG/ML (ref 211–911)
WBC # BLD: 5.8 K/CU MM (ref 4–10.5)

## 2023-05-10 PROCEDURE — 36415 COLL VENOUS BLD VENIPUNCTURE: CPT

## 2023-05-10 PROCEDURE — 84443 ASSAY THYROID STIM HORMONE: CPT

## 2023-05-10 PROCEDURE — 82306 VITAMIN D 25 HYDROXY: CPT

## 2023-05-10 PROCEDURE — 82746 ASSAY OF FOLIC ACID SERUM: CPT

## 2023-05-10 PROCEDURE — 82607 VITAMIN B-12: CPT

## 2023-05-10 PROCEDURE — 83735 ASSAY OF MAGNESIUM: CPT

## 2023-05-10 PROCEDURE — 80053 COMPREHEN METABOLIC PANEL: CPT

## 2023-05-10 PROCEDURE — 83036 HEMOGLOBIN GLYCOSYLATED A1C: CPT

## 2023-05-10 PROCEDURE — 83540 ASSAY OF IRON: CPT

## 2023-05-10 PROCEDURE — 84425 ASSAY OF VITAMIN B-1: CPT

## 2023-05-10 PROCEDURE — 83550 IRON BINDING TEST: CPT

## 2023-05-10 PROCEDURE — 84439 ASSAY OF FREE THYROXINE: CPT

## 2023-05-10 PROCEDURE — 80061 LIPID PANEL: CPT

## 2023-05-10 PROCEDURE — 84630 ASSAY OF ZINC: CPT

## 2023-05-10 PROCEDURE — 85025 COMPLETE CBC W/AUTO DIFF WBC: CPT

## 2023-05-11 ENCOUNTER — OFFICE VISIT (OUTPATIENT)
Dept: BARIATRICS/WEIGHT MGMT | Age: 43
End: 2023-05-11
Payer: MEDICAID

## 2023-05-11 VITALS
DIASTOLIC BLOOD PRESSURE: 72 MMHG | OXYGEN SATURATION: 99 % | WEIGHT: 150.1 LBS | HEART RATE: 79 BPM | BODY MASS INDEX: 26.59 KG/M2 | SYSTOLIC BLOOD PRESSURE: 126 MMHG | HEIGHT: 63 IN

## 2023-05-11 DIAGNOSIS — E66.3 OVERWEIGHT (BMI 25.0-29.9): ICD-10-CM

## 2023-05-11 DIAGNOSIS — Z98.84 STATUS POST LAPAROSCOPIC SLEEVE GASTRECTOMY: Primary | ICD-10-CM

## 2023-05-11 DIAGNOSIS — M25.559 HIP PAIN, UNSPECIFIED LATERALITY: ICD-10-CM

## 2023-05-11 DIAGNOSIS — R53.83 DECREASED ENERGY: ICD-10-CM

## 2023-05-11 LAB
REASON FOR REJECTION: NORMAL
REJECTED TEST: NORMAL

## 2023-05-11 PROCEDURE — G8427 DOCREV CUR MEDS BY ELIG CLIN: HCPCS | Performed by: SURGERY

## 2023-05-11 PROCEDURE — 1036F TOBACCO NON-USER: CPT | Performed by: SURGERY

## 2023-05-11 PROCEDURE — G8417 CALC BMI ABV UP PARAM F/U: HCPCS | Performed by: SURGERY

## 2023-05-11 PROCEDURE — 99214 OFFICE O/P EST MOD 30 MIN: CPT | Performed by: SURGERY

## 2023-05-11 ASSESSMENT — PATIENT HEALTH QUESTIONNAIRE - PHQ9
3. TROUBLE FALLING OR STAYING ASLEEP: 1
9. THOUGHTS THAT YOU WOULD BE BETTER OFF DEAD, OR OF HURTING YOURSELF: 0
6. FEELING BAD ABOUT YOURSELF - OR THAT YOU ARE A FAILURE OR HAVE LET YOURSELF OR YOUR FAMILY DOWN: 3
SUM OF ALL RESPONSES TO PHQ QUESTIONS 1-9: 16
4. FEELING TIRED OR HAVING LITTLE ENERGY: 3
SUM OF ALL RESPONSES TO PHQ QUESTIONS 1-9: 16
1. LITTLE INTEREST OR PLEASURE IN DOING THINGS: 3
10. IF YOU CHECKED OFF ANY PROBLEMS, HOW DIFFICULT HAVE THESE PROBLEMS MADE IT FOR YOU TO DO YOUR WORK, TAKE CARE OF THINGS AT HOME, OR GET ALONG WITH OTHER PEOPLE: 2
SUM OF ALL RESPONSES TO PHQ9 QUESTIONS 1 & 2: 6
2. FEELING DOWN, DEPRESSED OR HOPELESS: 3
8. MOVING OR SPEAKING SO SLOWLY THAT OTHER PEOPLE COULD HAVE NOTICED. OR THE OPPOSITE, BEING SO FIGETY OR RESTLESS THAT YOU HAVE BEEN MOVING AROUND A LOT MORE THAN USUAL: 0
5. POOR APPETITE OR OVEREATING: 3
7. TROUBLE CONCENTRATING ON THINGS, SUCH AS READING THE NEWSPAPER OR WATCHING TELEVISION: 0
SUM OF ALL RESPONSES TO PHQ QUESTIONS 1-9: 16
SUM OF ALL RESPONSES TO PHQ QUESTIONS 1-9: 16

## 2023-05-11 ASSESSMENT — ENCOUNTER SYMPTOMS
EYES NEGATIVE: 1
GASTROINTESTINAL NEGATIVE: 1
ALLERGIC/IMMUNOLOGIC NEGATIVE: 1
RESPIRATORY NEGATIVE: 1

## 2023-05-11 NOTE — PROGRESS NOTES
pain syndrome    Somatic dysfunction of head region    Somatic dysfunction of cervical region    Somatic dysfunction of pelvic region    EMILEE (obstructive sleep apnea)    Somatic dysfunction of both upper extremities    Paresthesia of left upper extremity    Fibromyalgia    Mixed hyperlipidemia    Carpal tunnel syndrome of left wrist    Vitamin D deficiency    Chronic depression    Anxiety    Chronic pain of left knee    Hypersomnia    Palpitation    Dizziness    Single kidney    Chronic bilateral low back pain with bilateral sciatica    S/P gastric sleeve procedure    Migraine with aura and without status migrainosus, not intractable       Past Surgical History:   Procedure Laterality Date    BREAST BIOPSY Left 2010    benign    BREAST REDUCTION SURGERY Bilateral 2003    BREAST SURGERY      CARPAL TUNNEL RELEASE Left 2/16/2021    LEFT CARPAL TUNNEL RELEASE performed by Austen Polanco DO at [address] N/A 4/20/2022    COLONOSCOPY POLYPECTOMY SNARE/COLD BIOPSY performed by Kimi Ramirez MD at [place] [address] Right     wrist    HAND SURGERY Bilateral     4/2014    HYSTERECTOMY (CERVIX STATUS UNKNOWN)      INNER EAR SURGERY      OTHER SURGICAL HISTORY  4/16/2012    Repair vaginal cuff    OVARY REMOVAL      PARTIAL NEPHRECTOMY Left     SLEEVE GASTRECTOMY N/A 11/21/2022    GASTRECTOMY SLEEVE LAPAROSCOPIC ROBOTIC performed by Kylie Chavez MD at [address] [place] [address] Left     TUBAL LIGATION      UPPER GASTROINTESTINAL ENDOSCOPY N/A 7/29/2022    EGD ESOPHAGOGASTRODUODENOSCOPY WITH BX performed by Kylie Chavez MD at [facility] ENDOSCOPY       Current Outpatient Medications   Medication Sig Dispense Refill    Multiple Vitamins-Minerals (HAIR SKIN AND NAILS FORMULA PO) Take by mouth      oxyCODONE-acetaminophen (PERCOCET) 5-325 MG per tablet Take 0.5 tablets by mouth 3 times daily as needed.       famotidine (PEPCID) 20 MG tablet Take 1 tablet by mouth 2 times

## 2023-05-15 ENCOUNTER — HOSPITAL ENCOUNTER (OUTPATIENT)
Age: 43
Discharge: HOME | End: 2023-05-15
Payer: MEDICAID

## 2023-05-15 ENCOUNTER — OFFICE VISIT (OUTPATIENT)
Dept: GASTROENTEROLOGY | Age: 43
End: 2023-05-15
Payer: MEDICAID

## 2023-05-15 VITALS
OXYGEN SATURATION: 100 % | TEMPERATURE: 97.2 F | DIASTOLIC BLOOD PRESSURE: 86 MMHG | HEART RATE: 89 BPM | BODY MASS INDEX: 26.58 KG/M2 | WEIGHT: 150 LBS | SYSTOLIC BLOOD PRESSURE: 124 MMHG | HEIGHT: 63 IN

## 2023-05-15 DIAGNOSIS — D64.9 ANEMIA, UNSPECIFIED TYPE: ICD-10-CM

## 2023-05-15 DIAGNOSIS — D64.9 ANEMIA, UNSPECIFIED TYPE: Primary | ICD-10-CM

## 2023-05-15 DIAGNOSIS — R10.13 DYSPEPSIA: ICD-10-CM

## 2023-05-15 DIAGNOSIS — Z80.0 FAMILY HISTORY OF COLON CANCER IN MOTHER: ICD-10-CM

## 2023-05-15 DIAGNOSIS — K62.5 RECTAL BLEEDING: ICD-10-CM

## 2023-05-15 DIAGNOSIS — R14.0 BLOATING: ICD-10-CM

## 2023-05-15 LAB
FERRITIN: 790 NG/ML (ref 15–150)
FOLATE SERPL-MCNC: 5.3 NG/ML (ref 3.1–17.5)
RETICULOCYTE COUNT PCT: 2.6 % (ref 0.2–2.2)
VIT B1 PYROPHOSHATE BLD-SCNC: 114 NMOL/L (ref 70–180)
VITAMIN B-12: 327.7 PG/ML (ref 211–911)

## 2023-05-15 PROCEDURE — 85045 AUTOMATED RETICULOCYTE COUNT: CPT

## 2023-05-15 PROCEDURE — G8427 DOCREV CUR MEDS BY ELIG CLIN: HCPCS | Performed by: NURSE PRACTITIONER

## 2023-05-15 PROCEDURE — 1036F TOBACCO NON-USER: CPT | Performed by: NURSE PRACTITIONER

## 2023-05-15 PROCEDURE — 82607 VITAMIN B-12: CPT

## 2023-05-15 PROCEDURE — 83010 ASSAY OF HAPTOGLOBIN QUANT: CPT

## 2023-05-15 PROCEDURE — 36415 COLL VENOUS BLD VENIPUNCTURE: CPT

## 2023-05-15 PROCEDURE — 84630 ASSAY OF ZINC: CPT

## 2023-05-15 PROCEDURE — 82746 ASSAY OF FOLIC ACID SERUM: CPT

## 2023-05-15 PROCEDURE — 99215 OFFICE O/P EST HI 40 MIN: CPT | Performed by: NURSE PRACTITIONER

## 2023-05-15 PROCEDURE — G8417 CALC BMI ABV UP PARAM F/U: HCPCS | Performed by: NURSE PRACTITIONER

## 2023-05-15 PROCEDURE — 82728 ASSAY OF FERRITIN: CPT

## 2023-05-15 RX ORDER — SODIUM, POTASSIUM,MAG SULFATES 17.5-3.13G
1 SOLUTION, RECONSTITUTED, ORAL ORAL ONCE
Qty: 1 EACH | Refills: 0 | Status: SHIPPED | OUTPATIENT
Start: 2023-05-15 | End: 2023-05-15

## 2023-05-16 ENCOUNTER — PREP FOR PROCEDURE (OUTPATIENT)
Dept: GASTROENTEROLOGY | Age: 43
End: 2023-05-16

## 2023-05-16 DIAGNOSIS — Z01.818 PREOP TESTING: Primary | ICD-10-CM

## 2023-05-16 RX ORDER — SODIUM CHLORIDE 0.9 % (FLUSH) 0.9 %
5-40 SYRINGE (ML) INJECTION EVERY 12 HOURS SCHEDULED
Status: CANCELLED | OUTPATIENT
Start: 2023-05-16

## 2023-05-16 RX ORDER — SODIUM CHLORIDE, SODIUM LACTATE, POTASSIUM CHLORIDE, CALCIUM CHLORIDE 600; 310; 30; 20 MG/100ML; MG/100ML; MG/100ML; MG/100ML
INJECTION, SOLUTION INTRAVENOUS CONTINUOUS
Status: CANCELLED | OUTPATIENT
Start: 2023-05-16

## 2023-05-16 RX ORDER — SODIUM CHLORIDE 9 MG/ML
25 INJECTION, SOLUTION INTRAVENOUS PRN
Status: CANCELLED | OUTPATIENT
Start: 2023-05-16

## 2023-05-16 RX ORDER — SODIUM CHLORIDE 0.9 % (FLUSH) 0.9 %
5-40 SYRINGE (ML) INJECTION PRN
Status: CANCELLED | OUTPATIENT
Start: 2023-05-16

## 2023-05-17 ENCOUNTER — TELEPHONE (OUTPATIENT)
Dept: GASTROENTEROLOGY | Age: 43
End: 2023-05-17

## 2023-05-17 LAB
HAPTOGLOB SERPL-MCNC: 72 MG/DL (ref 30–200)
ZINC SERPL-MCNC: 79.5 UG/DL (ref 60–120)

## 2023-05-18 ENCOUNTER — OFFICE VISIT (OUTPATIENT)
Dept: FAMILY MEDICINE CLINIC | Age: 43
End: 2023-05-18
Payer: MEDICAID

## 2023-05-18 VITALS
BODY MASS INDEX: 26.68 KG/M2 | DIASTOLIC BLOOD PRESSURE: 80 MMHG | HEIGHT: 63 IN | WEIGHT: 150.6 LBS | SYSTOLIC BLOOD PRESSURE: 126 MMHG | HEART RATE: 91 BPM | OXYGEN SATURATION: 98 %

## 2023-05-18 DIAGNOSIS — R79.89 HIGH SERUM FERRITIN: ICD-10-CM

## 2023-05-18 DIAGNOSIS — E05.90 HYPERTHYROIDISM: Primary | ICD-10-CM

## 2023-05-18 PROCEDURE — G8417 CALC BMI ABV UP PARAM F/U: HCPCS | Performed by: NURSE PRACTITIONER

## 2023-05-18 PROCEDURE — 99214 OFFICE O/P EST MOD 30 MIN: CPT | Performed by: NURSE PRACTITIONER

## 2023-05-18 PROCEDURE — G8427 DOCREV CUR MEDS BY ELIG CLIN: HCPCS | Performed by: NURSE PRACTITIONER

## 2023-05-18 PROCEDURE — 1036F TOBACCO NON-USER: CPT | Performed by: NURSE PRACTITIONER

## 2023-05-18 RX ORDER — ERGOCALCIFEROL 1.25 MG/1
50000 CAPSULE ORAL WEEKLY
Qty: 12 CAPSULE | Refills: 0 | Status: SHIPPED | OUTPATIENT
Start: 2023-05-18

## 2023-05-22 ENCOUNTER — HOSPITAL ENCOUNTER (OUTPATIENT)
Dept: PHYSICAL THERAPY | Age: 43
Setting detail: THERAPIES SERIES
Discharge: HOME | End: 2023-05-22
Payer: MEDICAID

## 2023-05-22 DIAGNOSIS — R79.89 HIGH SERUM FERRITIN: ICD-10-CM

## 2023-05-22 DIAGNOSIS — E05.90 HYPERTHYROIDISM: ICD-10-CM

## 2023-05-22 LAB
FERRITIN SERPL IA-MCNC: 702.9 NG/ML (ref 15–150)
HCT VFR BLD AUTO: 32.8 % (ref 36–48)
IMMATURE RETIC FRACT: 0.42 (ref 0.21–0.37)
IRON SATN MFR SERPL: 39 % (ref 15–50)
IRON SERPL-MCNC: 74 UG/DL (ref 37–145)
PTH-INTACT SERPL-MCNC: 6 PG/ML (ref 14–72)
RETICS # AUTO: 0.11 M/UL (ref 0.02–0.1)
RETICS/RBC NFR AUTO: 2.81 % (ref 0.5–2.18)
T3FREE SERPL-MCNC: 12.8 PG/ML (ref 2.3–4.2)
T4 FREE SERPL-MCNC: 4 NG/DL (ref 0.9–1.8)
TIBC SERPL-MCNC: 189 UG/DL (ref 260–445)
TSH SERPL DL<=0.005 MIU/L-ACNC: <0.01 UIU/ML (ref 0.27–4.2)

## 2023-05-22 PROCEDURE — 97110 THERAPEUTIC EXERCISES: CPT

## 2023-05-22 PROCEDURE — 97162 PT EVAL MOD COMPLEX 30 MIN: CPT

## 2023-05-22 ASSESSMENT — PAIN SCALES - GENERAL: PAINLEVEL_OUTOF10: 7

## 2023-05-22 NOTE — PLAN OF CARE
Outpatient Physical Therapy           [place]           [x] Phone: [phone]   Fax: [phone]  [place]           [] Phone: [phone]   Fax: [phone]     To: VIOLET Maya - *     From: Jackelyn Vidal, PT, DPT     Patient: Yadi Agarwal       : 1980  Diagnosis: Fibromyalgia [M79.7]  Chronic bilateral low back pain with bilateral sciatica [M54.42, M54.41, G89.29]    Treatment Diagnosis: lbp  Date: 2023    Physical Therapy Certification/Re-Certification Form  Dear VIOLET Jauregui-NP  The following patient has been evaluated for physical therapy services and for therapy to continue, insurance requires physician review of the treatment plan initially and every 90 days. Please review the attached evaluation and/or summary of the patient's plan of care, and verify that you agree therapy should continue by signing the attached document and sending it back to our office. Assessment:    Assessment: pt is a 36 y.o. female that presents to therapy with complaints of lower back pain lasting 22 years. 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 she has a history of fibromyalgia. pt demo impaired posture, lumbar ROM, BLE strength, activity/work tolerance and functional mobility. pt has forward flexed posture with rounded shoulders in sitting. pt has a history of falling a lot. Pt would benefit from continued skilled physical therapy to address impairments and limitations, progress toward goal completion, promote independence with ADLs, and prevent further injury. Patient agrees with established plan of care and assisted in the development of their short term and long term goals. Patient had no adverse reaction with initial treatment and there are no barriers to learning. Learning preferences include demonstration, practice, and handouts.   Patient expressed understanding of HEP and appears to be

## 2023-05-22 NOTE — FLOWSHEET NOTE
posture    Time In / Time Out:    1015/1040        Timed Code/Total Treatment Minutes:  8/25: 1 TE 1 eval       Next Progress Note due:  10th visit       Plan of Care Interventions:  [x] Therapeutic Exercise  [x] Modalities:  [x] Therapeutic Activity     [] Ultrasound  [] Estim  [x] Gait Training      [] Cervical Traction [] Lumbar Traction  [x] Neuromuscular Re-education    [x] Cold/hotpack [] Iontophoresis   [x] Instruction in HEP      [] Vasopneumatic   [] Dry Needling    [x] Manual Therapy               [] Aquatic Therapy              Electronically signed by:  Hunter Patel, PT, DPT 5/22/2023, 11:04 AM
SDA jodi

## 2023-05-22 NOTE — PROGRESS NOTES
Physical Therapy: Initial Evaluation    Patient: Natalie Agudelo (26 y.o. female)   Examination Date:   Plan of Care Certification Period: 2023 to        :  1980 ;    Confirmed: Yes MRN: [id number]  CSN: [id number]   Insurance: Payor: [employer] / Plan: [employer] [place] / Product Type: *No Product type* /   Insurance ID: [id number] - (Medicaid Managed) Secondary Insurance (if applicable):    Referring Physician: VIOLET Mendoza NP     PCP: VIOLET Amezcua NP Visits to Date/Visits Approved:   /      No Show/Cancelled Appts:   /       Medical Diagnosis: Fibromyalgia [M79.7]  Chronic bilateral low back pain with bilateral sciatica [M54.42, M54.41, G89.29]    Treatment Diagnosis: lbp     PERTINENT MEDICAL HISTORY   Patient Assessed for Rehabilitation Services: Yes       Medical History: Chart Reviewed: Yes   Past Medical History:   Diagnosis Date    Chronic depression 2021    No meds as of 22    H/O bilateral breast reduction surgery 10/31/2019    H/O of hysterectomy with bilateral oophorectomy 2012    Hx of Doppler echocardiogram 2022    EF 55-60%. Mild LV hypertrophy. Grade I diastolic dysfunction. Mildly dilated LA. Mild MR and TR. Kidney disorder     left removed .     Migraine 2022    Mixed hyperlipidemia 2021    Morbid obesity ([address] 2022    PONV (postoperative nausea and vomiting)     Preop pulmonary/respiratory exam 2022    Prolonged emergence from general anesthesia     Pulmonic stenosis     Sleep apnea      Surgical History:   Past Surgical History:   Procedure Laterality Date    BREAST BIOPSY Left     benign    BREAST REDUCTION SURGERY Bilateral     BREAST SURGERY      CARPAL TUNNEL RELEASE Left 2021    LEFT CARPAL TUNNEL RELEASE performed by Carisa Manriquez DO at [address]      COLONOSCOPY N/A 2022    COLONOSCOPY POLYPECTOMY SNARE/COLD BIOPSY

## 2023-05-23 ENCOUNTER — TELEPHONE (OUTPATIENT)
Dept: FAMILY MEDICINE CLINIC | Age: 43
End: 2023-05-23

## 2023-05-23 DIAGNOSIS — E05.90 HYPERTHYROIDISM: ICD-10-CM

## 2023-05-23 DIAGNOSIS — E04.1 THYROID NODULE: Primary | ICD-10-CM

## 2023-05-23 NOTE — TELEPHONE ENCOUNTER
Patient found Endo provider in network.  Adeola Mcintosh at [facility]   P: [phone]  F: [phone]

## 2023-05-24 ENCOUNTER — OFFICE VISIT (OUTPATIENT)
Dept: ORTHOPEDIC SURGERY | Age: 43
End: 2023-05-24
Payer: MEDICAID

## 2023-05-24 VITALS
OXYGEN SATURATION: 98 % | BODY MASS INDEX: 26.05 KG/M2 | RESPIRATION RATE: 16 BRPM | HEIGHT: 63 IN | DIASTOLIC BLOOD PRESSURE: 64 MMHG | SYSTOLIC BLOOD PRESSURE: 106 MMHG | WEIGHT: 147 LBS | HEART RATE: 84 BPM

## 2023-05-24 DIAGNOSIS — M70.61 TROCHANTERIC BURSITIS OF RIGHT HIP: ICD-10-CM

## 2023-05-24 DIAGNOSIS — M70.62 TROCHANTERIC BURSITIS OF LEFT HIP: Primary | ICD-10-CM

## 2023-05-24 LAB — TSH RECEP AB SER-ACNC: 7.42 IU/L

## 2023-05-24 PROCEDURE — G8417 CALC BMI ABV UP PARAM F/U: HCPCS | Performed by: PHYSICIAN ASSISTANT

## 2023-05-24 PROCEDURE — 1036F TOBACCO NON-USER: CPT | Performed by: PHYSICIAN ASSISTANT

## 2023-05-24 PROCEDURE — 99213 OFFICE O/P EST LOW 20 MIN: CPT | Performed by: PHYSICIAN ASSISTANT

## 2023-05-24 PROCEDURE — G8427 DOCREV CUR MEDS BY ELIG CLIN: HCPCS | Performed by: PHYSICIAN ASSISTANT

## 2023-05-24 ASSESSMENT — ENCOUNTER SYMPTOMS
RESPIRATORY NEGATIVE: 1
GASTROINTESTINAL NEGATIVE: 1
EYES NEGATIVE: 1

## 2023-05-24 NOTE — PROGRESS NOTES
Herberth Roman is a 36y.o. year old female who complains of Bilateral hip pain. Pain in the groin and lateral side of the hip and radiates down the back of the leg.  Patient states that she hasn't received any type of treatment to the hips
a week For 12 weeks only 12 capsule 0    Multiple Vitamins-Minerals (HAIR SKIN AND NAILS FORMULA PO) Take by mouth      oxyCODONE-acetaminophen (PERCOCET) 5-325 MG per tablet Take 0.5 tablets by mouth 3 times daily as needed. famotidine (PEPCID) 20 MG tablet Take 1 tablet by mouth 2 times daily (Patient taking differently: Take 1 tablet by mouth as needed) 60 tablet 3    acetaminophen (TYLENOL) 500 MG tablet Take 1 tablet by mouth every 6 hours as needed for Pain       No current facility-administered medications for this visit. Allergies   Allergen Reactions    Codeine Anaphylaxis    Dilaudid [Hydromorphone Hcl] Anaphylaxis     Morphine ok    Hydromorphone Anaphylaxis     Morphone ok verbal per pt    Keflex [Cephalexin] Hives    Pyridium [Phenazopyridine Hcl] Hives       Review of Systems:  See above      Physical Exam:   /64 (Site: Right Upper Arm, Position: Sitting, Cuff Size: Medium Adult)   Pulse 84   Resp 16   Ht 5' 3\" (1.6 m)   Wt 147 lb (66.7 kg)   LMP 02/15/2012   SpO2 98%   BMI 26.04 kg/m²        Gait is Normal.   Gen/Psych:Examination reveals a pleasant individual in no acute distress. The patient is oriented to time, place and person. The patient's mood and affect are appropriate. Lymph: The lymphatic examination bilaterally reveals all areas to be without enlargement or induration. Skin intact without lymphadenopathy, discoloration, or abnormal temperature. Vascular: There is intact, symmetric circulation in both lower extremities. Bilateral hips:  Tenderness to palpation over the greater trochanter and proximal IT band of both hips. Range of motion 40 degrees external rotation, 15 degrees internal rotation with mild pain radiating to the groin with rotation of the hips. 90 degrees hip flexion  Strength is 5/5 with hip abduction, 5/5 hip adduction.       Outsiderecord review: office notes      Imaging studies:  X-rays of the left hip and the right hip were

## 2023-05-24 NOTE — PATIENT INSTRUCTIONS
Try Voltaren gel for about 2-3 weeks  Continue with Physical Therapy  If no relief strongly consider a steroid injection into the bilateral hips    We are committed to providing you the best care possible. If you receive a survey after visiting one of our offices, please take time to share your experience concerning your physician office visit. These surveys are confidential and no health information about you is shared. We are eager to improve for you and we are counting on your feedback to help make that happen. Oriented - self; Oriented - place; Oriented - time

## 2023-05-25 ENCOUNTER — HOSPITAL ENCOUNTER (OUTPATIENT)
Dept: ULTRASOUND IMAGING | Age: 43
Discharge: HOME | End: 2023-05-25
Payer: MEDICAID

## 2023-05-25 ENCOUNTER — HOSPITAL ENCOUNTER (OUTPATIENT)
Dept: PHYSICAL THERAPY | Age: 43
Setting detail: THERAPIES SERIES
Discharge: HOME | End: 2023-05-25
Payer: MEDICAID

## 2023-05-25 DIAGNOSIS — E05.90 HYPERTHYROIDISM: ICD-10-CM

## 2023-05-25 PROCEDURE — 76536 US EXAM OF HEAD AND NECK: CPT

## 2023-05-25 PROCEDURE — 97530 THERAPEUTIC ACTIVITIES: CPT

## 2023-05-25 PROCEDURE — 97110 THERAPEUTIC EXERCISES: CPT

## 2023-05-25 NOTE — FLOWSHEET NOTE
y.o. female that presents to therapy with complaints of lower back pain lasting 22 years. 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 she has a history of fibromyalgia. pt demo impaired posture, lumbar ROM, BLE strength, activity/work tolerance and functional mobility. pt has forward flexed posture with rounded shoulders in sitting. pt has a history of falling a lot. Pt would benefit from continued skilled physical therapy to address impairments and limitations, progress toward goal completion, promote independence with ADLs, and prevent further injury.  End pain: 7/10      Plan for Next Session:   Specific Instructions for Next Treatment: lumbar ROM, BLE strength, stair progression, sciatic nerve glides, STS, posture    Time In / Time Out:    2172/4286        Timed Code/Total Treatment Minutes:  38/38, (2) TE, (1) TA      Next Progress Note due:  10th visit       Plan of Care Interventions:  [x] Therapeutic Exercise  [x] Modalities:  [x] Therapeutic Activity     [] Ultrasound  [] Estim  [x] Gait Training      [] Cervical Traction [] Lumbar Traction  [x] Neuromuscular Re-education    [x] Cold/hotpack [] Iontophoresis   [x] Instruction in HEP      [] Vasopneumatic   [] Dry Needling    [x] Manual Therapy               [] Aquatic Therapy              Electronically signed by:  Dakota Menendez PTA 5/25/2023, 9:48 AM

## 2023-05-30 ENCOUNTER — HOSPITAL ENCOUNTER (OUTPATIENT)
Dept: PHYSICAL THERAPY | Age: 43
Discharge: HOME | End: 2023-05-30

## 2023-05-30 NOTE — FLOWSHEET NOTE
Physical Therapy  Cancellation/No-show Note  Patient Name:  Ashley Louie  :  1980   Date:  2023  Cancelled visits to date: 1  No-shows to date: 0    For today's appointment patient:  []  Cancelled  []  Rescheduled appointment  []  No-show     Reason given by patient:  []  Patient ill  []  Conflicting appointment  []  No transportation    []  Conflict with work  [x]  No reason given  []  Other:     Comments:  Unable to come in today.     Electronically signed by:  Russ Martinez 2023, 9:04 AM

## 2023-05-31 ENCOUNTER — PATIENT MESSAGE (OUTPATIENT)
Dept: FAMILY MEDICINE CLINIC | Age: 43
End: 2023-05-31

## 2023-05-31 NOTE — TELEPHONE ENCOUNTER
From: Ailyn Rivera  To: Tammi Farris  Sent: 5/31/2023 1:21 PM EDT  Subject: Question regarding [employer]    No has gotten back with me yet

## 2023-06-02 ENCOUNTER — HOSPITAL ENCOUNTER (OUTPATIENT)
Dept: PHYSICAL THERAPY | Age: 43
Setting detail: THERAPIES SERIES
Discharge: HOME | End: 2023-06-02
Payer: MEDICAID

## 2023-06-02 PROCEDURE — 97110 THERAPEUTIC EXERCISES: CPT | Performed by: PHYSICAL THERAPY ASSISTANT

## 2023-06-06 ENCOUNTER — HOSPITAL ENCOUNTER (OUTPATIENT)
Dept: PHYSICAL THERAPY | Age: 43
Discharge: HOME | End: 2023-06-06

## 2023-06-06 RX ORDER — FAMOTIDINE 20 MG/1
20 TABLET, FILM COATED ORAL 2 TIMES DAILY
Qty: 60 TABLET | Refills: 3 | Status: SHIPPED | OUTPATIENT
Start: 2023-06-06

## 2023-06-06 NOTE — FLOWSHEET NOTE
Physical Therapy  Cancellation/No-show Note  Patient Name:  Omar Reynolds  :  1980   Date:  2023  Cancelled visits to date: 2  No-shows to date: 0    For today's appointment patient:  [x]  Cancelled  []  Rescheduled appointment  []  No-show     Reason given by patient:  [x]  Patient ill  []  Conflicting appointment  []  No transportation    []  Conflict with work  []  No reason given  []  Other:     Comments:      Electronically signed by:  Allyn Harrell PT,DPT  2023, 8:57 AM

## 2023-06-07 RX ORDER — ERGOCALCIFEROL 1.25 MG/1
50000 CAPSULE ORAL WEEKLY
Qty: 12 CAPSULE | Refills: 0 | OUTPATIENT
Start: 2023-06-07

## 2023-06-08 NOTE — TELEPHONE ENCOUNTER
vitamin D (ERGOCALCIFEROL) 1.25 MG (91764 UT) CAPS capsule [[id number]]     Order Details  Dose: 50,000 Units Route: Oral Frequency: WEEKLY   Dispense Quantity: 12 capsule Refills: 0          Sig: Take 1 capsule by mouth once a week For 12 weeks only         Start Date: 05/18/23 End Date: --   Written Date: 05/18/23 Expiration Date: 05/17/24   Providers    Authorizing Provider: Sheryle Certain, APRN - NP NPI: [id number]   Ordering User:  Sheryle Certain, APRN - [id number] - [place], [address] [phone] Carina [place] [phone]   [address] [place] [address]   Phone:  [phone]  Fax:  [phone]                        LATESHA, CAN YOU FIND OUT WHAT HAPPENED HERE. SHE SHOULD HAVE ENOUGH FOR 3 MONTHS. DID PHARM ONLY GIVE HER ONE MONTH SUPPLY AND SHE HAS TWO REFILLS OR IS SHE TAKING IT INCORRECTLY?

## 2023-06-09 ENCOUNTER — HOSPITAL ENCOUNTER (OUTPATIENT)
Dept: PHYSICAL THERAPY | Age: 43
Setting detail: THERAPIES SERIES
Discharge: HOME | End: 2023-06-09
Payer: MEDICAID

## 2023-06-09 PROCEDURE — 97112 NEUROMUSCULAR REEDUCATION: CPT

## 2023-06-09 PROCEDURE — 97110 THERAPEUTIC EXERCISES: CPT

## 2023-06-09 NOTE — FLOWSHEET NOTE
carmen with RTB, hip add ball squeezes and marches. Provided with handouts. Manual Treatments:  none      Modalities:  nerve flossing for ea LE      Communication with other providers:  POC sent      Assessment:  Pt with a good tolerance towards today's treatment session. Pt was able to increase activity in the gym today. . Pt would continue to benefit from skilled therapy interventions to increase ROM and strength. Pt rated pain at 2/10 after treatment. Assessment: pt is a 36 y.o. female that presents to therapy with complaints of lower back pain lasting 22 years. 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 she has a history of fibromyalgia. pt demo impaired posture, lumbar ROM, BLE strength, activity/work tolerance and functional mobility. pt has forward flexed posture with rounded shoulders in sitting. pt has a history of falling a lot. Pt would benefit from continued skilled physical therapy to address impairments and limitations, progress toward goal completion, promote independence with ADLs, and prevent further injury.  End pain: 7/10      Plan for Next Session:   Specific Instructions for Next Treatment: lumbar ROM, BLE strength, stair progression, sciatic nerve glides, STS, posture    Time In / Time Out:    1116/1201        Timed Code/Total Treatment Minutes: 45'/45' 1 TE ( 25') 1 neuro (20')     Next Progress Note due:  10th visit       Plan of Care Interventions:  [x] Therapeutic Exercise  [x] Modalities:  [x] Therapeutic Activity     [] Ultrasound  [] Estim  [x] Gait Training      [] Cervical Traction [] Lumbar Traction  [x] Neuromuscular Re-education    [x] Cold/hotpack [] Iontophoresis   [x] Instruction in HEP      [] Vasopneumatic   [] Dry Needling    [x] Manual Therapy               [] Aquatic Therapy              Electronically signed by:  Lurdes Ventura PTA 6/9/2023, 9:04 AM       6/9/2023,2:11 PM

## 2023-06-12 PROBLEM — D64.9 NORMOCYTIC ANEMIA: Status: ACTIVE | Noted: 2023-06-12

## 2023-06-12 PROBLEM — Z80.0 FAMILY HISTORY OF COLON CANCER: Status: ACTIVE | Noted: 2023-06-12

## 2023-06-12 PROBLEM — K62.5 RECTAL BLEEDING: Status: ACTIVE | Noted: 2023-06-12

## 2023-06-12 PROBLEM — R14.0 BLOATING: Status: ACTIVE | Noted: 2023-06-12

## 2023-06-12 PROBLEM — R10.13 DYSPEPSIA: Status: ACTIVE | Noted: 2023-06-12

## 2023-06-14 ENCOUNTER — HOSPITAL ENCOUNTER (OUTPATIENT)
Dept: INFUSION THERAPY | Age: 43
Discharge: HOME | End: 2023-06-14
Payer: MEDICAID

## 2023-06-14 ENCOUNTER — INITIAL CONSULT (OUTPATIENT)
Dept: ONCOLOGY | Age: 43
End: 2023-06-14
Payer: MEDICAID

## 2023-06-14 VITALS
OXYGEN SATURATION: 97 % | DIASTOLIC BLOOD PRESSURE: 84 MMHG | WEIGHT: 147.6 LBS | HEART RATE: 93 BPM | TEMPERATURE: 97.7 F | SYSTOLIC BLOOD PRESSURE: 135 MMHG | HEIGHT: 63 IN | BODY MASS INDEX: 26.15 KG/M2

## 2023-06-14 DIAGNOSIS — E05.90 HYPERTHYROIDISM: ICD-10-CM

## 2023-06-14 DIAGNOSIS — Z90.3 S/P GASTRIC SLEEVE PROCEDURE: ICD-10-CM

## 2023-06-14 DIAGNOSIS — R79.89 HIGH SERUM FERRITIN: ICD-10-CM

## 2023-06-14 DIAGNOSIS — E83.19 IRON OVERLOAD: ICD-10-CM

## 2023-06-14 DIAGNOSIS — R79.89 HIGH SERUM FERRITIN: Primary | ICD-10-CM

## 2023-06-14 LAB
ALBUMIN SERPL-MCNC: 4.2 GM/DL (ref 3.4–5)
ALP BLD-CCNC: 120 IU/L (ref 40–129)
ALT SERPL-CCNC: 10 U/L (ref 10–40)
ANION GAP SERPL CALCULATED.3IONS-SCNC: 12 MMOL/L (ref 4–16)
AST SERPL-CCNC: 16 IU/L (ref 15–37)
BASOPHILS ABSOLUTE: 0 K/CU MM
BASOPHILS RELATIVE PERCENT: 0.2 % (ref 0–1)
BILIRUB SERPL-MCNC: 0.5 MG/DL (ref 0–1)
BUN SERPL-MCNC: 19 MG/DL (ref 6–23)
CALCIUM SERPL-MCNC: 10 MG/DL (ref 8.3–10.6)
CHLORIDE BLD-SCNC: 103 MMOL/L (ref 99–110)
CO2: 24 MMOL/L (ref 21–32)
CREAT SERPL-MCNC: 0.5 MG/DL (ref 0.6–1.1)
DIFFERENTIAL TYPE: ABNORMAL
EOSINOPHILS ABSOLUTE: 0.1 K/CU MM
EOSINOPHILS RELATIVE PERCENT: 1.7 % (ref 0–3)
FERRITIN: 711 NG/ML (ref 15–150)
FOLATE SERPL-MCNC: 5.4 NG/ML (ref 3.1–17.5)
GFR SERPL CREATININE-BSD FRML MDRD: >60 ML/MIN/1.73M2
GLUCOSE SERPL-MCNC: 95 MG/DL (ref 70–99)
HCT VFR BLD CALC: 34.8 % (ref 37–47)
HEMOGLOBIN: 11.9 GM/DL (ref 12.5–16)
LACTATE DEHYDROGENASE: 156 IU/L (ref 120–246)
LYMPHOCYTES ABSOLUTE: 2 K/CU MM
LYMPHOCYTES RELATIVE PERCENT: 31.4 % (ref 24–44)
MCH RBC QN AUTO: 29 PG (ref 27–31)
MCHC RBC AUTO-ENTMCNC: 34.2 % (ref 32–36)
MCV RBC AUTO: 84.7 FL (ref 78–100)
MONOCYTES ABSOLUTE: 0.5 K/CU MM
MONOCYTES RELATIVE PERCENT: 7.3 % (ref 0–4)
PDW BLD-RTO: 13.3 % (ref 11.7–14.9)
PLATELET # BLD: 213 K/CU MM (ref 140–440)
PMV BLD AUTO: 9.6 FL (ref 7.5–11.1)
POTASSIUM SERPL-SCNC: 4.4 MMOL/L (ref 3.5–5.1)
RBC # BLD: 4.11 M/CU MM (ref 4.2–5.4)
RETICULOCYTE COUNT PCT: 2.5 % (ref 0.2–2.2)
SEGMENTED NEUTROPHILS ABSOLUTE COUNT: 3.8 K/CU MM
SEGMENTED NEUTROPHILS RELATIVE PERCENT: 59.4 % (ref 36–66)
SODIUM BLD-SCNC: 139 MMOL/L (ref 135–145)
TOTAL PROTEIN: 7 GM/DL (ref 6.4–8.2)
VITAMIN B-12: 299.6 PG/ML (ref 211–911)
WBC # BLD: 6.4 K/CU MM (ref 4–10.5)

## 2023-06-14 PROCEDURE — 82728 ASSAY OF FERRITIN: CPT

## 2023-06-14 PROCEDURE — 85045 AUTOMATED RETICULOCYTE COUNT: CPT

## 2023-06-14 PROCEDURE — 84155 ASSAY OF PROTEIN SERUM: CPT

## 2023-06-14 PROCEDURE — 99204 OFFICE O/P NEW MOD 45 MIN: CPT | Performed by: INTERNAL MEDICINE

## 2023-06-14 PROCEDURE — 36415 COLL VENOUS BLD VENIPUNCTURE: CPT

## 2023-06-14 PROCEDURE — 84165 PROTEIN E-PHORESIS SERUM: CPT

## 2023-06-14 PROCEDURE — 85025 COMPLETE CBC W/AUTO DIFF WBC: CPT

## 2023-06-14 PROCEDURE — 82607 VITAMIN B-12: CPT

## 2023-06-14 PROCEDURE — 82746 ASSAY OF FOLIC ACID SERUM: CPT

## 2023-06-14 PROCEDURE — 86038 ANTINUCLEAR ANTIBODIES: CPT

## 2023-06-14 PROCEDURE — 1036F TOBACCO NON-USER: CPT | Performed by: INTERNAL MEDICINE

## 2023-06-14 PROCEDURE — G8427 DOCREV CUR MEDS BY ELIG CLIN: HCPCS | Performed by: INTERNAL MEDICINE

## 2023-06-14 PROCEDURE — 86430 RHEUMATOID FACTOR TEST QUAL: CPT

## 2023-06-14 PROCEDURE — 83010 ASSAY OF HAPTOGLOBIN QUANT: CPT

## 2023-06-14 PROCEDURE — 83615 LACTATE (LD) (LDH) ENZYME: CPT

## 2023-06-14 PROCEDURE — 80053 COMPREHEN METABOLIC PANEL: CPT

## 2023-06-14 PROCEDURE — 99211 OFF/OP EST MAY X REQ PHY/QHP: CPT

## 2023-06-14 PROCEDURE — G8417 CALC BMI ABV UP PARAM F/U: HCPCS | Performed by: INTERNAL MEDICINE

## 2023-06-14 RX ORDER — FAMOTIDINE 20 MG/1
20 TABLET, FILM COATED ORAL 2 TIMES DAILY PRN
COMMUNITY

## 2023-06-14 ASSESSMENT — PATIENT HEALTH QUESTIONNAIRE - PHQ9
3. TROUBLE FALLING OR STAYING ASLEEP: 3
SUM OF ALL RESPONSES TO PHQ QUESTIONS 1-9: 18
SUM OF ALL RESPONSES TO PHQ QUESTIONS 1-9: 18
6. FEELING BAD ABOUT YOURSELF - OR THAT YOU ARE A FAILURE OR HAVE LET YOURSELF OR YOUR FAMILY DOWN: 3
7. TROUBLE CONCENTRATING ON THINGS, SUCH AS READING THE NEWSPAPER OR WATCHING TELEVISION: 0
5. POOR APPETITE OR OVEREATING: 3
SUM OF ALL RESPONSES TO PHQ QUESTIONS 1-9: 18
9. THOUGHTS THAT YOU WOULD BE BETTER OFF DEAD, OR OF HURTING YOURSELF: 0
4. FEELING TIRED OR HAVING LITTLE ENERGY: 3
10. IF YOU CHECKED OFF ANY PROBLEMS, HOW DIFFICULT HAVE THESE PROBLEMS MADE IT FOR YOU TO DO YOUR WORK, TAKE CARE OF THINGS AT HOME, OR GET ALONG WITH OTHER PEOPLE: 3
SUM OF ALL RESPONSES TO PHQ QUESTIONS 1-9: 18
1. LITTLE INTEREST OR PLEASURE IN DOING THINGS: 3
8. MOVING OR SPEAKING SO SLOWLY THAT OTHER PEOPLE COULD HAVE NOTICED. OR THE OPPOSITE, BEING SO FIGETY OR RESTLESS THAT YOU HAVE BEEN MOVING AROUND A LOT MORE THAN USUAL: 0
SUM OF ALL RESPONSES TO PHQ9 QUESTIONS 1 & 2: 6
2. FEELING DOWN, DEPRESSED OR HOPELESS: 3

## 2023-06-16 LAB
ALBUMIN SERPL ELPH-MCNC: 3.7 GM/DL (ref 3.2–5.6)
ALPHA-1-GLOBULIN: 0.3 GM/DL (ref 0.1–0.4)
ALPHA-2-GLOBULIN: 0.8 GM/DL (ref 0.4–1.2)
BETA GLOBULIN: 1.1 GM/DL (ref 0.5–1.3)
GAMMA GLOBULIN: 1 GM/DL (ref 0.5–1.6)
HAPTOGLOB SERPL-MCNC: 78 MG/DL (ref 30–200)
RHEUMATOID FACTOR: <10 IU/ML (ref 0–14)
SPEP INTERPRETATION: NORMAL
TOTAL PROTEIN: 7 GM/DL (ref 6.4–8.2)

## 2023-06-17 LAB — NUCLEAR IGG SER QL IA: NORMAL

## 2023-06-20 ENCOUNTER — HOSPITAL ENCOUNTER (OUTPATIENT)
Dept: PHYSICAL THERAPY | Age: 43
Setting detail: THERAPIES SERIES
Discharge: HOME | End: 2023-06-20
Payer: MEDICAID

## 2023-06-20 PROCEDURE — 97112 NEUROMUSCULAR REEDUCATION: CPT

## 2023-06-20 PROCEDURE — 97110 THERAPEUTIC EXERCISES: CPT

## 2023-06-20 NOTE — FLOWSHEET NOTE
Outpatient Physical Therapy  [place]           [x] Phone: [phone]   Fax: [phone]  [place]           [] Phone: [phone]   Fax: [phone]        Physical Therapy Daily Treatment Note  Date:  2023    Patient Name:  Liban Srinivasan    :  1980  MRN: [id number]  Restrictions/Precautions: Restrictions/Precautions: Fall Risk; General Precautions        Diagnosis:   Fibromyalgia [M79.7]  Chronic bilateral low back pain with bilateral sciatica [M54.42, M54.41, G89.29]    Date of Injury/Surgery:   Treatment Diagnosis:  lbp  Insurance/Certification information: [facility]  Referring Physician:  VIOLET Perez - *     PCP: VIOLET Richard NP  Next Doctor Visit:    Plan of care signed (Y/N): yes  Outcome Measure:   Oswestry: 21/50  5x STS: 16.63 sec   Visit# / total visits:  4/10  Pain level: 0/10  Goals:     Patient goals: get back to work  Short term goals  Time Frame for Short term goals: refer to [facility]    Long Term Goals  Time Frame for Long Term Goals: 10 visits  Pt will report overall improvement in condition by 50% or more  pt will improve oswestry to 12/50 or less to show [address] and subjective improvement  pt will improve lumbar AROM flexion to 75% to reach objects on the floor  pt will improve L hip flexion strength to 4/5 for improved stair negotiation  pt will be able to stand for 30 minutes without an increase in pain for work activities      Summary of Evaluation:  Assessment: pt is a 36 y.o. female that presents to therapy with complaints of lower back pain lasting 22 years. 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 she has a history of fibromyalgia. pt demo impaired posture, lumbar ROM, BLE strength, activity/work tolerance and functional mobility. pt has forward flexed posture with rounded shoulders in sitting. pt has a history of falling a lot.  Pt would benefit from continued skilled physical

## 2023-06-23 ENCOUNTER — HOSPITAL ENCOUNTER (OUTPATIENT)
Dept: PHYSICAL THERAPY | Age: 43
Setting detail: THERAPIES SERIES
Discharge: HOME | End: 2023-06-23
Payer: MEDICAID

## 2023-06-23 PROCEDURE — 97530 THERAPEUTIC ACTIVITIES: CPT

## 2023-06-23 PROCEDURE — 97110 THERAPEUTIC EXERCISES: CPT

## 2023-06-23 NOTE — FLOWSHEET NOTE
physical therapy to address impairments and limitations, progress toward goal completion, promote independence with ADLs, and prevent further injury. Subjective: pt reports that everything is hurting today. Started back at work a few days ago. Any changes in Ambulatory Summary Sheet? None        Objective:  no pain with any exercises              Exercises: (No more than 4 columns)   Exercise/Equipment 6/16/23 #5 6/20/2023 #6 6/23/23 #7           WARM UP      Nustep   L3 5' L-4 x 5 L5 5'         TABLE      LAQ 2x10 3\" ea 10 x 2 3\" ea  1# 2x10 3\"    SLR 2x10 ea  10 x 2 ea 1# 2x10 3\" ea    SL hip abd      TA contraction 2x10 5\"  10 x 2 5\"  2x10 5\"    LTR 10x5\" ea 10 x 2 5\"  10x5\" ea   Sciatic nerve glides       STS  10 x 2 no UE support 2x10 no UE support   Supine clams RTB x20 SL RTB 10 x 2 ea LE SL RTB x20 ea    Supine add squeeze 2x10 5\"   2x10 5\"    STANDING      Hip 3 way RTB x20 ea  RTB abd x20 ea   marches      Step up and overs  6\" 10 x 2 ea LE ant step ups  6\" lateral step up, over back and down ea LE 10x                                  PROPRIOCEPTION      Wobble board 1' ea A/P, S/S 30\" x2 ea dir 1' x2 A/P, S/S ea   NBOS   EC airex 30\" x2 2x30\" foam   Tandem   EC airex 30\" x2 ea dir  2x30\" ea foam                MODALITIES                      Other Therapeutic Activities/Education:  HEP and importance of completion, POC and goals, anatomy and physiology related to condition        Home Exercise Program:  Issued, practiced and pt demo ability to perform on eval date  5/22: LTR, LAQ. SLR, TA contraction   5/25/23 reviewed and added supine clamshell with RTB, hip add ball squeezes and marches. Provided with handouts. Manual Treatments:  none      Modalities:  none      Communication with other providers:  none this date      Assessment:  Pt tolerated treatment well today. Pt was able to tolerate exercises without any increased pain.  Pt would benefit from continued skilled physical therapy to

## 2023-06-26 ENCOUNTER — HOSPITAL ENCOUNTER (OUTPATIENT)
Dept: PHYSICAL THERAPY | Age: 43
Discharge: HOME | End: 2023-06-26

## 2023-06-28 ENCOUNTER — HOSPITAL ENCOUNTER (OUTPATIENT)
Dept: PHYSICAL THERAPY | Age: 43
Setting detail: THERAPIES SERIES
Discharge: HOME | End: 2023-06-28
Payer: MEDICAID

## 2023-06-28 PROCEDURE — 97140 MANUAL THERAPY 1/> REGIONS: CPT

## 2023-06-28 PROCEDURE — 97110 THERAPEUTIC EXERCISES: CPT

## 2023-06-29 ENCOUNTER — OFFICE VISIT (OUTPATIENT)
Dept: FAMILY MEDICINE CLINIC | Age: 43
End: 2023-06-29
Payer: MEDICAID

## 2023-06-29 VITALS
OXYGEN SATURATION: 97 % | BODY MASS INDEX: 26.82 KG/M2 | DIASTOLIC BLOOD PRESSURE: 70 MMHG | HEART RATE: 111 BPM | SYSTOLIC BLOOD PRESSURE: 122 MMHG | HEIGHT: 63 IN | WEIGHT: 151.4 LBS

## 2023-06-29 DIAGNOSIS — M25.551 BILATERAL HIP PAIN: ICD-10-CM

## 2023-06-29 DIAGNOSIS — M25.552 BILATERAL HIP PAIN: ICD-10-CM

## 2023-06-29 DIAGNOSIS — M54.41 CHRONIC BILATERAL LOW BACK PAIN WITH BILATERAL SCIATICA: ICD-10-CM

## 2023-06-29 DIAGNOSIS — G89.29 CHRONIC BILATERAL LOW BACK PAIN WITH BILATERAL SCIATICA: ICD-10-CM

## 2023-06-29 DIAGNOSIS — R06.89 BREATHING DIFFICULT: ICD-10-CM

## 2023-06-29 DIAGNOSIS — E04.1 THYROID NODULE: ICD-10-CM

## 2023-06-29 DIAGNOSIS — G47.33 OSA (OBSTRUCTIVE SLEEP APNEA): ICD-10-CM

## 2023-06-29 DIAGNOSIS — M54.42 CHRONIC BILATERAL LOW BACK PAIN WITH BILATERAL SCIATICA: ICD-10-CM

## 2023-06-29 DIAGNOSIS — E05.90 HYPERTHYROIDISM: Primary | ICD-10-CM

## 2023-06-29 PROCEDURE — 99214 OFFICE O/P EST MOD 30 MIN: CPT | Performed by: NURSE PRACTITIONER

## 2023-06-29 PROCEDURE — G8427 DOCREV CUR MEDS BY ELIG CLIN: HCPCS | Performed by: NURSE PRACTITIONER

## 2023-06-29 PROCEDURE — G8417 CALC BMI ABV UP PARAM F/U: HCPCS | Performed by: NURSE PRACTITIONER

## 2023-06-29 PROCEDURE — 1036F TOBACCO NON-USER: CPT | Performed by: NURSE PRACTITIONER

## 2023-06-29 RX ORDER — ALBUTEROL SULFATE 90 UG/1
2 AEROSOL, METERED RESPIRATORY (INHALATION) 4 TIMES DAILY PRN
Qty: 18 G | Refills: 5 | Status: SHIPPED | OUTPATIENT
Start: 2023-06-29

## 2023-06-30 ENCOUNTER — TELEPHONE (OUTPATIENT)
Dept: FAMILY MEDICINE CLINIC | Age: 43
End: 2023-06-30

## 2023-07-06 ENCOUNTER — HOSPITAL ENCOUNTER (OUTPATIENT)
Dept: PHYSICAL THERAPY | Age: 43
Setting detail: THERAPIES SERIES
Discharge: HOME | End: 2023-07-06
Payer: MEDICAID

## 2023-07-06 PROCEDURE — 97110 THERAPEUTIC EXERCISES: CPT

## 2023-07-06 NOTE — FLOWSHEET NOTE
from continued skilled physical therapy to address impairments and limitations, progress toward goal completion, promote independence with ADLs, and prevent further injury. Subjective: Pt stated that her back pain was 0/10 ,but her  R hip pain was \"15/10\". Pt stated that she can't get into see Dr. Jesse Kauffman until the 31st. Pt stated that she is on a cancel list. Pt stated that her thyroid numbers are high and that she was admitted to [facility] from  last Thurs to Sat for thyroid. Pt stated that she was taken off work until next week, but that it was ok to do therapy. Any changes in Ambulatory Summary Sheet?   None        Objective:  no alignment issues today        Exercises: (No more than 4 columns)   Exercise/Equipment 6/20/2023 #6 6/23/23 #7 6/28/2023 #8 7/6/2023 #9            WARM UP       Nustep   L-4 x 5 L5 5' L-5 x 5'  L-5 x 5'           TABLE       LAQ 10 x 2 3\" ea  1# 2x10 3\"  10 x 2  10 x 2 3\" ea LE   Prone on elbows   10 x 2 press ups  STM/ stick rolling/ TPR to glutes    Cat -camel    10 x 2 ea dir    Prone hip extension   10 x 2     SLR 10 x 2 ea 1# 2x10 3\" ea  10x2 10 x 2 ea    SL hip abd       TA contraction 10 x 2 5\"  2x10 5\"  10 x 2 5\"  10 x 2 5\"    LTR 10 x 2 5\"  10x5\" ea  RSB  10 x 2 3\"    Sciatic nerve glides    20x    STS 10 x 2 no UE support 2x10 no UE support  10 x  no UE support   Bridges     RSB 10x2   Supine clams SL RTB 10 x 2 ea LE SL RTB x20 ea  Supine 10 x 2 no resistance Supine RTB 10x2   Supine add squeeze  2x10 5\"   10 x 2 5\"    STANDING       Hip 3 way  RTB abd x20 ea     marches       Step up and overs 6\" 10 x 2 ea LE ant step ups  6\" lateral step up, over back and down ea LE 10x                                        PROPRIOCEPTION       Wobble board 30\" x2 ea dir 1' x2 A/P, S/S ea     NBOS  EC airex 30\" x2 2x30\" foam     Tandem  EC airex 30\" x2 ea dir  2x30\" ea foam                    MODALITIES                         Other Therapeutic Activities/Education:  HEP and importance

## 2023-07-11 ENCOUNTER — HOSPITAL ENCOUNTER (OUTPATIENT)
Dept: PHYSICAL THERAPY | Age: 43
Setting detail: THERAPIES SERIES
Discharge: HOME | End: 2023-07-11
Payer: MEDICAID

## 2023-07-11 PROCEDURE — 97110 THERAPEUTIC EXERCISES: CPT

## 2023-07-11 NOTE — FLOWSHEET NOTE
Outpatient Physical Therapy  [place]           [x] Phone: [phone]   Fax: [phone]  [place]ig[place] Guan           [] Phone: [phone]   Fax: [phone]        Physical Therapy Daily Treatment Note  Date:  2023    Patient Name:  Maverick Rizo    :  1980  MRN: [id number]  Restrictions/Precautions: Restrictions/Precautions: Fall Risk; General Precautions        Diagnosis:   Fibromyalgia [M79.7]  Chronic bilateral low back pain with bilateral sciatica [M54.42, M54.41, G89.29]    Date of Injury/Surgery:   Treatment Diagnosis:  lbp  Insurance/Certification information: [facility]  Referring Physician:  VIOLET Goss - Rachael     PCP: VIOLET Goss NP  Next Doctor Visit:    Plan of care signed (Y/N): yes  Outcome Measure:   Oswestry: 21/50  5x STS: 16.63 sec   Visit# / total visits:  10/10  Pain level: 5/10  Goals:     Patient goals: get back to work MET   Short term goals  Time Frame for Short term goals: refer to [facility]    Long Term Goals  Time Frame for Long Term Goals: 10 visits  Pt will report overall improvement in condition by 50% or more  MET   pt will improve oswestry to 12/50 or less to show [place] and subjective improvement NOT MET   pt will improve lumbar AROM flexion to 75% to reach objects on the floor MET   pt will improve L hip flexion strength to 4/5 for improved stair negotiation MET   pt will be able to stand for 30 minutes without an increase in pain for work activities ALMOST MET         Summary of Evaluation:  Assessment: pt is a 36 y.o. female that presents to therapy with complaints of lower back pain lasting 22 years. 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 she has a history of fibromyalgia. pt demo impaired posture, lumbar ROM, BLE strength, activity/work tolerance and functional mobility. pt has forward flexed posture with rounded shoulders in sitting.  pt has a history

## 2023-07-11 NOTE — DISCHARGE SUMMARY
Outpatient Physical Therapy           [place]           [x] Phone: [phone]   Fax: [phone]  [facility]           [] Phone: [phone]   Fax: [phone]      To: VIOLET Forman - *     From: Mikaela Sheriff, PT, DPT     Patient: Maribeth Gage                    : 1980  Diagnosis:  Fibromyalgia [M79.7]  Chronic bilateral low back pain with bilateral sciatica [M54.42, M54.41, G89.29]        Treatment Diagnosis:    lbp   Date: 2023  []  Progress Note                [x]  Discharge Note    Evaluation Date:  23   Total Visits to date:   10 Cancels/No-shows to date:  4    Subjective:  pt reports that she was off of work for a week due to having things done with her thyroid and returns tomorrow. She reports that she hasnt been sleeping very good but not due to pain. She reports that she has improved overall since starting therapy by 60%. She thinks that her R hip keeps popping out, has an appt with ortho later this month. She thinks that she can stand for 10-15 minutes without having an increase in pain. She reports that she is able to get up off the ground better than she has recently. She thinks that she is getting weaker. Oswestry:       Plan of Care/Treatment to date:  [x] Therapeutic Exercise    [x] Modalities:  [x] Therapeutic Activity     [] Ultrasound  [] Electrical Stimulation  [x] Gait Training      [] Cervical Traction   [] Lumbar Traction  [x] Neuromuscular Re-education  [x] Cold/hotpack [] Iontophoresis  [x] Instruction in HEP      Other:  [x] Manual Therapy       []  Vasopneumatic  [] Aquatic Therapy       []   Dry Needle Therapy                      Objective/Significant Findings At Last Visit/Comments:    L hip flexion strength: 4/5  Lumbar flexion AROM: 75%     Assessment:   Pt has shown good progress since therapy start regarding improved BLE strength, lumbar ROM, pain, activity/work tolerance and functional mobility. Pt has met most of her goals at this time.

## 2023-07-13 ENCOUNTER — PATIENT MESSAGE (OUTPATIENT)
Dept: FAMILY MEDICINE CLINIC | Age: 43
End: 2023-07-13

## 2023-07-13 ENCOUNTER — OFFICE VISIT (OUTPATIENT)
Dept: FAMILY MEDICINE CLINIC | Age: 43
End: 2023-07-13
Payer: MEDICAID

## 2023-07-13 VITALS
OXYGEN SATURATION: 97 % | WEIGHT: 152 LBS | HEIGHT: 63 IN | SYSTOLIC BLOOD PRESSURE: 128 MMHG | BODY MASS INDEX: 26.93 KG/M2 | HEART RATE: 74 BPM | DIASTOLIC BLOOD PRESSURE: 80 MMHG

## 2023-07-13 DIAGNOSIS — E05.90 HYPERTHYROIDISM: Primary | ICD-10-CM

## 2023-07-13 DIAGNOSIS — Q25.6 MILD PULMONARY STENOSIS: Primary | ICD-10-CM

## 2023-07-13 PROCEDURE — G8417 CALC BMI ABV UP PARAM F/U: HCPCS | Performed by: NURSE PRACTITIONER

## 2023-07-13 PROCEDURE — G8427 DOCREV CUR MEDS BY ELIG CLIN: HCPCS | Performed by: NURSE PRACTITIONER

## 2023-07-13 PROCEDURE — 1036F TOBACCO NON-USER: CPT | Performed by: NURSE PRACTITIONER

## 2023-07-13 PROCEDURE — 99212 OFFICE O/P EST SF 10 MIN: CPT | Performed by: NURSE PRACTITIONER

## 2023-07-13 RX ORDER — METHIMAZOLE 10 MG/1
TABLET ORAL
COMMUNITY
Start: 2023-07-01

## 2023-07-13 NOTE — PROGRESS NOTES
2023     Umberto Hanson (:  1980) is a 36 y.o. female, here for evaluation of the following medical concerns:        2 week follow up on hyperthyroid. She was seen at [facility] ER by this PCP recommendation at last visit due to tachycardia, hair loss, weight loss, palpitations, dyspnea. She was started on toprol and methimazole and is feeling better. She is scheduled to see endo at [facility] 2023. She was admitted to [facility] for three days and DC home. Reports her HR is down under 100 but will go up when she is exerting herself. Reports she got a letter from her insurance stating they are not covering her [facility] stay because she was not sick enough to be admitted to the hospital..             Review of Systems    Prior to Visit Medications    Medication Sig Taking? Authorizing Provider   albuterol sulfate HFA (VENTOLIN HFA) 108 (90 Base) MCG/ACT inhaler Inhale 2 puffs into the lungs 4 times daily as needed for Wheezing Yes Naomy Faye, APRN - NP   famotidine (PEPCID) 20 MG tablet Take 1 tablet by mouth 2 times daily as needed Yes Historical Provider, MD   omeprazole (PRILOSEC) 40 MG delayed release capsule Take 1 capsule by mouth every morning (before breakfast) Yes Nat Helm MD   vitamin D (ERGOCALCIFEROL) 1.25 MG (81868 UT) CAPS capsule Take 1 capsule by mouth once a week For 12 weeks only Yes Jessica Escobar, APRN - NP   Multiple Vitamins-Minerals (HAIR SKIN AND NAILS FORMULA PO) Take by mouth Yes Historical Provider, MD   oxyCODONE-acetaminophen (PERCOCET) 5-325 MG per tablet Take 0.5 tablets by mouth 3 times daily as needed.  Yes Historical Provider, MD   acetaminophen (TYLENOL) 500 MG tablet Take 1 tablet by mouth every 6 hours as needed for Pain Yes Historical Provider, MD   methIMAzole (TAPAZOLE) 10 MG tablet   Historical Provider, MD   metoprolol tartrate (LOPRESSOR) 25 MG tablet   Historical Provider, MD        Social History     Tobacco Use    Smoking status: Never    Smokeless tobacco:

## 2023-07-14 NOTE — TELEPHONE ENCOUNTER
From: Rosario Hayes  To: Jake Post  Sent: 7/13/2023 4:48 PM EDT  Subject: New heart dr     Is there any way you can send a referral to [address] [place] [address] so I can start seeing them please

## 2023-07-19 RX ORDER — ERGOCALCIFEROL 1.25 MG/1
50000 CAPSULE ORAL WEEKLY
Qty: 12 CAPSULE | Refills: 0 | OUTPATIENT
Start: 2023-07-19

## 2023-07-19 NOTE — TELEPHONE ENCOUNTER
Dr Adriana Mcneil is cardiothoracic surgery, vascular. They are not cardiology. I do not see a heart condition in [facility] cardiology notes, but I do recall pt telling me about a \"heart condition since she was 2\"     Does pt know what the heart condition is called?    Other option is to see a second opinion cardiologist. Patient would need to call insurance and see who is in network and let this provider know where to send referral .

## 2023-07-22 NOTE — TELEPHONE ENCOUNTER
Can you call dr [facility] office and find out what they are seeing her for and if she will still need a cardiologist in addition to them.

## 2023-07-24 NOTE — TELEPHONE ENCOUNTER
Patient currently not seeing Dr Yamile Whitley she is requesting referral for Mild pulmonic stenosis. Patient previously seen [facility].

## 2023-07-28 ENCOUNTER — PATIENT MESSAGE (OUTPATIENT)
Dept: FAMILY MEDICINE CLINIC | Age: 43
End: 2023-07-28

## 2023-08-08 ENCOUNTER — OFFICE VISIT (OUTPATIENT)
Dept: CARDIOLOGY CLINIC | Age: 43
End: 2023-08-08
Payer: MEDICAID

## 2023-08-08 ENCOUNTER — NURSE ONLY (OUTPATIENT)
Dept: CARDIOLOGY CLINIC | Age: 43
End: 2023-08-08

## 2023-08-08 VITALS
SYSTOLIC BLOOD PRESSURE: 114 MMHG | WEIGHT: 166 LBS | BODY MASS INDEX: 29.41 KG/M2 | HEART RATE: 77 BPM | HEIGHT: 63 IN | DIASTOLIC BLOOD PRESSURE: 84 MMHG

## 2023-08-08 DIAGNOSIS — R07.89 OTHER CHEST PAIN: Primary | ICD-10-CM

## 2023-08-08 DIAGNOSIS — R07.9 CHEST PAIN, UNSPECIFIED TYPE: Primary | ICD-10-CM

## 2023-08-08 PROCEDURE — G8427 DOCREV CUR MEDS BY ELIG CLIN: HCPCS | Performed by: INTERNAL MEDICINE

## 2023-08-08 PROCEDURE — 1036F TOBACCO NON-USER: CPT | Performed by: INTERNAL MEDICINE

## 2023-08-08 PROCEDURE — G8417 CALC BMI ABV UP PARAM F/U: HCPCS | Performed by: INTERNAL MEDICINE

## 2023-08-08 PROCEDURE — 93000 ELECTROCARDIOGRAM COMPLETE: CPT | Performed by: INTERNAL MEDICINE

## 2023-08-08 PROCEDURE — 99214 OFFICE O/P EST MOD 30 MIN: CPT | Performed by: INTERNAL MEDICINE

## 2023-08-08 NOTE — PROGRESS NOTES
24 hour holter monitor [email or web address] for chest pain Serial # [id number] . Instructed patient on monitor and proper use. Instructed on diary. When to remove and bring it back. Must leave the holter monitor on  without removing for the duration of time ordered. Answered all questions the patient had. Instructed patient to call [facility] at [phone] with any questions or concerns with the monitor.
2

## 2023-08-08 NOTE — PROGRESS NOTES
respiratory distress, No wheezing, No chest tenderness. ,no use of accessory muscles, diaphragm movement is normal  Cardiovascular: (PMI) apex non displaced,no lifts no thrills, no s3,no s4, Normal heart rate, Normal rhythm, + murmurs, No rubs, No gallops. Carotid arteries pulse and amplitude are normal no bruit, no abdominal bruit noted ( normal abdominal aorta ausculation),   Extremities - No cyanosis, clubbing, or significant edema, no varicose veins    Abdomen - No masses, tenderness, or organomegaly, no hepato-splenomegally, no bruits  Musculoskeletal - No significant edema, no kyphosis or scoliosis, no deformity in any extremity noted, muscle strength and tone are normal  Skin: no ulcer,no scar,no stasis dermatitis   Neurologic - alert oriented times 3,Cranial nerves II through XII are grossly intact. There were no gross focal neurologic abnormalities. Psychiatric: normal mood and affect    Lab Results   Component Value Date/Time    TROPONINI <0.006 03/24/2014 06:00 PM     BNP:  No results found for: BNP  PT/INR:  No results found for: PTINR  Lab Results   Component Value Date    LABA1C 4.5 05/10/2023    LABA1C 4.4 02/02/2023     Lab Results   Component Value Date    CHOL 127 05/10/2023    TRIG 195 (H) 05/10/2023    HDL 39 (L) 05/10/2023    LDLCALC 49 05/10/2023     Lab Results   Component Value Date    ALT 10 06/14/2023    AST 16 06/14/2023     TSH:    Lab Results   Component Value Date/Time    TSH <0.01 05/22/2023 08:26 AM       Impression:  Oliva Shepherd is a 39 y. o.year old who  has a past medical history of Arthritis, Chronic depression, H/O bilateral breast reduction surgery, H/O of hysterectomy with bilateral oophorectomy, Hx of Doppler echocardiogram, Kidney disorder, Migraine, Mixed hyperlipidemia, Morbid obesity ([address]), PONV (postoperative nausea and vomiting), Preop pulmonary/respiratory exam, Prolonged emergence from general anesthesia, Pulmonic stenosis, and Sleep apnea.  and presents with

## 2023-08-09 NOTE — TELEPHONE ENCOUNTER
Pt sent [employer] message about handicap placard on an old [employer] message thread about chets pain that was previously managed. Pt states   \"Hi [facility] you told me to shoot you a message to my handicap plackets. I have been shortness of breath and almost pass out related to my thyroid\"      We discussed this while pt was in office today with a family member. Will write placard. Due to symptomatic hyperthyroid disease.

## 2023-08-10 ENCOUNTER — OFFICE VISIT (OUTPATIENT)
Dept: FAMILY MEDICINE CLINIC | Age: 43
End: 2023-08-10
Payer: MEDICAID

## 2023-08-10 VITALS
OXYGEN SATURATION: 97 % | HEART RATE: 71 BPM | WEIGHT: 166.6 LBS | DIASTOLIC BLOOD PRESSURE: 76 MMHG | BODY MASS INDEX: 29.52 KG/M2 | HEIGHT: 63 IN | SYSTOLIC BLOOD PRESSURE: 122 MMHG

## 2023-08-10 DIAGNOSIS — M25.512 ACUTE PAIN OF LEFT SHOULDER: Primary | ICD-10-CM

## 2023-08-10 PROCEDURE — 99213 OFFICE O/P EST LOW 20 MIN: CPT | Performed by: NURSE PRACTITIONER

## 2023-08-10 PROCEDURE — G8427 DOCREV CUR MEDS BY ELIG CLIN: HCPCS | Performed by: NURSE PRACTITIONER

## 2023-08-10 PROCEDURE — G8417 CALC BMI ABV UP PARAM F/U: HCPCS | Performed by: NURSE PRACTITIONER

## 2023-08-10 PROCEDURE — 1036F TOBACCO NON-USER: CPT | Performed by: NURSE PRACTITIONER

## 2023-08-11 ENCOUNTER — OFFICE VISIT (OUTPATIENT)
Dept: ORTHOPEDIC SURGERY | Age: 43
End: 2023-08-11
Payer: MEDICAID

## 2023-08-11 VITALS — DIASTOLIC BLOOD PRESSURE: 80 MMHG | SYSTOLIC BLOOD PRESSURE: 120 MMHG | HEART RATE: 83 BPM | OXYGEN SATURATION: 98 %

## 2023-08-11 DIAGNOSIS — S43.402A SPRAIN OF LEFT SHOULDER, UNSPECIFIED SHOULDER SPRAIN TYPE, INITIAL ENCOUNTER: Primary | ICD-10-CM

## 2023-08-11 PROCEDURE — G8417 CALC BMI ABV UP PARAM F/U: HCPCS | Performed by: STUDENT IN AN ORGANIZED HEALTH CARE EDUCATION/TRAINING PROGRAM

## 2023-08-11 PROCEDURE — 99203 OFFICE O/P NEW LOW 30 MIN: CPT | Performed by: STUDENT IN AN ORGANIZED HEALTH CARE EDUCATION/TRAINING PROGRAM

## 2023-08-11 PROCEDURE — G8427 DOCREV CUR MEDS BY ELIG CLIN: HCPCS | Performed by: STUDENT IN AN ORGANIZED HEALTH CARE EDUCATION/TRAINING PROGRAM

## 2023-08-11 PROCEDURE — 1036F TOBACCO NON-USER: CPT | Performed by: STUDENT IN AN ORGANIZED HEALTH CARE EDUCATION/TRAINING PROGRAM

## 2023-08-11 RX ORDER — CYCLOBENZAPRINE HCL 5 MG
5 TABLET ORAL 2 TIMES DAILY PRN
Qty: 30 TABLET | Refills: 0 | Status: SHIPPED | OUTPATIENT
Start: 2023-08-11 | End: 2023-08-21

## 2023-08-11 ASSESSMENT — ENCOUNTER SYMPTOMS
VOMITING: 0
COUGH: 0
STRIDOR: 0
COLOR CHANGE: 0
FACIAL SWELLING: 0
NAUSEA: 0
BACK PAIN: 0
ABDOMINAL PAIN: 0
EYE PAIN: 0
SHORTNESS OF BREATH: 0
PHOTOPHOBIA: 0
EYE REDNESS: 0
WHEEZING: 0

## 2023-08-11 NOTE — PROGRESS NOTES
8/11/2023   No chief complaint on file. History of Present Illness:                             Misti Doe is a 39 y.o. female right handed patient referred by PCP for evaluation and treatment left shoulder pain. This is evaluated as a personal injury. Patient states she had sudden left shoulder pain beginning immediately after she suffered an injury 4 days prior. Patient states she was walking her dog when the dog suddenly pulled and she felt a painful pop in her shoulder. She states she is also had issues with numbness and tingling as well as burning sensation down the arm into the fingertips since this injury. She was seen by her primary care physician was placed in a sling which she has been in since having it placed. She has been attempting to use the arm but has been unable to do so due to significant pain and inability to lift the arm. She continues with the radicular type symptoms down to her fingertips. She has had no other treatment thus far. No advanced imaging thus far. This my first time seen the patient. She denies any prior left shoulder pain or injury. The pain occurs every day and when active. Location of pain is diffusely throughout shoulder, mainly at the anterior joint line as well as rotator cuff insertion. No history of dislocation. Symptoms are aggravated by ADL's, repetitive use, work at or above shoulder height, difficulty sleeping on affected side. Symptoms are diminished by rest, avoiding the painful activities. Limited activities include: lifting, repetitive use, work at or above shoulder height, difficulty sleeping, difficulty with ADLs. No stiffness is reported. Patient admits to numbness, tingling, altered sensation in the extremity, approximately the C7 distribution. Related history: negative for prior surgery, additional trauma, arthritis or disorders. Is affecting ADLs. Pain is 10/10 at it's worst.    Outside reports reviewed: none.

## 2023-08-11 NOTE — PATIENT INSTRUCTIONS
Continue to weight bear as tolerated  Continue range of motion  Ice and elevate as needed  Tylenol or Motrin for pain  Central scheduling [phone] will be calling you to schedule your MRI. If you do not hear from them with in a week give them a call. Once you have scheduled an MRI, call our office back to schedule follow up appointment. You have been referred for an EMG with Dr. Gurvinder Martínez. Once EMG is scheduled, please call our office back to schedule a follow up appointment. Our phone number is [phone]. Your cervical spine xray will be done at the [address] [place] [address], this office will call you to schedule this xray. We are committed to providing you the best care possible. If you receive a survey after visiting one of our offices, please take time to share your experience concerning your physician office visit. These surveys are confidential and no health information about you is shared. We are eager to improve for you and we are counting on your feedback to help make that happen. We are committed to providing you the best care possible. If you receive a survey after visiting one of our offices, please take time to share your experience concerning your physician office visit. These surveys are confidential and no health information about you is shared. We are eager to improve for you and we are counting on your feedback to help make that happen.

## 2023-08-14 ENCOUNTER — HOSPITAL ENCOUNTER (OUTPATIENT)
Age: 43
Discharge: HOME | End: 2023-08-14
Payer: MEDICAID

## 2023-08-14 ENCOUNTER — HOSPITAL ENCOUNTER (OUTPATIENT)
Dept: GENERAL RADIOLOGY | Age: 43
Discharge: HOME | End: 2023-08-14
Payer: MEDICAID

## 2023-08-14 DIAGNOSIS — S43.402A SPRAIN OF LEFT SHOULDER, UNSPECIFIED SHOULDER SPRAIN TYPE, INITIAL ENCOUNTER: ICD-10-CM

## 2023-08-14 PROCEDURE — 72040 X-RAY EXAM NECK SPINE 2-3 VW: CPT

## 2023-08-15 ENCOUNTER — OFFICE VISIT (OUTPATIENT)
Dept: BARIATRICS/WEIGHT MGMT | Age: 43
End: 2023-08-15
Payer: MEDICAID

## 2023-08-15 ENCOUNTER — PROCEDURE VISIT (OUTPATIENT)
Dept: CARDIOLOGY CLINIC | Age: 43
End: 2023-08-15
Payer: MEDICAID

## 2023-08-15 VITALS
HEIGHT: 63 IN | SYSTOLIC BLOOD PRESSURE: 126 MMHG | BODY MASS INDEX: 29.79 KG/M2 | WEIGHT: 168.1 LBS | DIASTOLIC BLOOD PRESSURE: 82 MMHG

## 2023-08-15 DIAGNOSIS — R06.02 SOB (SHORTNESS OF BREATH): Primary | ICD-10-CM

## 2023-08-15 DIAGNOSIS — R63.5 WEIGHT GAIN: ICD-10-CM

## 2023-08-15 DIAGNOSIS — R07.9 CHEST PAIN, UNSPECIFIED TYPE: ICD-10-CM

## 2023-08-15 DIAGNOSIS — Z98.84 STATUS POST LAPAROSCOPIC SLEEVE GASTRECTOMY: Primary | ICD-10-CM

## 2023-08-15 DIAGNOSIS — R07.9 CHEST PAIN, UNSPECIFIED TYPE: Primary | ICD-10-CM

## 2023-08-15 LAB
LV EF: 58 %
LVEF MODALITY: NORMAL

## 2023-08-15 PROCEDURE — 1036F TOBACCO NON-USER: CPT | Performed by: SURGERY

## 2023-08-15 PROCEDURE — G8417 CALC BMI ABV UP PARAM F/U: HCPCS | Performed by: SURGERY

## 2023-08-15 PROCEDURE — G8427 DOCREV CUR MEDS BY ELIG CLIN: HCPCS | Performed by: SURGERY

## 2023-08-15 PROCEDURE — 99214 OFFICE O/P EST MOD 30 MIN: CPT | Performed by: SURGERY

## 2023-08-15 PROCEDURE — 93306 TTE W/DOPPLER COMPLETE: CPT | Performed by: INTERNAL MEDICINE

## 2023-08-15 PROCEDURE — 93015 CV STRESS TEST SUPVJ I&R: CPT | Performed by: INTERNAL MEDICINE

## 2023-08-15 NOTE — PROGRESS NOTES
movements intact. Cardiovascular:      Rate and Rhythm: Normal rate. Pulmonary:      Effort: No respiratory distress. Abdominal:      Palpations: Abdomen is soft. Musculoskeletal:         General: No swelling. Skin:     General: Skin is warm. Neurological:      General: No focal deficit present. Mental Status: She is alert. ASSESSMENT & PLAN:    1. Status post laparoscopic sleeve gastrectomy  2. Weight gain  -Pt not tracking calories, protein. Not taking MV. Not exercising.   -Needs to meet with RD to review diet. -Needs to start taking MVI again.  -Needs to start exercising again.  -Pt was doing very well until recently when she stopped tracking and exercising and thus resulted in 18 lb weight gain. She is requesting anti obesity medication but given her medical history (cardiac and hyperthyroid) she is not candidate for stimulant. Also, given her insurance, she would have to pay out of pocked for GLP-1 medication which cannot afford at this time. I also do not think the pt needs anti obesity medication as she was doing well post-operatively (BMI down to 26) until she stopped tracking. -F/u in one month with CNP  -Call with any questions, concerns, or issues whatsoever. 3. Vit D def  -PO replacement  -Monitor            As of current visit, regarding obesity-related co-morbid conditions:  EMILEE [] compliant [] no longer using [] resolved per sleep study; hypertension [] medications; hyperlipidemia [] medications; GERD [] medications; DM [] insulin [] non-insulin [] no meds           No orders of the defined types were placed in this encounter. No orders of the defined types were placed in this encounter. Follow Up:  No follow-ups on file.     Summer Bustos MD

## 2023-08-18 ENCOUNTER — TELEPHONE (OUTPATIENT)
Dept: CARDIOLOGY CLINIC | Age: 43
End: 2023-08-18

## 2023-08-18 NOTE — TELEPHONE ENCOUNTER
Left a message for pt telling them the normal results of their echo and cardiac stress test as summarized below. Echo:     Summary   Technically difficult examination done in supine due to left shoulder   injury. Left ventricular function and size is normal, EF is estimated at 55-60%. Grade I diastolic dysfunction. No regional wall motion abnormalities were detected. No significant valvular disease noted. Mild mitral and tricuspid regurgitation is present. RVSP is 24 mmHg. No evidence of pericardial effusion. Cardiac stress test:     Summary   Good exercise capacity. Little over 7 METs work load. Physiological BP response to exercise. ETT non diagnostic as THR is not achieved. No ischemic EKG CHANGES SEEN but   patient C/O 5/10 CHEST PAIN towards the end of the protocol resolving   quickly during rest.   Clinical co-relation is recommended. Duke treadmill risk score is 6, low risk.

## 2023-08-22 ENCOUNTER — HOSPITAL ENCOUNTER (OUTPATIENT)
Dept: SLEEP CENTER | Age: 43
Discharge: HOME | End: 2023-08-22
Payer: MEDICAID

## 2023-08-22 VITALS
BODY MASS INDEX: 29.57 KG/M2 | HEIGHT: 63 IN | HEART RATE: 80 BPM | WEIGHT: 166.9 LBS | DIASTOLIC BLOOD PRESSURE: 76 MMHG | SYSTOLIC BLOOD PRESSURE: 118 MMHG | OXYGEN SATURATION: 97 %

## 2023-08-22 DIAGNOSIS — G47.33 OSA (OBSTRUCTIVE SLEEP APNEA): Primary | ICD-10-CM

## 2023-08-22 PROCEDURE — 99211 OFF/OP EST MAY X REQ PHY/QHP: CPT

## 2023-08-22 ASSESSMENT — SLEEP AND FATIGUE QUESTIONNAIRES
ESS TOTAL SCORE: 13
HOW LIKELY ARE YOU TO NOD OFF OR FALL ASLEEP WHILE WATCHING TV: 3
HOW LIKELY ARE YOU TO NOD OFF OR FALL ASLEEP IN A CAR, WHILE STOPPED FOR A FEW MINUTES IN TRAFFIC: 0
HOW LIKELY ARE YOU TO NOD OFF OR FALL ASLEEP WHILE SITTING QUIETLY AFTER LUNCH WITHOUT ALCOHOL: 2
HOW LIKELY ARE YOU TO NOD OFF OR FALL ASLEEP WHILE SITTING AND TALKING TO SOMEONE: 0
HOW LIKELY ARE YOU TO NOD OFF OR FALL ASLEEP WHILE SITTING AND READING: 3
HOW LIKELY ARE YOU TO NOD OFF OR FALL ASLEEP WHEN YOU ARE A PASSENGER IN A CAR FOR AN HOUR WITHOUT A BREAK: 2
HOW LIKELY ARE YOU TO NOD OFF OR FALL ASLEEP WHILE SITTING INACTIVE IN A PUBLIC PLACE: 0
HOW LIKELY ARE YOU TO NOD OFF OR FALL ASLEEP WHILE LYING DOWN TO REST IN THE AFTERNOON WHEN CIRCUMSTANCES PERMIT: 3
NECK CIRCUMFERENCE (INCHES): 14.25

## 2023-08-22 NOTE — PROGRESS NOTES
Nithya Bianchi MD, Norm Sarabia MD, Bert Branch MD, Pina Collazo MD, [facility]      [address] [facility], [address] [facility]   PH: [phone]  F: [phone]     Subjective:     Patient ID: rKishna Evans is a 39 y.o. female, referred to the sleep center for   Chief Complaint   Patient presents with    Consultation    Sleep Apnea     G47.33     . Referring physician:  jose bautista    History: 44 year old female with dx of dangelo for past five years. She was using her cpap but then stopped using it about 2 years ago. she has lost about 100 lbs since weight loss surgery. she has snoring. She does not feel fresh when wakes up and feels tired and fatigued all day. She has EDS    Social History     Socioeconomic History    Marital status:      Spouse name: Not on file    Number of children: Not on file    Years of education: Not on file    Highest education level: Not on file   Occupational History    Not on file   Tobacco Use    Smoking status: Never    Smokeless tobacco: Never   Vaping Use    Vaping Use: Never used   Substance and Sexual Activity    Alcohol use: No    Drug use: No    Sexual activity: Not on file   Other Topics Concern    Not on file   Social History Narrative    ** Merged History Encounter **          Social Determinants of Health     Financial Resource Strain: Unknown    Difficulty of Paying Living Expenses: Patient refused   Food Insecurity: Unknown    Worried About [place] in the Last Year: Patient refused    [address] in the Last Year: Patient refused   Transportation Needs: Unknown    Lack of Transportation (Medical):  Not on file    Lack of Transportation (Non-Medical): Patient refused   Physical Activity: Not on file   Stress: Not on file   Social Connections: Not on file   Intimate Partner Violence: Not on file   Housing Stability: Unknown    Unable to Pay for Housing in the

## 2023-08-24 ENCOUNTER — HOSPITAL ENCOUNTER (OUTPATIENT)
Dept: MRI IMAGING | Age: 43
Discharge: HOME | End: 2023-08-24
Attending: STUDENT IN AN ORGANIZED HEALTH CARE EDUCATION/TRAINING PROGRAM
Payer: MEDICAID

## 2023-08-24 DIAGNOSIS — S43.402A SPRAIN OF LEFT SHOULDER, UNSPECIFIED SHOULDER SPRAIN TYPE, INITIAL ENCOUNTER: ICD-10-CM

## 2023-08-24 PROCEDURE — 73221 MRI JOINT UPR EXTREM W/O DYE: CPT

## 2023-08-30 ENCOUNTER — OFFICE VISIT (OUTPATIENT)
Dept: BARIATRICS/WEIGHT MGMT | Age: 43
End: 2023-08-30

## 2023-08-30 VITALS — HEIGHT: 63 IN | BODY MASS INDEX: 30.67 KG/M2 | WEIGHT: 173.1 LBS

## 2023-08-30 DIAGNOSIS — E66.9 OBESITY (BMI 30-39.9): Primary | ICD-10-CM

## 2023-08-30 PROCEDURE — NBSRV NON-BILLABLE SERVICE: Performed by: SURGERY

## 2023-08-30 NOTE — PROGRESS NOTES
OR    CHOLECYSTECTOMY      COLONOSCOPY N/A 4/20/2022    COLONOSCOPY POLYPECTOMY SNARE/COLD BIOPSY performed by Quinton Bamberger, MD at [facility] ENDOSCOPY    COLONOSCOPY N/A 6/12/2023    COLONOSCOPY WITH BIOPSY performed by Joaquin Strauss MD at [facility] Right     wrist    HAND SURGERY Bilateral     4/2014    HYSTERECTOMY (CERVIX STATUS UNKNOWN)      INNER EAR SURGERY      OTHER SURGICAL HISTORY  4/16/2012    Repair vaginal cuff    OVARY REMOVAL      PARTIAL NEPHRECTOMY Left     SLEEVE GASTRECTOMY N/A 11/21/2022    GASTRECTOMY SLEEVE LAPAROSCOPIC ROBOTIC performed by Jamee Vásquez MD at [address] [place] [address],[address] Left     TUBAL LIGATION      UPPER GASTROINTESTINAL ENDOSCOPY N/A 7/29/2022    EGD ESOPHAGOGASTRODUODENOSCOPY WITH BX performed by Jamee Vásquez MD at [address] [place] [address] N/A 6/12/2023    EGD BIOPSY performed by Joaquin Strauss MD at [facility] ENDOSCOPY       Family History:  Family History   Problem Relation Age of Onset    Diabetes Mother     Cancer Mother     Migraines Mother     Cancer Maternal Grandmother     Diabetes Maternal Grandmother     Allergies Daughter     Bleeding Prob Daughter     Asthma Daughter     Allergies Son     Bleeding Prob Son     Asthma Son     Breast Cancer Neg Hx     Ovarian Cancer Neg Hx        Social History:  Social History     Socioeconomic History    Marital status:      Spouse name: Not on file    Number of children: Not on file    Years of education: Not on file    Highest education level: Not on file   Occupational History    Not on file   Tobacco Use    Smoking status: Never    Smokeless tobacco: Never   Vaping Use    Vaping Use: Never used   Substance and Sexual Activity    Alcohol use: No    Drug use: No    Sexual activity: Not on file   Other Topics Concern    Not on file   Social History Narrative    ** Merged History Encounter **          Social Determinants of Health     Financial Resource Strain: Unknown

## 2023-09-12 ENCOUNTER — OFFICE VISIT (OUTPATIENT)
Dept: CARDIOLOGY CLINIC | Age: 43
End: 2023-09-12
Payer: MEDICAID

## 2023-09-12 ENCOUNTER — TELEPHONE (OUTPATIENT)
Dept: CARDIOLOGY CLINIC | Age: 43
End: 2023-09-12

## 2023-09-12 ENCOUNTER — HOSPITAL ENCOUNTER (OUTPATIENT)
Age: 43
Discharge: HOME | End: 2023-09-12
Payer: MEDICAID

## 2023-09-12 ENCOUNTER — HOSPITAL ENCOUNTER (OUTPATIENT)
Dept: GENERAL RADIOLOGY | Age: 43
Discharge: HOME | End: 2023-09-12
Payer: MEDICAID

## 2023-09-12 VITALS
OXYGEN SATURATION: 94 % | HEIGHT: 63 IN | SYSTOLIC BLOOD PRESSURE: 100 MMHG | BODY MASS INDEX: 30.48 KG/M2 | DIASTOLIC BLOOD PRESSURE: 70 MMHG | WEIGHT: 172 LBS | HEART RATE: 55 BPM

## 2023-09-12 DIAGNOSIS — Z01.810 PRE-OPERATIVE CARDIOVASCULAR EXAMINATION: Primary | ICD-10-CM

## 2023-09-12 DIAGNOSIS — R00.0 TACHYCARDIA: ICD-10-CM

## 2023-09-12 DIAGNOSIS — Z01.810 PRE-OPERATIVE CARDIOVASCULAR EXAMINATION: ICD-10-CM

## 2023-09-12 DIAGNOSIS — E78.5 DYSLIPIDEMIA: ICD-10-CM

## 2023-09-12 DIAGNOSIS — R07.89 OTHER CHEST PAIN: ICD-10-CM

## 2023-09-12 DIAGNOSIS — E66.01 MORBIDLY OBESE (HCC): ICD-10-CM

## 2023-09-12 DIAGNOSIS — R06.02 SOB (SHORTNESS OF BREATH): Primary | ICD-10-CM

## 2023-09-12 DIAGNOSIS — R00.2 PALPITATION: ICD-10-CM

## 2023-09-12 LAB
ABO/RH: NORMAL
ANION GAP SERPL CALCULATED.3IONS-SCNC: 12 MMOL/L (ref 4–16)
ANTIBODY SCREEN: NEGATIVE
BUN SERPL-MCNC: 15 MG/DL (ref 6–23)
CALCIUM SERPL-MCNC: 8.7 MG/DL (ref 8.3–10.6)
CHLORIDE BLD-SCNC: 104 MMOL/L (ref 99–110)
CO2: 24 MMOL/L (ref 21–32)
COMMENT: NORMAL
CREAT SERPL-MCNC: 0.9 MG/DL (ref 0.6–1.1)
GFR SERPL CREATININE-BSD FRML MDRD: >60 ML/MIN/1.73M2
GLUCOSE SERPL-MCNC: 81 MG/DL (ref 70–99)
HCT VFR BLD CALC: 43.6 % (ref 37–47)
HEMOGLOBIN: 14 GM/DL (ref 12.5–16)
MCH RBC QN AUTO: 28.8 PG (ref 27–31)
MCHC RBC AUTO-ENTMCNC: 32.1 % (ref 32–36)
MCV RBC AUTO: 89.7 FL (ref 78–100)
PDW BLD-RTO: 13.5 % (ref 11.7–14.9)
PLATELET # BLD: 265 K/CU MM (ref 140–440)
PMV BLD AUTO: 9.9 FL (ref 7.5–11.1)
POTASSIUM SERPL-SCNC: 4.4 MMOL/L (ref 3.5–5.1)
RBC # BLD: 4.86 M/CU MM (ref 4.2–5.4)
SODIUM BLD-SCNC: 140 MMOL/L (ref 135–145)
WBC # BLD: 6.8 K/CU MM (ref 4–10.5)

## 2023-09-12 PROCEDURE — 85027 COMPLETE CBC AUTOMATED: CPT

## 2023-09-12 PROCEDURE — 1036F TOBACCO NON-USER: CPT | Performed by: INTERNAL MEDICINE

## 2023-09-12 PROCEDURE — 99214 OFFICE O/P EST MOD 30 MIN: CPT | Performed by: INTERNAL MEDICINE

## 2023-09-12 PROCEDURE — 36415 COLL VENOUS BLD VENIPUNCTURE: CPT

## 2023-09-12 PROCEDURE — 86850 RBC ANTIBODY SCREEN: CPT

## 2023-09-12 PROCEDURE — 80048 BASIC METABOLIC PNL TOTAL CA: CPT

## 2023-09-12 PROCEDURE — G8427 DOCREV CUR MEDS BY ELIG CLIN: HCPCS | Performed by: INTERNAL MEDICINE

## 2023-09-12 PROCEDURE — 71046 X-RAY EXAM CHEST 2 VIEWS: CPT

## 2023-09-12 PROCEDURE — 86900 BLOOD TYPING SEROLOGIC ABO: CPT

## 2023-09-12 PROCEDURE — 86901 BLOOD TYPING SEROLOGIC RH(D): CPT

## 2023-09-12 PROCEDURE — G8417 CALC BMI ABV UP PARAM F/U: HCPCS | Performed by: INTERNAL MEDICINE

## 2023-09-12 RX ORDER — BUPROPION HYDROCHLORIDE 150 MG/1
150 TABLET, EXTENDED RELEASE ORAL 2 TIMES DAILY
COMMUNITY

## 2023-09-12 NOTE — TELEPHONE ENCOUNTER
[address] WITH POSSIBLE PERCUTANEOUS CORONARY INTERVENTION        Patient Name: Harinder Cooper   : 1980  MRN# [id number]    Date of Procedure: 23 Time: noon Arrival Time: 10 am    The catheterization and angiogram are usually outpatient procedures, however if stenting is needed you may need to stay overnight. You will need to arrive at the hospital two hours before the procedure. You will go to registration in the main lobby. You will need to arrange for someone to drive you home. HOSPITAL:  [facility])      X   If you have received orders for blood work and or a chest x-ray, please have         them done on assigned date at [facility],           [facility], or [facility].     X Please do not have anything by mouth after midnight prior to or 8 hours before   the procedure.     X You may take your medications with a sip of water in the morning of your               procedure or take them with you to the hospital

## 2023-09-12 NOTE — TELEPHONE ENCOUNTER
Patient given instructions over telephone on [facility] Dx: Chest pain . Procedure is scheduled for 9/14/23 @ 12pm, w/arrival @ 10am, @ [address] [facility]. [facility] for labwork and/or CXR, which are due 9/12/23 @ [address] [facility]. Patient advised to review instructions given. Patient was notified that procedure date or time could be changed due to an emergency. Patient voiced understanding.

## 2023-09-12 NOTE — PROGRESS NOTES
stenosis, and Sleep apnea. and presents with     Plan:  Graves disease: on lopressor now for tachycardia, continue it  Chest pain: stress test was submaxinal and had chest pain, her echo is normal, will schedule [facility]   Murmur: she was dianosed wth mild pulmonic stenosis as a baby, last yr echo showed showed dmild MR AND TR,  echo is normal  Shortness of breath and tirebdness: could be fast heartrate relaged with graves disease, will get 24 hrs holer monitor  Health maintenance: exerise and diet  All labs, medications and tests reviewed, continue   All labs, medications and tests reviewed, continue all other medications of all above medical condition listed as is.     [unfilled]

## 2023-09-12 NOTE — PATIENT INSTRUCTIONS
Please be informed that if you contact our office outside of normal business hours the physician on call cannot help with any scheduling or rescheduling issues, procedure instruction questions or any type of medication issue. We advise you for any urgent/emergency that you go to the nearest emergency room! PLEASE CALL OUR OFFICE DURING NORMAL BUSINESS HOURS    Monday - Friday   8 am to 5 pm    [place]: [address]: [phone]    [place]:  [phone]  **It is YOUR responsibilty to bring medication bottles and/or updated medication list to [address] [place] [address]. This will allow us to better serve you and all your healthcare needs**  Thank you for allowing us to care for you today! We want to ensure we can follow your treatment plan and we strive to give you the best outcomes and experience possible. If you ever have a life threatening emergency and call 911 - for an ambulance (EMS)   Our providers can only care for you at:   [facility] or [facility]. Even if you have someone take you or you drive yourself we can only care for you in a [address] [place] [address] facility. Our providers are not setup at the other healthcare locations!

## 2023-09-13 ENCOUNTER — OFFICE VISIT (OUTPATIENT)
Dept: ORTHOPEDIC SURGERY | Age: 43
End: 2023-09-13
Payer: MEDICAID

## 2023-09-13 ENCOUNTER — OFFICE VISIT (OUTPATIENT)
Dept: NEUROLOGY | Age: 43
End: 2023-09-13
Payer: MEDICAID

## 2023-09-13 VITALS
BODY MASS INDEX: 30.12 KG/M2 | DIASTOLIC BLOOD PRESSURE: 70 MMHG | SYSTOLIC BLOOD PRESSURE: 100 MMHG | HEIGHT: 63 IN | WEIGHT: 170 LBS | OXYGEN SATURATION: 99 % | HEART RATE: 51 BPM

## 2023-09-13 VITALS
DIASTOLIC BLOOD PRESSURE: 70 MMHG | SYSTOLIC BLOOD PRESSURE: 108 MMHG | BODY MASS INDEX: 29.76 KG/M2 | HEART RATE: 68 BPM | OXYGEN SATURATION: 98 % | WEIGHT: 168 LBS

## 2023-09-13 DIAGNOSIS — S46.012A TRAUMATIC COMPLETE TEAR OF LEFT ROTATOR CUFF, INITIAL ENCOUNTER: Primary | ICD-10-CM

## 2023-09-13 DIAGNOSIS — G43.109 CHRONIC MIGRAINE WITH AURA: Primary | ICD-10-CM

## 2023-09-13 DIAGNOSIS — G43.901 STATUS MIGRAINOSUS: ICD-10-CM

## 2023-09-13 PROCEDURE — 1036F TOBACCO NON-USER: CPT | Performed by: STUDENT IN AN ORGANIZED HEALTH CARE EDUCATION/TRAINING PROGRAM

## 2023-09-13 PROCEDURE — G8417 CALC BMI ABV UP PARAM F/U: HCPCS | Performed by: STUDENT IN AN ORGANIZED HEALTH CARE EDUCATION/TRAINING PROGRAM

## 2023-09-13 PROCEDURE — 99205 OFFICE O/P NEW HI 60 MIN: CPT | Performed by: STUDENT IN AN ORGANIZED HEALTH CARE EDUCATION/TRAINING PROGRAM

## 2023-09-13 PROCEDURE — G8427 DOCREV CUR MEDS BY ELIG CLIN: HCPCS | Performed by: STUDENT IN AN ORGANIZED HEALTH CARE EDUCATION/TRAINING PROGRAM

## 2023-09-13 PROCEDURE — 99214 OFFICE O/P EST MOD 30 MIN: CPT | Performed by: STUDENT IN AN ORGANIZED HEALTH CARE EDUCATION/TRAINING PROGRAM

## 2023-09-13 RX ORDER — METHYLPREDNISOLONE 4 MG/1
TABLET ORAL
Qty: 1 KIT | Refills: 0 | Status: SHIPPED | OUTPATIENT
Start: 2023-09-13 | End: 2023-09-19

## 2023-09-13 RX ORDER — RIMEGEPANT SULFATE 75 MG/75MG
1 TABLET, ORALLY DISINTEGRATING ORAL AS NEEDED
Qty: 21 TABLET | Refills: 2 | Status: SHIPPED | OUTPATIENT
Start: 2023-09-13

## 2023-09-13 RX ORDER — ATOGEPANT 60 MG/1
60 TABLET ORAL DAILY
Qty: 30 TABLET | Refills: 2 | Status: SHIPPED | OUTPATIENT
Start: 2023-09-13

## 2023-09-13 ASSESSMENT — ENCOUNTER SYMPTOMS
BACK PAIN: 0
WHEEZING: 0
FACIAL SWELLING: 0
PHOTOPHOBIA: 0
COLOR CHANGE: 0
ABDOMINAL PAIN: 0
EYE REDNESS: 0
COUGH: 0
NAUSEA: 0
VOMITING: 0
STRIDOR: 0
SHORTNESS OF BREATH: 0
EYE PAIN: 0

## 2023-09-13 NOTE — PROGRESS NOTES
Here for MRI follow up. Works at [employer] on [employer]. Left shoulder pain affective work. Pain 6/10 today. Numbness and tingling present from shoulder down. EMG scheduled for 9/26. No new injuries. Appt with neurology today, prescribed Atogepant and Medrol Dosepak    MRI:  IMPRESSION:  Moderate grade intrasubstance supraspinatus tendon tear at the footprint, as  described above. Possible low-grade insertional subscapularis tendon tear. Mild supraspinatus, infraspinatus, and subscapularis tendinosis. Mild AC joint osteoarthrosis.
normal limits. Mild multilevel degenerative disc disease. Alignment appears to be anatomic in both flexion and extension without  evidence for instability. IMPRESSION:  No spondylolisthesis or evidence for instability with flexion and extension  lateral views of the cervical spine. Mild multilevel degenerative changes. Previous imaging:  3 views of the left shoulder in a skeletally mature patient demonstrates no acute osseous normalities. Minimal osteophytic changes seen at the before meals and glenohumeral joints. Appropriate acromiohumeral distance. No sign of any soft tissue avulsion injury or calcification. Glenohumeral joint remains well aligned. Type II acromion. Office Procedures:  No orders of the defined types were placed in this encounter. Assessment and Plan    A: Left shoulder rotator cuff strain    P:   I had a thorough conversation with the patient regarding her MRI findings and treatment course. I explained that she does have a mildly significant tear and this does correlate well to her physical exam findings. I explained that because of her young age and activity level I would recommend proceeding with surgical intervention to prevent this tear from enlarging over time. However, with the concerning radicular type symptoms I do want to hold off on scheduling her for any type of shoulder surgery until we have a full picture of what is going on with her neuro issues which may be coming from her cervical spine. We will hold off on scheduling her for the shoulder but I did discuss the procedure in detail with her today. She will follow-up in 3 weeks as scheduled to discuss the EMG which should be completed in the next 2 weeks. She was provided with a work note of no lifting more than 15 pounds and no overhead lifting and additional 10-minute break every 2 hours as needed.     Again, I discussed with the patient continuing conservative measures versus operative intervention and

## 2023-09-13 NOTE — PROGRESS NOTES
09/12/23 54   08/22/23 79        Gen: A&O x 4, NAD, cooperative  HEENT: NC/AT, EOMI, PERRL, mmm, neck supple, no meningeal signs; Heart: without central or acrocyanosis  Lungs: Without signs of respiratory distress  Ext: no edema, no calf tenderness b/l  Psych: normal mood and affect  Skin: no rashes or lesions    NEUROLOGIC EXAM:     Mental Status: A&O to self, location, month and year, NAD, speech clear, language fluent, repetition and naming intact, follows commands appropriately    Cranial Nerve Exam:   CN II-XII: PERRL, VFF, no nystagmus, no gaze paresis, sensation V1-V3 intact b/l, muscles of facial expression symmetric; hearing intact to conversational tone, palate elevates symmetrically, shoulder elevation symmetric and tongue protrudes midline with movement side to side. Motor Exam:       Strength 5/5 UE's/LE's b/l  Tone and bulk normal   No pronator drift    Deep Tendon Reflexes: 2/4 brachioradialis, patellar b/l    Sensation: Intact light touch UE's/LE's b/l    Coordination/Cerebellum:       Tremors--none      Finger-to-Nose: no dysmetria b/l    Gait and stance:      Gait: steady      LABS:        CBC:   Recent Labs     09/12/23  1234   WBC 6.8   RBC 4.86   HGB 14.0   HCT 43.6      MCV 89.7     BMP:    Recent Labs     09/12/23  1234      K 4.4      CO2 24   BUN 15   CREATININE 0.9   GLUCOSE 81   CALCIUM 8.7       IMAGING:    MRI brain without contrast 4/28/2023  IMPRESSION:  Normal noncontrast MRI evaluation of the brain. Above images and reports personally reviewed in detail. ASSESSMENT/PLAN:       ICD-10-CM    1. Chronic migraine with aura  G43.109 Atogepant (QULIPTA) 60 MG TABS     Rimegepant Sulfate (NURTEC) 75 MG TBDP      2. Status migrainosus  G43.901 methylPREDNISolone (MEDROL DOSEPACK) 4 MG tablet           39 y.o. -female with PMH gastric sleeve presenting with signs and symptoms consistent with chronic migraine with aura and status migrainosus.   Her neurologic

## 2023-09-14 ENCOUNTER — HOSPITAL ENCOUNTER (OUTPATIENT)
Dept: CARDIAC CATH/INVASIVE PROCEDURES | Age: 43
Discharge: HOME | End: 2023-09-14
Attending: INTERNAL MEDICINE | Admitting: INTERNAL MEDICINE
Payer: MEDICAID

## 2023-09-14 VITALS
BODY MASS INDEX: 30.3 KG/M2 | OXYGEN SATURATION: 99 % | DIASTOLIC BLOOD PRESSURE: 76 MMHG | SYSTOLIC BLOOD PRESSURE: 153 MMHG | RESPIRATION RATE: 16 BRPM | TEMPERATURE: 95.8 F | HEIGHT: 63 IN | HEART RATE: 55 BPM | WEIGHT: 171 LBS

## 2023-09-14 PROBLEM — R94.39 ABNORMAL STRESS TEST: Status: ACTIVE | Noted: 2023-09-14

## 2023-09-14 PROCEDURE — 6360000004 HC RX CONTRAST MEDICATION

## 2023-09-14 PROCEDURE — C1769 GUIDE WIRE: HCPCS

## 2023-09-14 PROCEDURE — 2580000003 HC RX 258: Performed by: INTERNAL MEDICINE

## 2023-09-14 PROCEDURE — 93454 CORONARY ARTERY ANGIO S&I: CPT

## 2023-09-14 PROCEDURE — C1894 INTRO/SHEATH, NON-LASER: HCPCS

## 2023-09-14 PROCEDURE — 6370000000 HC RX 637 (ALT 250 FOR IP): Performed by: INTERNAL MEDICINE

## 2023-09-14 PROCEDURE — 2709999900 HC NON-CHARGEABLE SUPPLY

## 2023-09-14 PROCEDURE — 93454 CORONARY ARTERY ANGIO S&I: CPT | Performed by: INTERNAL MEDICINE

## 2023-09-14 PROCEDURE — 6360000002 HC RX W HCPCS

## 2023-09-14 RX ORDER — SODIUM CHLORIDE 9 MG/ML
INJECTION, SOLUTION INTRAVENOUS CONTINUOUS
Status: DISCONTINUED | OUTPATIENT
Start: 2023-09-14 | End: 2023-09-14 | Stop reason: HOSPADM

## 2023-09-14 RX ORDER — DIPHENHYDRAMINE HCL 25 MG
25 TABLET ORAL ONCE
Status: COMPLETED | OUTPATIENT
Start: 2023-09-14 | End: 2023-09-14

## 2023-09-14 RX ORDER — DIAZEPAM 5 MG/1
5 TABLET ORAL ONCE
Status: COMPLETED | OUTPATIENT
Start: 2023-09-14 | End: 2023-09-14

## 2023-09-14 RX ADMIN — DIAZEPAM 5 MG: 5 TABLET ORAL at 10:58

## 2023-09-14 RX ADMIN — DIPHENHYDRAMINE HYDROCHLORIDE 25 MG: 25 TABLET ORAL at 10:58

## 2023-09-14 RX ADMIN — SODIUM CHLORIDE: 9 INJECTION, SOLUTION INTRAVENOUS at 10:58

## 2023-09-14 ASSESSMENT — PAIN DESCRIPTION - ORIENTATION: ORIENTATION: RIGHT

## 2023-09-14 ASSESSMENT — PAIN DESCRIPTION - FREQUENCY: FREQUENCY: INTERMITTENT

## 2023-09-14 ASSESSMENT — PAIN DESCRIPTION - DESCRIPTORS: DESCRIPTORS: DULL

## 2023-09-14 ASSESSMENT — PAIN DESCRIPTION - LOCATION: LOCATION: CHEST

## 2023-09-14 ASSESSMENT — PAIN DESCRIPTION - PAIN TYPE: TYPE: ACUTE PAIN

## 2023-09-14 ASSESSMENT — PAIN SCALES - GENERAL: PAINLEVEL_OUTOF10: 2

## 2023-09-14 NOTE — DISCHARGE INSTRUCTIONS
Home instruction for Radial site Heart Catheterization:    Do not lift with affected arm for 3 days. Avoid lifting >10lbs. No flexing or bending affected wrist.    Remove dressing the next day, keep area clean and dry and covered with a new band aid daily until healed. Do not submerge site in water for 3 days. Slight tenderness is normal, but notify doctor if tingling of fingers/hand happens. If bleeding or hematoma (blood collecting under the skin) occurs at site, apply pressure to slow the bleeding and notify doctor immediately. No driving for 3 days. Head is atraumatic. Head shape is symmetrical.

## 2023-09-14 NOTE — PROGRESS NOTES
To car per wheelchair and home with family member. Procedure site remains free from active bleeding and hematoma. Arm board remains in place.

## 2023-09-18 ENCOUNTER — TELEPHONE (OUTPATIENT)
Dept: NEUROLOGY | Age: 43
End: 2023-09-18

## 2023-09-18 NOTE — TELEPHONE ENCOUNTER
Received a fax from insurance stating Alyce Richey is denied. Pt needs to try at least 30 days of Aimovig or Ajovy. Please advise.

## 2023-09-22 DIAGNOSIS — G43.109 CHRONIC MIGRAINE WITH AURA: Primary | ICD-10-CM

## 2023-09-22 RX ORDER — ERENUMAB-AOOE 140 MG/ML
140 INJECTION, SOLUTION SUBCUTANEOUS
Qty: 1 ADJUSTABLE DOSE PRE-FILLED PEN SYRINGE | Refills: 11 | Status: SHIPPED | OUTPATIENT
Start: 2023-09-22

## 2023-09-26 ENCOUNTER — PROCEDURE VISIT (OUTPATIENT)
Dept: PHYSICAL MEDICINE AND REHAB | Age: 43
End: 2023-09-26
Payer: MEDICAID

## 2023-09-26 DIAGNOSIS — R20.2 PARESTHESIA AND PAIN OF BOTH UPPER EXTREMITIES: ICD-10-CM

## 2023-09-26 DIAGNOSIS — M79.601 PARESTHESIA AND PAIN OF BOTH UPPER EXTREMITIES: ICD-10-CM

## 2023-09-26 DIAGNOSIS — M79.602 PARESTHESIA AND PAIN OF BOTH UPPER EXTREMITIES: ICD-10-CM

## 2023-09-26 DIAGNOSIS — G56.12 MEDIAN NEUROPATHY AT UPPER ARM, LEFT: Primary | ICD-10-CM

## 2023-09-26 PROCEDURE — 95911 NRV CNDJ TEST 9-10 STUDIES: CPT | Performed by: PHYSICAL MEDICINE & REHABILITATION

## 2023-09-26 PROCEDURE — 95886 MUSC TEST DONE W/N TEST COMP: CPT | Performed by: PHYSICAL MEDICINE & REHABILITATION

## 2023-09-26 NOTE — PROGRESS NOTES
EMG REPORT     CHIEF COMPLAINT: \"I have torn my left rotator cuff\". HISTORY OF PRESENT ILLNESS: 39 y.o. right hand dominant female with a history of bilateral carpal tunnel release procedures about 4 (right) and 14 (left) years ago. She states that a large dog jerking its leash caught something on her left shoulder to pop about 3 months ago. Then and now she has had deep shoulder pain with a sense of weakness in the left arm. She states she occasionally drops things from her  and she has trouble sleeping because of her arm pain. The pain is primarily in the anterior shoulder radiating towards the elbow. She also gets some numbness in the left side of her neck without clear radiation to her upper limb. She states her left hand seems numb at times with all fingers being involved. She rated her pain severity as 10/10. She has a history of a thyroid disorder. No history of diabetes. PHYSICAL EXAMINATION: Alert. About 75 degrees of active cervical spine rotation bilaterally. Spurling's maneuver was uncomfortable but did not reproduce limb symptoms Tinel sign was negative. .  She exhibited normal strength across the shoulders including resisted external rotation of both shoulders. Power  testing yielded giveaway with some complaints of wrist pain on the left. Sensation was intact to confrontation. There was no asymmetry of muscle bulk. There was normal tone and sensation in both upper limbs. NERVE CONDUCTION STUDIES:     MOTOR         LATENCY NORMAL AMPLITUDE DISTANCE COND. MAMADOU.    RIGHT  MEDIAN 3.9 < 4.2 msec 11.9 8 cm >50   LEFT  MEDIAN 3.9 < 4.2 msec 13.9 8 cm 55   RIGHT  ULNAR 2.6 < 4.2 msec 8.9 8 cm >50   LEFT  ULNAR 3.0 < 4.2 msec 8.7 8 cm >50      SENSORY  ORTHODROMIC        LATENCY NORMAL AMPLITUDE DISTANCE   RIGHT MEDIAN 2.7 <2.3 msec 23 10 cm   LEFT  MEDIAN 2.4 < 2.3 msec 33 10 cm   RIGHT  ULNAR 2.1 < 2.3 msec 23 10 cm   LEFT  ULNAR 2.3 < 2.3 msec 13 10 cm       Left dorsal

## 2023-10-02 ENCOUNTER — TELEPHONE (OUTPATIENT)
Dept: ORTHOPEDIC SURGERY | Age: 43
End: 2023-10-02

## 2023-10-02 ENCOUNTER — OFFICE VISIT (OUTPATIENT)
Dept: ORTHOPEDIC SURGERY | Age: 43
End: 2023-10-02
Payer: MEDICAID

## 2023-10-02 VITALS — SYSTOLIC BLOOD PRESSURE: 78 MMHG | OXYGEN SATURATION: 98 % | HEART RATE: 68 BPM | DIASTOLIC BLOOD PRESSURE: 60 MMHG

## 2023-10-02 DIAGNOSIS — M75.42 IMPINGEMENT SYNDROME OF LEFT SHOULDER: ICD-10-CM

## 2023-10-02 DIAGNOSIS — S46.012A TRAUMATIC COMPLETE TEAR OF LEFT ROTATOR CUFF, INITIAL ENCOUNTER: Primary | ICD-10-CM

## 2023-10-02 DIAGNOSIS — M19.012 PRIMARY OSTEOARTHRITIS OF LEFT SHOULDER: ICD-10-CM

## 2023-10-02 DIAGNOSIS — S43.422A SPRAIN OF LEFT ROTATOR CUFF CAPSULE, INITIAL ENCOUNTER: Primary | ICD-10-CM

## 2023-10-02 DIAGNOSIS — M75.22 BICIPITAL TENDONITIS OF SHOULDER, LEFT: ICD-10-CM

## 2023-10-02 PROCEDURE — 99214 OFFICE O/P EST MOD 30 MIN: CPT | Performed by: STUDENT IN AN ORGANIZED HEALTH CARE EDUCATION/TRAINING PROGRAM

## 2023-10-02 PROCEDURE — 1036F TOBACCO NON-USER: CPT | Performed by: STUDENT IN AN ORGANIZED HEALTH CARE EDUCATION/TRAINING PROGRAM

## 2023-10-02 PROCEDURE — G8427 DOCREV CUR MEDS BY ELIG CLIN: HCPCS | Performed by: STUDENT IN AN ORGANIZED HEALTH CARE EDUCATION/TRAINING PROGRAM

## 2023-10-02 PROCEDURE — G8417 CALC BMI ABV UP PARAM F/U: HCPCS | Performed by: STUDENT IN AN ORGANIZED HEALTH CARE EDUCATION/TRAINING PROGRAM

## 2023-10-02 PROCEDURE — G8484 FLU IMMUNIZE NO ADMIN: HCPCS | Performed by: STUDENT IN AN ORGANIZED HEALTH CARE EDUCATION/TRAINING PROGRAM

## 2023-10-02 ASSESSMENT — ENCOUNTER SYMPTOMS
FACIAL SWELLING: 0
NAUSEA: 0
PHOTOPHOBIA: 0
COUGH: 0
EYE REDNESS: 0
STRIDOR: 0
BACK PAIN: 0
ABDOMINAL PAIN: 0
WHEEZING: 0
COLOR CHANGE: 0
VOMITING: 0
SHORTNESS OF BREATH: 0
EYE PAIN: 0

## 2023-10-02 NOTE — PROGRESS NOTES
10/2/2023   No chief complaint on file. Updated HPI: Patient returns today for reevaluation of her left shoulder. She continues to have significant left shoulder pain and wants to discuss surgery once again. Completed EMG and also has appointment for EMG review. No new injuries or complaints. No change in her health history denies any constitutional symptoms. Previous HPI (9/13/2023): Patient returns today for reevaluation of her left shoulder as well as MRI review. Patient continues to have significant numbness and tingling in her left hand. Again, she had a prior carpal tunnel releases of bilateral hands. She continues with neck stiffness as well. She was seen by neurology today and she was placed on Medrol Dosepak as well as [place] and Nurtec. EMG is scheduled in the next 2 weeks. MRI completed of left shoulder and she should have early then scheduled as she wanted discussed MRI results. Previous HPI (8/11/2023):                             Caty Loya is a 39 y.o. female right handed patient referred by PCP for evaluation and treatment left shoulder pain. This is evaluated as a personal injury. Patient states she had sudden left shoulder pain beginning immediately after she suffered an injury 4 days prior. Patient states she was walking her dog when the dog suddenly pulled and she felt a painful pop in her shoulder. She states she is also had issues with numbness and tingling as well as burning sensation down the arm into the fingertips since this injury. She was seen by her primary care physician was placed in a sling which she has been in since having it placed. She has been attempting to use the arm but has been unable to do so due to significant pain and inability to lift the arm. She continues with the radicular type symptoms down to her fingertips. She has had no other treatment thus far. No advanced imaging thus far. This my first time seen the patient.   She denies

## 2023-10-02 NOTE — TELEPHONE ENCOUNTER
Scheduled patient for:    Left Shoulder Arthroscopy with Rotator Cuff Reair; Subacromial Decompression; Distal Clavicle Excision; Debridement and Biceps Tenodesis  CPT: 30391;35990;[id number]  ICD 10: Q89.865W; M75.42; M75.22; M19.012  Surgery Date: 10/12/2023  Surgeon: Michael Dy: [facility]  Anesthesia: General/Nerve Block  Product: Arthrex    Clearances sent to:  PCP: April Wolfe  Cardiac: Joseluis Núñez  Pulmonary: Hali Nuñez    Physical Therapy: Excel-order created    Insurance: [address] started on 10/2/2023 via [facility] online portal. Clinicals uploaded.  Pending clinical review  [facility]#[id number]

## 2023-10-02 NOTE — PROGRESS NOTES
Previous MRI diagnosed rotator cuff tear. Pain steady, 5/10 today. No new injuries. Wants to schedule rotator cuff surgery ASAP. Wondering about return to work protocol.

## 2023-10-02 NOTE — PATIENT INSTRUCTIONS
We will schedule surgery at soonest convenience. If you have any questions regarding your surgery please call our office and ask to speak with Aileen Stanford [phone]    Work note given today to stay out of work 10/9/23. This will be extended once surgery is scheduled.

## 2023-10-03 ENCOUNTER — TELEPHONE (OUTPATIENT)
Dept: CARDIOLOGY CLINIC | Age: 43
End: 2023-10-03

## 2023-10-03 ENCOUNTER — HOSPITAL ENCOUNTER (OUTPATIENT)
Dept: SLEEP CENTER | Age: 43
Discharge: HOME | End: 2023-10-03
Payer: MEDICAID

## 2023-10-03 DIAGNOSIS — G47.33 OSA (OBSTRUCTIVE SLEEP APNEA): ICD-10-CM

## 2023-10-03 PROCEDURE — 95810 POLYSOM 6/> YRS 4/> PARAM: CPT

## 2023-10-03 NOTE — TELEPHONE ENCOUNTER
Cardiologist: Dr. Courtney Tripathi  Surgeon: Dr. Bernis Klinefelter  Surgery: Left Shoulder Rotator Cuff Repair/Subacromial Decompression, Distal clavicle excision, debridement and Biceps tenodesis    Anesthesia: General/ Nerve block  Date: 10/12/2023  FAX# [phone]  # [phone]    Last OV 9/12/2023 w/Terry    Graves disease: on lopressor now for tachycardia, continue it  Chest pain: stress test was submaxinal and had chest pain, her echo is normal, will schedule [facility]   Murmur: she was dianosed wth mild pulmonic stenosis as a baby, last yr echo showed showed dmild MR AND TR,  echo is normal  Shortness of breath and tirebdness: could be fast heartrate relaged with graves disease, will get 24 hrs holer monitor  Health maintenance: exerise and diet  All labs, medications and tests reviewed, continue         Last EKG- 8/8/2023        Stress Test- 8/15/2023   Good exercise capacity. Little over 7 METs work load. Physiological BP response to exercise. ETT non diagnostic as THR is not achieved. No ischemic EKG CHANGES SEEN but   patient C/O 5/10 CHEST PAIN towards the end of the protocol resolving   quickly during rest.   Clinical co-relation is recommended. Duke treadmill risk score is 6, low risk. Echo- 8/15/2023   Technically difficult examination done in supine due to left shoulder   injury. Left ventricular function and size is normal, EF is estimated at 55-60%. Grade I diastolic dysfunction. No regional wall motion abnormalities were detected. No significant valvular disease noted. Mild mitral and tricuspid regurgitation is present. RVSP is 24 mmHg. No evidence of pericardial effusion.       Cath-9/14/2023   normal coronaries

## 2023-10-04 RX ORDER — ALBUTEROL SULFATE 90 UG/1
2 AEROSOL, METERED RESPIRATORY (INHALATION) 4 TIMES DAILY PRN
Qty: 18 G | Refills: 5 | Status: SHIPPED | OUTPATIENT
Start: 2023-10-04

## 2023-10-04 NOTE — PROGRESS NOTES
10/3/2023  sleep study  for Umberto Hanson  1980 is complete. Results are pending physician review.     Electronically signed by Maddie Londono on 10/3/2023 at 8:32 PM

## 2023-10-05 ENCOUNTER — OFFICE VISIT (OUTPATIENT)
Dept: FAMILY MEDICINE CLINIC | Age: 43
End: 2023-10-05
Payer: MEDICAID

## 2023-10-05 VITALS
HEART RATE: 62 BPM | HEIGHT: 63 IN | WEIGHT: 173.2 LBS | SYSTOLIC BLOOD PRESSURE: 110 MMHG | BODY MASS INDEX: 30.69 KG/M2 | OXYGEN SATURATION: 97 % | DIASTOLIC BLOOD PRESSURE: 60 MMHG

## 2023-10-05 DIAGNOSIS — E55.9 VITAMIN D DEFICIENCY: ICD-10-CM

## 2023-10-05 DIAGNOSIS — M79.7 FIBROMYALGIA: ICD-10-CM

## 2023-10-05 DIAGNOSIS — G89.29 CHRONIC BILATERAL LOW BACK PAIN WITH BILATERAL SCIATICA: ICD-10-CM

## 2023-10-05 DIAGNOSIS — E04.1 THYROID NODULE: ICD-10-CM

## 2023-10-05 DIAGNOSIS — M54.42 CHRONIC BILATERAL LOW BACK PAIN WITH BILATERAL SCIATICA: ICD-10-CM

## 2023-10-05 DIAGNOSIS — M54.41 CHRONIC BILATERAL LOW BACK PAIN WITH BILATERAL SCIATICA: ICD-10-CM

## 2023-10-05 DIAGNOSIS — Z01.818 PRE-OP TESTING: Primary | ICD-10-CM

## 2023-10-05 DIAGNOSIS — G47.33 OSA (OBSTRUCTIVE SLEEP APNEA): ICD-10-CM

## 2023-10-05 DIAGNOSIS — F41.9 ANXIETY: ICD-10-CM

## 2023-10-05 DIAGNOSIS — E05.90 HYPERTHYROIDISM: ICD-10-CM

## 2023-10-05 DIAGNOSIS — E78.2 MIXED HYPERLIPIDEMIA: ICD-10-CM

## 2023-10-05 DIAGNOSIS — Z90.5 SINGLE KIDNEY: ICD-10-CM

## 2023-10-05 DIAGNOSIS — G43.109 MIGRAINE WITH AURA AND WITHOUT STATUS MIGRAINOSUS, NOT INTRACTABLE: ICD-10-CM

## 2023-10-05 DIAGNOSIS — F32.A CHRONIC DEPRESSION: ICD-10-CM

## 2023-10-05 DIAGNOSIS — S46.012S TRAUMATIC COMPLETE TEAR OF LEFT ROTATOR CUFF, SEQUELA: ICD-10-CM

## 2023-10-05 DIAGNOSIS — Z90.3 S/P GASTRIC SLEEVE PROCEDURE: ICD-10-CM

## 2023-10-05 PROCEDURE — 99214 OFFICE O/P EST MOD 30 MIN: CPT | Performed by: NURSE PRACTITIONER

## 2023-10-05 PROCEDURE — G8417 CALC BMI ABV UP PARAM F/U: HCPCS | Performed by: NURSE PRACTITIONER

## 2023-10-05 PROCEDURE — G8427 DOCREV CUR MEDS BY ELIG CLIN: HCPCS | Performed by: NURSE PRACTITIONER

## 2023-10-05 PROCEDURE — G8484 FLU IMMUNIZE NO ADMIN: HCPCS | Performed by: NURSE PRACTITIONER

## 2023-10-05 PROCEDURE — 1036F TOBACCO NON-USER: CPT | Performed by: NURSE PRACTITIONER

## 2023-10-05 RX ORDER — SODIUM CHLORIDE, SODIUM LACTATE, POTASSIUM CHLORIDE, CALCIUM CHLORIDE 600; 310; 30; 20 MG/100ML; MG/100ML; MG/100ML; MG/100ML
INJECTION, SOLUTION INTRAVENOUS CONTINUOUS
Status: CANCELLED | OUTPATIENT
Start: 2023-10-12

## 2023-10-05 RX ORDER — OMEPRAZOLE 40 MG/1
40 CAPSULE, DELAYED RELEASE ORAL
Qty: 90 CAPSULE | Refills: 1 | Status: SHIPPED | OUTPATIENT
Start: 2023-10-05 | End: 2024-01-03

## 2023-10-05 NOTE — PROGRESS NOTES
10/5/2023     Umberto Hanson (:  1980) is a 39 y.o. female, here for evaluation of the following medical concerns:    Pre op   Left shoulder surgery with dr Afsaneh Padilla on 10/12/23. General anesthesia with nerve block         ASA -no  Anticoags - no  NSAID - no  Alcohol - no  Smoking - no     Denies trouble with anesthesia in the past.       Diabetes  - NO. A1C may 2023 ----4.5  HTN -- NO   Palpitations tachycardia related to hyperthyroid- on metoprolol  High cholesterol - not medicated   EMILEE - sleep study 10/3/23. States she has follow up for results 10/18/23. Hyperthyroid - following with [facility] endo. On tapazole. Metoprolol  [facility] Given nurtec and amovig, does not have yet due to PA. Wanting removed from med list  GERD- controlled with prilosec   Chronic pain - follows with PM. Percocet   Vit d deficeincy - no longer on supplement   Panic disorder, anxiety, depression - she is on wellbutrin 150mg daily now. Started by PM. Previously declined  medications. Denies suicidal self harm TPI. Denies side effects. Has only been taking two weeks. No asthma or COPD - on albuterol due to shortness of breath with thyroid disease. States it does help. Has not used in months         Cardiac clearance letter sent to [facility] cardio   Left heart cath 23       Single kidney!!!!!         Review of Systems    Prior to Visit Medications    Medication Sig Taking?  Authorizing Provider   omeprazole (PRILOSEC) 40 MG delayed release capsule Take 1 capsule by mouth every morning (before breakfast) Yes Odell Escobar APRN - NP   albuterol sulfate HFA (VENTOLIN HFA) 108 (90 Base) MCG/ACT inhaler Inhale 2 puffs into the lungs 4 times daily as needed for Wheezing Yes Odell Escobar APRN - NP   buPROPion [facility] SR) 150 MG extended release tablet Take 1 tablet by mouth 2 times daily Yes ProviderMaritza MD   Omeprazole Magnesium 10 MG PACK Take by mouth Yes Maritza Johnson MD

## 2023-10-06 ENCOUNTER — ANESTHESIA EVENT (OUTPATIENT)
Dept: OPERATING ROOM | Age: 43
End: 2023-10-06
Payer: MEDICAID

## 2023-10-09 ENCOUNTER — TELEPHONE (OUTPATIENT)
Dept: FAMILY MEDICINE CLINIC | Age: 43
End: 2023-10-09

## 2023-10-09 NOTE — TELEPHONE ENCOUNTER
Called and spoke to patient about preop lab work per provider and patient states she is going on 10/10/2023 to have it done. I explained that PCP would not be signing preop release without blood work being completed.

## 2023-10-10 ENCOUNTER — OFFICE VISIT (OUTPATIENT)
Dept: CARDIOLOGY CLINIC | Age: 43
End: 2023-10-10
Payer: MEDICAID

## 2023-10-10 ENCOUNTER — HOSPITAL ENCOUNTER (OUTPATIENT)
Age: 43
Discharge: HOME | End: 2023-10-10
Payer: MEDICAID

## 2023-10-10 VITALS
BODY MASS INDEX: 30.83 KG/M2 | DIASTOLIC BLOOD PRESSURE: 66 MMHG | SYSTOLIC BLOOD PRESSURE: 100 MMHG | HEIGHT: 63 IN | OXYGEN SATURATION: 92 % | HEART RATE: 70 BPM | WEIGHT: 174 LBS

## 2023-10-10 DIAGNOSIS — R06.02 SOB (SHORTNESS OF BREATH): ICD-10-CM

## 2023-10-10 DIAGNOSIS — R07.89 OTHER CHEST PAIN: ICD-10-CM

## 2023-10-10 DIAGNOSIS — R07.9 CHEST PAIN, UNSPECIFIED TYPE: ICD-10-CM

## 2023-10-10 DIAGNOSIS — R00.2 PALPITATION: ICD-10-CM

## 2023-10-10 DIAGNOSIS — Z01.818 PRE-OP TESTING: ICD-10-CM

## 2023-10-10 DIAGNOSIS — E66.01 MORBIDLY OBESE (HCC): ICD-10-CM

## 2023-10-10 DIAGNOSIS — Z01.810 PREOP CARDIOVASCULAR EXAM: Primary | ICD-10-CM

## 2023-10-10 DIAGNOSIS — R42 DIZZINESS: ICD-10-CM

## 2023-10-10 DIAGNOSIS — E78.5 DYSLIPIDEMIA: ICD-10-CM

## 2023-10-10 LAB
ALBUMIN SERPL-MCNC: 4.3 GM/DL (ref 3.4–5)
ALP BLD-CCNC: 334 IU/L (ref 40–128)
ALT SERPL-CCNC: 9 U/L (ref 10–40)
ANION GAP SERPL CALCULATED.3IONS-SCNC: 10 MMOL/L (ref 4–16)
AST SERPL-CCNC: 15 IU/L (ref 15–37)
BACTERIA: ABNORMAL /HPF
BASOPHILS ABSOLUTE: 0 K/CU MM
BASOPHILS RELATIVE PERCENT: 0.6 % (ref 0–1)
BILIRUB SERPL-MCNC: 0.9 MG/DL (ref 0–1)
BILIRUBIN URINE: NEGATIVE MG/DL
BLOOD, URINE: ABNORMAL
BUN SERPL-MCNC: 17 MG/DL (ref 6–23)
CALCIUM SERPL-MCNC: 9.4 MG/DL (ref 8.3–10.6)
CHLORIDE BLD-SCNC: 103 MMOL/L (ref 99–110)
CLARITY: CLEAR
CO2: 26 MMOL/L (ref 21–32)
COLOR: YELLOW
CREAT SERPL-MCNC: 1 MG/DL (ref 0.6–1.1)
DIFFERENTIAL TYPE: ABNORMAL
EOSINOPHILS ABSOLUTE: 0.2 K/CU MM
EOSINOPHILS RELATIVE PERCENT: 2.7 % (ref 0–3)
GFR SERPL CREATININE-BSD FRML MDRD: >60 ML/MIN/1.73M2
GLUCOSE SERPL-MCNC: 86 MG/DL (ref 70–99)
GLUCOSE, URINE: NEGATIVE MG/DL
HCT VFR BLD CALC: 42.7 % (ref 37–47)
HEMOGLOBIN: 14.2 GM/DL (ref 12.5–16)
IMMATURE NEUTROPHIL %: 0.2 % (ref 0–0.43)
KETONES, URINE: NEGATIVE MG/DL
LEUKOCYTE ESTERASE, URINE: ABNORMAL
LYMPHOCYTES ABSOLUTE: 2.2 K/CU MM
LYMPHOCYTES RELATIVE PERCENT: 34.1 % (ref 24–44)
MCH RBC QN AUTO: 29.9 PG (ref 27–31)
MCHC RBC AUTO-ENTMCNC: 33.3 % (ref 32–36)
MCV RBC AUTO: 89.9 FL (ref 78–100)
MONOCYTES ABSOLUTE: 0.3 K/CU MM
MONOCYTES RELATIVE PERCENT: 4.6 % (ref 0–4)
NITRITE URINE, QUANTITATIVE: NEGATIVE
NUCLEATED RBC %: 0 %
PDW BLD-RTO: 13.6 % (ref 11.7–14.9)
PH, URINE: 7 (ref 5–8)
PLATELET # BLD: 203 K/CU MM (ref 140–440)
PMV BLD AUTO: 9.7 FL (ref 7.5–11.1)
POTASSIUM SERPL-SCNC: 4.4 MMOL/L (ref 3.5–5.1)
PROTEIN UA: ABNORMAL MG/DL
RBC # BLD: 4.75 M/CU MM (ref 4.2–5.4)
RBC URINE: 110 /HPF (ref 0–6)
SEGMENTED NEUTROPHILS ABSOLUTE COUNT: 3.7 K/CU MM
SEGMENTED NEUTROPHILS RELATIVE PERCENT: 57.8 % (ref 36–66)
SODIUM BLD-SCNC: 139 MMOL/L (ref 135–145)
SPECIFIC GRAVITY UA: 1.02 (ref 1–1.03)
SQUAMOUS EPITHELIAL: 7 /HPF
TOTAL IMMATURE NEUTOROPHIL: 0.01 K/CU MM
TOTAL NUCLEATED RBC: 0 K/CU MM
TOTAL PROTEIN: 7.1 GM/DL (ref 6.4–8.2)
TRANSITIONAL EPITHELIAL: 1 /HPF
TRICHOMONAS: ABNORMAL /HPF
UROBILINOGEN, URINE: 1 MG/DL (ref 0.2–1)
WBC # BLD: 6.4 K/CU MM (ref 4–10.5)
WBC UA: 22 /HPF (ref 0–5)
YEAST: ABNORMAL /HPF

## 2023-10-10 PROCEDURE — 36415 COLL VENOUS BLD VENIPUNCTURE: CPT

## 2023-10-10 PROCEDURE — 99214 OFFICE O/P EST MOD 30 MIN: CPT | Performed by: INTERNAL MEDICINE

## 2023-10-10 PROCEDURE — 81001 URINALYSIS AUTO W/SCOPE: CPT

## 2023-10-10 PROCEDURE — 87086 URINE CULTURE/COLONY COUNT: CPT

## 2023-10-10 PROCEDURE — G8427 DOCREV CUR MEDS BY ELIG CLIN: HCPCS | Performed by: INTERNAL MEDICINE

## 2023-10-10 PROCEDURE — 1036F TOBACCO NON-USER: CPT | Performed by: INTERNAL MEDICINE

## 2023-10-10 PROCEDURE — G8484 FLU IMMUNIZE NO ADMIN: HCPCS | Performed by: INTERNAL MEDICINE

## 2023-10-10 PROCEDURE — 80053 COMPREHEN METABOLIC PANEL: CPT

## 2023-10-10 PROCEDURE — G8417 CALC BMI ABV UP PARAM F/U: HCPCS | Performed by: INTERNAL MEDICINE

## 2023-10-10 PROCEDURE — 85025 COMPLETE CBC W/AUTO DIFF WBC: CPT

## 2023-10-10 RX ORDER — BUPROPION HYDROCHLORIDE 150 MG/1
150 TABLET ORAL EVERY MORNING
COMMUNITY
Start: 2023-10-02

## 2023-10-10 NOTE — PATIENT INSTRUCTIONS
Please be informed that if you contact our office outside of normal business hours the physician on call cannot help with any scheduling or rescheduling issues, procedure instruction questions or any type of medication issue. We advise you for any urgent/emergency that you go to the nearest emergency room! PLEASE CALL OUR OFFICE DURING NORMAL BUSINESS HOURS    Monday - Friday   8 am to 5 pm    [place]: [address] [place]: [phone]    [place]:  [phone]    **It is YOUR responsibilty to bring medication bottles and/or updated medication list to [address]. This will allow us to better serve you and all your healthcare needs**    Thank you for allowing us to care for you today! We want to ensure we can follow your treatment plan and we strive to give you the best outcomes and experience possible. If you ever have a life threatening emergency and call 911 - for an ambulance (EMS)   Our providers can only care for you at:   [facility] or [facility]. Even if you have someone take you or you drive yourself we can only care for you in a [facility] facility. Our providers are not setup at the other healthcare locations! We are committed to providing you the best care possible. If you receive a survey after visiting one of our offices, please take time to share your experience concerning your physician office visit. These surveys are confidential and no health information about you is shared. We are eager to improve for you and we are counting on your feedback to help make that happen. Normal rate, regular rhythm.  Heart sounds S1, S2.  No murmurs, rubs or gallops.

## 2023-10-10 NOTE — PROGRESS NOTES
Joseph Mccloud MD        OFFICE  FOLLOWUP NOTE    Chief complaints: patient is here for management of h/O gastric sleeve,HYPERTHYRODISM, GERD, preop for left shoulder    F/u after normal LHC    Subjective: patient feels better, no chest pain, no shortness of breath, no dizziness, no palpitations    ESTHER Shipman is a 39 y. o.year old who  has a past medical history of Anxiety, Arthritis, Chronic depression, Fibromyalgia, GERD (gastroesophageal reflux disease), H/O bilateral breast reduction surgery, H/O of hysterectomy with bilateral oophorectomy, H/O percutaneous left heart catheterization, Hx of Doppler echocardiogram, Kidney disorder, Migraine, Mixed hyperlipidemia, Morbid obesity ([address]), PONV (postoperative nausea and vomiting), Preop pulmonary/respiratory exam, Prolonged emergence from general anesthesia, Pulmonic stenosis, and Sleep apnea. and presents for management of  h/O gastric sleeve,HYPERTHYRODISM, GERD, preop for left shoulder which are well controlled      Current Outpatient Medications   Medication Sig Dispense Refill    buPROPion (WELLBUTRIN XL) 150 MG extended release tablet Take 1 tablet by mouth every morning      omeprazole (PRILOSEC) 40 MG delayed release capsule Take 1 capsule by mouth every morning (before breakfast) 90 capsule 1    albuterol sulfate HFA (VENTOLIN HFA) 108 (90 Base) MCG/ACT inhaler Inhale 2 puffs into the lungs 4 times daily as needed for Wheezing 18 g 5    methIMAzole (TAPAZOLE) 10 MG tablet       metoprolol tartrate (LOPRESSOR) 25 MG tablet       Multiple Vitamins-Minerals (HAIR SKIN AND NAILS FORMULA PO) Take by mouth      oxyCODONE-acetaminophen (PERCOCET) 5-325 MG per tablet Take 0.5 tablets by mouth 3 times daily as needed. acetaminophen (TYLENOL) 500 MG tablet Take 1 tablet by mouth every 6 hours as needed for Pain       No current facility-administered medications for this visit.      Allergies: Codeine, Dilaudid [hydromorphone hcl], Hydromorphone,

## 2023-10-11 DIAGNOSIS — R31.0 GROSS HEMATURIA: Primary | ICD-10-CM

## 2023-10-11 LAB
CULTURE: NORMAL
Lab: NORMAL
SPECIMEN: NORMAL

## 2023-10-11 NOTE — PROGRESS NOTES
Patient notified of surgery time at [facility] 10/12/23 [id number] arrival at 18, NPO instructions and DOS meds reviewed

## 2023-10-11 NOTE — ANESTHESIA PRE PROCEDURE
\"POCK\", \"POCCL\", \"POCBUN\", \"POCHEMO\", \"POCHCT\" in the last 72 hours. Coags:   Lab Results   Component Value Date/Time    PROTIME 12.2 03/11/2022 10:37 AM    PROTIME 11.2 04/15/2012 09:26 PM    INR 0.95 03/11/2022 10:37 AM    APTT 43.3 03/11/2022 10:37 AM       HCG (If Applicable):   Lab Results   Component Value Date    PREGTESTUR NEGATIVE 03/24/2014        ABGs: No results found for: \"PHART\", \"PO2ART\", \"KSS5QJP\", \"UXK5SHO\", \"BEART\", \"W1HSMUZN\"     Type & Screen (If Applicable):  No results found for: \"LABABO\", \"LABRH\"    Drug/Infectious Status (If Applicable):  No results found for: \"HIV\", \"HEPCAB\"    COVID-19 Screening (If Applicable):   Lab Results   Component Value Date/Time    COVID19 NOT DETECTED 03/03/2023 10:03 AM    COVID19 DETECTED 09/26/2022 10:30 AM           Anesthesia Evaluation  Patient summary reviewed   history of anesthetic complications: PONV. Airway: Mallampati: II          Dental:    (+) edentulous      Pulmonary:   (+) sleep apnea:                             Cardiovascular:    (+) hyperlipidemia      ECG reviewed      Echocardiogram reviewed  Stress test reviewed  Cleared by cardiology           ROS comment:  Conclusions      Procedure Summary   normal coronaries      Recommendations   optimize medications      Signatures      --------------------------------------------------------   Electronically signed by Jasmina Watt MD (Performing Physician) on 09/14/2023 at 13:18   --------------------------------------------------------     Summary   Technically difficult examination done in supine due to left shoulder   injury. Left ventricular function and size is normal, EF is estimated at 55-60%. Grade I diastolic dysfunction. No regional wall motion abnormalities were detected. No significant valvular disease noted. Mild mitral and tricuspid regurgitation is present. RVSP is 24 mmHg. No evidence of pericardial effusion.       Signature

## 2023-10-12 ENCOUNTER — HOSPITAL ENCOUNTER (OUTPATIENT)
Age: 43
Setting detail: OUTPATIENT SURGERY
Discharge: HOME | End: 2023-10-12
Attending: STUDENT IN AN ORGANIZED HEALTH CARE EDUCATION/TRAINING PROGRAM | Admitting: STUDENT IN AN ORGANIZED HEALTH CARE EDUCATION/TRAINING PROGRAM
Payer: MEDICAID

## 2023-10-12 ENCOUNTER — ANESTHESIA (OUTPATIENT)
Dept: OPERATING ROOM | Age: 43
End: 2023-10-12
Payer: MEDICAID

## 2023-10-12 VITALS
HEIGHT: 63 IN | BODY MASS INDEX: 30.65 KG/M2 | WEIGHT: 173 LBS | DIASTOLIC BLOOD PRESSURE: 59 MMHG | SYSTOLIC BLOOD PRESSURE: 103 MMHG | TEMPERATURE: 97 F | RESPIRATION RATE: 18 BRPM | OXYGEN SATURATION: 96 % | HEART RATE: 60 BPM

## 2023-10-12 DIAGNOSIS — S46.012A TRAUMATIC COMPLETE TEAR OF LEFT ROTATOR CUFF, INITIAL ENCOUNTER: Primary | ICD-10-CM

## 2023-10-12 PROBLEM — M75.22 BICEPS TENDONITIS ON LEFT: Status: ACTIVE | Noted: 2023-10-12

## 2023-10-12 LAB — GLUCOSE BLD-MCNC: 106 MG/DL (ref 70–99)

## 2023-10-12 PROCEDURE — 64415 NJX AA&/STRD BRCH PLXS IMG: CPT | Performed by: ANESTHESIOLOGY

## 2023-10-12 PROCEDURE — 3600000004 HC SURGERY LEVEL 4 BASE: Performed by: STUDENT IN AN ORGANIZED HEALTH CARE EDUCATION/TRAINING PROGRAM

## 2023-10-12 PROCEDURE — 2500000003 HC RX 250 WO HCPCS: Performed by: NURSE ANESTHETIST, CERTIFIED REGISTERED

## 2023-10-12 PROCEDURE — 3700000001 HC ADD 15 MINUTES (ANESTHESIA): Performed by: STUDENT IN AN ORGANIZED HEALTH CARE EDUCATION/TRAINING PROGRAM

## 2023-10-12 PROCEDURE — 3600000014 HC SURGERY LEVEL 4 ADDTL 15MIN: Performed by: STUDENT IN AN ORGANIZED HEALTH CARE EDUCATION/TRAINING PROGRAM

## 2023-10-12 PROCEDURE — C1713 ANCHOR/SCREW BN/BN,TIS/BN: HCPCS | Performed by: STUDENT IN AN ORGANIZED HEALTH CARE EDUCATION/TRAINING PROGRAM

## 2023-10-12 PROCEDURE — 2720000010 HC SURG SUPPLY STERILE: Performed by: STUDENT IN AN ORGANIZED HEALTH CARE EDUCATION/TRAINING PROGRAM

## 2023-10-12 PROCEDURE — 2709999900 HC NON-CHARGEABLE SUPPLY: Performed by: STUDENT IN AN ORGANIZED HEALTH CARE EDUCATION/TRAINING PROGRAM

## 2023-10-12 PROCEDURE — 6360000002 HC RX W HCPCS: Performed by: ANESTHESIOLOGY

## 2023-10-12 PROCEDURE — 82962 GLUCOSE BLOOD TEST: CPT

## 2023-10-12 PROCEDURE — 6360000002 HC RX W HCPCS: Performed by: NURSE ANESTHETIST, CERTIFIED REGISTERED

## 2023-10-12 PROCEDURE — 6360000002 HC RX W HCPCS: Performed by: STUDENT IN AN ORGANIZED HEALTH CARE EDUCATION/TRAINING PROGRAM

## 2023-10-12 PROCEDURE — 2580000003 HC RX 258: Performed by: STUDENT IN AN ORGANIZED HEALTH CARE EDUCATION/TRAINING PROGRAM

## 2023-10-12 PROCEDURE — 7100000011 HC PHASE II RECOVERY - ADDTL 15 MIN: Performed by: STUDENT IN AN ORGANIZED HEALTH CARE EDUCATION/TRAINING PROGRAM

## 2023-10-12 PROCEDURE — 6370000000 HC RX 637 (ALT 250 FOR IP): Performed by: STUDENT IN AN ORGANIZED HEALTH CARE EDUCATION/TRAINING PROGRAM

## 2023-10-12 PROCEDURE — 7100000000 HC PACU RECOVERY - FIRST 15 MIN: Performed by: STUDENT IN AN ORGANIZED HEALTH CARE EDUCATION/TRAINING PROGRAM

## 2023-10-12 PROCEDURE — 7100000010 HC PHASE II RECOVERY - FIRST 15 MIN: Performed by: STUDENT IN AN ORGANIZED HEALTH CARE EDUCATION/TRAINING PROGRAM

## 2023-10-12 PROCEDURE — 3700000000 HC ANESTHESIA ATTENDED CARE: Performed by: STUDENT IN AN ORGANIZED HEALTH CARE EDUCATION/TRAINING PROGRAM

## 2023-10-12 PROCEDURE — 7100000001 HC PACU RECOVERY - ADDTL 15 MIN: Performed by: STUDENT IN AN ORGANIZED HEALTH CARE EDUCATION/TRAINING PROGRAM

## 2023-10-12 DEVICE — ANCHOR SUTURE BIOCOMP 4.75X19.1 MM SWIVELOCK C: Type: IMPLANTABLE DEVICE | Site: SHOULDER | Status: FUNCTIONAL

## 2023-10-12 DEVICE — ANCHOR BIOCOMPOSITE KNOTLESS SL  4.75X19.1MM W/#2 BL SUTURE: Type: IMPLANTABLE DEVICE | Site: SHOULDER | Status: FUNCTIONAL

## 2023-10-12 RX ORDER — SODIUM CHLORIDE 9 MG/ML
INJECTION, SOLUTION INTRAVENOUS PRN
Status: DISCONTINUED | OUTPATIENT
Start: 2023-10-12 | End: 2023-10-12 | Stop reason: HOSPADM

## 2023-10-12 RX ORDER — SODIUM CHLORIDE, SODIUM LACTATE, POTASSIUM CHLORIDE, CALCIUM CHLORIDE 600; 310; 30; 20 MG/100ML; MG/100ML; MG/100ML; MG/100ML
INJECTION, SOLUTION INTRAVENOUS CONTINUOUS
Status: CANCELLED | OUTPATIENT
Start: 2023-10-12

## 2023-10-12 RX ORDER — ROPIVACAINE HYDROCHLORIDE 5 MG/ML
INJECTION, SOLUTION EPIDURAL; INFILTRATION; PERINEURAL
Status: COMPLETED | OUTPATIENT
Start: 2023-10-12 | End: 2023-10-12

## 2023-10-12 RX ORDER — SODIUM CHLORIDE 0.9 % (FLUSH) 0.9 %
5-40 SYRINGE (ML) INJECTION PRN
Status: CANCELLED | OUTPATIENT
Start: 2023-10-12

## 2023-10-12 RX ORDER — ONDANSETRON 4 MG/1
4 TABLET, ORALLY DISINTEGRATING ORAL EVERY 8 HOURS PRN
Status: CANCELLED | OUTPATIENT
Start: 2023-10-12

## 2023-10-12 RX ORDER — KETOROLAC TROMETHAMINE 30 MG/ML
30 INJECTION, SOLUTION INTRAMUSCULAR; INTRAVENOUS EVERY 6 HOURS
Status: CANCELLED | OUTPATIENT
Start: 2023-10-12 | End: 2023-10-15

## 2023-10-12 RX ORDER — OXYCODONE HYDROCHLORIDE AND ACETAMINOPHEN 5; 325 MG/1; MG/1
1 TABLET ORAL EVERY 6 HOURS PRN
Qty: 28 TABLET | Refills: 0 | Status: SHIPPED | OUTPATIENT
Start: 2023-10-12 | End: 2023-10-19

## 2023-10-12 RX ORDER — ACETAMINOPHEN 500 MG
1000 TABLET ORAL ONCE
Status: COMPLETED | OUTPATIENT
Start: 2023-10-12 | End: 2023-10-12

## 2023-10-12 RX ORDER — SODIUM CHLORIDE 0.9 % (FLUSH) 0.9 %
5-40 SYRINGE (ML) INJECTION PRN
Status: DISCONTINUED | OUTPATIENT
Start: 2023-10-12 | End: 2023-10-12 | Stop reason: HOSPADM

## 2023-10-12 RX ORDER — ONDANSETRON 4 MG/1
4 TABLET, ORALLY DISINTEGRATING ORAL 3 TIMES DAILY PRN
Qty: 21 TABLET | Refills: 0 | Status: SHIPPED | OUTPATIENT
Start: 2023-10-12

## 2023-10-12 RX ORDER — LIDOCAINE HYDROCHLORIDE 20 MG/ML
INJECTION, SOLUTION EPIDURAL; INFILTRATION; INTRACAUDAL; PERINEURAL PRN
Status: DISCONTINUED | OUTPATIENT
Start: 2023-10-12 | End: 2023-10-12 | Stop reason: SDUPTHER

## 2023-10-12 RX ORDER — SODIUM CHLORIDE 0.9 % (FLUSH) 0.9 %
5-40 SYRINGE (ML) INJECTION EVERY 12 HOURS SCHEDULED
Status: DISCONTINUED | OUTPATIENT
Start: 2023-10-12 | End: 2023-10-12 | Stop reason: HOSPADM

## 2023-10-12 RX ORDER — CYCLOBENZAPRINE HCL 5 MG
5 TABLET ORAL 2 TIMES DAILY PRN
Qty: 30 TABLET | Refills: 0 | Status: SHIPPED | OUTPATIENT
Start: 2023-10-12 | End: 2023-10-22

## 2023-10-12 RX ORDER — PROPOFOL 10 MG/ML
INJECTION, EMULSION INTRAVENOUS PRN
Status: DISCONTINUED | OUTPATIENT
Start: 2023-10-12 | End: 2023-10-12 | Stop reason: SDUPTHER

## 2023-10-12 RX ORDER — OXYCODONE HYDROCHLORIDE 5 MG/1
10 TABLET ORAL EVERY 4 HOURS PRN
Status: CANCELLED | OUTPATIENT
Start: 2023-10-12

## 2023-10-12 RX ORDER — FENTANYL CITRATE 50 UG/ML
25 INJECTION, SOLUTION INTRAMUSCULAR; INTRAVENOUS EVERY 5 MIN PRN
Status: DISCONTINUED | OUTPATIENT
Start: 2023-10-12 | End: 2023-10-12 | Stop reason: HOSPADM

## 2023-10-12 RX ORDER — SODIUM CHLORIDE 9 MG/ML
INJECTION, SOLUTION INTRAVENOUS PRN
Status: CANCELLED | OUTPATIENT
Start: 2023-10-12

## 2023-10-12 RX ORDER — SODIUM CHLORIDE, SODIUM LACTATE, POTASSIUM CHLORIDE, CALCIUM CHLORIDE 600; 310; 30; 20 MG/100ML; MG/100ML; MG/100ML; MG/100ML
INJECTION, SOLUTION INTRAVENOUS CONTINUOUS PRN
Status: DISCONTINUED | OUTPATIENT
Start: 2023-10-12 | End: 2023-10-12 | Stop reason: SDUPTHER

## 2023-10-12 RX ORDER — SODIUM CHLORIDE 0.9 % (FLUSH) 0.9 %
5-40 SYRINGE (ML) INJECTION EVERY 12 HOURS SCHEDULED
Status: CANCELLED | OUTPATIENT
Start: 2023-10-12

## 2023-10-12 RX ORDER — OXYCODONE HYDROCHLORIDE 5 MG/1
5 TABLET ORAL EVERY 4 HOURS PRN
Status: CANCELLED | OUTPATIENT
Start: 2023-10-12

## 2023-10-12 RX ORDER — ROCURONIUM BROMIDE 10 MG/ML
INJECTION, SOLUTION INTRAVENOUS PRN
Status: DISCONTINUED | OUTPATIENT
Start: 2023-10-12 | End: 2023-10-12 | Stop reason: SDUPTHER

## 2023-10-12 RX ORDER — ONDANSETRON 2 MG/ML
INJECTION INTRAMUSCULAR; INTRAVENOUS PRN
Status: DISCONTINUED | OUTPATIENT
Start: 2023-10-12 | End: 2023-10-12 | Stop reason: SDUPTHER

## 2023-10-12 RX ORDER — ONDANSETRON 2 MG/ML
4 INJECTION INTRAMUSCULAR; INTRAVENOUS EVERY 6 HOURS PRN
Status: CANCELLED | OUTPATIENT
Start: 2023-10-12

## 2023-10-12 RX ORDER — LIDOCAINE HYDROCHLORIDE 20 MG/ML
INJECTION, SOLUTION INFILTRATION; PERINEURAL PRN
Status: DISCONTINUED | OUTPATIENT
Start: 2023-10-12 | End: 2023-10-12

## 2023-10-12 RX ORDER — MIDAZOLAM HYDROCHLORIDE 1 MG/ML
INJECTION INTRAMUSCULAR; INTRAVENOUS PRN
Status: DISCONTINUED | OUTPATIENT
Start: 2023-10-12 | End: 2023-10-12 | Stop reason: SDUPTHER

## 2023-10-12 RX ORDER — HYDRALAZINE HYDROCHLORIDE 20 MG/ML
10 INJECTION INTRAMUSCULAR; INTRAVENOUS
Status: DISCONTINUED | OUTPATIENT
Start: 2023-10-12 | End: 2023-10-12 | Stop reason: HOSPADM

## 2023-10-12 RX ORDER — FENTANYL CITRATE 50 UG/ML
50 INJECTION, SOLUTION INTRAMUSCULAR; INTRAVENOUS EVERY 5 MIN PRN
Status: DISCONTINUED | OUTPATIENT
Start: 2023-10-12 | End: 2023-10-12 | Stop reason: HOSPADM

## 2023-10-12 RX ORDER — LABETALOL HYDROCHLORIDE 5 MG/ML
10 INJECTION, SOLUTION INTRAVENOUS
Status: DISCONTINUED | OUTPATIENT
Start: 2023-10-12 | End: 2023-10-12 | Stop reason: HOSPADM

## 2023-10-12 RX ORDER — SODIUM CHLORIDE, SODIUM LACTATE, POTASSIUM CHLORIDE, CALCIUM CHLORIDE 600; 310; 30; 20 MG/100ML; MG/100ML; MG/100ML; MG/100ML
INJECTION, SOLUTION INTRAVENOUS CONTINUOUS
Status: DISCONTINUED | OUTPATIENT
Start: 2023-10-12 | End: 2023-10-12 | Stop reason: HOSPADM

## 2023-10-12 RX ORDER — ONDANSETRON 2 MG/ML
4 INJECTION INTRAMUSCULAR; INTRAVENOUS
Status: COMPLETED | OUTPATIENT
Start: 2023-10-12 | End: 2023-10-12

## 2023-10-12 RX ORDER — FENTANYL CITRATE 50 UG/ML
INJECTION, SOLUTION INTRAMUSCULAR; INTRAVENOUS PRN
Status: DISCONTINUED | OUTPATIENT
Start: 2023-10-12 | End: 2023-10-12 | Stop reason: SDUPTHER

## 2023-10-12 RX ORDER — DEXAMETHASONE SODIUM PHOSPHATE 4 MG/ML
INJECTION, SOLUTION INTRA-ARTICULAR; INTRALESIONAL; INTRAMUSCULAR; INTRAVENOUS; SOFT TISSUE PRN
Status: DISCONTINUED | OUTPATIENT
Start: 2023-10-12 | End: 2023-10-12 | Stop reason: SDUPTHER

## 2023-10-12 RX ADMIN — SODIUM CHLORIDE, POTASSIUM CHLORIDE, SODIUM LACTATE AND CALCIUM CHLORIDE: 600; 310; 30; 20 INJECTION, SOLUTION INTRAVENOUS at 08:34

## 2023-10-12 RX ADMIN — FENTANYL CITRATE 50 MCG: 50 INJECTION, SOLUTION INTRAMUSCULAR; INTRAVENOUS at 09:33

## 2023-10-12 RX ADMIN — SODIUM CHLORIDE, SODIUM LACTATE, POTASSIUM CHLORIDE, CALCIUM CHLORIDE: 600; 310; 30; 20 INJECTION, SOLUTION INTRAVENOUS at 10:53

## 2023-10-12 RX ADMIN — MIDAZOLAM 2 MG: 1 INJECTION INTRAMUSCULAR; INTRAVENOUS at 08:57

## 2023-10-12 RX ADMIN — FENTANYL CITRATE 50 MCG: 50 INJECTION, SOLUTION INTRAMUSCULAR; INTRAVENOUS at 09:50

## 2023-10-12 RX ADMIN — ONDANSETRON 4 MG: 2 INJECTION INTRAMUSCULAR; INTRAVENOUS at 11:39

## 2023-10-12 RX ADMIN — LIDOCAINE HYDROCHLORIDE 25 MG: 20 INJECTION, SOLUTION EPIDURAL; INFILTRATION; INTRACAUDAL; PERINEURAL at 09:15

## 2023-10-12 RX ADMIN — ROCURONIUM BROMIDE 50 MG: 10 INJECTION INTRAVENOUS at 09:33

## 2023-10-12 RX ADMIN — PROPOFOL 100 MG: 10 INJECTION, EMULSION INTRAVENOUS at 09:33

## 2023-10-12 RX ADMIN — CEFAZOLIN 2000 MG: 2 INJECTION, POWDER, FOR SOLUTION INTRAMUSCULAR; INTRAVENOUS at 08:34

## 2023-10-12 RX ADMIN — ONDANSETRON 4 MG: 2 INJECTION INTRAMUSCULAR; INTRAVENOUS at 12:55

## 2023-10-12 RX ADMIN — SODIUM CHLORIDE, SODIUM LACTATE, POTASSIUM CHLORIDE, CALCIUM CHLORIDE: 600; 310; 30; 20 INJECTION, SOLUTION INTRAVENOUS at 08:57

## 2023-10-12 RX ADMIN — ONDANSETRON 4 MG: 2 INJECTION INTRAMUSCULAR; INTRAVENOUS at 13:06

## 2023-10-12 RX ADMIN — ACETAMINOPHEN 1000 MG: 500 TABLET ORAL at 08:27

## 2023-10-12 RX ADMIN — DEXAMETHASONE SODIUM PHOSPHATE 4 MG: 4 INJECTION, SOLUTION INTRAMUSCULAR; INTRAVENOUS at 09:54

## 2023-10-12 RX ADMIN — ROPIVACAINE HYDROCHLORIDE 25 ML: 5 INJECTION, SOLUTION EPIDURAL; INFILTRATION; PERINEURAL at 09:22

## 2023-10-12 RX ADMIN — SUGAMMADEX 200 MG: 100 INJECTION, SOLUTION INTRAVENOUS at 11:55

## 2023-10-12 ASSESSMENT — ENCOUNTER SYMPTOMS
STRIDOR: 0
VOMITING: 0
NAUSEA: 0
EYE PAIN: 0
SHORTNESS OF BREATH: 0
FACIAL SWELLING: 0
EYE REDNESS: 0
ABDOMINAL PAIN: 0
COLOR CHANGE: 0
WHEEZING: 0
PHOTOPHOBIA: 0
BACK PAIN: 0
COUGH: 0

## 2023-10-12 ASSESSMENT — PAIN SCALES - GENERAL
PAINLEVEL_OUTOF10: 5
PAINLEVEL_OUTOF10: 0

## 2023-10-12 ASSESSMENT — PAIN - FUNCTIONAL ASSESSMENT: PAIN_FUNCTIONAL_ASSESSMENT: 0-10

## 2023-10-12 ASSESSMENT — PAIN DESCRIPTION - DESCRIPTORS: DESCRIPTORS: ACHING;STABBING;DISCOMFORT

## 2023-10-12 NOTE — PROGRESS NOTES
Pt. Is resting quietly with eyes closed, resps easy and unlabored. Pt. Has no complaints. She can be aroused by voice and touch. Pt. Denies pain or nausea at this time. VS stable. Will transport pt. Back to \[facility]\"" .

## 2023-10-12 NOTE — ANESTHESIA PROCEDURE NOTES
Peripheral Block    Patient location during procedure: PACU  Reason for block: post-op pain management and at surgeon's request  Start time: 10/12/2023 9:15 AM  End time: 10/12/2023 9:25 AM  Staffing  Performed: other anesthesia staff and resident/CRNA   Anesthesiologist: Beto Thompson MD  Resident/CRNA: VIOLET Escalante CRNA  Other anesthesia staff: Angelina Segovia RN  Performed by: VIOLET Escalante CRNA  Authorized by:  Beto Thompson MD    Preanesthetic Checklist  Completed: patient identified, IV checked, site marked, risks and benefits discussed, surgical/procedural consents, equipment checked, pre-op evaluation, timeout performed, anesthesia consent given, oxygen available, monitors applied/VS acknowledged, fire risk safety assessment completed and verbalized and blood product R/B/A discussed and consented  Peripheral Block   Patient position: sitting  Prep: ChloraPrep  Provider prep: sterile gloves and mask  Patient monitoring: cardiac monitor, continuous pulse ox, frequent blood pressure checks, IV access and responsive to questions  Block type: Brachial plexus  Supraclavicular  Laterality: left  Injection technique: single-shot  Guidance: ultrasound guided  Local infiltration: lidocaine  Infiltration strength: 2 %  Local infiltration: lidocaine  Dose: 1.5 mL    Needle   Needle localization: anatomical landmarks and ultrasound guidance  Assessment   Injection assessment: negative aspiration for heme, no paresthesia on injection, no intravascular symptoms and local visualized surrounding nerve on ultrasound  Paresthesia pain: none  Slow fractionated injection: yes  Hemodynamics: stable  Real-time US image taken/store: yes  Outcomes: uncomplicated and patient tolerated procedure well    Medications Administered  ropivacaine (NAROPIN) injection 0.5% - Perineural   25 mL - 10/12/2023 9:22:00 AM

## 2023-10-12 NOTE — ANESTHESIA POSTPROCEDURE EVALUATION
Department of Anesthesiology  Postprocedure Note    Patient: Tamiko Karimi  MRN: [id number]  YOB: 1980  Date of evaluation: 10/12/2023      Procedure Summary     Date: 10/12/23 Room / Location: [address] [place] [address]    Anesthesia Start: 7980 Anesthesia Stop: 1214    Procedures:       LEFT SHOULDER ARTHROSCOPY ROTATOR CUFF REPAIR; SUBACROMIAL DECOMPRESSION; DISTAL CLAVICLE EXCISION; DEBRIDEMENT (Left: Shoulder)      LEFT BICEPS TENODESIS (Left: Shoulder) Diagnosis:       Sprain of left rotator cuff capsule, initial encounter      Impingement syndrome, shoulder, left      Biceps tendonitis on left      Primary osteoarthritis of left shoulder      (Sprain of left rotator cuff capsule, initial encounter [B46.816B])      (Impingement syndrome, shoulder, left [M75.42])      (Biceps tendonitis on left [M75.22])      (Primary osteoarthritis of left shoulder [M19.012])    Surgeons: Boy De La Torre DO Responsible Provider: Carlo Silva MD    Anesthesia Type: Regional, General ASA Status: 2          Anesthesia Type: Regional, General    Waldo Phase I: Waldo Score: 8    Waldo Phase II:        Anesthesia Post Evaluation    Patient location during evaluation: PACU  Patient participation: complete - patient participated  Level of consciousness: awake  Pain score: 0  Airway patency: patent  Nausea & Vomiting: no nausea and no vomiting  Complications: no  Cardiovascular status: blood pressure returned to baseline and hemodynamically stable  Respiratory status: acceptable and face mask  Hydration status: euvolemic  Multimodal analgesia pain management approach  Pain management: adequate

## 2023-10-12 NOTE — H&P
as well as the benefits and negatives of both interventions. Due to the patient's significant symptoms including pain, lack of range of motion and lack of strength, they would like to proceed with surgical intervention. I discussed surgical intervention in the form of:    Left shoulder arthroscopic  Rotator cuff repair   Subacromial decompression  Distal Clavicle Excision  Possible biceps tenodesis  Debridement    I explained risks, benefits, possible complications of the procedure and answered all questions for the patient. I explained the nature of the surgery and how there is always a risk for but not limited to neurovascular injury as well as need for further surgery, retear, shoulder instability, continued lack of function in the shoulder including with strength and range of motion and complications such as blood loss, neurovascular injury. I also discussed anesthesia risk including the risk of death. I explained postoperative rehabilitation protocol and expectations with the patient today. The patient understands and consents to the procedure. Patient will need PCP and cardiac clearance. The patient was counseled at length about the risks of campos Covid-19 during their perioperative period and any recovery window from their procedure. The patient was made aware that campos Covid-19  may worsen their prognosis for recovering from their procedure  and lend to a higher morbidity and/or mortality risk. All material risks, benefits, and reasonable alternatives including postponing the procedure were discussed. The patient does wish to proceed with the procedure at this time. Pt seen and examined, No change in H+P.  Cleared by PCP, cardiologist    Electronically signed by Andrés Damon DO on 10/12/2023 at 8:27 AM

## 2023-10-12 NOTE — OP NOTE
Operative Note      Patient: Caty Loya  YOB: 1980  MRN: [id number]    Date of Procedure: 10/12/2023    Pre-Op Diagnosis Codes: * Sprain of left rotator cuff capsule, initial encounter [M09.368A]     * Impingement syndrome, shoulder, left [M75.42]     * Biceps tendonitis on left [M75.22]     * Primary osteoarthritis of left shoulder [M19.012]    Post-Op Diagnosis: Same       Procedure(s):  LEFT SHOULDER ARTHROSCOPY ROTATOR CUFF REPAIR; SUBACROMIAL DECOMPRESSION; DISTAL CLAVICLE EXCISION; DEBRIDEMENT  LEFT BICEPS TENODESIS    Surgeon(s):  Bernice De La Torre DO    Assistant:   * No surgical staff found *    Anesthesia: General    Estimated Blood Loss (mL): less than 50     Complications: None    Specimens:   * No specimens in log *    Implants:  Implant Name Type Inv. Item Serial No.  Lot No. LRB No. Used Action   [facility] SUTURE BIOCOMP 4.75X19.1 MM Severa  [id number]  [facility] SUTURE BIOCOMP 4.75X19.1 MM [employer] [id number] Left 1 Implanted   ANCHOR BIOCOMPOSITE KNOTLESS SL  4.75X19.1MM W/#2 BL SUTURE - [id number]  ANCHOR BIOCOMPOSITE KNOTLESS SL  4.75X19.1MM W/#2 BL SUTURE  [employer] [id number] Left 1 Implanted         Drains: * No LDAs found *    Findings: Please see dictated op report      Detailed Description of Procedure:     PREOPERATIVE DIAGNOSES:     Left side    1. Shoulder rotator cuff tear     2. Shoulder SLAP    3. Shoulder synovitis    4. Acromioclavicular joint osteoarthritis    5. Shoulder impingement, bursitis        POSTOPERATIVE DIAGNOSES:     Left side    1. Shoulder rotator cuff tear     2. Shoulder SLAP    3. Shoulder synovitis    4. Acromioclavicular joint osteoarthritis    5. Shoulder impingement, bursitis         OPERATION:     Left side    1. Shoulder arthroscopic rotator cuff repair - small, partial thickness with single row repair    2. Shoulder biceps tenodesis - arthroscopic    3.  Shoulder arthroscopic subacromial decompression, maintained. No further knotless anchors were required. I did probe the area of repair and found no significant area of rotator cuff that was able to be lifted with the probe as well as with attempted passing of the scorpion suture with the retaining stitch. This was tested in both the anterior and posterior aspects of the cuff repair and the areas were found to be watertight and compressed well against the humeral head at the greater tuberosity. The repair demonstrated smooth edges and required no further repair techniques. Final pictures of the repair were obtained. With motion both internally and externally the repair remained intact. Excellent stability was demonstrated with a tight seal and solid repair. I then turned my attention to the [facility] joint. Soft tissue below the joint was removed with the arthroscopic shaver and vapor wand. The remaining portion of the [facility] region was treated with 1 cm burring  off of the distal clavicular edge along with the associated acromial edge, leaving the superior and posterior ligaments intact. Arthroscopic pictures of all repair sites were obtained and saved for documentation. The previous arthroscopy portals were also closed with 3-0 nylon suture. We then applied Xeroform gauze, ABD pads and Medipore tape to the wound sites. The patient's arm was placed in a cooling unit, sling and abduction pillow. The patient was allowed to recover from the anesthetic, was extubated and was taken to Recovery in stable condition. At the completion of case, all instrument and sponge counts were correct. I was able to speak to the patient and their family immediately after the case. Patient was given prescriptions for Percocet for pain control as well as Flexeril for muscle cramping and Zofran for nausea. The patient was doing very well without any issues or complaints. I explained to them the nature of what we performed during the case and all questions were answered.   I also

## 2023-10-12 NOTE — PROGRESS NOTES
Pt. Resting quietly with eyes closed, resps easy and unlabored. Pt. Denies pain or nausea. Dressing to left shoulder is clean/dry/intact. No drainage noted. Ice man in place. Iv infusing without difficulty. SCDs to BLEs. Pt. Ready to be transported back to [facility] via cart.

## 2023-10-12 NOTE — PROGRESS NOTES
Pt. Arrived in PACU via cart from the OR. Brakes applied, side rails up x 2. Placed on oxygen via NC @ 4L. Attached to monitor, vs stable. Pt. Winferd Rides, but arouses to voice and touch. Denies pain or nausea. Clean, dry, intact dressing to left shoulder. No drainage noted. Ice man in place. IV infusing without difficulty. SCDs to BLEs. Bedside reports received from [address] and CRNA. Will continue to monitor via bedside.

## 2023-10-12 NOTE — DISCHARGE INSTRUCTIONS
[facility] in [address] not drive, work around [address] or use equipment. Do not drink any alcoholic beverages. Do not smoke while alone. Avoid making important decisions. Plan to spend a quiet, relaxed evening @ home. Resume normal activities as you begin to feel better. Eat lightly for your first meal, then gradually increase your diet to what is normal for you. In case of nausea, avoid food and drink only clear liquids. Resume food as nausea ceases. Notify your surgeon if you experience fever, chills, large amount of bleeding, difficulty breathing, persistent nausea and vomiting or any other disturbing problem. Call for a follow-up appointment with your surgeon. *Please follow instructions given to you by Dr. Romi Brown attached discharge paper. *

## 2023-10-12 NOTE — PROGRESS NOTES
1334- Patient arrived back to [facility]. Report given to this nurse from [facility] RN.patient A&O arousal able when calling her name, answers questions appropriately. VSS, No C/O pain. Just feels heavy. Beverage of choice offered to patient. Call light in reach and bed in lowest position. 1353- Patient escorted to restroom. 1401- IV removed. Discharge instructions given to patient and her  both verbalized understanding. Patient sitting on side of bed getting dressed assisted by  and her mother. 1420- Patient escorted to car via wheelchair transported home by spouse.

## 2023-10-17 ENCOUNTER — TELEPHONE (OUTPATIENT)
Dept: ORTHOPEDIC SURGERY | Age: 43
End: 2023-10-17

## 2023-10-17 NOTE — TELEPHONE ENCOUNTER
Patient states that she was stepping down into her bathroom stairs. Patient states that she felt a pop into the bicep area. It has been aching since 8 am. Patient states that the mid bicep popped. Patient states that she is having 8-9/10 pain. Please advise on what to do. Nisreen Wright

## 2023-10-18 ENCOUNTER — OFFICE VISIT (OUTPATIENT)
Dept: FAMILY MEDICINE CLINIC | Age: 43
End: 2023-10-18
Payer: MEDICAID

## 2023-10-18 VITALS
HEIGHT: 63 IN | OXYGEN SATURATION: 97 % | SYSTOLIC BLOOD PRESSURE: 130 MMHG | HEART RATE: 59 BPM | DIASTOLIC BLOOD PRESSURE: 78 MMHG | WEIGHT: 177.4 LBS | BODY MASS INDEX: 31.43 KG/M2

## 2023-10-18 DIAGNOSIS — F32.A CHRONIC DEPRESSION: ICD-10-CM

## 2023-10-18 DIAGNOSIS — Z90.5 SINGLE KIDNEY: ICD-10-CM

## 2023-10-18 DIAGNOSIS — F41.9 ANXIETY: ICD-10-CM

## 2023-10-18 DIAGNOSIS — E55.9 VITAMIN D DEFICIENCY: ICD-10-CM

## 2023-10-18 DIAGNOSIS — Z90.3 S/P GASTRIC SLEEVE PROCEDURE: ICD-10-CM

## 2023-10-18 DIAGNOSIS — M79.7 FIBROMYALGIA: ICD-10-CM

## 2023-10-18 DIAGNOSIS — G89.18 POSTOPERATIVE PAIN, ACUTE, SHOULDER: ICD-10-CM

## 2023-10-18 DIAGNOSIS — R74.8 HIGH ALKALINE PHOSPHATASE: ICD-10-CM

## 2023-10-18 DIAGNOSIS — E05.90 HYPERTHYROIDISM: ICD-10-CM

## 2023-10-18 DIAGNOSIS — Z12.31 ENCOUNTER FOR SCREENING MAMMOGRAM FOR BREAST CANCER: Primary | ICD-10-CM

## 2023-10-18 DIAGNOSIS — Z78.0 ENCOUNTER FOR OSTEOPOROSIS SCREENING IN ASYMPTOMATIC POSTMENOPAUSAL PATIENT: ICD-10-CM

## 2023-10-18 DIAGNOSIS — Z13.820 ENCOUNTER FOR OSTEOPOROSIS SCREENING IN ASYMPTOMATIC POSTMENOPAUSAL PATIENT: ICD-10-CM

## 2023-10-18 DIAGNOSIS — M54.42 CHRONIC BILATERAL LOW BACK PAIN WITH BILATERAL SCIATICA: ICD-10-CM

## 2023-10-18 DIAGNOSIS — M25.519 POSTOPERATIVE PAIN, ACUTE, SHOULDER: ICD-10-CM

## 2023-10-18 DIAGNOSIS — G43.009 MIGRAINE WITHOUT AURA AND WITHOUT STATUS MIGRAINOSUS, NOT INTRACTABLE: ICD-10-CM

## 2023-10-18 DIAGNOSIS — G47.33 OSA (OBSTRUCTIVE SLEEP APNEA): ICD-10-CM

## 2023-10-18 DIAGNOSIS — E78.2 MIXED HYPERLIPIDEMIA: ICD-10-CM

## 2023-10-18 DIAGNOSIS — G89.29 CHRONIC BILATERAL LOW BACK PAIN WITH BILATERAL SCIATICA: ICD-10-CM

## 2023-10-18 DIAGNOSIS — M54.41 CHRONIC BILATERAL LOW BACK PAIN WITH BILATERAL SCIATICA: ICD-10-CM

## 2023-10-18 PROCEDURE — 36415 COLL VENOUS BLD VENIPUNCTURE: CPT | Performed by: NURSE PRACTITIONER

## 2023-10-18 PROCEDURE — G8427 DOCREV CUR MEDS BY ELIG CLIN: HCPCS | Performed by: NURSE PRACTITIONER

## 2023-10-18 PROCEDURE — G8484 FLU IMMUNIZE NO ADMIN: HCPCS | Performed by: NURSE PRACTITIONER

## 2023-10-18 PROCEDURE — 99214 OFFICE O/P EST MOD 30 MIN: CPT | Performed by: NURSE PRACTITIONER

## 2023-10-18 PROCEDURE — 1036F TOBACCO NON-USER: CPT | Performed by: NURSE PRACTITIONER

## 2023-10-18 PROCEDURE — G8417 CALC BMI ABV UP PARAM F/U: HCPCS | Performed by: NURSE PRACTITIONER

## 2023-10-18 NOTE — PROGRESS NOTES
toxic-appearing or diaphoretic. HENT:      Head: Normocephalic and atraumatic. Nose: Nose normal.   Eyes:      Extraocular Movements: Extraocular movements intact. Pupils: Pupils are equal, round, and reactive to light. Cardiovascular:      Rate and Rhythm: Normal rate and regular rhythm. Heart sounds: Normal heart sounds. Pulmonary:      Effort: Pulmonary effort is normal.      Breath sounds: Normal breath sounds. Abdominal:      General: Bowel sounds are normal. There is no distension. Palpations: Abdomen is soft. There is no mass. Tenderness: There is no abdominal tenderness. Hernia: No hernia is present. Musculoskeletal:      Cervical back: Normal range of motion and neck supple. Right lower leg: No edema. Left lower leg: No edema. Comments: Left arm/ shoulder immobilizer. Skin:     General: Skin is warm and dry. Neurological:      General: No focal deficit present. Mental Status: She is alert and oriented to person, place, and time. Mental status is at baseline. Psychiatric:         Mood and Affect: Mood normal.         Behavior: Behavior normal.         Thought Content: Thought content normal.         Judgment: Judgment normal.         ASSESSMENT/PLAN:  1. Encounter for screening mammogram for breast cancer  -     ZAID DIGITAL SCREEN W CAD BILATERAL PER PROTOCOL; Future  2. High alkaline phosphatase  -     Hepatic Function Panel  3. Encounter for osteoporosis screening in asymptomatic postmenopausal patient  4. Hyperthyroidism  5. EMILEE (obstructive sleep apnea)  6. Chronic bilateral low back pain with bilateral sciatica  7. Mixed hyperlipidemia  8. S/P gastric sleeve procedure  9. Migraine without aura and without status migrainosus, not intractable  10. Vitamin D deficiency  11. Single kidney  12. Anxiety  13. Fibromyalgia  14. Chronic depression  15.  Postoperative pain, acute, shoulder        Mammo ordered for feb 2024  Follow up with ortho

## 2023-10-18 NOTE — TELEPHONE ENCOUNTER
Please make sure patient has been in her sling. This should not be a substantial problem but I can discuss it further with her at her follow-up visit but nothing to worry about. However, if she still has pain on Thursday and she would like to be seen on Friday of this week, please put her on the schedule to be seen.

## 2023-10-18 NOTE — PATIENT INSTRUCTIONS
[facility] - Jose Lyons MD   [address] [place] [address] [address], [address] [facility], [address] [place] [address]   [phone]

## 2023-10-19 DIAGNOSIS — R74.8 HIGH ALKALINE PHOSPHATASE: Primary | ICD-10-CM

## 2023-10-19 LAB
ALBUMIN SERPL-MCNC: 4.4 G/DL (ref 3.4–5)
ALP SERPL-CCNC: 285 U/L (ref 40–129)
ALT SERPL-CCNC: 7 U/L (ref 10–40)
AST SERPL-CCNC: 15 U/L (ref 15–37)
BILIRUB DIRECT SERPL-MCNC: <0.2 MG/DL (ref 0–0.3)
BILIRUB INDIRECT SERPL-MCNC: ABNORMAL MG/DL (ref 0–1)
BILIRUB SERPL-MCNC: 0.8 MG/DL (ref 0–1)
PROT SERPL-MCNC: 7.2 G/DL (ref 6.4–8.2)

## 2023-10-25 ENCOUNTER — OFFICE VISIT (OUTPATIENT)
Dept: ORTHOPEDIC SURGERY | Age: 43
End: 2023-10-25

## 2023-10-25 VITALS — HEART RATE: 68 BPM | DIASTOLIC BLOOD PRESSURE: 58 MMHG | OXYGEN SATURATION: 99 % | SYSTOLIC BLOOD PRESSURE: 110 MMHG

## 2023-10-25 DIAGNOSIS — Z98.890 STATUS POST ROTATOR CUFF REPAIR: Primary | ICD-10-CM

## 2023-10-25 PROCEDURE — 99024 POSTOP FOLLOW-UP VISIT: CPT | Performed by: STUDENT IN AN ORGANIZED HEALTH CARE EDUCATION/TRAINING PROGRAM

## 2023-10-25 NOTE — PROGRESS NOTES
Has been wearing sling without trouble  Still taking flexeril for muscle spasms, helping  Pain well managed  Starts PT tomorrow  Incision cleared, sutures out

## 2023-10-25 NOTE — PROGRESS NOTES
Date of surgery:  10/12/2023     Procedure:  Left side  1. Shoulder arthroscopic rotator cuff repair - small, partial thickness with single row repair  2. Shoulder biceps tenodesis - arthroscopic  3. Shoulder arthroscopic subacromial decompression, bursectomy   4. Shoulder arthroscopic extensive debridement (anterior, posterior glenohumeral joint, subacromial space) (CPT 91164)  5. Shoulder arthroscopic labral debridement (CPT 67440)  6. Shoulder arthroscopic distal clavicle excision      History:  Patient is here in follow up regarding their 2-week postop appointment for above procedure    Patient is doing well. They have improving 2/10 pain. They deny chest pain, SOB, calf pain,fever,wound drainage. No other issues. Patient denies any constitutional symptoms. Patient states they have been compliant with restrictions. Patient states they have been using their sling as ordered. Physical:   Patient demonstrates appropriate mood and affect. Left lower extremity exam:    The incisions are well approximated and are clean, dry, intact, and nontender with no erythema. They have minimal edema, the Arm compartments are soft . There are No cords or calf tenderness. No significant calf/ankle edema. They are neurovascularly intact distally. Sutures removed without issue    Range of motion of the left shoulder not tested at this time    No areas of tenderness to palpation    Negative Adrianna's    Imaging:   No new orthopedic imaging     Impression: Status post above, doing well        Plan:   -Intraoperative arthroscopic imaging reviewed with patient  -Patient placed back into sling and this to be maintained till 6 weeks postop  -continue the PT protocol   -Patient is nonweightbearing at this time and no active range of motion.   She does have a physical therapy appointment scheduled for tomorrow and they can work with her on some passive range of motion activities but again the sling is to remain in place

## 2023-10-26 ENCOUNTER — OFFICE VISIT (OUTPATIENT)
Dept: BARIATRICS/WEIGHT MGMT | Age: 43
End: 2023-10-26
Payer: MEDICAID

## 2023-10-26 ENCOUNTER — HOSPITAL ENCOUNTER (OUTPATIENT)
Dept: PHYSICAL THERAPY | Age: 43
Setting detail: THERAPIES SERIES
Discharge: HOME | End: 2023-10-26
Payer: MEDICAID

## 2023-10-26 VITALS
OXYGEN SATURATION: 99 % | HEIGHT: 63 IN | WEIGHT: 174.8 LBS | DIASTOLIC BLOOD PRESSURE: 82 MMHG | BODY MASS INDEX: 30.97 KG/M2 | SYSTOLIC BLOOD PRESSURE: 104 MMHG | HEART RATE: 56 BPM

## 2023-10-26 DIAGNOSIS — Z98.84 STATUS POST LAPAROSCOPIC SLEEVE GASTRECTOMY: ICD-10-CM

## 2023-10-26 DIAGNOSIS — R63.5 WEIGHT GAIN: Primary | ICD-10-CM

## 2023-10-26 PROCEDURE — G8427 DOCREV CUR MEDS BY ELIG CLIN: HCPCS | Performed by: NURSE PRACTITIONER

## 2023-10-26 PROCEDURE — G8417 CALC BMI ABV UP PARAM F/U: HCPCS | Performed by: NURSE PRACTITIONER

## 2023-10-26 PROCEDURE — 1036F TOBACCO NON-USER: CPT | Performed by: NURSE PRACTITIONER

## 2023-10-26 PROCEDURE — 99213 OFFICE O/P EST LOW 20 MIN: CPT | Performed by: NURSE PRACTITIONER

## 2023-10-26 PROCEDURE — 97110 THERAPEUTIC EXERCISES: CPT

## 2023-10-26 PROCEDURE — 97161 PT EVAL LOW COMPLEX 20 MIN: CPT

## 2023-10-26 PROCEDURE — G8484 FLU IMMUNIZE NO ADMIN: HCPCS | Performed by: NURSE PRACTITIONER

## 2023-10-26 RX ORDER — OXYCODONE HYDROCHLORIDE AND ACETAMINOPHEN 5; 325 MG/1; MG/1
1 TABLET ORAL 3 TIMES DAILY PRN
COMMUNITY
Start: 2023-10-19

## 2023-10-26 RX ORDER — METHIMAZOLE 5 MG/1
1 TABLET ORAL DAILY
COMMUNITY
Start: 2023-10-13

## 2023-10-26 RX ORDER — TRAZODONE HYDROCHLORIDE 50 MG/1
50 TABLET ORAL
COMMUNITY
Start: 2023-08-24

## 2023-10-26 NOTE — FLOWSHEET NOTE
Outpatient Physical Therapy  [place]           [x] Phone: [phone]   Fax: [phone]  [facility]           [] Phone: [phone]   Fax: [phone]        Physical Therapy Daily Treatment Note  Date:  10/26/2023    Patient Name:  Tacho Nolasco    :  1980  MRN: [id number]  Restrictions/Precautions: Restrictions/Precautions: Fall Risk; General Precautions        Diagnosis:   Sprain of left rotator cuff capsule, initial encounter [S43.422A]  Impingement syndrome of left shoulder [M75.42]  Bicipital tendonitis of shoulder, left [M75.22]  Primary osteoarthritis of left shoulder [M19.012] Diagnosis: sprain L rotator cuff, bicipital tendonitis, OA L shoulder. Date of Injury/Surgery:   Treatment Diagnosis:  L rotator cuff repair, biceps tenodesis 10/12/23. Insurance/Certification information: [facility]  Referring Physician:  DO Dr. Sharmin Levy   PCP: VIOLET Craig NP  Next Doctor Visit:    Plan of care signed (Y/N):  n  Outcome Measure: DASH 44 raw score  Visit# / total visits:  1 /  Pain level: 0/10 at rest   Goals:     Patient goals: regain function, strength and motion of L UE. Short Term Goals  Time Frame for Short Term Goals: 8 weeks  Short Term Goal 1: shoudler PROM to 100flexion  Short Term Goal 2: Shoudler PROM to 30 ER in loose packed position  Long Term Goals  Time Frame for Long Term Goals : 14 weeks  Long Term Goal 1: I in home program.  Long Term Goal 2: AROM flexion to 145 or better for overhead reaching  Long Term Goal 3: reach behind back with minimal difficulty  Long Term Goal 4: sleep without waking due to shoulder pain 4/7nights. Long Term Goal 5: ER and scap strength for 5/5 for shoulder stability, good mechanics with movement. Summary of Evaluation:  Assessment: Pt is s/p L rotator cuff, supraspinatus repair, biceps tenodesis, distal clavical resection 10/12/23.   She relates complaince with her sling, and has been able to sleep in bed with pillow

## 2023-10-26 NOTE — PROGRESS NOTES
tablet by mouth daily, Disp: , Rfl:     traZODone (DESYREL) 50 MG tablet, Take 1 tablet by mouth at bedtime. (Patient not taking: Reported on 10/26/2023), Disp: , Rfl:     ondansetron (ZOFRAN-ODT) 4 MG disintegrating tablet, Take 1 tablet by mouth 3 times daily as needed for Nausea or Vomiting, Disp: 21 tablet, Rfl: 0    buPROPion (WELLBUTRIN XL) 150 MG extended release tablet, Take 1 tablet by mouth every morning, Disp: , Rfl:     omeprazole (PRILOSEC) 40 MG delayed release capsule, Take 1 capsule by mouth every morning (before breakfast), Disp: 90 capsule, Rfl: 1    albuterol sulfate HFA (VENTOLIN HFA) 108 (90 Base) MCG/ACT inhaler, Inhale 2 puffs into the lungs 4 times daily as needed for Wheezing, Disp: 18 g, Rfl: 5    methIMAzole (TAPAZOLE) 10 MG tablet, , Disp: , Rfl:     metoprolol tartrate (LOPRESSOR) 25 MG tablet, , Disp: , Rfl:     Multiple Vitamins-Minerals (HAIR SKIN AND NAILS FORMULA PO), Take by mouth, Disp: , Rfl:   Allergies: Codeine, Dilaudid [hydromorphone hcl], Hydromorphone, Keflex [cephalexin], and Pyridium [phenazopyridine hcl]      SUBJECTIVE EXAMINATION      ,           Subjective History:    Subjective: L shoulder tightness, aching, using sling. Has been sleeping in bed - wearing sling.   Additional Pertinent Hx (if applicable): L shoulder rotator cuff and biceps tenodesis 10/12/23/   Previous treatments prior to current episode?: Surgery      Learning/Language: Learning  Does the patient/guardian have any barriers to learning?: No barriers  What is the preferred language of the patient/guardian?: English  Is an  required?: No  How does the patient/guardian prefer to learn new concepts?: Listening, Reading, Demonstration     Pain Screening        Functional Status    Dominant Hand: : Right    Social History:  Social History  Lives With: Spouse  Home Layout:  (one DAWSON and one step to bathroom)    Occupation/Interests:  Occupation: Full time employment  Type of Occupation: [employer] -

## 2023-10-26 NOTE — PLAN OF CARE
Outpatient Physical Therapy                  [x] Phone: [phone]   Fax: [phone]    Pediatric Therapy                                    [] Phone: [phone]   Fax: [phone]  Pediatric [facility]                                      [] Phone: [phone]   Fax: [phone]      To: DO Dr. Zachariah Gibbs   From: Trice Frausto, PT,     Patient: Myranda Hall       : 1980  Diagnosis: sprain L rotator cuff, bicipital tendonitis, OA L shoulder. Treatment Diagnosis: L rotator cuff repair, biceps tenodesis 10/12/23. Date: 10/26/2023    Physical Therapy Certification/Re-Certification Form  Dear Dr. Zachariah Bnyum   The following patient has been evaluated for physical therapy services and for therapy to continue, Please review the attached evaluation and/or summary of the patient's plan of care, and verify that you agree therapy should continue by signing the attached document and sending it back to our office. Patient is a  40 yo female who presents to PT following L rotator cuff repair, biceps tenodesis which impacts on adls;patient's goal is to regain motion, strength and use of LUE ;patient reports that  pain, tightness, surgical healing  limits activities including all functional use of LUE; PT to address patient's goals, impairments and activity limitations with skilled interventions checked in plan of care;patient's level of function prior to onset of  symptoms was independent; did not observe any barriers to learning during PT eval; learning preferences include demonstration, practice, and handouts; patient expressed understanding of HEP; patient appears to be motivated to participate in an active PT program and to be compliant with HEP expectations;patient assisted in developing treatment plan and goals; DME is currently being used sling w abduction pillow. Pt is in agreement with the treatment plan and goals.   Pt treatment will include multiple approaches to maximize

## 2023-10-26 NOTE — PROGRESS NOTES
sleeve gastrectomy  - Down 40.1 pounds since surgery/ total weight loss of 63 pounds thus far  - Normal Bm  - Some walking   - Tolerating regular diet without N/V  - Taking supplements     Recommendations and Orders:  - Needs to restart tracking calories and practicing mindful eating  - Follow bariatric parameters:  - Patient was encouraged to journal all food intake. - Keep calorie level at approximately 600-800, per discussion / plan with registered dietician. - Protein intake is to be a minimum of 50-60 grams per day. - Water drinking was encouraged with a goal of 64oz-128oz daily. Beverages are to be calorie free except for milk. Avoid soda. - Continue supplements as discussed    - Hemoglobin A1C; Future  - Iron and TIBC; Future  - Lipid Panel; Future  - Magnesium; Future  - Zinc; Future  - Vitamin D 25 Hydroxy; Future  - Vitamin B12 & Folate; Future  - Vitamin B1; Future        - Recent Labs reviewed   - RTC in 3 months with remaining lab results    - RTC 3 months for weight check       As of current visit, regarding obesity-related co-morbid conditions:  EMILEE [] compliant [] no longer using [] resolved per sleep study; hypertension [] medications; hyperlipidemia [] medications; GERD [] medications; DM [] insulin [] non-insulin [] no meds      The patient expressed understanding and willingness to comply nicely; all questions and concerns addressed. No orders of the defined types were placed in this encounter.     Orders Placed This Encounter   Procedures    Hemoglobin A1C     Standing Status:   Future     Standing Expiration Date:   10/26/2024    Iron and TIBC     Standing Status:   Future     Standing Expiration Date:   10/26/2024    Lipid Panel     Standing Status:   Future     Standing Expiration Date:   10/26/2024    Magnesium     Standing Status:   Future     Standing Expiration Date:   10/26/2024    Zinc     Standing Status:   Future     Standing Expiration Date:   10/26/2024    Vitamin D 25

## 2023-11-06 ENCOUNTER — PATIENT MESSAGE (OUTPATIENT)
Dept: FAMILY MEDICINE CLINIC | Age: 43
End: 2023-11-06

## 2023-11-06 ENCOUNTER — OFFICE VISIT (OUTPATIENT)
Dept: FAMILY MEDICINE CLINIC | Age: 43
End: 2023-11-06
Payer: MEDICAID

## 2023-11-06 ENCOUNTER — HOSPITAL ENCOUNTER (OUTPATIENT)
Dept: INFUSION THERAPY | Age: 43
Discharge: HOME | End: 2023-11-06
Payer: MEDICAID

## 2023-11-06 VITALS
BODY MASS INDEX: 31.11 KG/M2 | DIASTOLIC BLOOD PRESSURE: 68 MMHG | WEIGHT: 175.6 LBS | HEART RATE: 67 BPM | SYSTOLIC BLOOD PRESSURE: 124 MMHG | OXYGEN SATURATION: 97 % | HEIGHT: 63 IN

## 2023-11-06 DIAGNOSIS — R79.89 HIGH SERUM FERRITIN: ICD-10-CM

## 2023-11-06 DIAGNOSIS — Z98.890 STATUS POST ROTATOR CUFF REPAIR: ICD-10-CM

## 2023-11-06 DIAGNOSIS — R63.5 WEIGHT GAIN: ICD-10-CM

## 2023-11-06 DIAGNOSIS — E55.9 VITAMIN D DEFICIENCY: ICD-10-CM

## 2023-11-06 DIAGNOSIS — E83.19 IRON OVERLOAD: ICD-10-CM

## 2023-11-06 DIAGNOSIS — Z98.84 STATUS POST LAPAROSCOPIC SLEEVE GASTRECTOMY: ICD-10-CM

## 2023-11-06 DIAGNOSIS — M79.7 FIBROMYALGIA: ICD-10-CM

## 2023-11-06 DIAGNOSIS — Z90.3 S/P GASTRIC SLEEVE PROCEDURE: ICD-10-CM

## 2023-11-06 DIAGNOSIS — G47.33 OSA (OBSTRUCTIVE SLEEP APNEA): ICD-10-CM

## 2023-11-06 DIAGNOSIS — G43.109 MIGRAINE WITH AURA AND WITHOUT STATUS MIGRAINOSUS, NOT INTRACTABLE: Primary | ICD-10-CM

## 2023-11-06 DIAGNOSIS — E05.90 HYPERTHYROIDISM: ICD-10-CM

## 2023-11-06 LAB
ALBUMIN SERPL-MCNC: 4.3 GM/DL (ref 3.4–5)
ALP BLD-CCNC: 281 IU/L (ref 40–129)
ALT SERPL-CCNC: 7 U/L (ref 10–40)
ANION GAP SERPL CALCULATED.3IONS-SCNC: 10 MMOL/L (ref 4–16)
AST SERPL-CCNC: 16 IU/L (ref 15–37)
BASOPHILS ABSOLUTE: 0 K/CU MM
BASOPHILS RELATIVE PERCENT: 0.2 % (ref 0–1)
BILIRUB SERPL-MCNC: 0.6 MG/DL (ref 0–1)
BUN SERPL-MCNC: 15 MG/DL (ref 6–23)
CALCIUM SERPL-MCNC: 8.8 MG/DL (ref 8.3–10.6)
CHLORIDE BLD-SCNC: 106 MMOL/L (ref 99–110)
CHOLEST SERPL-MCNC: 244 MG/DL
CO2: 24 MMOL/L (ref 21–32)
CREAT SERPL-MCNC: 1.1 MG/DL (ref 0.6–1.1)
DIFFERENTIAL TYPE: ABNORMAL
EOSINOPHILS ABSOLUTE: 0.3 K/CU MM
EOSINOPHILS RELATIVE PERCENT: 3.5 % (ref 0–3)
ESTIMATED AVERAGE GLUCOSE: 97 MG/DL
FERRITIN: 229 NG/ML (ref 15–150)
FOLATE SERPL-MCNC: 10.6 NG/ML (ref 3.1–17.5)
GFR SERPL CREATININE-BSD FRML MDRD: >60 ML/MIN/1.73M2
GLUCOSE SERPL-MCNC: 72 MG/DL (ref 70–99)
HBA1C MFR BLD: 5 % (ref 4.2–6.3)
HCT VFR BLD CALC: 40.8 % (ref 37–47)
HDLC SERPL-MCNC: 58 MG/DL
HEMOGLOBIN: 13.9 GM/DL (ref 12.5–16)
IRON: 81 UG/DL (ref 37–145)
LDLC SERPL CALC-MCNC: 144 MG/DL
LYMPHOCYTES ABSOLUTE: 3.4 K/CU MM
LYMPHOCYTES RELATIVE PERCENT: 40.5 % (ref 24–44)
MAGNESIUM: 2.6 MG/DL (ref 1.8–2.4)
MCH RBC QN AUTO: 30.3 PG (ref 27–31)
MCHC RBC AUTO-ENTMCNC: 34.1 % (ref 32–36)
MCV RBC AUTO: 88.9 FL (ref 78–100)
MONOCYTES ABSOLUTE: 0.4 K/CU MM
MONOCYTES RELATIVE PERCENT: 5.2 % (ref 0–4)
PCT TRANSFERRIN: 30 % (ref 10–44)
PDW BLD-RTO: 14.3 % (ref 11.7–14.9)
PLATELET # BLD: 243 K/CU MM (ref 140–440)
PMV BLD AUTO: 8.9 FL (ref 7.5–11.1)
POTASSIUM SERPL-SCNC: 4.3 MMOL/L (ref 3.5–5.1)
RBC # BLD: 4.59 M/CU MM (ref 4.2–5.4)
SEGMENTED NEUTROPHILS ABSOLUTE COUNT: 4.2 K/CU MM
SEGMENTED NEUTROPHILS RELATIVE PERCENT: 50.6 % (ref 36–66)
SODIUM BLD-SCNC: 140 MMOL/L (ref 135–145)
TOTAL IRON BINDING CAPACITY: 268 UG/DL (ref 250–450)
TOTAL PROTEIN: 7.1 GM/DL (ref 6.4–8.2)
TRIGL SERPL-MCNC: 210 MG/DL
UNSATURATED IRON BINDING CAPACITY: 187 UG/DL (ref 110–370)
VITAMIN B-12: 334 PG/ML (ref 211–911)
WBC # BLD: 8.3 K/CU MM (ref 4–10.5)

## 2023-11-06 PROCEDURE — 83036 HEMOGLOBIN GLYCOSYLATED A1C: CPT

## 2023-11-06 PROCEDURE — 99214 OFFICE O/P EST MOD 30 MIN: CPT | Performed by: NURSE PRACTITIONER

## 2023-11-06 PROCEDURE — 85025 COMPLETE CBC W/AUTO DIFF WBC: CPT

## 2023-11-06 PROCEDURE — G8484 FLU IMMUNIZE NO ADMIN: HCPCS | Performed by: NURSE PRACTITIONER

## 2023-11-06 PROCEDURE — 1036F TOBACCO NON-USER: CPT | Performed by: NURSE PRACTITIONER

## 2023-11-06 PROCEDURE — 83735 ASSAY OF MAGNESIUM: CPT

## 2023-11-06 PROCEDURE — 83540 ASSAY OF IRON: CPT

## 2023-11-06 PROCEDURE — 82728 ASSAY OF FERRITIN: CPT

## 2023-11-06 PROCEDURE — 80061 LIPID PANEL: CPT

## 2023-11-06 PROCEDURE — 82306 VITAMIN D 25 HYDROXY: CPT

## 2023-11-06 PROCEDURE — 82746 ASSAY OF FOLIC ACID SERUM: CPT

## 2023-11-06 PROCEDURE — 83550 IRON BINDING TEST: CPT

## 2023-11-06 PROCEDURE — G8417 CALC BMI ABV UP PARAM F/U: HCPCS | Performed by: NURSE PRACTITIONER

## 2023-11-06 PROCEDURE — 80053 COMPREHEN METABOLIC PANEL: CPT

## 2023-11-06 PROCEDURE — 82607 VITAMIN B-12: CPT

## 2023-11-06 PROCEDURE — 36415 COLL VENOUS BLD VENIPUNCTURE: CPT

## 2023-11-06 PROCEDURE — 84630 ASSAY OF ZINC: CPT

## 2023-11-06 PROCEDURE — G8427 DOCREV CUR MEDS BY ELIG CLIN: HCPCS | Performed by: NURSE PRACTITIONER

## 2023-11-06 PROCEDURE — 84425 ASSAY OF VITAMIN B-1: CPT

## 2023-11-06 PROCEDURE — 36415 COLL VENOUS BLD VENIPUNCTURE: CPT | Performed by: NURSE PRACTITIONER

## 2023-11-07 LAB
25(OH)D3 SERPL-MCNC: 24.3 NG/ML
REASON FOR REJECTION: NORMAL
REJECTED TEST: NORMAL

## 2023-11-08 DIAGNOSIS — E55.9 VITAMIN D DEFICIENCY: Primary | ICD-10-CM

## 2023-11-08 LAB — 25(OH)D3 SERPL-MCNC: 21.74 NG/ML

## 2023-11-08 RX ORDER — ERGOCALCIFEROL 1.25 MG/1
50000 CAPSULE ORAL WEEKLY
Qty: 12 CAPSULE | Refills: 0 | Status: SHIPPED | OUTPATIENT
Start: 2023-11-08

## 2023-11-08 RX ORDER — MELATONIN
1 DAILY
Qty: 90 TABLET | Refills: 3 | COMMUNITY
Start: 2023-11-08

## 2023-11-10 ENCOUNTER — HOSPITAL ENCOUNTER (OUTPATIENT)
Dept: INFUSION THERAPY | Age: 43
Discharge: HOME | End: 2023-11-10
Payer: MEDICAID

## 2023-11-10 ENCOUNTER — OFFICE VISIT (OUTPATIENT)
Dept: ONCOLOGY | Age: 43
End: 2023-11-10
Payer: MEDICAID

## 2023-11-10 VITALS
BODY MASS INDEX: 30.83 KG/M2 | HEART RATE: 68 BPM | WEIGHT: 174 LBS | SYSTOLIC BLOOD PRESSURE: 101 MMHG | HEIGHT: 63 IN | TEMPERATURE: 97.3 F | OXYGEN SATURATION: 99 % | DIASTOLIC BLOOD PRESSURE: 60 MMHG

## 2023-11-10 DIAGNOSIS — E53.8 B12 DEFICIENCY: ICD-10-CM

## 2023-11-10 DIAGNOSIS — Z90.3 S/P GASTRIC SLEEVE PROCEDURE: ICD-10-CM

## 2023-11-10 DIAGNOSIS — R79.89 HIGH SERUM FERRITIN: Primary | ICD-10-CM

## 2023-11-10 DIAGNOSIS — E83.19 IRON OVERLOAD: ICD-10-CM

## 2023-11-10 DIAGNOSIS — E05.90 HYPERTHYROIDISM: ICD-10-CM

## 2023-11-10 LAB — VIT B1 PYROPHOSHATE BLD-SCNC: 124 NMOL/L (ref 70–180)

## 2023-11-10 PROCEDURE — G8427 DOCREV CUR MEDS BY ELIG CLIN: HCPCS | Performed by: INTERNAL MEDICINE

## 2023-11-10 PROCEDURE — G8417 CALC BMI ABV UP PARAM F/U: HCPCS | Performed by: INTERNAL MEDICINE

## 2023-11-10 PROCEDURE — 99214 OFFICE O/P EST MOD 30 MIN: CPT | Performed by: INTERNAL MEDICINE

## 2023-11-10 PROCEDURE — 1036F TOBACCO NON-USER: CPT | Performed by: INTERNAL MEDICINE

## 2023-11-10 PROCEDURE — G8484 FLU IMMUNIZE NO ADMIN: HCPCS | Performed by: INTERNAL MEDICINE

## 2023-11-10 PROCEDURE — 99211 OFF/OP EST MAY X REQ PHY/QHP: CPT

## 2023-11-10 RX ORDER — DIPHENHYDRAMINE HYDROCHLORIDE 50 MG/ML
50 INJECTION INTRAMUSCULAR; INTRAVENOUS
OUTPATIENT
Start: 2023-11-10

## 2023-11-10 RX ORDER — EPINEPHRINE 1 MG/ML
0.3 INJECTION, SOLUTION, CONCENTRATE INTRAVENOUS PRN
OUTPATIENT
Start: 2023-11-10

## 2023-11-10 RX ORDER — SODIUM CHLORIDE 9 MG/ML
INJECTION, SOLUTION INTRAVENOUS CONTINUOUS
OUTPATIENT
Start: 2023-11-10

## 2023-11-10 RX ORDER — ACETAMINOPHEN 325 MG/1
650 TABLET ORAL
OUTPATIENT
Start: 2023-11-10

## 2023-11-10 RX ORDER — CYANOCOBALAMIN 1000 UG/ML
1000 INJECTION, SOLUTION INTRAMUSCULAR; SUBCUTANEOUS ONCE
OUTPATIENT
Start: 2023-11-10 | End: 2023-11-10

## 2023-11-10 RX ORDER — ONDANSETRON 2 MG/ML
8 INJECTION INTRAMUSCULAR; INTRAVENOUS
OUTPATIENT
Start: 2023-11-10

## 2023-11-10 RX ORDER — ALBUTEROL SULFATE 90 UG/1
4 AEROSOL, METERED RESPIRATORY (INHALATION) PRN
OUTPATIENT
Start: 2023-11-10

## 2023-11-10 RX ORDER — FAMOTIDINE 10 MG/ML
20 INJECTION, SOLUTION INTRAVENOUS
OUTPATIENT
Start: 2023-11-10

## 2023-11-10 NOTE — PROGRESS NOTES
Order placed for Monthly Vit B-12 injections and NN referral per physician  order. Therapy plan added.

## 2023-11-14 ENCOUNTER — TELEPHONE (OUTPATIENT)
Dept: INFUSION THERAPY | Age: 43
End: 2023-11-14

## 2023-11-15 ENCOUNTER — NURSE ONLY (OUTPATIENT)
Dept: FAMILY MEDICINE CLINIC | Age: 43
End: 2023-11-15

## 2023-11-15 DIAGNOSIS — R74.8 HIGH ALKALINE PHOSPHATASE: ICD-10-CM

## 2023-11-22 ENCOUNTER — OFFICE VISIT (OUTPATIENT)
Dept: ORTHOPEDIC SURGERY | Age: 43
End: 2023-11-22

## 2023-11-22 VITALS
WEIGHT: 171 LBS | BODY MASS INDEX: 30.29 KG/M2 | HEART RATE: 64 BPM | DIASTOLIC BLOOD PRESSURE: 84 MMHG | SYSTOLIC BLOOD PRESSURE: 114 MMHG | OXYGEN SATURATION: 98 %

## 2023-11-22 DIAGNOSIS — Z98.890 STATUS POST ROTATOR CUFF REPAIR: Primary | ICD-10-CM

## 2023-11-22 PROCEDURE — 99024 POSTOP FOLLOW-UP VISIT: CPT | Performed by: STUDENT IN AN ORGANIZED HEALTH CARE EDUCATION/TRAINING PROGRAM

## 2023-11-22 NOTE — PROGRESS NOTES
No new injuries. Skin tender to touch. Has been wearing sling. Some generalized triceps pain rated 7/10 with some numbness. Some proximal biceps pain rated 7/10. States these were present prior to surgery. Overall, pain has improved since she was seen 4 weeks ago.
Will reassess at next visit  -Patient will call to restart PT.  HEP provided today in order to get ROM started now due to holiday delaying restaring PT  -rest/elevation as needed  -DVT prophylaxis: None  -No refills needed per patient   -f/u in 6 weeks  -f/u sooner prn any issues

## 2023-11-28 ENCOUNTER — HOSPITAL ENCOUNTER (OUTPATIENT)
Dept: PHYSICAL THERAPY | Age: 43
Setting detail: THERAPIES SERIES
Discharge: HOME | End: 2023-11-28
Payer: MEDICAID

## 2023-11-28 ENCOUNTER — SCHEDULED TELEPHONE ENCOUNTER (OUTPATIENT)
Dept: GASTROENTEROLOGY | Age: 43
End: 2023-11-28
Payer: MEDICAID

## 2023-11-28 DIAGNOSIS — D64.9 ANEMIA, UNSPECIFIED TYPE: ICD-10-CM

## 2023-11-28 DIAGNOSIS — R14.0 BLOATING: ICD-10-CM

## 2023-11-28 DIAGNOSIS — R74.8 ELEVATED ALKALINE PHOSPHATASE LEVEL: Primary | ICD-10-CM

## 2023-11-28 DIAGNOSIS — R10.13 DYSPEPSIA: ICD-10-CM

## 2023-11-28 PROCEDURE — 99443 PR PHYS/QHP TELEPHONE EVALUATION 21-30 MIN: CPT | Performed by: NURSE PRACTITIONER

## 2023-11-28 PROCEDURE — 97110 THERAPEUTIC EXERCISES: CPT

## 2023-11-28 NOTE — PROGRESS NOTES
Yocasta Hanson 39 y.o. female was seen by SINDHU Campbell on 11/28/2023     Wt Readings from Last 3 Encounters:   11/22/23 77.6 kg (171 lb)   11/10/23 78.9 kg (174 lb)   11/06/23 79.7 kg (175 lb 9.6 oz)       Umberto Hanson was evaluated through a synchronous (real-time) audio encounter. Patient identification was verified at the start of the visit. She (or guardian if applicable) is aware that this is a billable service, which includes applicable co-pays. This visit was conducted with the patient's (and/or legal guardian's) verbal consent. She has not had a related appointment within my department in the past 7 days or scheduled within the next 24 hours. The patient was located at Home: [address] [place] [address]. The provider was located at Home ([address] [place] [address]): [facility]. ESTHER Karimi is a pleasant 39 y.o.  female evaluated via telephone visit for follow-up on anemia, acid reflux, bloating and elevated Alkaline Phosphatase. She denies NSAID or alcohol use. She has chronic back pain managed with taking Percocet per pain management two times a day. She was diagnosed with Grave's disease and started on thyroid medication three months ago. She had a laparoscopic gastrectomy sleeve done by Dr. Kristine Zaragoza on 11- with original weight 242 and lowest weight 147 pounds. Her EGD/colonoscopy were done by Dr. Caty Rogel on 6-. Her EGD showed LA Grade C reflux esophagitis with no bleeding, a sleeve gastrectomy was found, characterized by erythema and normal examined duodenum and ampulla. Her stomach biopsies showed normal tissue with no evidence of H. Pylori infection. Her esophageal biopsies showed mixed inflammation with no evidence of Avitia's esophagus or dysplasia.   Her colonoscopy showed the entire examined colon is vicky, diverticulosis in the sigmoid colon, internal non-bleeding hemorrhoids; otherwise examination was normal.  Her random colon biopsies showed normal

## 2023-11-28 NOTE — FLOWSHEET NOTE
Outpatient Physical Therapy  [place]           [x] Phone: [phone]   Fax: [phone]  [facility]           [] Phone: [phone]   Fax: [phone]        Physical Therapy Daily Treatment Note  Date:  2023    Patient Name:  Rosibel Dodd    :  1980  MRN: [id number]  Restrictions/Precautions: Restrictions/Precautions: Fall Risk; General Precautions   Diagnosis:   Sprain of left rotator cuff capsule, initial encounter [S43.422A]  Impingement syndrome of left shoulder [M75.42]  Bicipital tendonitis of shoulder, left [M75.22]  Primary osteoarthritis of left shoulder [M19.012] Diagnosis: sprain L rotator cuff, bicipital tendonitis, OA L shoulder. Date of Injury/Surgery:   Treatment Diagnosis:  L rotator cuff repair, biceps tenodesis 10/12/23. Insurance/Certification information: [facility]  Referring Physician:  DO Dr. Malachi Crockett   PCP: VIOLET Hyatt NP  Next Doctor Visit:    Plan of care signed (Y/N):  n  Outcome Measure: DASH 44 raw score  Visit# / total visits:  2  Pain level:      0/10 at rest        Goals:     Patient goals: regain function, strength and motion of L UE. Short Term Goals  Time Frame for Short Term Goals: 8 weeks  Short Term Goal 1: shoudler PROM to 100flexion  Short Term Goal 2: Shoudler PROM to 30 ER in loose packed position  Long Term Goals  Time Frame for Long Term Goals : 14 weeks  Long Term Goal 1: I in home program.  Long Term Goal 2: AROM flexion to 145 or better for overhead reaching  Long Term Goal 3: reach behind back with minimal difficulty  Long Term Goal 4: sleep without waking due to shoulder pain 4/7nights. Long Term Goal 5: ER and scap strength for 5/5 for shoulder stability, good mechanics with movement. Summary of Evaluation:  Assessment: Pt is s/p L rotator cuff, supraspinatus repair, biceps tenodesis, distal clavical resection 10/12/23.   She relates complaince with her sling, and has been able to sleep in bed with pillow

## 2023-11-29 ENCOUNTER — TELEPHONE (OUTPATIENT)
Dept: GASTROENTEROLOGY | Age: 43
End: 2023-11-29

## 2023-11-29 NOTE — PRE-CERTIFICATION NOTE
Pt approved for 20 visits or 80 units of each (022) [phone]  and 20 units of 93098      11/22/23-2/2/24

## 2023-12-01 ENCOUNTER — HOSPITAL ENCOUNTER (OUTPATIENT)
Dept: PHYSICAL THERAPY | Age: 43
Setting detail: THERAPIES SERIES
Discharge: HOME | End: 2023-12-01
Payer: MEDICAID

## 2023-12-01 PROCEDURE — 97110 THERAPEUTIC EXERCISES: CPT

## 2023-12-01 PROCEDURE — 97016 VASOPNEUMATIC DEVICE THERAPY: CPT

## 2023-12-01 PROCEDURE — 97140 MANUAL THERAPY 1/> REGIONS: CPT

## 2023-12-01 NOTE — FLOWSHEET NOTE
Outpatient Physical Therapy  [place]           [x] Phone: [phone]   Fax: [phone]  [facility]           [] Phone: [phone]   Fax: [phone]        Physical Therapy Daily Treatment Note  Date:  2023    Patient Name:  Eva Orantes    :  1980  MRN: [id number]  Restrictions/Precautions: Restrictions/Precautions: Fall Risk; General Precautions   Diagnosis:   Sprain of left rotator cuff capsule, initial encounter [S43.422A]  Impingement syndrome of left shoulder [M75.42]  Bicipital tendonitis of shoulder, left [M75.22]  Primary osteoarthritis of left shoulder [M19.012] Diagnosis: sprain L rotator cuff, bicipital tendonitis, OA L shoulder. Date of Injury/Surgery:   Treatment Diagnosis:  L rotator cuff repair, biceps tenodesis 10/12/23. Insurance/Certification information: [address] [place] [address]  Referring Physician:  DO Dr. Jacques Sandoval   PCP: VIOLET Salazar NP  Next Doctor Visit:    Plan of care signed (Y/N):  n  Outcome Measure: DASH 44 raw score  Visit# / total visits:  3/20  Pain level:      9/10 at rest        Goals:     Patient goals: regain function, strength and motion of L UE. Short Term Goals  Time Frame for Short Term Goals: 8 weeks  Short Term Goal 1: shoudler PROM to 100flexion  Short Term Goal 2: Shoudler PROM to 30 ER in loose packed position  Long Term Goals  Time Frame for Long Term Goals : 14 weeks  Long Term Goal 1: I in home program.  Long Term Goal 2: AROM flexion to 145 or better for overhead reaching  Long Term Goal 3: reach behind back with minimal difficulty  Long Term Goal 4: sleep without waking due to shoulder pain 4/7nights. Long Term Goal 5: ER and scap strength for 5/5 for shoulder stability, good mechanics with movement. Summary of Evaluation:  Assessment: Pt is s/p L rotator cuff, supraspinatus repair, biceps tenodesis, distal clavical resection 10/12/23.   She relates complaince with her sling, and has been able to sleep in bed with pillow

## 2023-12-05 ENCOUNTER — HOSPITAL ENCOUNTER (OUTPATIENT)
Dept: PHYSICAL THERAPY | Age: 43
Setting detail: THERAPIES SERIES
Discharge: HOME | End: 2023-12-05
Payer: MEDICAID

## 2023-12-05 PROCEDURE — 97016 VASOPNEUMATIC DEVICE THERAPY: CPT

## 2023-12-05 PROCEDURE — 97110 THERAPEUTIC EXERCISES: CPT

## 2023-12-05 PROCEDURE — 97140 MANUAL THERAPY 1/> REGIONS: CPT

## 2023-12-05 NOTE — FLOWSHEET NOTE
Outpatient Physical Therapy  [place]           [x] Phone: [phone]   Fax: [phone]  [facility]           [] Phone: [phone]   Fax: [phone]        Physical Therapy Daily Treatment Note  Date:  2023    Patient Name:  Erin Reich    :  1980  MRN: [id number]  Restrictions/Precautions: Restrictions/Precautions: Fall Risk; General Precautions   Diagnosis:   Sprain of left rotator cuff capsule, initial encounter [S43.422A]  Impingement syndrome of left shoulder [M75.42]  Bicipital tendonitis of shoulder, left [M75.22]  Primary osteoarthritis of left shoulder [M19.012] Diagnosis: sprain L rotator cuff, bicipital tendonitis, OA L shoulder. Date of Injury/Surgery:   Treatment Diagnosis:  L rotator cuff repair, biceps tenodesis 10/12/23. Insurance/Certification information: [facility]; Pt approved for 20 visits or 80 units of each [phone]  and 20 units of 36032  Referring Physician:  DO Dr. Reina Masters   PCP: Mildred Musa, APRN - NP  Next Doctor Visit:    Plan of care signed (Y/N):  Y  Outcome Measure: DASH 44 raw score  Visit# / total visits:    Pain level:      8/10         Goals:     Patient goals: regain function, strength and motion of L UE. Short Term Goals  Time Frame for Short Term Goals: 8 weeks  Short Term Goal 1: shoudler PROM to 100flexion  Short Term Goal 2: Shoudler PROM to 30 ER in loose packed position  Long Term Goals  Time Frame for Long Term Goals : 14 weeks  Long Term Goal 1: I in home program.  Long Term Goal 2: AROM flexion to 145 or better for overhead reaching  Long Term Goal 3: reach behind back with minimal difficulty  Long Term Goal 4: sleep without waking due to shoulder pain 4/7nights. Long Term Goal 5: ER and scap strength for 5/5 for shoulder stability, good mechanics with movement.         Summary of Evaluation:  Assessment: Pt is s/p L rotator cuff, supraspinatus repair, biceps tenodesis, distal clavical resection

## 2023-12-07 ENCOUNTER — HOSPITAL ENCOUNTER (OUTPATIENT)
Dept: PHYSICAL THERAPY | Age: 43
Discharge: HOME | End: 2023-12-07

## 2023-12-07 NOTE — FLOWSHEET NOTE
Physical Therapy  Cancellation/No-show Note  Patient Name:  Aimee Negro  :  1980   Date:  2023  Cancelled visits to date: 1  No-shows to date: 0    For today's appointment patient:  [x]  Cancelled  []  Rescheduled appointment  []  No-show     Reason given by patient:  [x]  Patient ill  []  Conflicting appointment  []  No transportation    []  Conflict with work  []  No reason given  []  Other:     Comments:      Electronically signed by:  Arash Victor PTA    1:03 PM  2023

## 2023-12-08 ENCOUNTER — HOSPITAL ENCOUNTER (OUTPATIENT)
Dept: INFUSION THERAPY | Age: 43
Discharge: HOME | End: 2023-12-08
Payer: MEDICAID

## 2023-12-08 DIAGNOSIS — E53.8 B12 DEFICIENCY: Primary | ICD-10-CM

## 2023-12-08 PROCEDURE — 6360000002 HC RX W HCPCS: Performed by: INTERNAL MEDICINE

## 2023-12-08 PROCEDURE — 96372 THER/PROPH/DIAG INJ SC/IM: CPT

## 2023-12-08 RX ORDER — EPINEPHRINE 1 MG/ML
0.3 INJECTION, SOLUTION, CONCENTRATE INTRAVENOUS PRN
OUTPATIENT
Start: 2024-01-05

## 2023-12-08 RX ORDER — DIPHENHYDRAMINE HYDROCHLORIDE 50 MG/ML
50 INJECTION INTRAMUSCULAR; INTRAVENOUS
OUTPATIENT
Start: 2024-01-05

## 2023-12-08 RX ORDER — ONDANSETRON 2 MG/ML
8 INJECTION INTRAMUSCULAR; INTRAVENOUS
OUTPATIENT
Start: 2024-01-05

## 2023-12-08 RX ORDER — CYANOCOBALAMIN 1000 UG/ML
1000 INJECTION, SOLUTION INTRAMUSCULAR; SUBCUTANEOUS ONCE
OUTPATIENT
Start: 2024-01-05 | End: 2024-01-05

## 2023-12-08 RX ORDER — ALBUTEROL SULFATE 90 UG/1
4 AEROSOL, METERED RESPIRATORY (INHALATION) PRN
OUTPATIENT
Start: 2024-01-05

## 2023-12-08 RX ORDER — FAMOTIDINE 10 MG/ML
20 INJECTION, SOLUTION INTRAVENOUS
OUTPATIENT
Start: 2024-01-05

## 2023-12-08 RX ORDER — CYANOCOBALAMIN 1000 UG/ML
1000 INJECTION, SOLUTION INTRAMUSCULAR; SUBCUTANEOUS ONCE
Status: COMPLETED | OUTPATIENT
Start: 2023-12-08 | End: 2023-12-08

## 2023-12-08 RX ORDER — ACETAMINOPHEN 325 MG/1
650 TABLET ORAL
OUTPATIENT
Start: 2024-01-05

## 2023-12-08 RX ORDER — SODIUM CHLORIDE 9 MG/ML
INJECTION, SOLUTION INTRAVENOUS CONTINUOUS
OUTPATIENT
Start: 2024-01-05

## 2023-12-08 RX ADMIN — CYANOCOBALAMIN 1000 MCG: 1000 INJECTION, SOLUTION INTRAMUSCULAR at 14:23

## 2023-12-08 NOTE — PROGRESS NOTES
Pt here for B-12 injection. Injection given IM to right deltoid.  Pt tolerated without incident left ambulatory declined discharge instructions

## 2023-12-09 ENCOUNTER — HOSPITAL ENCOUNTER (OUTPATIENT)
Dept: PHYSICAL THERAPY | Age: 43
Setting detail: THERAPIES SERIES
Discharge: HOME | End: 2023-12-09
Payer: MEDICAID

## 2023-12-09 PROCEDURE — 97110 THERAPEUTIC EXERCISES: CPT

## 2023-12-09 PROCEDURE — 97016 VASOPNEUMATIC DEVICE THERAPY: CPT

## 2023-12-09 PROCEDURE — 97140 MANUAL THERAPY 1/> REGIONS: CPT

## 2023-12-09 NOTE — FLOWSHEET NOTE
Outpatient Physical Therapy  [place]           [x] Phone: [phone]   Fax: [phone]  [facility]           [] Phone: [phone]   Fax: [phone]        Physical Therapy Daily Treatment Note  Date:  2023    Patient Name:  Ankit Gallo    :  1980  MRN: [id number]  Restrictions/Precautions: Restrictions/Precautions: Fall Risk; General Precautions   Diagnosis:   Sprain of left rotator cuff capsule, initial encounter [S43.422A]  Impingement syndrome of left shoulder [M75.42]  Bicipital tendonitis of shoulder, left [M75.22]  Primary osteoarthritis of left shoulder [M19.012] Diagnosis: sprain L rotator cuff, bicipital tendonitis, OA L shoulder. Date of Injury/Surgery:   Treatment Diagnosis:  L rotator cuff repair, biceps tenodesis 10/12/23. Insurance/Certification information: [facility]; Pt approved for 20 visits or 80 units of each [phone]  and 20 units of 35772  Referring Physician:  DO Dr. Radha Tay Officer   PCP: VIOLET Polanco NP  Next Doctor Visit:    Plan of care signed (Y/N):  Y  Outcome Measure: DASH 44 raw score  Visit# / total visits:    Pain level:      4/10         Goals:     Patient goals: regain function, strength and motion of L UE. Short Term Goals  Time Frame for Short Term Goals: 8 weeks  Short Term Goal 1: shoudler PROM to 100flexion: MET   Short Term Goal 2: Shoudler PROM to 30 ER in loose packed position  Long Term Goals  Time Frame for Long Term Goals : 14 weeks  Long Term Goal 1: I in home program.  Long Term Goal 2: AROM flexion to 145 or better for overhead reaching  Long Term Goal 3: reach behind back with minimal difficulty  Long Term Goal 4: sleep without waking due to shoulder pain 4/7nights. Long Term Goal 5: ER and scap strength for 5/5 for shoulder stability, good mechanics with movement.         Summary of Evaluation:  Assessment: Pt is s/p L rotator cuff, supraspinatus repair, biceps tenodesis, distal clavical

## 2023-12-11 ENCOUNTER — HOSPITAL ENCOUNTER (OUTPATIENT)
Dept: PHYSICAL THERAPY | Age: 43
Setting detail: THERAPIES SERIES
Discharge: HOME | End: 2023-12-11
Payer: MEDICAID

## 2023-12-11 PROCEDURE — 97110 THERAPEUTIC EXERCISES: CPT

## 2023-12-11 PROCEDURE — 97016 VASOPNEUMATIC DEVICE THERAPY: CPT

## 2023-12-11 PROCEDURE — 97140 MANUAL THERAPY 1/> REGIONS: CPT

## 2023-12-12 ENCOUNTER — PATIENT MESSAGE (OUTPATIENT)
Dept: GASTROENTEROLOGY | Age: 43
End: 2023-12-12

## 2023-12-12 ENCOUNTER — HOSPITAL ENCOUNTER (OUTPATIENT)
Dept: ULTRASOUND IMAGING | Age: 43
Discharge: HOME | End: 2023-12-12
Payer: MEDICAID

## 2023-12-12 ENCOUNTER — HOSPITAL ENCOUNTER (OUTPATIENT)
Age: 43
Discharge: HOME | End: 2023-12-12
Payer: MEDICAID

## 2023-12-12 DIAGNOSIS — R74.8 ELEVATED ALKALINE PHOSPHATASE LEVEL: ICD-10-CM

## 2023-12-12 DIAGNOSIS — R74.8 ELEVATED ALKALINE PHOSPHATASE LEVEL: Primary | ICD-10-CM

## 2023-12-12 DIAGNOSIS — K76.0 FATTY LIVER: ICD-10-CM

## 2023-12-12 PROCEDURE — 84165 PROTEIN E-PHORESIS SERUM: CPT

## 2023-12-12 PROCEDURE — 76705 ECHO EXAM OF ABDOMEN: CPT

## 2023-12-12 PROCEDURE — 80074 ACUTE HEPATITIS PANEL: CPT

## 2023-12-12 PROCEDURE — 82977 ASSAY OF GGT: CPT

## 2023-12-12 PROCEDURE — 83516 IMMUNOASSAY NONANTIBODY: CPT

## 2023-12-12 PROCEDURE — 81256 HFE GENE: CPT

## 2023-12-12 PROCEDURE — 80076 HEPATIC FUNCTION PANEL: CPT

## 2023-12-12 PROCEDURE — 84075 ASSAY ALKALINE PHOSPHATASE: CPT

## 2023-12-13 ENCOUNTER — HOSPITAL ENCOUNTER (OUTPATIENT)
Dept: PHYSICAL THERAPY | Age: 43
Setting detail: THERAPIES SERIES
Discharge: HOME | End: 2023-12-13
Payer: MEDICAID

## 2023-12-13 ENCOUNTER — OFFICE VISIT (OUTPATIENT)
Dept: NEUROLOGY | Age: 43
End: 2023-12-13
Payer: MEDICAID

## 2023-12-13 VITALS
OXYGEN SATURATION: 96 % | WEIGHT: 175 LBS | DIASTOLIC BLOOD PRESSURE: 70 MMHG | HEIGHT: 63 IN | BODY MASS INDEX: 31.01 KG/M2 | HEART RATE: 56 BPM | SYSTOLIC BLOOD PRESSURE: 124 MMHG

## 2023-12-13 DIAGNOSIS — G43.E09 CHRONIC MIGRAINE WITH AURA WITHOUT STATUS MIGRAINOSUS, NOT INTRACTABLE: Primary | ICD-10-CM

## 2023-12-13 PROCEDURE — 1036F TOBACCO NON-USER: CPT | Performed by: NURSE PRACTITIONER

## 2023-12-13 PROCEDURE — G8417 CALC BMI ABV UP PARAM F/U: HCPCS | Performed by: NURSE PRACTITIONER

## 2023-12-13 PROCEDURE — G8484 FLU IMMUNIZE NO ADMIN: HCPCS | Performed by: NURSE PRACTITIONER

## 2023-12-13 PROCEDURE — 99213 OFFICE O/P EST LOW 20 MIN: CPT | Performed by: NURSE PRACTITIONER

## 2023-12-13 PROCEDURE — 97110 THERAPEUTIC EXERCISES: CPT

## 2023-12-13 PROCEDURE — G8427 DOCREV CUR MEDS BY ELIG CLIN: HCPCS | Performed by: NURSE PRACTITIONER

## 2023-12-13 RX ORDER — ERENUMAB-AOOE 140 MG/ML
INJECTION, SOLUTION SUBCUTANEOUS
Qty: 1 ADJUSTABLE DOSE PRE-FILLED PEN SYRINGE | Refills: 0 | Status: SHIPPED | COMMUNITY
Start: 2023-12-13

## 2023-12-13 RX ORDER — ERENUMAB-AOOE 140 MG/ML
140 INJECTION, SOLUTION SUBCUTANEOUS
Qty: 1 ADJUSTABLE DOSE PRE-FILLED PEN SYRINGE | Refills: 11 | Status: SHIPPED | OUTPATIENT
Start: 2023-12-13

## 2023-12-26 ENCOUNTER — HOSPITAL ENCOUNTER (OUTPATIENT)
Dept: PHYSICAL THERAPY | Age: 43
Setting detail: THERAPIES SERIES
Discharge: HOME | End: 2023-12-26
Payer: MEDICAID

## 2023-12-26 PROCEDURE — 97110 THERAPEUTIC EXERCISES: CPT

## 2023-12-26 PROCEDURE — 97140 MANUAL THERAPY 1/> REGIONS: CPT

## 2023-12-28 ENCOUNTER — HOSPITAL ENCOUNTER (OUTPATIENT)
Dept: PHYSICAL THERAPY | Age: 43
Setting detail: THERAPIES SERIES
Discharge: HOME | End: 2023-12-28
Payer: MEDICAID

## 2023-12-28 PROCEDURE — 97110 THERAPEUTIC EXERCISES: CPT

## 2023-12-28 PROCEDURE — 97140 MANUAL THERAPY 1/> REGIONS: CPT

## 2023-12-28 NOTE — FLOWSHEET NOTE
daily - 7 x weekly - 1 sets - 10 reps - 5 hold  - Standing Isometric Shoulder External Rotation with Doorway  - 3 x daily - 7 x weekly - 1 sets - 10 reps - 5 hold    Wall walking, AAROM. Manual Treatments:  PROM in all planes to patient tolerance      Modalities:   none    Communication with other providers:      Assessment:   Nilam's PROM is making good progress. She is limited for AROM, especially into abduction, with pain and compensation. She remains with general weakness and decreased endurance for AROM activities, even in the supine position. She will continue to benefit from skilled PT services to address these deficits so she may return to her PLOF without pain or limitation.  End session pain: 4/10    Plan for Next Session: progress per POC    If [employer] Please Indicate Units for Rx this date and running total for each and overall total for Rx to date:  CPT Code Units today Running Total Units Total approved    TE 70964 (80) 1 17    MAN 73756 (80) 1 10    Gait 31094      NR 18993 (80)      TA  46375 (80)      Estim Unatt 17431       [facility] [place] [address]      VASO 77153 (20)   5    ADL/Self care 36805 (80)      DNT 1-2 15991      DNT 3-4 04642      Other: Eval    1           Total for episode of care   33               Time In / Time Out:  1345/1415    Timed Code/Total Treatment Minutes: 27'  1 man 1 TE     Next Progress Note due:  every 10th visit    Plan of Care Interventions:  [x] Therapeutic Exercise  [] Modalities:  [x] Therapeutic Activity     [] Ultrasound  [] Estim  [] Gait Training      [] Cervical Traction [] Lumbar Traction  [x] Neuromuscular Re-education    [] Cold/hotpack [] Iontophoresis   [x] Instruction in HEP      [x] Vasopneumatic   [] Dry Needling    [x] Manual Therapy               [] Aquatic Therapy              Electronically signed by:  Sharyle Davies             12/28/2023,8:52 AM

## 2023-12-29 ENCOUNTER — HOSPITAL ENCOUNTER (OUTPATIENT)
Age: 43
Discharge: HOME | End: 2023-12-29
Payer: MEDICAID

## 2023-12-29 ENCOUNTER — OFFICE VISIT (OUTPATIENT)
Dept: FAMILY MEDICINE CLINIC | Age: 43
End: 2023-12-29
Payer: MEDICAID

## 2023-12-29 ENCOUNTER — TELEPHONE (OUTPATIENT)
Dept: GASTROENTEROLOGY | Age: 43
End: 2023-12-29

## 2023-12-29 VITALS
BODY MASS INDEX: 31.75 KG/M2 | SYSTOLIC BLOOD PRESSURE: 124 MMHG | DIASTOLIC BLOOD PRESSURE: 76 MMHG | HEIGHT: 63 IN | OXYGEN SATURATION: 100 % | WEIGHT: 179.2 LBS | HEART RATE: 64 BPM

## 2023-12-29 DIAGNOSIS — R74.8 ELEVATED ALKALINE PHOSPHATASE LEVEL: ICD-10-CM

## 2023-12-29 DIAGNOSIS — K76.0 FATTY LIVER: ICD-10-CM

## 2023-12-29 DIAGNOSIS — Z90.3 S/P GASTRIC SLEEVE PROCEDURE: ICD-10-CM

## 2023-12-29 DIAGNOSIS — E83.19 IRON OVERLOAD: ICD-10-CM

## 2023-12-29 DIAGNOSIS — E05.90 HYPERTHYROIDISM: ICD-10-CM

## 2023-12-29 DIAGNOSIS — R79.89 HIGH SERUM FERRITIN: ICD-10-CM

## 2023-12-29 DIAGNOSIS — E05.90 HYPERTHYROIDISM: Primary | ICD-10-CM

## 2023-12-29 LAB
ALBUMIN SERPL-MCNC: 4.4 GM/DL (ref 3.4–5)
ALP BLD-CCNC: 197 IU/L (ref 40–129)
ALT SERPL-CCNC: 8 U/L (ref 10–40)
ANION GAP SERPL CALCULATED.3IONS-SCNC: 12 MMOL/L (ref 7–16)
AST SERPL-CCNC: 15 IU/L (ref 15–37)
BILIRUB SERPL-MCNC: 0.6 MG/DL (ref 0–1)
BUN SERPL-MCNC: 11 MG/DL (ref 6–23)
CALCIUM SERPL-MCNC: 8.8 MG/DL (ref 8.3–10.6)
CHLORIDE BLD-SCNC: 102 MMOL/L (ref 99–110)
CO2: 25 MMOL/L (ref 21–32)
CREAT SERPL-MCNC: 1.2 MG/DL (ref 0.6–1.1)
FERRITIN: 203 NG/ML (ref 15–150)
GAMMA GLUTAMYL TRANSFERASE: 14 IU/L (ref 5–36)
GFR SERPL CREATININE-BSD FRML MDRD: 57 ML/MIN/1.73M2
GLUCOSE SERPL-MCNC: 85 MG/DL (ref 70–99)
HAV IGM SERPL QL IA: NON REACTIVE
HBV CORE IGM SERPL QL IA: NON REACTIVE
HBV SURFACE AG SERPL QL IA: NON REACTIVE
HCV AB SERPL QL IA: NON REACTIVE
INR BLD: 1 INDEX
PLATELET # BLD: 265 10*3/UL
POTASSIUM SERPL-SCNC: 4.5 MMOL/L (ref 3.5–5.1)
PROTHROMBIN TIME: 13.5 SECONDS (ref 11.7–14.5)
SODIUM BLD-SCNC: 139 MMOL/L (ref 135–145)
T3 FREE: 1.9 PG/ML (ref 2.3–4.2)
T4 FREE SERPL-MCNC: 0.12 NG/DL (ref 0.9–1.8)
TOTAL PROTEIN: 7.2 GM/DL (ref 6.4–8.2)
TSH SERPL DL<=0.005 MIU/L-ACNC: 64.56 UIU/ML (ref 0.27–4.2)

## 2023-12-29 PROCEDURE — G8484 FLU IMMUNIZE NO ADMIN: HCPCS | Performed by: NURSE PRACTITIONER

## 2023-12-29 PROCEDURE — 99214 OFFICE O/P EST MOD 30 MIN: CPT | Performed by: NURSE PRACTITIONER

## 2023-12-29 PROCEDURE — 84481 FREE ASSAY (FT-3): CPT

## 2023-12-29 PROCEDURE — 84080 ASSAY ALKALINE PHOSPHATASES: CPT

## 2023-12-29 PROCEDURE — 80053 COMPREHEN METABOLIC PANEL: CPT

## 2023-12-29 PROCEDURE — 84450 TRANSFERASE (AST) (SGOT): CPT

## 2023-12-29 PROCEDURE — 36415 COLL VENOUS BLD VENIPUNCTURE: CPT

## 2023-12-29 PROCEDURE — 83516 IMMUNOASSAY NONANTIBODY: CPT

## 2023-12-29 PROCEDURE — 83883 ASSAY NEPHELOMETRY NOT SPEC: CPT

## 2023-12-29 PROCEDURE — 82104 ALPHA-1-ANTITRYPSIN PHENO: CPT

## 2023-12-29 PROCEDURE — 84520 ASSAY OF UREA NITROGEN: CPT

## 2023-12-29 PROCEDURE — 86038 ANTINUCLEAR ANTIBODIES: CPT

## 2023-12-29 PROCEDURE — 82728 ASSAY OF FERRITIN: CPT

## 2023-12-29 PROCEDURE — 80074 ACUTE HEPATITIS PANEL: CPT

## 2023-12-29 PROCEDURE — 1036F TOBACCO NON-USER: CPT | Performed by: NURSE PRACTITIONER

## 2023-12-29 PROCEDURE — 82977 ASSAY OF GGT: CPT

## 2023-12-29 PROCEDURE — 84439 ASSAY OF FREE THYROXINE: CPT

## 2023-12-29 PROCEDURE — G8427 DOCREV CUR MEDS BY ELIG CLIN: HCPCS | Performed by: NURSE PRACTITIONER

## 2023-12-29 PROCEDURE — 85610 PROTHROMBIN TIME: CPT

## 2023-12-29 PROCEDURE — 82390 ASSAY OF CERULOPLASMIN: CPT

## 2023-12-29 PROCEDURE — G8417 CALC BMI ABV UP PARAM F/U: HCPCS | Performed by: NURSE PRACTITIONER

## 2023-12-29 PROCEDURE — 86256 FLUORESCENT ANTIBODY TITER: CPT

## 2023-12-29 PROCEDURE — 84443 ASSAY THYROID STIM HORMONE: CPT

## 2023-12-29 PROCEDURE — 84075 ASSAY ALKALINE PHOSPHATASE: CPT

## 2023-12-29 PROCEDURE — 86376 MICROSOMAL ANTIBODY EACH: CPT

## 2023-12-29 NOTE — TELEPHONE ENCOUNTER
Please call patient and find out if she planned to complete the lab work I ordered.  Thank you, Coty

## 2023-12-29 NOTE — PROGRESS NOTES
2023     Umberto Hanson (:  1980) is a 39 y.o. female, here for evaluation of the following medical concerns:    Reports thryoid and neck swelling. Some mild trouble swallowing. Started a month ago. Worsening. Follows with [facility] stephanie and they told her to see PCP. She does have an davide with them next week. On methimazole. Has only missed one dose. Last labs 2023   TSH 0.011  T4 and t3 low   US 2023 with multiple thyroid nodules. Review of Systems    Prior to Visit Medications    Medication Sig Taking? Authorizing Provider   Jill Sartorius (AIMOVIG) 140 MG/ML SOAJ Inject 140 mg into the skin every 30 days Yes Liam Less, APRN - ISAAC   Erenumab-aooe (AIMOVIG) 140 MG/ML SOAJ Lot: [id number] Exp: 25 Yes Liam Less, APRROSSI - ISAAC   vitamin D3 (CHOLECALCIFEROL) 25 MCG (1000 UT) TABS tablet Take 1 tablet by mouth daily Yes Iraj Escobar APRN - NP   vitamin D (ERGOCALCIFEROL) 1.25 MG (14512 UT) CAPS capsule Take 1 capsule by mouth once a week For 12 weeks only Yes Iraj Escobar APRN - NP   oxyCODONE-acetaminophen (PERCOCET) 5-325 MG per tablet Take 1 tablet by mouth every 6 hours as needed for Pain. Yes Maritza Johnson MD   methIMAzole (TAPAZOLE) 5 MG tablet Take 1 tablet by mouth daily Yes Maritza Johnson MD   traZODone (DESYREL) 50 MG tablet Take 1 tablet by mouth at bedtime.  Yes Maritza Johnson MD   ondansetron (ZOFRAN-ODT) 4 MG disintegrating tablet Take 1 tablet by mouth 3 times daily as needed for Nausea or Vomiting Yes Rolena Foots, DO   buPROPion (WELLBUTRIN XL) 150 MG extended release tablet Take 1 tablet by mouth every morning Yes Maritza Johnson MD   omeprazole (PRILOSEC) 40 MG delayed release capsule Take 1 capsule by mouth every morning (before breakfast) Yes Iraj Escobar APRN - NP   albuterol sulfate HFA (VENTOLIN HFA) 108 (90 Base) MCG/ACT inhaler Inhale 2 puffs into the lungs 4 times daily as

## 2023-12-31 LAB
CERULOPLASMIN SERPL-MCNC: 26 MG/DL (ref 16–45)
Lab: NORMAL
MITOCHONDRIA M2 IGG SER-ACNC: 5.5 UNITS (ref 0–24.9)
NUCLEAR IGG SER QL IA: NORMAL
SMA IGG SER-ACNC: 8 UNITS (ref 0–19)
TEST NAME: NORMAL

## 2024-01-01 LAB
A1AT PHENOTYP SERPL-IMP: NORMAL
A1AT SERPL-MCNC: 138 MG/DL (ref 90–200)
A2 MACROGLOB SERPL-MCNC: 365 MG/DL (ref 131–293)
ALT SERPL-CCNC: 9 U/L (ref 5–40)
ANNOTATION COMMENT IMP: ABNORMAL
AST SERPL-CCNC: 21 U/L (ref 9–40)
BUN SERPL-MCNC: 12 MG/DL (ref 7–20)
CIRRHOMETER PATIENT SCORE: 0.03
FIBROSIS STAGE SERPL QL: ABNORMAL
GGT SERPL-CCNC: 13 U/L (ref 7–33)
INFLAMETER METAVIR CLASSIFICATION: ABNORMAL
INFLAMETER PATIENT SCORE: 0.44
LIVER FIBR SCORE SERPL CALC.FIBROMETER: 0.48
PATHOLOGY STUDY: ABNORMAL
PLATELET # BLD: 265 10*3/UL
PT INDEX PPP: 83 % (ref 90–120)

## 2024-01-02 ENCOUNTER — HOSPITAL ENCOUNTER (OUTPATIENT)
Dept: PHYSICAL THERAPY | Age: 44
Setting detail: THERAPIES SERIES
Discharge: HOME | End: 2024-01-02
Payer: MEDICAID

## 2024-01-02 PROCEDURE — 97110 THERAPEUTIC EXERCISES: CPT

## 2024-01-02 PROCEDURE — 97140 MANUAL THERAPY 1/> REGIONS: CPT

## 2024-01-02 NOTE — FLOWSHEET NOTE
Outpatient Physical Therapy  [place]           [x] Phone: [phone]   Fax: [phone]  [place]           [] Phone: [phone]   Fax: [phone]        Physical Therapy Daily Treatment Note  Date:  2024    Patient Name:  Umberto Hanson    :  1980  MRN: [id number]  Restrictions/Precautions: Restrictions/Precautions: Fall Risk; General Precautions   Diagnosis:   Sprain of left rotator cuff capsule, initial encounter [S43.422A]  Impingement syndrome of left shoulder [M75.42]  Bicipital tendonitis of shoulder, left [M75.22]  Primary osteoarthritis of left shoulder [M19.012] Diagnosis: sprain L rotator cuff, bicipital tendonitis, OA L shoulder.  Date of Injury/Surgery:   Treatment Diagnosis:  L rotator cuff repair, biceps tenodesis 10/12/2023  Insurance/Certification information: [facility]; Pt approved for 20 visits or 80 units of each TE TA NR Man ADL and 20 units of Vaso  Referring Physician:  Rustam Louise DO Dr. Malone   PCP: Jessica Escobar, APRN - NP  Next Doctor Visit:    Plan of care signed (Y/N):  Y  Outcome Measure: DASH 44 raw score  Visit# / total visits:    Pain level:    0/10             Goals:     Patient goals: regain function, strength and motion of L UE.  Short Term Goals  Time Frame for Short Term Goals: 8 weeks  Short Term Goal 1: shoudler PROM to 100flexion: MET   Short Term Goal 2: Shoudler PROM to 30 ER in loose packed position PROM 57°   Long Term Goals  Time Frame for Long Term Goals : 14 weeks  Long Term Goal 1: I in home program. Reports compliance    Long Term Goal 2: AROM flexion to 145 or better for overhead reaching  Long Term Goal 3: reach behind back with minimal difficulty  Long Term Goal 4: sleep without waking due to shoulder pain 4/7nights.  Long Term Goal 5: ER and scap strength for 5/5 for shoulder stability, good mechanics with movement.        Summary of Evaluation:  Assessment: Pt is s/p L rotator cuff, supraspinatus repair, biceps

## 2024-01-03 LAB
ALP BONE SERPL-CCNC: 129 U/L (ref 0–55)
ALP LIVER SERPL-CCNC: 73 U/L (ref 0–94)
ALP OTHER SERPL-CCNC: 0 U/L
ALP SERPL-CCNC: 202 U/L (ref 40–120)

## 2024-01-03 NOTE — TELEPHONE ENCOUNTER
Patient had all labs done printed misc test result and provided it to Physician to review no further action required

## 2024-01-04 ENCOUNTER — HOSPITAL ENCOUNTER (OUTPATIENT)
Dept: PHYSICAL THERAPY | Age: 44
Setting detail: THERAPIES SERIES
Discharge: HOME | End: 2024-01-04
Payer: MEDICAID

## 2024-01-04 PROCEDURE — 97110 THERAPEUTIC EXERCISES: CPT

## 2024-01-04 PROCEDURE — 97140 MANUAL THERAPY 1/> REGIONS: CPT

## 2024-01-04 NOTE — FLOWSHEET NOTE
Outpatient Physical Therapy  [place]           [x] Phone: [phone]   Fax: [phone]  [place]           [] Phone: [phone]   Fax: [phone]        Physical Therapy Daily Treatment Note  Date:  2024    Patient Name:  Umberto Hanson    :  1980  MRN: [id number]  Restrictions/Precautions: Restrictions/Precautions: Fall Risk; General Precautions   Diagnosis:   Sprain of left rotator cuff capsule, initial encounter [S43.422A]  Impingement syndrome of left shoulder [M75.42]  Bicipital tendonitis of shoulder, left [M75.22]  Primary osteoarthritis of left shoulder [M19.012] Diagnosis: sprain L rotator cuff, bicipital tendonitis, OA L shoulder.  Date of Injury/Surgery:   Treatment Diagnosis:  L rotator cuff repair, biceps tenodesis 10/12/2023  Insurance/Certification information: [facility]; Pt approved for 20 visits or 80 units of each TE TA NR Man ADL and 20 units of Vaso  Referring Physician:  Rustam Louise DO Dr. Malone   PCP: Jessica Escobar, APRN - NP  Next Doctor Visit:    Plan of care signed (Y/N):  Y  Outcome Measure: DASH 44 raw score  Visit# / total visits:    Pain level:    5-6/10             Goals:     Patient goals: regain function, strength and motion of L UE.  Short Term Goals  Time Frame for Short Term Goals: 8 weeks  Short Term Goal 1: shoudler PROM to 100flexion: MET   Short Term Goal 2: Shoudler PROM to 30 ER in loose packed position PROM 57°   Long Term Goals  Time Frame for Long Term Goals : 14 weeks  Long Term Goal 1: I in home program. Reports compliance    Long Term Goal 2: AROM flexion to 145 or better for overhead reaching  Long Term Goal 3: reach behind back with minimal difficulty  Long Term Goal 4: sleep without waking due to shoulder pain 4/7nights.  Long Term Goal 5: ER and scap strength for 5/5 for shoulder stability, good mechanics with movement.        Summary of Evaluation:  Assessment: Pt is s/p L rotator cuff, supraspinatus repair,

## 2024-01-05 ENCOUNTER — HOSPITAL ENCOUNTER (OUTPATIENT)
Dept: INFUSION THERAPY | Age: 44
Discharge: HOME | End: 2024-01-05
Payer: MEDICAID

## 2024-01-05 DIAGNOSIS — E53.8 B12 DEFICIENCY: Primary | ICD-10-CM

## 2024-01-05 PROCEDURE — 6360000002 HC RX W HCPCS

## 2024-01-05 PROCEDURE — 96372 THER/PROPH/DIAG INJ SC/IM: CPT

## 2024-01-05 RX ORDER — DIPHENHYDRAMINE HYDROCHLORIDE 50 MG/ML
50 INJECTION INTRAMUSCULAR; INTRAVENOUS
OUTPATIENT
Start: 2024-02-02

## 2024-01-05 RX ORDER — ACETAMINOPHEN 325 MG/1
650 TABLET ORAL
OUTPATIENT
Start: 2024-02-02

## 2024-01-05 RX ORDER — FAMOTIDINE 10 MG/ML
20 INJECTION, SOLUTION INTRAVENOUS
OUTPATIENT
Start: 2024-02-02

## 2024-01-05 RX ORDER — ALBUTEROL SULFATE 90 UG/1
4 AEROSOL, METERED RESPIRATORY (INHALATION) PRN
OUTPATIENT
Start: 2024-02-02

## 2024-01-05 RX ORDER — CYANOCOBALAMIN 1000 UG/ML
INJECTION, SOLUTION INTRAMUSCULAR; SUBCUTANEOUS
Status: COMPLETED
Start: 2024-01-05 | End: 2024-01-05

## 2024-01-05 RX ORDER — EPINEPHRINE 1 MG/ML
0.3 INJECTION, SOLUTION, CONCENTRATE INTRAVENOUS PRN
OUTPATIENT
Start: 2024-02-02

## 2024-01-05 RX ORDER — CYANOCOBALAMIN 1000 UG/ML
1000 INJECTION, SOLUTION INTRAMUSCULAR; SUBCUTANEOUS ONCE
OUTPATIENT
Start: 2024-02-02 | End: 2024-02-02

## 2024-01-05 RX ORDER — CYANOCOBALAMIN 1000 UG/ML
1000 INJECTION, SOLUTION INTRAMUSCULAR; SUBCUTANEOUS ONCE
Status: COMPLETED | OUTPATIENT
Start: 2024-01-05 | End: 2024-01-05

## 2024-01-05 RX ORDER — ONDANSETRON 2 MG/ML
8 INJECTION INTRAMUSCULAR; INTRAVENOUS
OUTPATIENT
Start: 2024-02-02

## 2024-01-05 RX ORDER — SODIUM CHLORIDE 9 MG/ML
INJECTION, SOLUTION INTRAVENOUS CONTINUOUS
OUTPATIENT
Start: 2024-02-02

## 2024-01-05 RX ADMIN — CYANOCOBALAMIN 1000 MCG: 1000 INJECTION, SOLUTION INTRAMUSCULAR; SUBCUTANEOUS at 14:01

## 2024-01-05 RX ADMIN — CYANOCOBALAMIN 1000 MCG: 1000 INJECTION, SOLUTION INTRAMUSCULAR at 14:01

## 2024-01-05 NOTE — PROGRESS NOTES
Pt arrived to treatment suite for B12 injection.  Treatment plan approved, released and completed.  Injection given IM in right deltoid.  Pt tolerated well.  AVS declined.  Discharge ambulatory in stable condition. Wendy Florence RN

## 2024-01-09 ENCOUNTER — HOSPITAL ENCOUNTER (OUTPATIENT)
Dept: PHYSICAL THERAPY | Age: 44
Setting detail: THERAPIES SERIES
Discharge: HOME | End: 2024-01-09
Payer: MEDICAID

## 2024-01-09 PROCEDURE — 97140 MANUAL THERAPY 1/> REGIONS: CPT

## 2024-01-09 PROCEDURE — 97110 THERAPEUTIC EXERCISES: CPT

## 2024-01-09 NOTE — FLOWSHEET NOTE
Outpatient Physical Therapy  [place]           [x] Phone: [phone]   Fax: [phone]  [place]           [] Phone: [phone]   Fax: [phone]        Physical Therapy Daily Treatment Note  Date:  2024    Patient Name:  Umberto Hanson    :  1980  MRN: [id number]  Restrictions/Precautions: Restrictions/Precautions: Fall Risk; General Precautions   Diagnosis:   Sprain of left rotator cuff capsule, initial encounter [S43.422A]  Impingement syndrome of left shoulder [M75.42]  Bicipital tendonitis of shoulder, left [M75.22]  Primary osteoarthritis of left shoulder [M19.012] Diagnosis: sprain L rotator cuff, bicipital tendonitis, OA L shoulder.  Date of Injury/Surgery:   Treatment Diagnosis:  L rotator cuff repair, biceps tenodesis 10/12/2023  Insurance/Certification information: [facility]; Pt approved for 20 visits or 80 units of each TE TA NR Man ADL and 20 units of Vaso  Referring Physician:  Rustam Louise DO Dr. Malone   PCP: Jessica Escobar, APRN - NP  Next Doctor Visit:    Plan of care signed (Y/N):  Y  Outcome Measure: DASH 44 raw score  Visit# / total visits:    Pain level:    15/10             Goals:     Patient goals: regain function, strength and motion of L UE.  Short Term Goals  Time Frame for Short Term Goals: 8 weeks  Short Term Goal 1: shoudler PROM to 100flexion: MET   Short Term Goal 2: Shoudler PROM to 30 ER in loose packed position PROM 57°   Long Term Goals  Time Frame for Long Term Goals : 14 weeks  Long Term Goal 1: I in home program. Reports compliance    Long Term Goal 2: AROM flexion to 145 or better for overhead reaching  Long Term Goal 3: reach behind back with minimal difficulty  Long Term Goal 4: sleep without waking due to shoulder pain 4/7nights.  Long Term Goal 5: ER and scap strength for 5/5 for shoulder stability, good mechanics with movement.        Summary of Evaluation:  Assessment: Pt is s/p L rotator cuff, supraspinatus repair, biceps

## 2024-01-11 ENCOUNTER — HOSPITAL ENCOUNTER (OUTPATIENT)
Dept: PHYSICAL THERAPY | Age: 44
Setting detail: THERAPIES SERIES
Discharge: HOME | End: 2024-01-11
Payer: MEDICAID

## 2024-01-11 PROCEDURE — 97110 THERAPEUTIC EXERCISES: CPT

## 2024-01-11 PROCEDURE — 97140 MANUAL THERAPY 1/> REGIONS: CPT

## 2024-01-11 NOTE — FLOWSHEET NOTE
Outpatient Physical Therapy  [place]           [x] Phone: [phone]   Fax: [phone]  [place]           [] Phone: [phone]   Fax: [phone]        Physical Therapy Daily Treatment Note  Date:  2024    Patient Name:  Umberto Hanson    :  1980  MRN: [id number]  Restrictions/Precautions: Restrictions/Precautions: Fall Risk; General Precautions   Diagnosis:   Sprain of left rotator cuff capsule, initial encounter [S43.422A]  Impingement syndrome of left shoulder [M75.42]  Bicipital tendonitis of shoulder, left [M75.22]  Primary osteoarthritis of left shoulder [M19.012] Diagnosis: sprain L rotator cuff, bicipital tendonitis, OA L shoulder.  Date of Injury/Surgery:   Treatment Diagnosis:  L rotator cuff repair, biceps tenodesis 10/12/2023  Insurance/Certification information: [facility]; Pt approved for 20 visits or 80 units of each TE TA NR Man ADL and 20 units of Vaso  Referring Physician:  Rustam Louise DO Dr. Malone   PCP: Jessica Escobar, APRN - NP  Next Doctor Visit:    Plan of care signed (Y/N):  Y  Outcome Measure: DASH 44 raw score  Visit# / total visits:    Pain level:    0/10             Goals:     Patient goals: regain function, strength and motion of L UE.  Short Term Goals  Time Frame for Short Term Goals: 8 weeks  Short Term Goal 1: shoudler PROM to 100flexion: MET   Short Term Goal 2: Shoudler PROM to 30 ER in loose packed position PROM 57°   Long Term Goals  Time Frame for Long Term Goals : 14 weeks  Long Term Goal 1: I in home program. Reports compliance    Long Term Goal 2: AROM flexion to 145 or better for overhead reaching      Not yet met. 2024    Long Term Goal 3: reach behind back with minimal difficulty       Minimal pain, but not full motion2024  Long Term Goal 4: sleep without waking due to shoulder pain 4/7nights.      Wakes 3-4x/wk w shld pain2024  Long Term Goal 5: ER and scap strength for 5/5 for shoulder stability, good

## 2024-01-12 ENCOUNTER — OFFICE VISIT (OUTPATIENT)
Dept: ORTHOPEDIC SURGERY | Age: 44
End: 2024-01-12

## 2024-01-12 VITALS — SYSTOLIC BLOOD PRESSURE: 128 MMHG | HEART RATE: 80 BPM | DIASTOLIC BLOOD PRESSURE: 80 MMHG | OXYGEN SATURATION: 98 %

## 2024-01-12 DIAGNOSIS — Z98.890 STATUS POST ROTATOR CUFF REPAIR: Primary | ICD-10-CM

## 2024-01-12 RX ORDER — LEVOTHYROXINE SODIUM 0.05 MG/1
50 TABLET ORAL
COMMUNITY
Start: 2024-01-02

## 2024-01-12 NOTE — PROGRESS NOTES
13 week post op Left RTCR/SAD/DCE/ANALI/BT    Last PT on January 30th, feels she needs more PT to regain full strength.   Overall improving, primary concern is strength in her right arm, still has limited ROM.     No new injuries. Numbness in triceps.   
imaging    Office Procedures:  No orders of the defined types were placed in this encounter.      Assessment and Plan    A: 3-month status post rotator cuff repair    P:    -Reassurance provided to the patient that she is doing well and there are no concerning findings on physical exam today.  She is progressing appropriately with physical therapy.  Patient did request additional therapy and this will be provided.  -Continue out of sling  -Progress with PT per the protocol  -Work note provided with restrictions including no lifting more than 5 pounds, no activities above the head or outstretched from body  -rest/elevation as needed  -DVT prophylaxis: None  -No refills needed per patient   -f/u in 3 months  -f/u sooner prn any issues     Electronically signed by Rustam Louise DO on 1/12/2024 at 9:31 AM

## 2024-01-13 ASSESSMENT — ENCOUNTER SYMPTOMS
PHOTOPHOBIA: 0
FACIAL SWELLING: 0
BACK PAIN: 0
STRIDOR: 0
SHORTNESS OF BREATH: 0
VOMITING: 0
EYE PAIN: 0
ABDOMINAL PAIN: 0
EYE REDNESS: 0
COLOR CHANGE: 0
WHEEZING: 0
COUGH: 0
NAUSEA: 0

## 2024-01-16 ENCOUNTER — HOSPITAL ENCOUNTER (OUTPATIENT)
Dept: PHYSICAL THERAPY | Age: 44
Setting detail: THERAPIES SERIES
Discharge: HOME | End: 2024-01-16
Payer: MEDICAID

## 2024-01-16 PROCEDURE — 97110 THERAPEUTIC EXERCISES: CPT

## 2024-01-16 PROCEDURE — 97140 MANUAL THERAPY 1/> REGIONS: CPT

## 2024-01-16 NOTE — FLOWSHEET NOTE
Outpatient Physical Therapy  [place]           [x] Phone: [phone]   Fax: [phone]  [place]           [] Phone: [phone]   Fax: [phone]        Physical Therapy Daily Treatment Note  Date:  2024    Patient Name:  Umberto Hanson    :  1980  MRN: [id number]  Restrictions/Precautions: Restrictions/Precautions: Fall Risk; General Precautions   Diagnosis:   Sprain of left rotator cuff capsule, initial encounter [S43.422A]  Impingement syndrome of left shoulder [M75.42]  Bicipital tendonitis of shoulder, left [M75.22]  Primary osteoarthritis of left shoulder [M19.012] Diagnosis: sprain L rotator cuff, bicipital tendonitis, OA L shoulder.  Date of Injury/Surgery:   Treatment Diagnosis:  L rotator cuff repair, biceps tenodesis 10/12/2023  Insurance/Certification information: [facility]; Pt approved for 20 visits or 80 units of each TE TA NR Man ADL and 20 units of Vaso  Referring Physician:  Rustam Louise DO Dr. Malone   PCP: Jessica Escobar, APRN - NP  Next Doctor Visit:    Plan of care signed (Y/N):  Y  Outcome Measure: DASH 44 raw score  Visit# / total visits:    Pain level:    2/10             Goals:     Patient goals: regain function, strength and motion of L UE.  Short Term Goals  Time Frame for Short Term Goals: 8 weeks  Short Term Goal 1: shoudler PROM to 100flexion: MET   Short Term Goal 2: Shoudler PROM to 30 ER in loose packed position PROM 57°   Long Term Goals  Time Frame for Long Term Goals : 14 weeks  Long Term Goal 1: I in home program. Reports compliance    Long Term Goal 2: AROM flexion to 145 or better for overhead reaching      Not yet met.     Long Term Goal 3: reach behind back with minimal difficulty       Minimal pain, but not full motion2024  Long Term Goal 4: sleep without waking due to shoulder pain 4/7nights.      Wakes 3-4x/wk w shld pain2024  Long Term Goal 5: ER and scap strength for 5/5 for shoulder stability, good mechanics

## 2024-01-18 ENCOUNTER — HOSPITAL ENCOUNTER (OUTPATIENT)
Dept: PHYSICAL THERAPY | Age: 44
Setting detail: THERAPIES SERIES
Discharge: HOME | End: 2024-01-18
Payer: MEDICAID

## 2024-01-18 PROCEDURE — 97110 THERAPEUTIC EXERCISES: CPT

## 2024-01-18 NOTE — FLOWSHEET NOTE
Outpatient Physical Therapy  [place]           [x] Phone: [phone]   Fax: [phone]  [place]           [] Phone: [phone]   Fax: [phone]        Physical Therapy Daily Treatment Note  Date:  2024    Patient Name:  Umberto Hanson    :  1980  MRN: [id number]  Restrictions/Precautions: Restrictions/Precautions: Fall Risk; General Precautions   Diagnosis:   Sprain of left rotator cuff capsule, initial encounter [S43.422A]  Impingement syndrome of left shoulder [M75.42]  Bicipital tendonitis of shoulder, left [M75.22]  Primary osteoarthritis of left shoulder [M19.012] Diagnosis: sprain L rotator cuff, bicipital tendonitis, OA L shoulder.  Date of Injury/Surgery:   Treatment Diagnosis:  L rotator cuff repair, biceps tenodesis 10/12/2023  Insurance/Certification information: [facility]; Pt approved for 20 visits or 80 units of each TE TA NR Man ADL and 20 units of Vaso  Referring Physician:  Rustam Louise DO Dr. Malone   PCP: Jessica Escobar, APRN - NP  Next Doctor Visit:    Plan of care signed (Y/N):  Y  Outcome Measure: DASH 44 raw score  Visit# / total visits:    Pain level:    1-2/10             Goals:     Patient goals: regain function, strength and motion of L UE.  Short Term Goals  Time Frame for Short Term Goals: 8 weeks  Short Term Goal 1: shoudler PROM to 100flexion: MET   Short Term Goal 2: Shoudler PROM to 30 ER in loose packed position PROM 57°   Long Term Goals  Time Frame for Long Term Goals : 14 weeks  Long Term Goal 1: I in home program. Reports compliance    Long Term Goal 2: AROM flexion to 145 or better for overhead reaching      Not yet met.     Long Term Goal 3: reach behind back with minimal difficulty       Minimal pain, but not full motion2024  Long Term Goal 4: sleep without waking due to shoulder pain 4/7nights.      Wakes 3-4x/wk w shld pain2024  Long Term Goal 5: ER and scap strength for 5/5 for shoulder stability, good mechanics

## 2024-01-22 ENCOUNTER — OFFICE VISIT (OUTPATIENT)
Dept: FAMILY MEDICINE CLINIC | Age: 44
End: 2024-01-22
Payer: MEDICAID

## 2024-01-22 VITALS
OXYGEN SATURATION: 96 % | WEIGHT: 179 LBS | SYSTOLIC BLOOD PRESSURE: 126 MMHG | BODY MASS INDEX: 31.71 KG/M2 | HEART RATE: 72 BPM | HEIGHT: 63 IN | DIASTOLIC BLOOD PRESSURE: 80 MMHG

## 2024-01-22 DIAGNOSIS — H11.32 SUBCONJUNCTIVAL HEMORRHAGE OF LEFT EYE: Primary | ICD-10-CM

## 2024-01-22 PROCEDURE — G8427 DOCREV CUR MEDS BY ELIG CLIN: HCPCS | Performed by: NURSE PRACTITIONER

## 2024-01-22 PROCEDURE — G8484 FLU IMMUNIZE NO ADMIN: HCPCS | Performed by: NURSE PRACTITIONER

## 2024-01-22 PROCEDURE — G8417 CALC BMI ABV UP PARAM F/U: HCPCS | Performed by: NURSE PRACTITIONER

## 2024-01-22 PROCEDURE — 99213 OFFICE O/P EST LOW 20 MIN: CPT | Performed by: NURSE PRACTITIONER

## 2024-01-22 PROCEDURE — 1036F TOBACCO NON-USER: CPT | Performed by: NURSE PRACTITIONER

## 2024-01-22 ASSESSMENT — PATIENT HEALTH QUESTIONNAIRE - PHQ9
9. THOUGHTS THAT YOU WOULD BE BETTER OFF DEAD, OR OF HURTING YOURSELF: 0
6. FEELING BAD ABOUT YOURSELF - OR THAT YOU ARE A FAILURE OR HAVE LET YOURSELF OR YOUR FAMILY DOWN: 0
4. FEELING TIRED OR HAVING LITTLE ENERGY: 0
3. TROUBLE FALLING OR STAYING ASLEEP: 0
1. LITTLE INTEREST OR PLEASURE IN DOING THINGS: 0
10. IF YOU CHECKED OFF ANY PROBLEMS, HOW DIFFICULT HAVE THESE PROBLEMS MADE IT FOR YOU TO DO YOUR WORK, TAKE CARE OF THINGS AT HOME, OR GET ALONG WITH OTHER PEOPLE: 0
2. FEELING DOWN, DEPRESSED OR HOPELESS: 0
5. POOR APPETITE OR OVEREATING: 0
SUM OF ALL RESPONSES TO PHQ QUESTIONS 1-9: 0
8. MOVING OR SPEAKING SO SLOWLY THAT OTHER PEOPLE COULD HAVE NOTICED. OR THE OPPOSITE, BEING SO FIGETY OR RESTLESS THAT YOU HAVE BEEN MOVING AROUND A LOT MORE THAN USUAL: 0
7. TROUBLE CONCENTRATING ON THINGS, SUCH AS READING THE NEWSPAPER OR WATCHING TELEVISION: 0
SUM OF ALL RESPONSES TO PHQ9 QUESTIONS 1 & 2: 0
SUM OF ALL RESPONSES TO PHQ QUESTIONS 1-9: 0

## 2024-01-22 NOTE — PROGRESS NOTES
2024     Umberto Hanson (:  1980) is a 43 y.o. female, here for evaluation of the following medical concerns:      Reports redness to her left eye.   States she woke up like that one week ago.   It is staying the same   Denies pain, change in vision, known injury.   Reports some itching and drainage only in the mornings.   Declines ATB   Has not see an eye specialist   No recent illness.   No contact lenses               Review of Systems    Prior to Visit Medications    Medication Sig Taking? Authorizing Provider   levothyroxine (SYNTHROID) 50 MCG tablet Take 1 tablet by mouth every morning (before breakfast) Yes Maritza Johnson MD   Erenumab-aooe (AIMOVIG) 140 MG/ML SOAJ Inject 140 mg into the skin every 30 days Yes Neri Pond APRN - CNP   Erenumab-aooe (AIMOVIG) 140 MG/ML SOAJ Lot: [id number] Exp: 25 Yes Neri Pond APRN - CNP   vitamin D3 (CHOLECALCIFEROL) 25 MCG (1000 UT) TABS tablet Take 1 tablet by mouth daily Yes Jessica Escobar APRN - NP   oxyCODONE-acetaminophen (PERCOCET) 5-325 MG per tablet Take 1 tablet by mouth every 6 hours as needed for Pain. Yes Maritza Johnson MD   methIMAzole (TAPAZOLE) 5 MG tablet Take 1 tablet by mouth daily Yes Maritza Johnson MD   traZODone (DESYREL) 50 MG tablet Take 1 tablet by mouth at bedtime. Yes Maritza Johnson MD   ondansetron (ZOFRAN-ODT) 4 MG disintegrating tablet Take 1 tablet by mouth 3 times daily as needed for Nausea or Vomiting Yes Rustam Louise,    buPROPion (WELLBUTRIN XL) 150 MG extended release tablet Take 1 tablet by mouth every morning Yes Maritza Johnson MD   albuterol sulfate HFA (VENTOLIN HFA) 108 (90 Base) MCG/ACT inhaler Inhale 2 puffs into the lungs 4 times daily as needed for Wheezing Yes Jessica Escobar APRN - NP   methIMAzole (TAPAZOLE) 10 MG tablet  Yes ProviderMaritza MD   metoprolol tartrate (LOPRESSOR) 25 MG tablet  Yes ProviderMaritza MD   Multiple

## 2024-01-23 ENCOUNTER — HOSPITAL ENCOUNTER (OUTPATIENT)
Dept: PHYSICAL THERAPY | Age: 44
Setting detail: THERAPIES SERIES
Discharge: HOME | End: 2024-01-23
Payer: MEDICAID

## 2024-01-23 PROCEDURE — 97140 MANUAL THERAPY 1/> REGIONS: CPT

## 2024-01-23 PROCEDURE — 97110 THERAPEUTIC EXERCISES: CPT

## 2024-01-23 NOTE — FLOWSHEET NOTE
Outpatient Physical Therapy  [place]           [x] Phone: [phone]   Fax: [phone]  [place]           [] Phone: [phone]   Fax: [phone]        Physical Therapy Daily Treatment Note  Date:  2024    Patient Name:  Umberto Hanson    :  1980  MRN: [id number]  Restrictions/Precautions:  LATEX ALLERGY   Restrictions/Precautions: Fall Risk; General Precautions   Diagnosis:   Sprain of left rotator cuff capsule, initial encounter [S43.422A]  Impingement syndrome of left shoulder [M75.42]  Bicipital tendonitis of shoulder, left [M75.22]  Primary osteoarthritis of left shoulder [M19.012] Diagnosis: sprain L rotator cuff, bicipital tendonitis, OA L shoulder.  Date of Injury/Surgery:   Treatment Diagnosis:  L rotator cuff repair, biceps tenodesis 10/12/2023  Insurance/Certification information: [facility]; Pt approved for 20 visits or 80 units of each TE TA NR Man ADL and 20 units of Vaso  Referring Physician:  Rustam Louise DO Dr. Malone   PCP: Jessica Escobar APRN - NP  Next Doctor Visit:    Plan of care signed (Y/N):  Y  Outcome Measure: DASH 44 raw score  Visit# / total visits:    Pain level:   2 /10             Goals:     Patient goals: regain function, strength and motion of L UE.  Short Term Goals  Time Frame for Short Term Goals: 8 weeks  Short Term Goal 1: shoudler PROM to 100flexion: MET   Short Term Goal 2: Shoudler PROM to 30 ER in loose packed position PROM 57°   Long Term Goals  Time Frame for Long Term Goals : 14 weeks  Long Term Goal 1: I in home program. Reports compliance    Long Term Goal 2: AROM flexion to 145 or better for overhead reaching      Not yet met.     Long Term Goal 3: reach behind back with minimal difficulty       Minimal pain, but not full motion2024  Long Term Goal 4: sleep without waking due to shoulder pain 4/7nights.      Wakes 3-4x/wk w shld pain2024  Long Term Goal 5: ER and scap strength for 5/5 for shoulder stability,

## 2024-01-25 ENCOUNTER — OFFICE VISIT (OUTPATIENT)
Dept: BARIATRICS/WEIGHT MGMT | Age: 44
End: 2024-01-25
Payer: MEDICAID

## 2024-01-25 VITALS
DIASTOLIC BLOOD PRESSURE: 84 MMHG | HEIGHT: 63 IN | BODY MASS INDEX: 31.38 KG/M2 | SYSTOLIC BLOOD PRESSURE: 114 MMHG | WEIGHT: 177.1 LBS | HEART RATE: 92 BPM | OXYGEN SATURATION: 97 %

## 2024-01-25 DIAGNOSIS — Z98.84 STATUS POST LAPAROSCOPIC SLEEVE GASTRECTOMY: ICD-10-CM

## 2024-01-25 DIAGNOSIS — R63.5 WEIGHT GAIN: Primary | ICD-10-CM

## 2024-01-25 PROCEDURE — 99213 OFFICE O/P EST LOW 20 MIN: CPT | Performed by: NURSE PRACTITIONER

## 2024-01-25 PROCEDURE — G8484 FLU IMMUNIZE NO ADMIN: HCPCS | Performed by: NURSE PRACTITIONER

## 2024-01-25 PROCEDURE — G8417 CALC BMI ABV UP PARAM F/U: HCPCS | Performed by: NURSE PRACTITIONER

## 2024-01-25 PROCEDURE — G8427 DOCREV CUR MEDS BY ELIG CLIN: HCPCS | Performed by: NURSE PRACTITIONER

## 2024-01-25 PROCEDURE — 1036F TOBACCO NON-USER: CPT | Performed by: NURSE PRACTITIONER

## 2024-01-25 RX ORDER — CYCLOBENZAPRINE HCL 10 MG
10 TABLET ORAL 3 TIMES DAILY
COMMUNITY
Start: 2024-01-02

## 2024-01-25 NOTE — PROGRESS NOTES
BARIATRIC SURGERY OFFICE PROGRESS NOTE    SUBJECTIVE:    Patient presenting today referred from Jessica Escobar, VIOLET - NP, for   Chief Complaint   Patient presents with    Follow-up     3 mo weight check  S/P S/g 11/21/2022 @ [facility]   .    Vitals:    01/25/24 1431   BP: 114/84   Pulse: 92   SpO2: 97%        BMI: Body mass index is 31.37 kg/m².     Weight History:   Wt Readings from Last 3 Encounters:   01/25/24 80.3 kg (177 lb 1.6 oz)   01/22/24 81.2 kg (179 lb)   12/29/23 81.3 kg (179 lb 3.2 oz)         HPI:Umberto Hanson is a 43 y.o. female presenting in  bariatric 1 year 2 months POST-OP visit.    Total weight loss/gain:   +2.3 lbs since last visit  -37.8 lbs since surgery  -60.7 lbs since starting program    Changes in health since last visit: thyroid levels elevated; scheduled for thyroid uptake study; steady weight gain; fatigue    Pt tracking calories: not tracking calories; protein good     Pt exercising: not currently    Pt taking vitamins: daily    Fluid intake: good     Past Medical History:   Diagnosis Date    Anxiety 1/10/2001    Arthritis     Chronic depression 07/09/2021    No meds as of 11/11/22    Fibromyalgia     Can’t remember date    GERD (gastroesophageal reflux disease) 2015    H/O bilateral breast reduction surgery 10/31/2019    H/O of hysterectomy with bilateral oophorectomy 04/16/2012    H/O percutaneous left heart catheterization 09/14/2023    normal coronaries    Hx of Doppler echocardiogram 04/27/2022    EF 55-60%. Mild LV hypertrophy. Grade I diastolic dysfunction. Mildly dilated LA. Mild MR and TR.    Hyperthyroidism 11/6/2023    Kidney disorder     left removed .    Migraine 11/11/2022    Mixed hyperlipidemia 01/27/2021    Morbid obesity (HCC) 11/21/2022    PONV (postoperative nausea and vomiting)     Preop pulmonary/respiratory exam 05/24/2022    Prolonged emergence from general anesthesia     Pulmonic stenosis     Sleep apnea         Patient Active Problem List   Diagnosis    Early

## 2024-01-26 ENCOUNTER — HOSPITAL ENCOUNTER (OUTPATIENT)
Dept: PHYSICAL THERAPY | Age: 44
Setting detail: THERAPIES SERIES
Discharge: HOME | End: 2024-01-26
Payer: MEDICAID

## 2024-01-26 PROCEDURE — 97140 MANUAL THERAPY 1/> REGIONS: CPT

## 2024-01-26 PROCEDURE — 97110 THERAPEUTIC EXERCISES: CPT

## 2024-01-26 PROCEDURE — 97016 VASOPNEUMATIC DEVICE THERAPY: CPT

## 2024-01-26 NOTE — FLOWSHEET NOTE
Outpatient Physical Therapy  [place]           [x] Phone: [phone]   Fax: [phone]  [place]           [] Phone: [phone]   Fax: [phone]        Physical Therapy Daily Treatment Note  Date:  2024    Patient Name:  Umberto Hanson    :  1980  MRN: [id number]  Restrictions/Precautions:  LATEX ALLERGY   Restrictions/Precautions: Fall Risk; General Precautions   Diagnosis:   Sprain of left rotator cuff capsule, initial encounter [S43.422A]  Impingement syndrome of left shoulder [M75.42]  Bicipital tendonitis of shoulder, left [M75.22]  Primary osteoarthritis of left shoulder [M19.012] Diagnosis: sprain L rotator cuff, bicipital tendonitis, OA L shoulder.  Date of Injury/Surgery:   Treatment Diagnosis:  L rotator cuff repair, biceps tenodesis 10/12/2023  Insurance/Certification information: [facility]; Pt approved for 20 visits or 80 units of each TE TA NR Man ADL and 20 units of Vaso  Referring Physician:  Rustam Louise DO Dr. Malone   PCP: Jessica Escobar APRN - NP  Next Doctor Visit:    Plan of care signed (Y/N):  Y  Outcome Measure: DASH 44 raw score  Visit# / total visits:    Pain level:   7 /10             Goals:     Patient goals: regain function, strength and motion of L UE.  Short Term Goals  Time Frame for Short Term Goals: 8 weeks  Short Term Goal 1: shoudler PROM to 100flexion: MET   Short Term Goal 2: Shoudler PROM to 30 ER in loose packed position PROM 57°   Long Term Goals  Time Frame for Long Term Goals : 14 weeks  Long Term Goal 1: I in home program. Reports compliance    Long Term Goal 2: AROM flexion to 145 or better for overhead reaching      Not yet met.     Long Term Goal 3: reach behind back with minimal difficulty       Minimal pain, but not full motion2024  Long Term Goal 4: sleep without waking due to shoulder pain 4/7nights.      Wakes 3-4x/wk w shld pain2024  Long Term Goal 5: ER and scap strength for 5/5 for shoulder stability,

## 2024-01-30 ENCOUNTER — HOSPITAL ENCOUNTER (OUTPATIENT)
Dept: PHYSICAL THERAPY | Age: 44
Setting detail: THERAPIES SERIES
Discharge: HOME | End: 2024-01-30
Payer: MEDICAID

## 2024-01-30 PROCEDURE — 97140 MANUAL THERAPY 1/> REGIONS: CPT

## 2024-01-30 PROCEDURE — 97110 THERAPEUTIC EXERCISES: CPT

## 2024-01-30 NOTE — PROGRESS NOTES
Outpatient Physical Therapy        [x] Phone: [phone]   Fax: [phone]  Physician:   Dr. Louise       From: Mundo Campos, PT,     Patient: Umberto Hanson                    : 1980   Diagnosis:   Sprain of left rotator cuff capsule, initial encounter [S43.422A]  Impingement syndrome of left shoulder [M75.42]  Bicipital tendonitis of shoulder, left [M75.22]  Primary osteoarthritis of left shoulder [M19.012] Diagnosis: sprain L rotator cuff, bicipital tendonitis, OA L shoulder.  Date of Injury/Surgery: 10/12/23  Treatment Diagnosis:  L rotator cuff repair, biceps tenodesis 10/12/2023     Date: 2024    [x]  Progress Note                []  Discharge Note    Total Visits to date:    20   Cancels/No-shows to date:   0    Subjective:  Pt complains of increased pain this past week without change in activity or injury.  She has returned to work with restrictions.  Rates pain 10/10 at start of treatment today.        Prominent Objective Findings:        Time Frame for Long Term Goals : 14 weeks  Long Term Goal 1: I in home program.         Long Term Goal 2: AROM flexion to 145 or better for overhead reaching  Not met  Long Term Goal 3: reach behind back with minimal difficulty   painful  Long Term Goal 4: sleep without waking due to shoulder pain 4/7nights.  Not met  Long Term Goal 5: ER and scap strength for 5/5 for shoulder stability, good mechanics with movement.  Not met    AAROM supine  ER 80  IR 70  Flexion 145     AROM:  Flexion: 110°    4-/5 mid range strength  (Prior to most recent onset of pain was 140)  Scaption: 100  Abduction: 90  3+ mid range strength    Tenderness at coracoid, pectorals, bicep groove, infraspinatus, upper trap.      Assessment:    Pt has increased pain and decreased active motion.  Good passive motion, no capsular pattern present.  Symptoms did improve with treatment 6/10.      Goal Status:  [] Achieved [x] Partially Achieved  [] Not Achieved         Changes to

## 2024-01-30 NOTE — FLOWSHEET NOTE
Outpatient Physical Therapy  [place]           [x] Phone: [phone]   Fax: [phone]  [place]           [] Phone: [phone]   Fax: [phone]        Physical Therapy Daily Treatment Note  Date:  2024    Patient Name:  Umberto Hanson    :  1980  MRN: [id number]  Restrictions/Precautions:  LATEX ALLERGY   Restrictions/Precautions: Fall Risk; General Precautions   Diagnosis:   Sprain of left rotator cuff capsule, initial encounter [S43.422A]  Impingement syndrome of left shoulder [M75.42]  Bicipital tendonitis of shoulder, left [M75.22]  Primary osteoarthritis of left shoulder [M19.012] Diagnosis: sprain L rotator cuff, bicipital tendonitis, OA L shoulder.  Date of Injury/Surgery:   Treatment Diagnosis:  L rotator cuff repair, biceps tenodesis 10/12/2023  Insurance/Certification information: [facility]; Pt approved for 20 visits or 80 units of each TE TA NR Man ADL and 20 units of Vaso  Referring Physician:  Rustam Louise DO Dr. Malone   PCP: Jessica Escobar APRN - NP  Next Doctor Visit:    Plan of care signed (Y/N):  Y  Outcome Measure: DASH 44 raw score  Visit# / total visits:    Pain level:   10 /10             Goals:     Patient goals: regain function, strength and motion of L UE.  Short Term Goals  Time Frame for Short Term Goals: 8 weeks  Short Term Goal 1: shoudler PROM to 100flexion: MET   Short Term Goal 2: Shoudler PROM to 30 ER in loose packed position PROM 57°   Long Term Goals  Time Frame for Long Term Goals : 14 weeks  Long Term Goal 1: I in home program. Reports compliance    Long Term Goal 2: AROM flexion to 145 or better for overhead reaching      Not yet met.     Long Term Goal 3: reach behind back with minimal difficulty       Minimal pain, but not full motion2024  Long Term Goal 4: sleep without waking due to shoulder pain 4/7nights.      Wakes 3-4x/wk w shld pain2024  Long Term Goal 5: ER and scap strength for 5/5 for shoulder stability,

## 2024-02-02 ENCOUNTER — HOSPITAL ENCOUNTER (OUTPATIENT)
Dept: INFUSION THERAPY | Age: 44
Discharge: HOME | End: 2024-02-02
Payer: MEDICAID

## 2024-02-02 DIAGNOSIS — E53.8 B12 DEFICIENCY: Primary | ICD-10-CM

## 2024-02-02 PROCEDURE — 6360000002 HC RX W HCPCS: Performed by: INTERNAL MEDICINE

## 2024-02-02 PROCEDURE — 96372 THER/PROPH/DIAG INJ SC/IM: CPT

## 2024-02-02 RX ORDER — CYANOCOBALAMIN 1000 UG/ML
1000 INJECTION, SOLUTION INTRAMUSCULAR; SUBCUTANEOUS ONCE
OUTPATIENT
Start: 2024-03-01 | End: 2024-03-01

## 2024-02-02 RX ORDER — FAMOTIDINE 10 MG/ML
20 INJECTION, SOLUTION INTRAVENOUS
OUTPATIENT
Start: 2024-03-01

## 2024-02-02 RX ORDER — SODIUM CHLORIDE 9 MG/ML
INJECTION, SOLUTION INTRAVENOUS CONTINUOUS
OUTPATIENT
Start: 2024-03-01

## 2024-02-02 RX ORDER — CYANOCOBALAMIN 1000 UG/ML
1000 INJECTION, SOLUTION INTRAMUSCULAR; SUBCUTANEOUS ONCE
Status: COMPLETED | OUTPATIENT
Start: 2024-02-02 | End: 2024-02-02

## 2024-02-02 RX ORDER — EPINEPHRINE 1 MG/ML
0.3 INJECTION, SOLUTION, CONCENTRATE INTRAVENOUS PRN
OUTPATIENT
Start: 2024-03-01

## 2024-02-02 RX ORDER — ACETAMINOPHEN 325 MG/1
650 TABLET ORAL
OUTPATIENT
Start: 2024-03-01

## 2024-02-02 RX ORDER — DIPHENHYDRAMINE HYDROCHLORIDE 50 MG/ML
50 INJECTION INTRAMUSCULAR; INTRAVENOUS
OUTPATIENT
Start: 2024-03-01

## 2024-02-02 RX ORDER — ONDANSETRON 2 MG/ML
8 INJECTION INTRAMUSCULAR; INTRAVENOUS
OUTPATIENT
Start: 2024-03-01

## 2024-02-02 RX ORDER — ALBUTEROL SULFATE 90 UG/1
4 AEROSOL, METERED RESPIRATORY (INHALATION) PRN
OUTPATIENT
Start: 2024-03-01

## 2024-02-02 RX ADMIN — CYANOCOBALAMIN 1000 MCG: 1000 INJECTION, SOLUTION INTRAMUSCULAR at 14:06

## 2024-02-02 NOTE — PROGRESS NOTES
Pt here for B-12 injection. Injection given IM to right deltoid. Pt tolerated without incident left ambulatory discharge instructions given. Pt has no concerns or issues to discuss with physician at this time.

## 2024-02-05 ENCOUNTER — HOSPITAL ENCOUNTER (OUTPATIENT)
Dept: PHYSICAL THERAPY | Age: 44
Setting detail: THERAPIES SERIES
Discharge: HOME | End: 2024-02-05
Payer: MEDICAID

## 2024-02-05 PROCEDURE — 97110 THERAPEUTIC EXERCISES: CPT

## 2024-02-05 NOTE — FLOWSHEET NOTE
for shoulder stability, good mechanics with movement.        Summary of Evaluation:  Assessment: Pt is s/p L rotator cuff, supraspinatus repair, biceps tenodesis, distal clavical resection 10/12/23.  She relates complaince with her sling, and has been able to sleep in bed with pillow supporting her UE and sling.  Pain is well managed.  She will be placed on hold until her 6 week post op visit with Dr. Louise.  We will continue as directed at that time.         Subjective:   Pt stated that she worked from 8-2:30 today. Pt stated that her pain was 10+/10 today. Pt stated that she was tired and sore all over. Pt stated that she sees Dr. Louise on the 19th.   Any changes in Ambulatory Summary Sheet?  None      Objective:  AROM in SEATED    Flexion  136°              Exercises: (No more than 4 columns)  Follow rotator cuff, biceps tenodesis protocol/as directed Dr. Louise.   Exercise/Equipment #16 1/16/24 1/23/2024 #17 1/26/2024 #18 #20 1/30/24 2/5/2024 #21             WARM UP        UBC  2min each way 2'/2'  2'/2'  2'/2'           TABLE        Reclined PROM     Supine with 1# bar 10 x 2  Seated    Supine punch     Supine 2# bar 10 x 2  Seated    Flexion arc        ER   10x2     Seated cane abduction         Incline table slides                    STANDING        Bicep curl  3# 3x15 3# 15x3 3# 15x3     Rows  Green  3x15 GTB 15x3 GTB 15x3 GTB 15x3 GTB 20x2   Shld extension Green 3x15 GTB 15x3 GTB 15x3 GTB 15x3 GTB 20x2    Core wheel         ER w towel roll  Red 3x10 RTB 15x3 RTB 10x2 Red 3x10 RTB 10 x  Double strand  RTB single strand x 10     flex 0# full range 2x10  0# 10x2 0# 10 x = pain  Seated 10 x 2 1# bar   scap 0# full range 3x10 0# 10x2 0# 10x = pain              Punch  Green 3x15 GTB 15x3  GTB 15x3 Green 3x15 GTB 10x2   PROPRIOCEPTION        Ball on wall                                        MODALITIES                            Other Therapeutic Activities/Education:        Home Exercise Program:    Access

## 2024-02-07 ENCOUNTER — E-VISIT (OUTPATIENT)
Dept: FAMILY MEDICINE CLINIC | Age: 44
End: 2024-02-07
Payer: MEDICAID

## 2024-02-07 DIAGNOSIS — U07.1 COVID: Primary | ICD-10-CM

## 2024-02-07 PROCEDURE — 99421 OL DIG E/M SVC 5-10 MIN: CPT | Performed by: NURSE PRACTITIONER

## 2024-02-07 RX ORDER — GUAIFENESIN/DEXTROMETHORPHAN 100-10MG/5
5 SYRUP ORAL DAILY PRN
Qty: 50 ML | Refills: 0 | Status: SHIPPED | OUTPATIENT
Start: 2024-02-07 | End: 2024-02-17

## 2024-02-07 ASSESSMENT — LIFESTYLE VARIABLES: SMOKING_STATUS: NO, I'VE NEVER SMOKED

## 2024-02-07 NOTE — PROGRESS NOTES
Nater[place] CHRISTENSEN Margie (1980) initiated an asynchronous digital communication through [employer].    HPI: per patient questionnaire     Exam: not applicable    Diagnoses and all orders for this visit:  Diagnoses and all orders for this visit:    COVID    Other orders  -     guaiFENesin-dextromethorphan (ROBITUSSIN DM) 100-10 MG/5ML syrup; Take 5 mLs by mouth daily as needed for Cough        Daughter with COVID positive test. Pt likely has covid. Will treat as viral. Pt requesting robitussin. Will send med. Needing work note. Isolate 5 days. See mychart note sent to pt       Time: EV1 - 5-10 minutes were spent on the digital evaluation and management of this patient.    VIOLET Oro - NP

## 2024-02-08 RX ORDER — BENZONATATE 100 MG/1
100 CAPSULE ORAL 3 TIMES DAILY PRN
Qty: 30 CAPSULE | Refills: 0 | Status: SHIPPED | OUTPATIENT
Start: 2024-02-08 | End: 2024-02-18

## 2024-02-14 ENCOUNTER — HOSPITAL ENCOUNTER (OUTPATIENT)
Dept: PHYSICAL THERAPY | Age: 44
Discharge: HOME | End: 2024-02-14

## 2024-02-14 NOTE — FLOWSHEET NOTE
Physical Therapy  Cancellation/No-show Note  Patient Name:  Umberto Hanson  :  1980   Date:  2024  Cancelled visits to date: 0  No-shows to date:1    For today's appointment patient:  []  Cancelled  [x]  Rescheduled appointment  [x]  No-show     Reason given by patient:  []  Patient ill  []  Conflicting appointment  []  No transportation    []  Conflict with work  []  No reason given  [x]  Other:  pt called, forgot about appointment.     Comments:      Electronically signed by:  Mundo Campos, PT, 2024, 4:15 PM

## 2024-02-15 ENCOUNTER — HOSPITAL ENCOUNTER (OUTPATIENT)
Dept: PHYSICAL THERAPY | Age: 44
Setting detail: THERAPIES SERIES
Discharge: HOME | End: 2024-02-15
Payer: MEDICAID

## 2024-02-15 PROCEDURE — 97110 THERAPEUTIC EXERCISES: CPT

## 2024-02-15 PROCEDURE — 97140 MANUAL THERAPY 1/> REGIONS: CPT

## 2024-02-15 NOTE — FLOWSHEET NOTE
Outpatient Physical Therapy  [place]           [x] Phone: [phone]   Fax: [phone]  [place]           [] Phone: [phone]   Fax: [phone]        Physical Therapy Daily Treatment Note  Date:  2/15/2024    Patient Name:  Umberto Hanson    :  1980  MRN: [id number]  Restrictions/Precautions:  LATEX ALLERGY   Restrictions/Precautions: Fall Risk; General Precautions   Diagnosis:   Sprain of left rotator cuff capsule, initial encounter [S43.422A]  Impingement syndrome of left shoulder [M75.42]  Bicipital tendonitis of shoulder, left [M75.22]  Primary osteoarthritis of left shoulder [M19.012] Diagnosis: sprain L rotator cuff, bicipital tendonitis, OA L shoulder.  Date of Injury/Surgery:   Treatment Diagnosis:  L rotator cuff repair, biceps tenodesis 10/12/2023  Insurance/Certification information: [facility]; Pt approved for 10 more visits from 24-3/29/24     [id number] only    Referring Physician:  Rustam Louise DO Dr. Malone   PCP: Jessica Escobar APRN - NP  Next Doctor Visit:    Plan of care signed (Y/N):  Y  Outcome Measure: DASH 44 raw score  Visit# / total visits:   Pain level:    2/10             Goals:     Patient goals: regain function, strength and motion of L UE.  Short Term Goals  Time Frame for Short Term Goals: 8 weeks  Short Term Goal 1: shoudler PROM to 100flexion: MET   Short Term Goal 2: Shoudler PROM to 30 ER in loose packed position PROM 57°   Long Term Goals  Time Frame for Long Term Goals : 14 weeks  Long Term Goal 1: I in home program. Reports compliance    Long Term Goal 2: AROM flexion to 145 or better for overhead reaching     130 flexion    Long Term Goal 3: reach behind back with minimal difficulty       2/15/24: reach to upper lumbar.    Long Term Goal 4: sleep without waking due to shoulder pain 4/7nights.      MET  Long Term Goal 5: ER and scap strength for  for shoulder stability, good mechanics with

## 2024-02-16 ENCOUNTER — HOSPITAL ENCOUNTER (OUTPATIENT)
Dept: PHYSICAL THERAPY | Age: 44
Discharge: HOME | End: 2024-02-16

## 2024-02-16 NOTE — FLOWSHEET NOTE
Physical Therapy  Cancellation/No-show Note  Patient Name:  Umbetro Hanson  :  1980   Date:  2024  Cancelled visits to date: 1  No-shows to date:1    For today's appointment patient:  [x]  Cancelled  []  Rescheduled appointment  []  No-show     Reason given by patient:  [x]  Patient ill  []  Conflicting appointment  []  No transportation    []  Conflict with work  []  No reason given  []  Other:    Comments:  \"not feeling well\"    Electronically signed by:  Marlin Linares PTA     2024, 3:46 PM

## 2024-02-19 ENCOUNTER — OFFICE VISIT (OUTPATIENT)
Dept: ORTHOPEDIC SURGERY | Age: 44
End: 2024-02-19
Payer: MEDICAID

## 2024-02-19 ENCOUNTER — OFFICE VISIT (OUTPATIENT)
Dept: FAMILY MEDICINE CLINIC | Age: 44
End: 2024-02-19
Payer: MEDICAID

## 2024-02-19 ENCOUNTER — HOSPITAL ENCOUNTER (OUTPATIENT)
Dept: PHYSICAL THERAPY | Age: 44
Setting detail: THERAPIES SERIES
Discharge: HOME | End: 2024-02-19
Payer: MEDICAID

## 2024-02-19 VITALS — DIASTOLIC BLOOD PRESSURE: 84 MMHG | HEART RATE: 76 BPM | OXYGEN SATURATION: 98 % | SYSTOLIC BLOOD PRESSURE: 126 MMHG

## 2024-02-19 VITALS
BODY MASS INDEX: 32.11 KG/M2 | HEART RATE: 94 BPM | HEIGHT: 63 IN | SYSTOLIC BLOOD PRESSURE: 120 MMHG | WEIGHT: 181.2 LBS | DIASTOLIC BLOOD PRESSURE: 72 MMHG | OXYGEN SATURATION: 98 %

## 2024-02-19 DIAGNOSIS — H92.12 DRAINAGE FROM LEFT EAR: Primary | ICD-10-CM

## 2024-02-19 DIAGNOSIS — Z98.890 STATUS POST ROTATOR CUFF REPAIR: Primary | ICD-10-CM

## 2024-02-19 PROCEDURE — 1036F TOBACCO NON-USER: CPT | Performed by: STUDENT IN AN ORGANIZED HEALTH CARE EDUCATION/TRAINING PROGRAM

## 2024-02-19 PROCEDURE — 97140 MANUAL THERAPY 1/> REGIONS: CPT

## 2024-02-19 PROCEDURE — G8484 FLU IMMUNIZE NO ADMIN: HCPCS | Performed by: STUDENT IN AN ORGANIZED HEALTH CARE EDUCATION/TRAINING PROGRAM

## 2024-02-19 PROCEDURE — 97110 THERAPEUTIC EXERCISES: CPT

## 2024-02-19 PROCEDURE — G8417 CALC BMI ABV UP PARAM F/U: HCPCS | Performed by: STUDENT IN AN ORGANIZED HEALTH CARE EDUCATION/TRAINING PROGRAM

## 2024-02-19 PROCEDURE — 99214 OFFICE O/P EST MOD 30 MIN: CPT | Performed by: NURSE PRACTITIONER

## 2024-02-19 PROCEDURE — 1036F TOBACCO NON-USER: CPT | Performed by: NURSE PRACTITIONER

## 2024-02-19 PROCEDURE — G8427 DOCREV CUR MEDS BY ELIG CLIN: HCPCS | Performed by: STUDENT IN AN ORGANIZED HEALTH CARE EDUCATION/TRAINING PROGRAM

## 2024-02-19 PROCEDURE — G8484 FLU IMMUNIZE NO ADMIN: HCPCS | Performed by: NURSE PRACTITIONER

## 2024-02-19 PROCEDURE — G8427 DOCREV CUR MEDS BY ELIG CLIN: HCPCS | Performed by: NURSE PRACTITIONER

## 2024-02-19 PROCEDURE — G8417 CALC BMI ABV UP PARAM F/U: HCPCS | Performed by: NURSE PRACTITIONER

## 2024-02-19 PROCEDURE — 99213 OFFICE O/P EST LOW 20 MIN: CPT | Performed by: STUDENT IN AN ORGANIZED HEALTH CARE EDUCATION/TRAINING PROGRAM

## 2024-02-19 RX ORDER — CIPROFLOXACIN AND DEXAMETHASONE 3; 1 MG/ML; MG/ML
4 SUSPENSION/ DROPS AURICULAR (OTIC) 2 TIMES DAILY
Qty: 1 EACH | Refills: 0 | Status: SHIPPED | OUTPATIENT
Start: 2024-02-19 | End: 2024-02-26

## 2024-02-19 RX ORDER — FLUTICASONE PROPIONATE 50 MCG
2 SPRAY, SUSPENSION (ML) NASAL DAILY
Qty: 48 G | Refills: 1 | Status: CANCELLED | OUTPATIENT
Start: 2024-02-19

## 2024-02-19 RX ORDER — CIPROFLOXACIN AND DEXAMETHASONE 3; 1 MG/ML; MG/ML
4 SUSPENSION/ DROPS AURICULAR (OTIC) 2 TIMES DAILY
Status: CANCELLED | OUTPATIENT
Start: 2024-02-19 | End: 2024-02-26

## 2024-02-19 RX ORDER — CETIRIZINE HYDROCHLORIDE 10 MG/1
10 TABLET ORAL DAILY
Qty: 90 TABLET | Refills: 0 | Status: SHIPPED | OUTPATIENT
Start: 2024-02-19

## 2024-02-19 ASSESSMENT — ENCOUNTER SYMPTOMS
EYE REDNESS: 0
SHORTNESS OF BREATH: 0
EYE PAIN: 0
NAUSEA: 0
COUGH: 0
FACIAL SWELLING: 0
COLOR CHANGE: 0
STRIDOR: 0
WHEEZING: 0
BACK PAIN: 0
ABDOMINAL PAIN: 0
PHOTOPHOBIA: 0
VOMITING: 0

## 2024-02-19 NOTE — PROGRESS NOTES
Left shoulder arthroscopic RTCR/SAD/DCE/BT DOS:10/12/23    Ongoing physical therapy through 3/29/24    Having increased, or unimproved pain. Numbness around incision. Pain with ROM. Denies new injury. Pain affecting sleep. Has returned to work at [employer]. Reevaluate work restrictions

## 2024-02-19 NOTE — FLOWSHEET NOTE
Outpatient Physical Therapy  [place]           [x] Phone: [phone]   Fax: [phone]  [place]           [] Phone: [phone]   Fax: [phone]        Physical Therapy Daily Treatment Note  Date:  2024    Patient Name:  Umberto Hanson    :  1980  MRN: [id number]  Restrictions/Precautions:  LATEX ALLERGY   Restrictions/Precautions: Fall Risk; General Precautions   Diagnosis:   Sprain of left rotator cuff capsule, initial encounter [S43.422A]  Impingement syndrome of left shoulder [M75.42]  Bicipital tendonitis of shoulder, left [M75.22]  Primary osteoarthritis of left shoulder [M19.012] Diagnosis: sprain L rotator cuff, bicipital tendonitis, OA L shoulder.  Date of Injury/Surgery:   Treatment Diagnosis:  L rotator cuff repair, biceps tenodesis 10/12/2023  Insurance/Certification information: [facility]; Pt approved for 10 more visits from 24-3/29/24     [id number] [phone] only    Referring Physician:  Rustam Louise DO Dr. Malone   PCP: Jessica Escobar APRN - NP  Next Doctor Visit:    Plan of care signed (Y/N):  Y  Outcome Measure: DASH 44 raw score  Visit# / total visits:   Pain level:    5/10             Goals:     Patient goals: regain function, strength and motion of L UE.  Short Term Goals  Time Frame for Short Term Goals: 8 weeks  Short Term Goal 1: shoudler PROM to 100flexion: MET   Short Term Goal 2: Shoudler PROM to 30 ER in loose packed position PROM 57°   Long Term Goals  Time Frame for Long Term Goals : 14 weeks  Long Term Goal 1: I in home program. Reports compliance    Long Term Goal 2: AROM flexion to 145 or better for overhead reaching     130 flexion    Long Term Goal 3: reach behind back with minimal difficulty       2/15/24: reach to upper lumbar.    Long Term Goal 4: sleep without waking due to shoulder pain 4/7nights.      MET  Long Term Goal 5: ER and scap strength for  for shoulder stability, good mechanics with

## 2024-02-19 NOTE — PROGRESS NOTES
2024     Umberto Hanson (:  1980) is a 43 y.o. female, here for evaluation of the following medical concerns:    Reports left ear drainage for one week. Denies pain. Reports itching  Recent COVID URI 24.   Not taking anything to help  She was given duong jaime and robitussin for her URI sx early feb   Hx recurrent ear infections, ruptured left TM and sugreries left lear as a child.                 Review of Systems    Prior to Visit Medications    Medication Sig Taking? Authorizing Provider   cetirizine (ZYRTEC) 10 MG tablet Take 1 tablet by mouth daily Yes Jessica Escobar APRN - NP   ciprofloxacin-dexAMETHasone (CIPRODEX) 0.3-0.1 % otic suspension Place 4 drops into the left ear 2 times daily for 7 days Yes Jessica Escobar APRN - NP   cyclobenzaprine (FLEXERIL) 10 MG tablet Take 1 tablet by mouth 3 times daily Yes ProviderMaritza MD   levothyroxine (SYNTHROID) 50 MCG tablet Take 1 tablet by mouth every morning (before breakfast) Yes ProviderMaritza MD   Erenumab-aooe (AIMOVIG) 140 MG/ML SOAJ Inject 140 mg into the skin every 30 days Yes Neri Pond APRN - CNP   Erenumab-aooe (AIMOVIG) 140 MG/ML SOAJ Lot: [id number] Exp: 25 Yes Neri Pond APRN - CNP   vitamin D3 (CHOLECALCIFEROL) 25 MCG (1000 UT) TABS tablet Take 1 tablet by mouth daily Yes Jessica Escobar APRN - NP   vitamin D (ERGOCALCIFEROL) 1.25 MG (79234 UT) CAPS capsule Take 1 capsule by mouth once a week For 12 weeks only Yes Jessica Escobar APRN - NP   oxyCODONE-acetaminophen (PERCOCET) 5-325 MG per tablet Take 1 tablet by mouth every 6 hours as needed for Pain. Yes ProviderMaritza MD   methIMAzole (TAPAZOLE) 5 MG tablet Take 1 tablet by mouth daily Yes Maritza Johnson MD   traZODone (DESYREL) 50 MG tablet Take 1 tablet by mouth at bedtime. Yes ProviderMaritza MD   ondansetron (ZOFRAN-ODT) 4 MG disintegrating tablet Take 1 tablet by mouth 3 times daily as needed for Nausea or Vomiting

## 2024-02-19 NOTE — PROGRESS NOTES
2/19/2024   Chief Complaint   Patient presents with    Shoulder Pain        History of Present Illness:                             Umberto Hanson is a 43 y.o. female       Date of surgery:  10/12/2023     Procedure:  Left side  1. Shoulder arthroscopic rotator cuff repair - small, partial thickness with single row repair  2. Shoulder biceps tenodesis - arthroscopic  3. Shoulder arthroscopic subacromial decompression, bursectomy   4. Shoulder arthroscopic extensive debridement (anterior, posterior glenohumeral joint, subacromial space) (CPT 41532)  5. Shoulder arthroscopic labral debridement (CPT 56353)  6.  Shoulder arthroscopic distal clavicle excision      History:  Patient is here in follow up regarding their 4 month postop appointment for above procedure    Patient is doing okay.  Pain is somewhat improved since last being seen but is still remains to be an issue.  Patient was sent to therapy at her last visit to continue working on range of motion and strengthening but she has missed most of her appointments due to getting COVID. They deny chest pain, SOB, calf pain,fever,wound drainage.  No other issues.  Patient denies any constitutional symptoms.    She states her function fluctuates as far as what she can and cannot do depending on how much work she has been doing and what type of work she has been doing while at work.  She states that this can be bothersome and aggravate her pain but currently her pain is well-controlled.  She is been trying to do the exercises on her own from physical therapy    Patient states they have been compliant with restrictions.  No new injuries or complaints.  States that when she goes, physical therapy is going well.    Patient states they have been out of the sling without issue    Medical History  Patient's medications, allergies, past medical, surgical, social and family histories were reviewed and updated as appropriate.    Past Medical History:   Diagnosis Date

## 2024-02-22 ENCOUNTER — HOSPITAL ENCOUNTER (OUTPATIENT)
Dept: PHYSICAL THERAPY | Age: 44
Discharge: HOME | End: 2024-02-22

## 2024-02-27 ENCOUNTER — HOSPITAL ENCOUNTER (OUTPATIENT)
Dept: PHYSICAL THERAPY | Age: 44
Setting detail: THERAPIES SERIES
Discharge: HOME | End: 2024-02-27
Payer: MEDICAID

## 2024-02-27 PROCEDURE — 97110 THERAPEUTIC EXERCISES: CPT

## 2024-02-27 NOTE — FLOWSHEET NOTE
movement.  4+/5 at neutral.          Summary of Evaluation:  Assessment: Pt is s/p L rotator cuff, supraspinatus repair, biceps tenodesis, distal clavical resection 10/12/23.  She relates complaince with her sling, and has been able to sleep in bed with pillow supporting her UE and sling.  Pain is well managed.  She will be placed on hold until her 6 week post op visit with Dr. Louise.  We will continue as directed at that time.         Subjective:  R shoulder is a little achy, not painful.      Objective: needed cues to perform exercises slower and for technique for ER    A/PROM seated:    Flexion:L 125  R 145  Abd: L110/ 130 R 130  IR reach T12      Exercises: (No more than 4 columns)  Follow rotator cuff, biceps tenodesis protocol/as directed Dr. Louise.   Exercise/Equipment 2/15/24 #22  2/10 2/19/2024 #23  3/10 #24 2/27/24  4/10auth           WARM UP      UBC   2'/2'          TABLE      Reclined PROM  Supine 2# bar 10x3    Supine punch  Supine 2# bar 10 x 3  Seated   2# 10 x 2 AROM    Flexion arc  1# 10x2    ER      Seated cane abduction       Incline table slides          ABC  0# 1x    STANDING      Bicep curl    5#  3x10    OH press   1# 3x10    Rows  Green 3x 15 GTB 10x3  Double red 3x10   Shld extension Green 3x 15 GTB 10x3    Core wheel       ER w towel roll  Red 3x10 RTB 10 x  Double strand  RTB single strand x 10   3x10 red cues for scap position/posture   flex 1# 3x10 1# 10x3 1# 3x10   scap   0# 3x10  AAROM assist for end range   Wall clock w band   Yellow 3x 5   Punch  Green 2x10 GTB 10x3 Counter push up at window rail.  3x12   PROPRIOCEPTION      Ball on wall      Body blade   Push pull vertical blade 4x20                     MODALITIES                      Other Therapeutic Activities/Education:        Home Exercise Program:    Reissued home program, provided new bands.    Access Code: [email or web address]  URL: [email or web address]  Date: 02/15/2024  Prepared by: Mundo Campos    Exercises  - Single Arm

## 2024-02-29 ENCOUNTER — HOSPITAL ENCOUNTER (OUTPATIENT)
Dept: PHYSICAL THERAPY | Age: 44
Setting detail: THERAPIES SERIES
Discharge: HOME | End: 2024-02-29
Payer: MEDICAID

## 2024-02-29 NOTE — FLOWSHEET NOTE
Outpatient Physical Therapy  [place]           [x] Phone: [phone]   Fax: [phone]  [place]           [] Phone: [phone]   Fax: [phone]        Physical Therapy Daily Treatment Note  Date:  2024    Patient Name:  Umberto Hanson    :  1980  MRN: [id number]  Restrictions/Precautions:  LATEX ALLERGY   Restrictions/Precautions: Fall Risk; General Precautions   Diagnosis:   Sprain of left rotator cuff capsule, initial encounter [S43.422A]  Impingement syndrome of left shoulder [M75.42]  Bicipital tendonitis of shoulder, left [M75.22]  Primary osteoarthritis of left shoulder [M19.012] Diagnosis: sprain L rotator cuff, bicipital tendonitis, OA L shoulder.  Date of Injury/Surgery:   Treatment Diagnosis:  L rotator cuff repair, biceps tenodesis 10/12/2023  Insurance/Certification information: [facility]; Pt approved for 10 more visits from 24-3/29/24     [id number] [id number] only    Referring Physician:  Rustam Louise DO Dr. Malone   PCP: Jessica Escobar APRN - NP  Next Doctor Visit:    Plan of care signed (Y/N):  Y  Outcome Measure: DASH 44 raw score  Visit# / total visits:   Pain level:  3  /10             Goals:     Patient goals: regain function, strength and motion of L UE.  Short Term Goals  Time Frame for Short Term Goals: 8 weeks  Short Term Goal 1: shoudler PROM to 100flexion: MET   Short Term Goal 2: Shoudler PROM to 30 ER in loose packed position PROM 57°   Long Term Goals  Time Frame for Long Term Goals : 14 weeks  Long Term Goal 1: I in home program. Reports compliance    Long Term Goal 2: AROM flexion to 145 or better for overhead reaching     130 flexion    Long Term Goal 3: reach behind back with minimal difficulty       2/15/24: reach to upper lumbar.    Long Term Goal 4: sleep without waking due to shoulder pain 4/7nights.      MET  Long Term Goal 5: ER and scap strength for  for shoulder stability, good mechanics with

## 2024-03-04 ENCOUNTER — OFFICE VISIT (OUTPATIENT)
Dept: GASTROENTEROLOGY | Age: 44
End: 2024-03-04
Payer: MEDICAID

## 2024-03-04 VITALS
WEIGHT: 186.4 LBS | DIASTOLIC BLOOD PRESSURE: 80 MMHG | HEART RATE: 103 BPM | HEIGHT: 63 IN | BODY MASS INDEX: 33.03 KG/M2 | OXYGEN SATURATION: 98 % | SYSTOLIC BLOOD PRESSURE: 120 MMHG

## 2024-03-04 DIAGNOSIS — K59.03 DRUG-INDUCED CONSTIPATION: ICD-10-CM

## 2024-03-04 DIAGNOSIS — R74.8 ELEVATED ALKALINE PHOSPHATASE LEVEL: Primary | ICD-10-CM

## 2024-03-04 DIAGNOSIS — K76.0 FATTY LIVER: ICD-10-CM

## 2024-03-04 PROCEDURE — G8484 FLU IMMUNIZE NO ADMIN: HCPCS | Performed by: NURSE PRACTITIONER

## 2024-03-04 PROCEDURE — 1036F TOBACCO NON-USER: CPT | Performed by: NURSE PRACTITIONER

## 2024-03-04 PROCEDURE — 99213 OFFICE O/P EST LOW 20 MIN: CPT | Performed by: NURSE PRACTITIONER

## 2024-03-04 PROCEDURE — G8417 CALC BMI ABV UP PARAM F/U: HCPCS | Performed by: NURSE PRACTITIONER

## 2024-03-04 PROCEDURE — G8427 DOCREV CUR MEDS BY ELIG CLIN: HCPCS | Performed by: NURSE PRACTITIONER

## 2024-03-04 RX ORDER — SENNOSIDES 8.6 MG
1 TABLET ORAL DAILY
Qty: 60 TABLET | Refills: 3 | Status: SHIPPED | OUTPATIENT
Start: 2024-03-04

## 2024-03-04 NOTE — PROGRESS NOTES
Umberto Hanson 43 y.o. female was seen by SINDHU Ashraf on 3/4/2024     Wt Readings from Last 3 Encounters:   03/04/24 84.6 kg (186 lb 6.4 oz)   02/19/24 82.2 kg (181 lb 3.2 oz)   01/25/24 80.3 kg (177 lb 1.6 oz)       HPI  Umberto Hanson is a pleasant 43 y.o.  female who presents today for follow-up on  acid reflux, elevated alkaline phosphatase and fatty liver.  She denies NSAID or alcohol use. She has chronic back pain managed with taking Percocet per pain management and takes twice a week.  She was diagnosed with Grave's disease and started on thyroid medication over six months ago.  She had a laparoscopic gastrectomy sleeve done by Dr. Chin on 11- with original weight 242 and lowest weight 147 pounds.  Her EGD/colonoscopy were done by Dr. Latif on 6-. Her EGD showed LA Grade C reflux esophagitis with no bleeding, a sleeve gastrectomy was found, characterized by erythema and normal examined duodenum and ampulla.  Her stomach biopsies showed normal tissue with no evidence of H.Pylori infection.  Her esophageal biopsies showed mixed inflammation with no evidence of Avitia's esophagus or dysplasia.  Her colonoscopy showed the entire examined colon is normal, diverticulosis in the sigmoid colon, internal non-bleeding hemorrhoids; otherwise examination was normal.  Her random colon biopsies showed normal tissue with no evidence of microscopic colitis.  Her abdominal ultrasound done on 12- showed diffusely fatty liver, unremarkable appearance common bile duct right kidney and visualized pancreas.  Her lab work done on 12- showed Sodium 139, Potassium 4.5, BUN 11, Creatinine 1.2, Total Bilirubin 0.6, Alkaline Phosphatase 197, ALT 8, and AST 15. GGT 14, Alpha 1 antitrypsin 138, Liver fibrosis F2, Hepatitis A, B C non-reactive, Ceruloplasmin 26, F-Actin IgG 8, Alkaline phosphatase isoenzymes liver fraction 73 U/l, bone fraction elevated at 129 U/l.  PT 13.5, INR 1.0, FREDA

## 2024-03-05 ENCOUNTER — HOSPITAL ENCOUNTER (OUTPATIENT)
Dept: PHYSICAL THERAPY | Age: 44
Setting detail: THERAPIES SERIES
Discharge: HOME | End: 2024-03-05
Payer: MEDICAID

## 2024-03-05 PROCEDURE — 97140 MANUAL THERAPY 1/> REGIONS: CPT

## 2024-03-05 PROCEDURE — 97110 THERAPEUTIC EXERCISES: CPT

## 2024-03-05 NOTE — FLOWSHEET NOTE
Activities/Education:        Home Exercise Program:    Reissued home program, provided new bands.    Access Code: [email or web address]  URL: [email or web address]  Date: 02/15/2024  Prepared by: Mundo Campos    Exercises  - Single Arm Scaption with Dumbbell  - 1 x daily - 7 x weekly - 3 sets - 10 reps  - Single Arm Shoulder Flexion with Dumbbell  - 1 x daily - 7 x weekly - 3 sets - 10 reps  - Standing Row with Anchored Resistance  - 1 x daily - 7 x weekly - 3 sets - 10 reps  - Shoulder External Rotation with Anchored Resistance  - 1 x daily - 7 x weekly - 3 sets - 10 reps       Manual Treatments:    PROM stretching, TPR to rhomboid, upper trap.      Modalities:   none    Communication with other providers:  none    Assessment: Pt tolerated treatment well today. Persisting weakness and limited endurance at L shoulder, but improved today compared to last visit.  Able to increase resistance and reps.  Pt rated pain at 2/10 after treatment.  Pt will continue to benefit from more strengthening and ROM.     Plan for Next Session: progress per POC    If [employer] Please Indicate Units for Rx this date and running total for each and overall total for Rx to date:  CPT Code Units today Running Total Units Total approved    TE 42507 (80) 2 43    MAN 21107 (80) 1 22    Gait 51841      NR 84482 (80)      TA  73684 (80)      Estim Unatt 46160       [place] [address]      [facility] [place] [address]      VASO 14482 (20)   7    ADL/Self care 33891 (80)      DNT 1-2 29713      DNT 3-4 20561      Other: Eval    1           Total for episode of care                  Time In / Time Out:  1345/1425    Timed Code/Total Treatment Minutes:   40/40    Next Progress Note due:  every 10th visit    Plan of Care Interventions:  [x] Therapeutic Exercise  [] Modalities:  [x] Therapeutic Activity     [] Ultrasound  [] Estim  [] Gait Training      [] Cervical Traction [] Lumbar Traction  [x] Neuromuscular Re-education    [] Cold/hotpack [] Iontophoresis   [x] Instruction in

## 2024-03-07 ENCOUNTER — HOSPITAL ENCOUNTER (OUTPATIENT)
Dept: PHYSICAL THERAPY | Age: 44
Setting detail: THERAPIES SERIES
Discharge: HOME | End: 2024-03-07
Payer: MEDICAID

## 2024-03-07 PROCEDURE — 97140 MANUAL THERAPY 1/> REGIONS: CPT

## 2024-03-07 PROCEDURE — 97110 THERAPEUTIC EXERCISES: CPT

## 2024-03-07 NOTE — FLOWSHEET NOTE
Outpatient Physical Therapy  [place]           [x] Phone: [phone]   Fax: [phone]  [place]           [] Phone: [phone]   Fax: [phone]        Physical Therapy Daily Treatment Note  Date:  3/7/2024    Patient Name:  Umberto Hanson    :  1980  MRN: [id number]  Restrictions/Precautions:  LATEX ALLERGY   Restrictions/Precautions: Fall Risk; General Precautions   Diagnosis:   Sprain of left rotator cuff capsule, initial encounter [S43.422A]  Impingement syndrome of left shoulder [M75.42]  Bicipital tendonitis of shoulder, left [M75.22]  Primary osteoarthritis of left shoulder [M19.012] Diagnosis: sprain L rotator cuff, bicipital tendonitis, OA L shoulder.  Date of Injury/Surgery:   Treatment Diagnosis:  L rotator cuff repair, biceps tenodesis 10/12/2023  Insurance/Certification information: [facility]; Pt approved for 10 more visits from 24-3/29/24     [id number] [id number] [phone] only    Referring Physician:  Rustam Louise DO Dr. Malone   PCP: Jessica Escobar APRN - NP  Next Doctor Visit:    Plan of care signed (Y/N):  Y  Outcome Measure: DASH 44 raw score  Visit# / total visits:   Pain level:  8 /10             Goals:     Patient goals: regain function, strength and motion of L UE.  Short Term Goals  Time Frame for Short Term Goals: 8 weeks  Short Term Goal 1: shoudler PROM to 100flexion: MET   Short Term Goal 2: Shoudler PROM to 30 ER in loose packed position PROM 57°   Long Term Goals  Time Frame for Long Term Goals : 14 weeks  Long Term Goal 1: I in home program. Reports compliance    Long Term Goal 2: AROM flexion to 145 or better for overhead reaching     130 flexion    Long Term Goal 3: reach behind back with minimal difficulty       2/15/24: reach to upper lumbar.    Long Term Goal 4: sleep without waking due to shoulder pain 4/7nights.      MET  Long Term Goal 5: ER and scap strength for  for shoulder stability, good mechanics with

## 2024-03-08 ENCOUNTER — HOSPITAL ENCOUNTER (OUTPATIENT)
Dept: INFUSION THERAPY | Age: 44
Discharge: HOME | End: 2024-03-08
Payer: MEDICAID

## 2024-03-08 DIAGNOSIS — E53.8 B12 DEFICIENCY: Primary | ICD-10-CM

## 2024-03-08 PROCEDURE — 6360000002 HC RX W HCPCS: Performed by: INTERNAL MEDICINE

## 2024-03-08 PROCEDURE — 96372 THER/PROPH/DIAG INJ SC/IM: CPT

## 2024-03-08 RX ORDER — EPINEPHRINE 1 MG/ML
0.3 INJECTION, SOLUTION, CONCENTRATE INTRAVENOUS PRN
OUTPATIENT
Start: 2024-03-29

## 2024-03-08 RX ORDER — DIPHENHYDRAMINE HYDROCHLORIDE 50 MG/ML
50 INJECTION INTRAMUSCULAR; INTRAVENOUS
OUTPATIENT
Start: 2024-03-29

## 2024-03-08 RX ORDER — ONDANSETRON 2 MG/ML
8 INJECTION INTRAMUSCULAR; INTRAVENOUS
OUTPATIENT
Start: 2024-03-29

## 2024-03-08 RX ORDER — CYANOCOBALAMIN 1000 UG/ML
1000 INJECTION, SOLUTION INTRAMUSCULAR; SUBCUTANEOUS ONCE
Status: COMPLETED | OUTPATIENT
Start: 2024-03-08 | End: 2024-03-08

## 2024-03-08 RX ORDER — SODIUM CHLORIDE 9 MG/ML
INJECTION, SOLUTION INTRAVENOUS CONTINUOUS
OUTPATIENT
Start: 2024-03-29

## 2024-03-08 RX ORDER — FAMOTIDINE 10 MG/ML
20 INJECTION, SOLUTION INTRAVENOUS
OUTPATIENT
Start: 2024-03-29

## 2024-03-08 RX ORDER — CYANOCOBALAMIN 1000 UG/ML
1000 INJECTION, SOLUTION INTRAMUSCULAR; SUBCUTANEOUS ONCE
OUTPATIENT
Start: 2024-03-29 | End: 2024-03-29

## 2024-03-08 RX ORDER — ACETAMINOPHEN 325 MG/1
650 TABLET ORAL
OUTPATIENT
Start: 2024-03-29

## 2024-03-08 RX ORDER — ALBUTEROL SULFATE 90 UG/1
4 AEROSOL, METERED RESPIRATORY (INHALATION) PRN
OUTPATIENT
Start: 2024-03-29

## 2024-03-08 RX ADMIN — CYANOCOBALAMIN 1000 MCG: 1000 INJECTION, SOLUTION INTRAMUSCULAR at 14:50

## 2024-03-08 NOTE — PROGRESS NOTES
Ambulated to infusion area. B12 injection administered as ordered. Tolerated well. Discharged in stable condition. AVS declined.

## 2024-03-12 ENCOUNTER — HOSPITAL ENCOUNTER (OUTPATIENT)
Dept: PHYSICAL THERAPY | Age: 44
Discharge: HOME | End: 2024-03-12

## 2024-03-12 NOTE — FLOWSHEET NOTE
Physical Therapy  Cancellation/No-show Note  Patient Name:  Umberto Hanson  :  1980   Date:  3/12/2024  Cancelled visits to date: 3  No-shows to date:1    For today's appointment patient:  [x]  Cancelled  []  Rescheduled appointment  []  No-show     Reason given by patient:  []  Patient ill  []  Conflicting appointment  []  No transportation    []  Conflict with work  []  No reason given  []  Other:    Comments:      Electronically signed by:  Mundo Campos, PT,     3/12/2024, 4:28 PM     3/12/2024,4:28 PM

## 2024-03-13 ENCOUNTER — OFFICE VISIT (OUTPATIENT)
Dept: NEUROLOGY | Age: 44
End: 2024-03-13
Payer: MEDICAID

## 2024-03-13 VITALS
OXYGEN SATURATION: 98 % | BODY MASS INDEX: 32.46 KG/M2 | DIASTOLIC BLOOD PRESSURE: 70 MMHG | WEIGHT: 183.2 LBS | HEART RATE: 90 BPM | SYSTOLIC BLOOD PRESSURE: 98 MMHG

## 2024-03-13 DIAGNOSIS — G43.E09 CHRONIC MIGRAINE WITH AURA WITHOUT STATUS MIGRAINOSUS, NOT INTRACTABLE: Primary | ICD-10-CM

## 2024-03-13 PROCEDURE — G8484 FLU IMMUNIZE NO ADMIN: HCPCS | Performed by: NURSE PRACTITIONER

## 2024-03-13 PROCEDURE — 1036F TOBACCO NON-USER: CPT | Performed by: NURSE PRACTITIONER

## 2024-03-13 PROCEDURE — G8417 CALC BMI ABV UP PARAM F/U: HCPCS | Performed by: NURSE PRACTITIONER

## 2024-03-13 PROCEDURE — 99213 OFFICE O/P EST LOW 20 MIN: CPT | Performed by: NURSE PRACTITIONER

## 2024-03-13 PROCEDURE — G8427 DOCREV CUR MEDS BY ELIG CLIN: HCPCS | Performed by: NURSE PRACTITIONER

## 2024-03-13 RX ORDER — RIMEGEPANT SULFATE 75 MG/75MG
TABLET, ORALLY DISINTEGRATING ORAL
Qty: 4 TABLET | Refills: 0 | Status: SHIPPED | COMMUNITY
Start: 2024-03-13

## 2024-03-13 RX ORDER — RIZATRIPTAN BENZOATE 10 MG/1
10 TABLET ORAL
Qty: 9 TABLET | Refills: 2 | Status: SHIPPED | OUTPATIENT
Start: 2024-03-13 | End: 2024-03-13

## 2024-03-13 RX ORDER — RIMEGEPANT SULFATE 75 MG/75MG
75 TABLET, ORALLY DISINTEGRATING ORAL PRN
Qty: 8 TABLET | Refills: 2 | Status: SHIPPED | OUTPATIENT
Start: 2024-03-13 | End: 2024-03-13

## 2024-03-13 NOTE — PROGRESS NOTES
3/13/24    Umberto Hanson  1980    Chief Complaint   Patient presents with    Follow-up     Chronic migraine with aura without status migrainosus, not intractable       History of Present Illness  Umberto is a 43 y.o. female presenting today for follow-up of: Migraine.  MRI brain normal.  She continues Aimovig monthly injections for preventative therapy and Nurtec for abortive therapy.  She did complete her sleep study which did not show EMILEE.    She follows with pain management for back pain, is taking Percocet.     She is now having about 7-8 migraines per month. She tells me that her insurance did not approve Nurtec.     Migraine History:  Triggers:     Baseline headache frequency: 30/30 days per month  Baseline migraine frequency: 30/30 days per month     Prior Preventative medications tried: lopressor, topamax, amitriptylline, aimovig  Prior abortive medications tried: percocet, tylenol, ibuprofen, imitrex      Current preventative: lopressor, aimovig, flexeril,   Current abortive: percocet      Current Outpatient Medications   Medication Sig Dispense Refill    rizatriptan (MAXALT) 10 MG tablet Take 1 tablet by mouth once as needed for Migraine Take 1 tab @ onset of migraine.  May repeat in 2 hrs if needed.  Not to exceed 2 tabs per day 9 tablet 2    senna (SENOKOT) 8.6 MG TABS tablet Take 1 tablet by mouth daily 60 tablet 3    cetirizine (ZYRTEC) 10 MG tablet Take 1 tablet by mouth daily 90 tablet 0    cyclobenzaprine (FLEXERIL) 10 MG tablet Take 1 tablet by mouth 3 times daily      levothyroxine (SYNTHROID) 50 MCG tablet Take 1 tablet by mouth every morning (before breakfast)      Erenumab-aooe (AIMOVIG) 140 MG/ML SOAJ Inject 140 mg into the skin every 30 days 1 Adjustable Dose Pre-filled Pen Syringe 11    vitamin D (ERGOCALCIFEROL) 1.25 MG (29090 UT) CAPS capsule Take 1 capsule by mouth once a week For 12 weeks only 12 capsule 0    oxyCODONE-acetaminophen (PERCOCET) 5-325 MG per tablet Take 1 tablet by

## 2024-03-14 ENCOUNTER — TELEPHONE (OUTPATIENT)
Dept: NEUROLOGY | Age: 44
End: 2024-03-14

## 2024-03-15 NOTE — TELEPHONE ENCOUNTER
Patient medication for Nurtec denied, patient must show 14 day trial of two of the below:    Naratriptan  Rizatriptan  Sumatriptan     I see she has tried amitriptylline.

## 2024-03-19 ENCOUNTER — HOSPITAL ENCOUNTER (OUTPATIENT)
Dept: PHYSICAL THERAPY | Age: 44
Discharge: HOME | End: 2024-03-19

## 2024-03-19 NOTE — FLOWSHEET NOTE
Physical Therapy  Cancellation/No-show Note  Patient Name:  Umberto Hanson  :  1980   Date:  3/19/2024  Cancelled visits to date: 4  No-shows to date:1    For today's appointment patient:  [x]  Cancelled  []  Rescheduled appointment  []  No-show     Reason given by patient:  []  Patient ill  []  Conflicting appointment  []  No transportation    [x]  Conflict with work  []  No reason given  []  Other:    Comments:      Electronically signed by:  Mundo Camops, PT,     3/19/2024, 4:56 PM     3/19/2024,4:56 PM

## 2024-03-21 ENCOUNTER — OFFICE VISIT (OUTPATIENT)
Dept: FAMILY MEDICINE CLINIC | Age: 44
End: 2024-03-21
Payer: MEDICAID

## 2024-03-21 VITALS
HEIGHT: 63 IN | OXYGEN SATURATION: 98 % | DIASTOLIC BLOOD PRESSURE: 80 MMHG | WEIGHT: 187.6 LBS | BODY MASS INDEX: 33.24 KG/M2 | HEART RATE: 82 BPM | SYSTOLIC BLOOD PRESSURE: 128 MMHG

## 2024-03-21 DIAGNOSIS — M79.672 LEFT FOOT PAIN: Primary | ICD-10-CM

## 2024-03-21 PROCEDURE — G8417 CALC BMI ABV UP PARAM F/U: HCPCS | Performed by: NURSE PRACTITIONER

## 2024-03-21 PROCEDURE — 99214 OFFICE O/P EST MOD 30 MIN: CPT | Performed by: NURSE PRACTITIONER

## 2024-03-21 PROCEDURE — G8484 FLU IMMUNIZE NO ADMIN: HCPCS | Performed by: NURSE PRACTITIONER

## 2024-03-21 PROCEDURE — 1036F TOBACCO NON-USER: CPT | Performed by: NURSE PRACTITIONER

## 2024-03-21 PROCEDURE — G8427 DOCREV CUR MEDS BY ELIG CLIN: HCPCS | Performed by: NURSE PRACTITIONER

## 2024-03-21 NOTE — PROGRESS NOTES
NP   metoprolol tartrate (LOPRESSOR) 25 MG tablet  Yes Maritza Johnson MD   Rimegepant Sulfate (NURTEC) 75 MG TBDP LOT# [id number] EXP: 5/26 (2 PACKS)  Patient not taking: Reported on 3/21/2024  Neri Pond APRN - CNP   Erenumab-aooe (AIMOVIG) 140 MG/ML SOAJ Lot: [id number] Exp: 2/28/25  Neri Pond APRN - CNP   vitamin D3 (CHOLECALCIFEROL) 25 MCG (1000 UT) TABS tablet Take 1 tablet by mouth daily  Jessica Escobar APRN - NP   vitamin D (ERGOCALCIFEROL) 1.25 MG (90259 UT) CAPS capsule Take 1 capsule by mouth once a week For 12 weeks only  Jessica Escobar APRN - NP   methIMAzole (TAPAZOLE) 5 MG tablet Take 1 tablet by mouth daily  Maritza Johnson MD   traZODone (DESYREL) 50 MG tablet Take 1 tablet by mouth at bedtime.  Maritza Johnson MD   omeprazole (PRILOSEC) 40 MG delayed release capsule Take 1 capsule by mouth every morning (before breakfast)  Jessica Escobar APRN - NP   methIMAzole (TAPAZOLE) 10 MG tablet   ProviderMaritza MD   Multiple Vitamins-Minerals (HAIR SKIN AND NAILS FORMULA PO) Take by mouth  Patient not taking: Reported on 3/13/2024  Maritza Johnson MD        Social History     Tobacco Use    Smoking status: Never    Smokeless tobacco: Never   Substance Use Topics    Alcohol use: Never     Comment: caffeine: 2 cups tea a day, pop sometimes        Vitals:    03/21/24 0855   BP: 128/80   Site: Right Upper Arm   Position: Sitting   Pulse: 82   SpO2: 98%   Weight: 85.1 kg (187 lb 9.6 oz)   Height: 1.6 m (5' 2.99\")     Estimated body mass index is 33.24 kg/m² as calculated from the following:    Height as of this encounter: 1.6 m (5' 2.99\").    Weight as of this encounter: 85.1 kg (187 lb 9.6 oz).    Physical Exam  Constitutional:       General: She is not in acute distress.     Appearance: Normal appearance. She is not ill-appearing, toxic-appearing or diaphoretic.   Musculoskeletal:      Comments: Ambulating without difficulty or antalgic gait. Wearing regular

## 2024-03-26 NOTE — TELEPHONE ENCOUNTER
Physician (Cardiology)  Marino Gore MD as Consulting Physician (Pulmonary Disease)     Requested Pharmacy____________________________________________________________________

## 2024-03-27 RX ORDER — BENZONATATE 100 MG/1
CAPSULE ORAL
Qty: 30 CAPSULE | Refills: 0 | OUTPATIENT
Start: 2024-03-27

## 2024-03-27 NOTE — TELEPHONE ENCOUNTER
Faxed note stating pt has tried and failed sumatriptan and rizatriptan with request to review attached ov note to Rai.

## 2024-04-01 ENCOUNTER — OFFICE VISIT (OUTPATIENT)
Dept: ORTHOPEDIC SURGERY | Age: 44
End: 2024-04-01
Payer: MEDICAID

## 2024-04-01 VITALS — DIASTOLIC BLOOD PRESSURE: 76 MMHG | OXYGEN SATURATION: 98 % | HEART RATE: 80 BPM | SYSTOLIC BLOOD PRESSURE: 126 MMHG

## 2024-04-01 DIAGNOSIS — M25.552 LEFT HIP PAIN: Primary | ICD-10-CM

## 2024-04-01 PROCEDURE — G8417 CALC BMI ABV UP PARAM F/U: HCPCS | Performed by: STUDENT IN AN ORGANIZED HEALTH CARE EDUCATION/TRAINING PROGRAM

## 2024-04-01 PROCEDURE — 99213 OFFICE O/P EST LOW 20 MIN: CPT | Performed by: STUDENT IN AN ORGANIZED HEALTH CARE EDUCATION/TRAINING PROGRAM

## 2024-04-01 PROCEDURE — 1036F TOBACCO NON-USER: CPT | Performed by: STUDENT IN AN ORGANIZED HEALTH CARE EDUCATION/TRAINING PROGRAM

## 2024-04-01 PROCEDURE — G8427 DOCREV CUR MEDS BY ELIG CLIN: HCPCS | Performed by: STUDENT IN AN ORGANIZED HEALTH CARE EDUCATION/TRAINING PROGRAM

## 2024-04-01 ASSESSMENT — ENCOUNTER SYMPTOMS
EYE PAIN: 0
FACIAL SWELLING: 0
NAUSEA: 0
WHEEZING: 0
COUGH: 0
BACK PAIN: 0
EYE REDNESS: 0
SHORTNESS OF BREATH: 0
PHOTOPHOBIA: 0
VOMITING: 0
STRIDOR: 0
COLOR CHANGE: 0
ABDOMINAL PAIN: 0

## 2024-04-01 NOTE — PATIENT INSTRUCTIONS
Appointment for foot in ~3 weeks.  Appointment made for 1 year post op.  Call if you have any new injuries or concerns.

## 2024-04-01 NOTE — PROGRESS NOTES
6 month post op left shoulder RTCR/SAD/DCE/ANALI/BT     No new injury. Demonstrates good ROM. No pain. Doing great.    Wants to talk about foot pain  
[Hydromorphone Hcl] Anaphylaxis     Morphine ok    Hydromorphone Anaphylaxis     Morphone ok verbal per pt    Keflex [Cephalexin] Hives    Pyridium [Phenazopyridine Hcl] Hives         Review of Systems   Constitutional:  Negative for activity change, appetite change, chills, diaphoresis, fatigue, fever and unexpected weight change.   HENT:  Negative for congestion, ear pain, facial swelling, hearing loss and sneezing.    Eyes:  Negative for photophobia, pain and redness.   Respiratory:  Negative for cough, shortness of breath, wheezing and stridor.    Cardiovascular:  Negative for chest pain, palpitations and leg swelling.   Gastrointestinal:  Negative for abdominal pain, nausea and vomiting.   Endocrine: Negative for cold intolerance and heat intolerance.   Musculoskeletal:  Positive for myalgias. Negative for arthralgias, back pain, gait problem, joint swelling, neck pain and neck stiffness.   Skin:  Negative for color change, pallor, rash and wound.   Neurological:  Positive for weakness. Negative for dizziness, facial asymmetry and numbness.                                                   Examination:  General Exam:  Vitals: /76 (Site: Left Upper Arm, Position: Sitting)   Pulse 80   LMP 02/15/2012   SpO2 98%    Physical Exam  Constitutional:       General: She is not in acute distress.     Appearance: Normal appearance. She is obese.   HENT:      Head: Normocephalic and atraumatic.      Nose: Nose normal.   Eyes:      General:         Right eye: No discharge.         Left eye: No discharge.      Extraocular Movements: Extraocular movements intact.   Cardiovascular:      Pulses: Normal pulses.   Pulmonary:      Effort: Pulmonary effort is normal.      Breath sounds: Normal breath sounds.   Musculoskeletal:         General: Tenderness present. No swelling, deformity or signs of injury.      Right shoulder: Normal.      Left shoulder: No swelling, deformity, effusion, laceration, tenderness, bony

## 2024-04-11 RX ORDER — OMEPRAZOLE 40 MG/1
40 CAPSULE, DELAYED RELEASE ORAL
Qty: 90 CAPSULE | Refills: 1 | Status: SHIPPED | OUTPATIENT
Start: 2024-04-11

## 2024-04-11 NOTE — TELEPHONE ENCOUNTER
Last appointment: 3/21/2024   Next Appointment: 4/25/2024     Allergies:  Allergies   Allergen Reactions    Codeine Anaphylaxis    Dilaudid [Hydromorphone Hcl] Anaphylaxis     Morphine ok    Hydromorphone Anaphylaxis     Morphone ok verbal per pt    Keflex [Cephalexin] Hives    Pyridium [Phenazopyridine Hcl] Hives         Recent Vitals:  Wt Readings from Last 3 Encounters:   03/21/24 85.1 kg (187 lb 9.6 oz)   03/13/24 83.1 kg (183 lb 3.2 oz)   03/04/24 84.6 kg (186 lb 6.4 oz)     Ht Readings from Last 3 Encounters:   03/21/24 1.6 m (5' 2.99\")   03/04/24 1.6 m (5' 2.99\")   02/19/24 1.6 m (5' 3\")     BP Readings from Last 3 Encounters:   04/01/24 126/76   03/21/24 128/80   03/13/24 98/70     BMI Readings from Last 3 Encounters:   03/21/24 33.24 kg/m²   03/13/24 32.46 kg/m²   03/04/24 33.03 kg/m²       Recent Labs__________________________________________________________________________    Renal:   Creatinine   Date Value Ref Range Status   12/29/2023 1.2 (H) 0.6 - 1.1 MG/DL Final     BUN   Date Value Ref Range Status   12/29/2023 11 6 - 23 MG/DL Final     Sodium   Date Value Ref Range Status   12/29/2023 139 135 - 145 MMOL/L Final     Potassium   Date Value Ref Range Status   12/29/2023 4.5 3.5 - 5.1 MMOL/L Final     Chloride   Date Value Ref Range Status   12/29/2023 102 99 - 110 mMol/L Final     CO2   Date Value Ref Range Status   12/29/2023 25 21 - 32 MMOL/L Final       BMP:    Sodium   Date Value Ref Range Status   12/29/2023 139 135 - 145 MMOL/L Final     Potassium   Date Value Ref Range Status   12/29/2023 4.5 3.5 - 5.1 MMOL/L Final     Chloride   Date Value Ref Range Status   12/29/2023 102 99 - 110 mMol/L Final     CO2   Date Value Ref Range Status   12/29/2023 25 21 - 32 MMOL/L Final     BUN   Date Value Ref Range Status   12/29/2023 11 6 - 23 MG/DL Final     Creatinine   Date Value Ref Range Status   12/29/2023 1.2 (H) 0.6 - 1.1 MG/DL Final     Glucose   Date Value Ref Range Status   12/29/2023 85 70 - 99

## 2024-04-15 ENCOUNTER — HOSPITAL ENCOUNTER (OUTPATIENT)
Dept: INFUSION THERAPY | Age: 44
Discharge: HOME | End: 2024-04-15
Payer: MEDICAID

## 2024-04-15 DIAGNOSIS — E53.8 B12 DEFICIENCY: Primary | ICD-10-CM

## 2024-04-15 PROCEDURE — 96372 THER/PROPH/DIAG INJ SC/IM: CPT

## 2024-04-15 PROCEDURE — 6360000002 HC RX W HCPCS

## 2024-04-15 RX ORDER — DIPHENHYDRAMINE HYDROCHLORIDE 50 MG/ML
50 INJECTION INTRAMUSCULAR; INTRAVENOUS
OUTPATIENT
Start: 2024-04-29

## 2024-04-15 RX ORDER — EPINEPHRINE 1 MG/ML
0.3 INJECTION, SOLUTION, CONCENTRATE INTRAVENOUS PRN
OUTPATIENT
Start: 2024-04-29

## 2024-04-15 RX ORDER — FAMOTIDINE 10 MG/ML
20 INJECTION, SOLUTION INTRAVENOUS
OUTPATIENT
Start: 2024-04-29

## 2024-04-15 RX ORDER — ACETAMINOPHEN 325 MG/1
650 TABLET ORAL
OUTPATIENT
Start: 2024-04-29

## 2024-04-15 RX ORDER — CYANOCOBALAMIN 1000 UG/ML
1000 INJECTION, SOLUTION INTRAMUSCULAR; SUBCUTANEOUS ONCE
Status: COMPLETED | OUTPATIENT
Start: 2024-04-15 | End: 2024-04-15

## 2024-04-15 RX ORDER — SODIUM CHLORIDE 9 MG/ML
INJECTION, SOLUTION INTRAVENOUS CONTINUOUS
OUTPATIENT
Start: 2024-04-29

## 2024-04-15 RX ORDER — CYANOCOBALAMIN 1000 UG/ML
INJECTION, SOLUTION INTRAMUSCULAR; SUBCUTANEOUS
Status: COMPLETED
Start: 2024-04-15 | End: 2024-04-15

## 2024-04-15 RX ORDER — ONDANSETRON 2 MG/ML
8 INJECTION INTRAMUSCULAR; INTRAVENOUS
OUTPATIENT
Start: 2024-04-29

## 2024-04-15 RX ORDER — CYANOCOBALAMIN 1000 UG/ML
1000 INJECTION, SOLUTION INTRAMUSCULAR; SUBCUTANEOUS ONCE
OUTPATIENT
Start: 2024-04-29 | End: 2024-04-29

## 2024-04-15 RX ORDER — ALBUTEROL SULFATE 90 UG/1
4 AEROSOL, METERED RESPIRATORY (INHALATION) PRN
OUTPATIENT
Start: 2024-04-29

## 2024-04-15 RX ADMIN — CYANOCOBALAMIN 1000 MCG: 1000 INJECTION, SOLUTION INTRAMUSCULAR; SUBCUTANEOUS at 15:05

## 2024-04-15 RX ADMIN — CYANOCOBALAMIN 1000 MCG: 1000 INJECTION, SOLUTION INTRAMUSCULAR at 15:05

## 2024-04-15 NOTE — PROGRESS NOTES
Pt here for B-12 injection. Pt has no concerns or issues to discuss. Injection given IM to left deltoid. Pt tolerated without incident left ambulatory declined discharge instructions

## 2024-04-16 ENCOUNTER — OFFICE VISIT (OUTPATIENT)
Dept: CARDIOLOGY CLINIC | Age: 44
End: 2024-04-16
Payer: MEDICAID

## 2024-04-16 VITALS
SYSTOLIC BLOOD PRESSURE: 96 MMHG | HEIGHT: 63 IN | WEIGHT: 188 LBS | HEART RATE: 82 BPM | OXYGEN SATURATION: 100 % | BODY MASS INDEX: 33.31 KG/M2 | DIASTOLIC BLOOD PRESSURE: 62 MMHG

## 2024-04-16 DIAGNOSIS — E78.2 MIXED HYPERLIPIDEMIA: ICD-10-CM

## 2024-04-16 DIAGNOSIS — R06.02 SOB (SHORTNESS OF BREATH): ICD-10-CM

## 2024-04-16 DIAGNOSIS — R07.1 CHEST PAIN ON BREATHING: ICD-10-CM

## 2024-04-16 DIAGNOSIS — E66.09 CLASS 1 OBESITY DUE TO EXCESS CALORIES WITH SERIOUS COMORBIDITY AND BODY MASS INDEX (BMI) OF 33.0 TO 33.9 IN ADULT: Primary | ICD-10-CM

## 2024-04-16 PROBLEM — E66.811 CLASS 1 OBESITY DUE TO EXCESS CALORIES WITH SERIOUS COMORBIDITY AND BODY MASS INDEX (BMI) OF 33.0 TO 33.9 IN ADULT: Status: ACTIVE | Noted: 2024-04-16

## 2024-04-16 PROCEDURE — G8427 DOCREV CUR MEDS BY ELIG CLIN: HCPCS | Performed by: NURSE PRACTITIONER

## 2024-04-16 PROCEDURE — G8417 CALC BMI ABV UP PARAM F/U: HCPCS | Performed by: NURSE PRACTITIONER

## 2024-04-16 PROCEDURE — 99214 OFFICE O/P EST MOD 30 MIN: CPT | Performed by: NURSE PRACTITIONER

## 2024-04-16 PROCEDURE — 1036F TOBACCO NON-USER: CPT | Performed by: NURSE PRACTITIONER

## 2024-04-16 ASSESSMENT — ENCOUNTER SYMPTOMS: SHORTNESS OF BREATH: 1

## 2024-04-16 NOTE — PROGRESS NOTES
[place] [address]  Phone: [phone]    Fax [phone]    Radha Epps MD, [facility]  Jimmy Burns MD, [facility]   Grant Alegria MD, [facility] MD Ernestina Burrows MD, [facility]  Aldo Castaneda MD, [facility]    Effie Romano MD, [facility]   Amber Wahl, APRN  Danna Sandoval, APRN  Izabel Gibson, APRN  Izaiah May, APRN      Cardiology Progress Note      4/16/2024    RE: Umberto Hanson  (1980)                             Primary cardiologist: Dr. Michael Stewart       Subjective:  CC:   1. Class 1 obesity due to excess calories with serious comorbidity and body mass index (BMI) of 33.0 to 33.9 in adult    2. Mixed hyperlipidemia    3. SOB (shortness of breath)    4. Chest pain on breathing        HPI: Umberto Hanson, who is a  43 y.o. year old female with a past medical history as listed below.  Patient presents to the office for follow up on dyslipidemia, SOB, chest pain, and Obesity.  S/P gastric sleeve 11/21/22.  Patient had normal stress test and echo but continued to have shortness of breath.  She then proceeded to have C which showed normal coronaries. Patient reports being under a lot of stress due to her son being deployed in [place]. Her son is currently stationed in [place] with new [place] wife who is pregnant and due in October of 2023.     Past Medical History:   Diagnosis Date    Anxiety 1/10/2001    Arthritis     Chronic depression 07/09/2021    No meds as of 11/11/22    Fibromyalgia     Can’t remember date    GERD (gastroesophageal reflux disease) 2015    H/O bilateral breast reduction surgery 10/31/2019    H/O of hysterectomy with bilateral oophorectomy 04/16/2012    H/O percutaneous left heart catheterization 09/14/2023    normal coronaries    Hx of Doppler echocardiogram 04/27/2022    EF 55-60%. Mild LV hypertrophy. Grade I diastolic dysfunction. Mildly dilated LA. Mild MR and TR.    Hyperthyroidism 11/6/2023    Kidney

## 2024-04-16 NOTE — PATIENT INSTRUCTIONS
Please be informed that if you contact our office outside of normal business hours the physician on call cannot help with any scheduling or rescheduling issues, procedure instruction questions or any type of medication issue.    We advise you for any urgent/emergency that you go to the nearest emergency room!    PLEASE CALL OUR OFFICE DURING NORMAL BUSINESS HOURS    Monday - Friday   8 am to 5 pm    [place]: [phone]    [place]: [phone]    [place]:  [phone]    **It is YOUR responsibilty to bring medication bottles and/or updated medication list to EACH APPOINTMENT. This will allow us to better serve you and all your healthcare needs**    Thank you for allowing us to care for you today!   We want to ensure we can follow your treatment plan and we strive to give you the best outcomes and experience possible.   If you ever have a life threatening emergency and call 911 - for an ambulance (EMS)   Our providers can only care for you at:   [facility] or [facility].   Even if you have someone take you or you drive yourself we can only care for you in a [facility] facility. Our providers are not setup at the other healthcare locations!

## 2024-04-19 ENCOUNTER — OFFICE VISIT (OUTPATIENT)
Dept: FAMILY MEDICINE CLINIC | Age: 44
End: 2024-04-19
Payer: MEDICAID

## 2024-04-19 VITALS
BODY MASS INDEX: 33.52 KG/M2 | SYSTOLIC BLOOD PRESSURE: 120 MMHG | OXYGEN SATURATION: 98 % | WEIGHT: 189.2 LBS | HEART RATE: 73 BPM | HEIGHT: 63 IN | DIASTOLIC BLOOD PRESSURE: 70 MMHG

## 2024-04-19 DIAGNOSIS — Z90.5 SINGLE KIDNEY: ICD-10-CM

## 2024-04-19 DIAGNOSIS — H92.02 LEFT EAR PAIN: ICD-10-CM

## 2024-04-19 DIAGNOSIS — G43.E09 CHRONIC MIGRAINE WITH AURA WITHOUT STATUS MIGRAINOSUS, NOT INTRACTABLE: ICD-10-CM

## 2024-04-19 DIAGNOSIS — R31.0 GROSS HEMATURIA: ICD-10-CM

## 2024-04-19 DIAGNOSIS — K42.9 UMBILICAL HERNIA WITHOUT OBSTRUCTION AND WITHOUT GANGRENE: Primary | ICD-10-CM

## 2024-04-19 DIAGNOSIS — H92.12 DRAINAGE FROM LEFT EAR: ICD-10-CM

## 2024-04-19 PROCEDURE — G8427 DOCREV CUR MEDS BY ELIG CLIN: HCPCS | Performed by: NURSE PRACTITIONER

## 2024-04-19 PROCEDURE — 99214 OFFICE O/P EST MOD 30 MIN: CPT | Performed by: NURSE PRACTITIONER

## 2024-04-19 PROCEDURE — G8417 CALC BMI ABV UP PARAM F/U: HCPCS | Performed by: NURSE PRACTITIONER

## 2024-04-19 PROCEDURE — 1036F TOBACCO NON-USER: CPT | Performed by: NURSE PRACTITIONER

## 2024-04-19 SDOH — ECONOMIC STABILITY: FOOD INSECURITY: WITHIN THE PAST 12 MONTHS, YOU WORRIED THAT YOUR FOOD WOULD RUN OUT BEFORE YOU GOT MONEY TO BUY MORE.: PATIENT DECLINED

## 2024-04-19 SDOH — ECONOMIC STABILITY: HOUSING INSECURITY
IN THE LAST 12 MONTHS, WAS THERE A TIME WHEN YOU DID NOT HAVE A STEADY PLACE TO SLEEP OR SLEPT IN A SHELTER (INCLUDING NOW)?: PATIENT DECLINED

## 2024-04-19 SDOH — ECONOMIC STABILITY: INCOME INSECURITY: HOW HARD IS IT FOR YOU TO PAY FOR THE VERY BASICS LIKE FOOD, HOUSING, MEDICAL CARE, AND HEATING?: PATIENT DECLINED

## 2024-04-19 SDOH — ECONOMIC STABILITY: FOOD INSECURITY: WITHIN THE PAST 12 MONTHS, THE FOOD YOU BOUGHT JUST DIDN'T LAST AND YOU DIDN'T HAVE MONEY TO GET MORE.: PATIENT DECLINED

## 2024-04-19 NOTE — PROGRESS NOTES
2024     Umberto Hanson (:  1980) is a 43 y.o. female, here for evaluation of the following medical concerns:      ER follow up   ER [facility] 24 reports that she has had painless hematuria for the past 3 days. She reports clots. She reports she has had [place]rectomy in the [place]   Has ONE kidney, right. 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 No ureteral or urinary bladder stones. Urinary bladder wall thickening may be infectious or inflammatory in etiology. Clinical correlation is requested.  Fat-containing supraumbilical hernia measures up to 1 cm at the neck with maximum sac dimension of approximately 4 cm.   advised to follow-up with the urologist       UA with 3+ blood, protein, leuks   GFR WNL  CBC WNL      Right flank pain, LLQ pain.   Generalized abdominal pain across her mid abdomen   States her hernia is \"really hurting\"   She noticed it right before she went to the ER 24  Blood in her urine last night, but none since then.   Reports blood clots in her urine Wednesday night.       States she is having thyroid removed after may 2024 davide with [facility] endo             Review of Systems    Prior to Visit Medications    Medication Sig Taking? Authorizing Provider   omeprazole (PRILOSEC) 40 MG delayed release capsule Take 1 capsule by mouth every morning (before breakfast) Yes Jessica Escobar, APRN - NP   senna (SENOKOT) 8.6 MG TABS tablet Take 1 tablet by mouth daily Yes Coty Dixon, APRN - CNP   cetirizine (ZYRTEC) 10 MG tablet Take 1 tablet by mouth daily Yes Jessica Escobar, APRN - NP   cyclobenzaprine (FLEXERIL) 10 MG tablet Take 1 tablet by mouth 3 times daily Yes Provider, MD Maritza   levothyroxine (SYNTHROID) 50 MCG tablet Take 1 tablet by mouth every morning (before breakfast) Yes Provider, MD Maritza   Erenumab-aooe (AIMOVIG) 140 MG/ML SOAJ Inject 140 mg into the skin every 30 days Yes Neri Pond

## 2024-04-20 LAB
BACTERIA URNS QL MICRO: ABNORMAL /HPF
BILIRUB UR QL STRIP.AUTO: NEGATIVE
CLARITY UR: CLEAR
COLOR UR: YELLOW
EPI CELLS #/AREA URNS AUTO: 2 /HPF (ref 0–5)
GLUCOSE UR STRIP.AUTO-MCNC: NEGATIVE MG/DL
HGB UR QL STRIP.AUTO: ABNORMAL
HYALINE CASTS #/AREA URNS AUTO: 0 /LPF (ref 0–8)
KETONES UR STRIP.AUTO-MCNC: NEGATIVE MG/DL
LEUKOCYTE ESTERASE UR QL STRIP.AUTO: ABNORMAL
NITRITE UR QL STRIP.AUTO: NEGATIVE
PH UR STRIP.AUTO: 7.5 [PH] (ref 5–8)
PROT UR STRIP.AUTO-MCNC: ABNORMAL MG/DL
RBC CLUMPS #/AREA URNS AUTO: 31 /HPF (ref 0–4)
SP GR UR STRIP.AUTO: 1.02 (ref 1–1.03)
UA DIPSTICK W REFLEX MICRO PNL UR: YES
URN SPEC COLLECT METH UR: ABNORMAL
UROBILINOGEN UR STRIP-ACNC: 1 E.U./DL
WBC #/AREA URNS AUTO: 5 /HPF (ref 0–5)

## 2024-04-21 LAB — BACTERIA UR CULT: NORMAL

## 2024-04-22 RX ORDER — ERENUMAB-AOOE 140 MG/ML
140 INJECTION, SOLUTION SUBCUTANEOUS
Qty: 1 ADJUSTABLE DOSE PRE-FILLED PEN SYRINGE | Refills: 11 | OUTPATIENT
Start: 2024-04-22

## 2024-05-06 ENCOUNTER — OFFICE VISIT (OUTPATIENT)
Dept: ORTHOPEDIC SURGERY | Age: 44
End: 2024-05-06
Payer: MEDICAID

## 2024-05-06 VITALS — BODY MASS INDEX: 33.31 KG/M2 | WEIGHT: 188 LBS | OXYGEN SATURATION: 99 % | HEIGHT: 63 IN | HEART RATE: 103 BPM

## 2024-05-06 DIAGNOSIS — S86.302A INJURY OF PERONEAL TENDON OF LEFT FOOT, INITIAL ENCOUNTER: Primary | ICD-10-CM

## 2024-05-06 PROCEDURE — G8417 CALC BMI ABV UP PARAM F/U: HCPCS | Performed by: STUDENT IN AN ORGANIZED HEALTH CARE EDUCATION/TRAINING PROGRAM

## 2024-05-06 PROCEDURE — 99213 OFFICE O/P EST LOW 20 MIN: CPT | Performed by: STUDENT IN AN ORGANIZED HEALTH CARE EDUCATION/TRAINING PROGRAM

## 2024-05-06 PROCEDURE — G8427 DOCREV CUR MEDS BY ELIG CLIN: HCPCS | Performed by: STUDENT IN AN ORGANIZED HEALTH CARE EDUCATION/TRAINING PROGRAM

## 2024-05-06 PROCEDURE — 1036F TOBACCO NON-USER: CPT | Performed by: STUDENT IN AN ORGANIZED HEALTH CARE EDUCATION/TRAINING PROGRAM

## 2024-05-06 NOTE — PROGRESS NOTES
Patient is a 43 y.o. year old female. Patient is in the office today with left foot pain. Patient states that her left foot started hurting hurting in March but not injury. She was seen in the [place] ED on 3/14/24. X-rays were taken at that time. Pain scale  7/10. Pain starts on the lateral portion of the her foot and moves medially. Denies any new falls or injuries. Denies a prior hx of injury or surgery.   Occupation: [employer]      Left foot x-rays  IMPRESSION:   No acute osseous abnormalities.   
Take 1 tab @ onset of migraine.  May repeat in 2 hrs if needed.  Not to exceed 2 tabs per day 9 tablet 2     No current facility-administered medications for this visit.     Allergies   Allergen Reactions    Codeine Anaphylaxis    Dilaudid [Hydromorphone Hcl] Anaphylaxis     Morphine ok    Hydromorphone Anaphylaxis     Morphone ok verbal per pt    Keflex [Cephalexin] Hives    Pyridium [Phenazopyridine Hcl] Hives         Review of Systems   Constitutional:  Positive for activity change. Negative for fatigue and fever.   HENT:  Negative for sneezing, sore throat and voice change.    Respiratory:  Negative for cough, shortness of breath and wheezing.    Cardiovascular:  Negative for leg swelling.   Gastrointestinal:  Negative for nausea and vomiting.   Musculoskeletal:  Positive for arthralgias and myalgias. Negative for back pain, gait problem, joint swelling, neck pain and neck stiffness.   Skin:  Negative for color change, rash and wound.   Neurological:  Negative for weakness and numbness.   Psychiatric/Behavioral:  Negative for behavioral problems, confusion and self-injury.                                                    Examination:  General Exam:  Vitals: Pulse (!) 103   Ht 1.6 m (5' 3\")   Wt 85.3 kg (188 lb)   LMP 02/15/2012   SpO2 99%   BMI 33.30 kg/m²    Physical Exam  Constitutional:       General: She is not in acute distress.     Appearance: Normal appearance. She is obese. She is not ill-appearing.   Eyes:      General:         Right eye: No discharge.         Left eye: No discharge.      Extraocular Movements: Extraocular movements intact.   Cardiovascular:      Rate and Rhythm: Normal rate.   Pulmonary:      Effort: Pulmonary effort is normal. No respiratory distress.      Breath sounds: No wheezing.   Musculoskeletal:         General: Tenderness present. No swelling, deformity or signs of injury.      Right knee: Normal.      Left knee: Normal.      Right lower leg: Normal. No edema.      Left

## 2024-05-07 PROBLEM — S86.302A INJURY OF PERONEAL TENDON OF LEFT FOOT: Status: ACTIVE | Noted: 2024-05-07

## 2024-05-07 ASSESSMENT — ENCOUNTER SYMPTOMS
SHORTNESS OF BREATH: 0
VOICE CHANGE: 0
COUGH: 0
NAUSEA: 0
SORE THROAT: 0
BACK PAIN: 0
WHEEZING: 0
COLOR CHANGE: 0
VOMITING: 0

## 2024-05-08 ENCOUNTER — TELEPHONE (OUTPATIENT)
Dept: CARDIOLOGY CLINIC | Age: 44
End: 2024-05-08

## 2024-05-08 NOTE — TELEPHONE ENCOUNTER
Cardiologist: Dr. Stewart  Surgeon: Dr. Machado  Surgery: Total Thyroidectomy  Surgery/Procedure should be done in the hospital setting    _____ yes    _____  no    Anesthesia: General  Date: TBD  Fax# [phone]  # [phone]    Last OV 4/16/2024 w/Izaiah    Morbidly obese (HCC)   -Discussed the importance of diet and exercise and assisting with weight loss.  Patient informed of ideal body weight and high risk mortality associated with obesity.  Patient voices understanding.     -S/P gastric sleeve.  She was down to 144 lbs but now up 14 lbs since last visit.    Dyslipidemia   -At or near goal No, , 11/6/23. Patient is not currently on medication.      Chest pain   -Patient had normal stress test and echo but complained of ongoing chest pressure.  She proceeded to have LHC which did not show any CAD.  Reports palpitations are ongoing, unremarkable holter monitor. S/P cholecystectomy in gastric sleeve placement.  Likely stress related.      SOB   -Chest pain and palpation, occassionally.  Patient reports intense at times without any alleviating factors.   Stress test normal and echo. F/U LHC did not show any CAD.       Last EKG- 8/8/2023      Stress Test- 8/15/2023  Good exercise capacity. Little over 7 METs work load.   Physiological BP response to exercise.   ETT non diagnostic as THR is not achieved. No ischemic EKG CHANGES SEEN but   patient C/O 5/10 CHEST PAIN towards the end of the protocol resolving   quickly during rest.   Clinical co-relation is recommended.   [facility] treadmill risk score is 6, low risk.       Echo- 8/15/2023  Technically difficult examination done in supine due to left shoulder   injury.   Left ventricular function and size is normal, EF is estimated at 55-60%.   Grade I diastolic dysfunction.   No regional wall motion abnormalities were detected.   No significant valvular disease noted.   Mild mitral and tricuspid regurgitation is present.   RVSP is 24 mmHg.   No evidence of

## 2024-05-20 ENCOUNTER — TELEPHONE (OUTPATIENT)
Dept: BARIATRICS/WEIGHT MGMT | Age: 44
End: 2024-05-20

## 2024-05-20 DIAGNOSIS — E05.90 HYPERTHYROIDISM: Primary | ICD-10-CM

## 2024-05-20 NOTE — TELEPHONE ENCOUNTER
MESSAGE FROM DANO - INTAKE CALL Pt called and said she is having trouble eating and can only get 2 bites in before she feels like she is going to get sick. I spoke with hi and hi is going to check with Dr Chin.     OFFICE CALLED PT TO GATHER MORE INFORMATION     PT HAD SX FOR GLORIA STEIN WITH HIEN BRENDAN  5/18/24 PT STATES SINCE THIS SX SHE CAN NOT EAT ANYTHING WITHOUT WANTING TO GET SICK.    PT STATES SHE IS 1 YRS S/P S/G 11/21/2022   PT THINKS SHE IS LOSING TO MUCH WEIGHT  AND WANTS AN EVAL  DUE TO NOT BEING ABLE TO EAT. [facility] WITH DR. CHIN   WAITING FOR A CALL BACK  - FIDENCIO CALLED BACK -  OK'ED TO SEE PT TOMORROW 5/21/24

## 2024-05-20 NOTE — TELEPHONE ENCOUNTER
Pt called and said she is having trouble eating and can only get 2 bites in before she feels like she is going to get sick. I spoke with hi and hi is going to check with Dr Chin.

## 2024-05-21 ENCOUNTER — PREP FOR PROCEDURE (OUTPATIENT)
Dept: BARIATRICS/WEIGHT MGMT | Age: 44
End: 2024-05-21

## 2024-05-21 ENCOUNTER — TELEPHONE (OUTPATIENT)
Dept: BARIATRICS/WEIGHT MGMT | Age: 44
End: 2024-05-21

## 2024-05-21 ENCOUNTER — OFFICE VISIT (OUTPATIENT)
Dept: BARIATRICS/WEIGHT MGMT | Age: 44
End: 2024-05-21
Payer: MEDICAID

## 2024-05-21 VITALS
OXYGEN SATURATION: 98 % | HEART RATE: 73 BPM | BODY MASS INDEX: 34.18 KG/M2 | HEIGHT: 63 IN | SYSTOLIC BLOOD PRESSURE: 118 MMHG | DIASTOLIC BLOOD PRESSURE: 72 MMHG | WEIGHT: 192.9 LBS

## 2024-05-21 DIAGNOSIS — Z98.84 STATUS POST BARIATRIC SURGERY: ICD-10-CM

## 2024-05-21 DIAGNOSIS — R11.2 NAUSEA AND VOMITING, UNSPECIFIED VOMITING TYPE: Primary | ICD-10-CM

## 2024-05-21 PROCEDURE — G8427 DOCREV CUR MEDS BY ELIG CLIN: HCPCS | Performed by: SURGERY

## 2024-05-21 PROCEDURE — 99213 OFFICE O/P EST LOW 20 MIN: CPT | Performed by: SURGERY

## 2024-05-21 PROCEDURE — G8417 CALC BMI ABV UP PARAM F/U: HCPCS | Performed by: SURGERY

## 2024-05-21 PROCEDURE — 1036F TOBACCO NON-USER: CPT | Performed by: SURGERY

## 2024-05-21 RX ORDER — SODIUM CHLORIDE 0.9 % (FLUSH) 0.9 %
5-40 SYRINGE (ML) INJECTION PRN
OUTPATIENT
Start: 2024-05-21

## 2024-05-21 RX ORDER — SODIUM CHLORIDE 9 MG/ML
25 INJECTION, SOLUTION INTRAVENOUS PRN
OUTPATIENT
Start: 2024-05-21

## 2024-05-21 RX ORDER — SODIUM CHLORIDE 0.9 % (FLUSH) 0.9 %
5-40 SYRINGE (ML) INJECTION EVERY 12 HOURS SCHEDULED
OUTPATIENT
Start: 2024-05-21

## 2024-05-21 ASSESSMENT — PATIENT HEALTH QUESTIONNAIRE - PHQ9
2. FEELING DOWN, DEPRESSED OR HOPELESS: SEVERAL DAYS
SUM OF ALL RESPONSES TO PHQ QUESTIONS 1-9: 2
1. LITTLE INTEREST OR PLEASURE IN DOING THINGS: SEVERAL DAYS
SUM OF ALL RESPONSES TO PHQ9 QUESTIONS 1 & 2: 2

## 2024-05-21 ASSESSMENT — ENCOUNTER SYMPTOMS
VOMITING: 1
NAUSEA: 1

## 2024-05-21 NOTE — TELEPHONE ENCOUNTER
SPOKE TO  Umberto Hanson REGARDING SURGERY (EGD) SCHEDULED @ [facility]. NOTIFIED OF DATES, TIMES AND LOCATION    PHONE ASSESSMENT   SURGERY - 5/31/24 @ 10AM  P/O -6/11 @ 8:45AM    NPO AFTER MIDNIGHT    HOLD BLOOD THINNERS - NA

## 2024-05-21 NOTE — TELEPHONE ENCOUNTER
Tue May 21, 2024 10:14:49 AM EDT  EMILEE MENDOSA  [address] [id number]  ID:[id number]  :1980Sex:F  Referring Provider:  PEMA NICOLAS  [address] [facility] [address], [place] [address]  TIN:[id number]  NPI:[id number]  Servicing Facility:  [address] [place] [id number]  Ph:[phone]  TIN:[id number]  NPI:[id number]  Servicing Provider:  FIDENCIO PEMA  [address] [facility] [address], [place] [address]  Ph:[phone]  TIN:[id number]  NPI:[id number]  Category:  Health Services Review  Service:  Surgical  Facility:  [facility]  Certification:  Initial  Requested Dates:  May 31, 2024 - Aug 29, 2024  Diagnosis codes:  1.  R11.2  Nausea with vomiting, unspecified  Requested Services:  1.  82681:  DIAGNOSTIC EXAM OF ESOPHAGUS, STOMACH, AND/OR UPPER SMALL BOWEL USING A FLEXIBLE ENDOSCOPE  Status: CERTIFIED  Servicing Provider:PEMA NICOLAS; TIN:[id number]; [place] [address]  1  Status:  APPROVED  Authorization #:  [id number]  Message:  Payment is based on member eligibility, medical necessity review, where applicable and [facility] provider contractual agreement. Authorization does not guarantee payment.    History  4. Certified3. Data Sent2. Data Requested1. Initial Request    CPT® copyright  American Medical Association. All rights reserved.  ©  [employer]. All rights reserved. [employer] confidential or trade secret information. Legal Notice

## 2024-05-21 NOTE — PROGRESS NOTES
Chief Complaint   Patient presents with    Weight Management     C/O N/V after eating S/P Sleeve 11/21/22         SUBJECTIVE:  HPI: Patient is here with complaints of:    N/V and inability to tolerate food after her ventral hernia repair.    Pt reports she was not having symptoms prior to surgery.     Her surgery was on or around 5/13.     She is following up with her surgeon tomorrow.    She reportedly went to [place] ED and had w/u and was d/c'd.         I have reviewed the patient's(pertinent information to this visit) medical history, family history(scanned in  the [employer] under \"patient questioner\"), social history and review of systems with the patient today in the office.            Past Surgical History:   Procedure Laterality Date    BICEPS TENDON REPAIR Left 10/12/2023    LEFT BICEPS TENODESIS performed by Rustam Louise DO at [facility] OR    BREAST BIOPSY Left 2010    benign    BREAST REDUCTION SURGERY Bilateral 2003    BREAST SURGERY      CARPAL TUNNEL RELEASE Left 02/16/2021    LEFT CARPAL TUNNEL RELEASE performed by William Lomeli DO at [facility] OR    CHOLECYSTECTOMY      COLONOSCOPY N/A 04/20/2022    COLONOSCOPY POLYPECTOMY SNARE/COLD BIOPSY performed by William Zhao MD at [facility] ENDOSCOPY    COLONOSCOPY N/A 06/12/2023    COLONOSCOPY WITH BIOPSY performed by Kassi Latif MD at [facility] ENDOSCOPY    CYST REMOVAL Right     wrist    HAND SURGERY Bilateral     4/2014    HYSTERECTOMY (CERVIX STATUS UNKNOWN)      HYSTERECTOMY, TOTAL ABDOMINAL (CERVIX REMOVED)      INNER EAR SURGERY      OTHER SURGICAL HISTORY  04/16/2012    Repair vaginal cuff    OVARY REMOVAL      PARTIAL NEPHRECTOMY Left     SHOULDER ARTHROSCOPY Left 10/12/2023    LEFT SHOULDER ARTHROSCOPY ROTATOR CUFF REPAIR; SUBACROMIAL DECOMPRESSION; DISTAL CLAVICLE EXCISION; DEBRIDEMENT performed by Rustam Louise DO at [facility] OR    SHOULDER SURGERY Left 10/12/2023    LEFT SHOULDER ARTHROSCOPY ROTATOR CUFF REPAIR; SUBACROMIAL DECOMPRESSION; DISTAL

## 2024-05-23 NOTE — PROGRESS NOTES
.Surgery @ [facility] on 5/31/24 you will be called 5/30/24 with times    NOTHING TO EAT OR DRINK AFTER MIDNIGHT DAY OF SURGERY    1. Enter thru the hospital main entrance on day of surgery, check in at the Information Desk. If you arrive prior to 6:00am, enter thru the ER entrance.    2. Follow the directions as prescribed by the doctor for your procedure and medications.         Morning of surgery take: Omeprazole & Tapazole with sips of water         Stop vitamins, supplements and NSAIDS:  5/24/24    3. Check with your Doctor regarding stopping blood thinners and follow their instructions.    4. Do not smoke, vape or use chewing tobacco morning of surgery. Do not drink any alcoholic beverages 24 hours prior to surgery.       This includes NA Beer. No street drugs 7 days prior to surgery.    5. If you have dentures, contacts of glasses they will be removed before going to the OR; please bring a case.    6. Please bring picture ID, insurance card, paperwork from the doctor’s office (H & P, Consent, & card for implantable devices).    7. Take a shower with an antibacterial soap the night before surgery and the morning of surgery. Do not put anything on your skin      After your morning shower.    8. You will need a responsible adult to drive you home and check on you after surgery.

## 2024-05-30 ENCOUNTER — ANESTHESIA EVENT (OUTPATIENT)
Dept: ENDOSCOPY | Age: 44
End: 2024-05-30
Payer: MEDICAID

## 2024-05-30 NOTE — PROGRESS NOTES
Left message for patient to arrive at 0800 at [facility] on 5/31/2024 for her procedure at 0930. Orders are in [facility].

## 2024-05-30 NOTE — ANESTHESIA PRE PROCEDURE
emergence from general anesthesia): PONV.  Airway: Mallampati: I  TM distance: <3 FB   Neck ROM: full  Mouth opening: > = 3 FB   Dental:    (+) edentulous      Pulmonary:   (+) sleep apnea:                             Cardiovascular:    (+) hyperlipidemia         Beta Blocker:  Not on Beta Blocker      ROS comment: Stress test 10/2022;  Summary    Overall Impression:normal stress test, THR achieved        Functional Capacity: Fair Exercise Tolerance.        Conclusion: normal stress test     Echo 4/2022:  Summary   Limited study due to patients body habitus.   Left ventricular function and size is normal, EF is estimated at 55-60%.   Mild left ventricular hypertrophy.   Grade I diastolic dysfunction.   No regional wall motion abnormalities were detected.   Mildly dilated left atrium.   Mild mitral and tricuspid regurgitation is present.   RVSP is 27 mmHg.   No evidence of pericardial effusion.        Neuro/Psych:   (+) headaches: migraine headaches, depression/anxiety             GI/Hepatic/Renal:   (+) morbid obesity          Endo/Other: Negative Endo/Other ROS                    Abdominal:             Vascular: negative vascular ROS.         Other Findings:           Anesthesia Plan      general     ASA 2       Induction: intravenous.                            VIOLET [address] CRNA   5/30/2024         Pre Anesthesia Assessment complete. Chart reviewed on 5/30/2024     Anesthesia Plan      MAC     ASA 3     (Chart review on 10/11/23)  Induction: intravenous.      Anesthetic plan and risks discussed with patient.    Use of blood products discussed with patient whom consented to blood products.    Plan discussed with CRNA.    Attending anesthesiologist reviewed and agrees with Preprocedure content              [address] CRNA   5/30/2024

## 2024-05-31 ENCOUNTER — HOSPITAL ENCOUNTER (OUTPATIENT)
Age: 44
Setting detail: OUTPATIENT SURGERY
Discharge: HOME | End: 2024-05-31
Attending: SURGERY | Admitting: SURGERY
Payer: MEDICAID

## 2024-05-31 ENCOUNTER — ANESTHESIA (OUTPATIENT)
Dept: ENDOSCOPY | Age: 44
End: 2024-05-31
Payer: MEDICAID

## 2024-05-31 VITALS
HEIGHT: 63 IN | DIASTOLIC BLOOD PRESSURE: 85 MMHG | RESPIRATION RATE: 16 BRPM | OXYGEN SATURATION: 99 % | WEIGHT: 188 LBS | HEART RATE: 90 BPM | SYSTOLIC BLOOD PRESSURE: 140 MMHG | TEMPERATURE: 97.4 F | BODY MASS INDEX: 33.31 KG/M2

## 2024-05-31 DIAGNOSIS — R11.2 NAUSEA WITH VOMITING: ICD-10-CM

## 2024-05-31 PROCEDURE — 7100000010 HC PHASE II RECOVERY - FIRST 15 MIN: Performed by: SURGERY

## 2024-05-31 PROCEDURE — 3700000000 HC ANESTHESIA ATTENDED CARE: Performed by: SURGERY

## 2024-05-31 PROCEDURE — 3700000001 HC ADD 15 MINUTES (ANESTHESIA): Performed by: SURGERY

## 2024-05-31 PROCEDURE — 88342 IMHCHEM/IMCYTCHM 1ST ANTB: CPT

## 2024-05-31 PROCEDURE — 7100000011 HC PHASE II RECOVERY - ADDTL 15 MIN: Performed by: SURGERY

## 2024-05-31 PROCEDURE — 43239 EGD BIOPSY SINGLE/MULTIPLE: CPT | Performed by: SURGERY

## 2024-05-31 PROCEDURE — 88305 TISSUE EXAM BY PATHOLOGIST: CPT

## 2024-05-31 PROCEDURE — 3609012400 HC EGD TRANSORAL BIOPSY SINGLE/MULTIPLE: Performed by: SURGERY

## 2024-05-31 PROCEDURE — 6360000002 HC RX W HCPCS: Performed by: NURSE ANESTHETIST, CERTIFIED REGISTERED

## 2024-05-31 PROCEDURE — 2580000003 HC RX 258: Performed by: NURSE ANESTHETIST, CERTIFIED REGISTERED

## 2024-05-31 PROCEDURE — 2709999900 HC NON-CHARGEABLE SUPPLY: Performed by: SURGERY

## 2024-05-31 PROCEDURE — 2500000003 HC RX 250 WO HCPCS: Performed by: NURSE ANESTHETIST, CERTIFIED REGISTERED

## 2024-05-31 RX ORDER — SODIUM CHLORIDE 9 MG/ML
25 INJECTION, SOLUTION INTRAVENOUS PRN
Status: DISCONTINUED | OUTPATIENT
Start: 2024-05-31 | End: 2024-05-31 | Stop reason: HOSPADM

## 2024-05-31 RX ORDER — SODIUM CHLORIDE, SODIUM LACTATE, POTASSIUM CHLORIDE, CALCIUM CHLORIDE 600; 310; 30; 20 MG/100ML; MG/100ML; MG/100ML; MG/100ML
INJECTION, SOLUTION INTRAVENOUS CONTINUOUS PRN
Status: DISCONTINUED | OUTPATIENT
Start: 2024-05-31 | End: 2024-05-31 | Stop reason: SDUPTHER

## 2024-05-31 RX ORDER — SODIUM CHLORIDE 0.9 % (FLUSH) 0.9 %
5-40 SYRINGE (ML) INJECTION PRN
Status: DISCONTINUED | OUTPATIENT
Start: 2024-05-31 | End: 2024-05-31 | Stop reason: HOSPADM

## 2024-05-31 RX ORDER — PROPOFOL 10 MG/ML
INJECTION, EMULSION INTRAVENOUS PRN
Status: DISCONTINUED | OUTPATIENT
Start: 2024-05-31 | End: 2024-05-31 | Stop reason: SDUPTHER

## 2024-05-31 RX ORDER — LIDOCAINE HYDROCHLORIDE 20 MG/ML
INJECTION, SOLUTION INFILTRATION; PERINEURAL PRN
Status: DISCONTINUED | OUTPATIENT
Start: 2024-05-31 | End: 2024-05-31 | Stop reason: SDUPTHER

## 2024-05-31 RX ORDER — SODIUM CHLORIDE 0.9 % (FLUSH) 0.9 %
5-40 SYRINGE (ML) INJECTION EVERY 12 HOURS SCHEDULED
Status: DISCONTINUED | OUTPATIENT
Start: 2024-05-31 | End: 2024-05-31 | Stop reason: HOSPADM

## 2024-05-31 RX ADMIN — PROPOFOL 50 MG: 10 INJECTION, EMULSION INTRAVENOUS at 09:35

## 2024-05-31 RX ADMIN — SODIUM CHLORIDE, POTASSIUM CHLORIDE, SODIUM LACTATE AND CALCIUM CHLORIDE: 600; 310; 30; 20 INJECTION, SOLUTION INTRAVENOUS at 09:26

## 2024-05-31 RX ADMIN — PROPOFOL 130 MG: 10 INJECTION, EMULSION INTRAVENOUS at 09:31

## 2024-05-31 RX ADMIN — LIDOCAINE HYDROCHLORIDE 100 MG: 20 INJECTION, SOLUTION INFILTRATION; PERINEURAL at 09:30

## 2024-05-31 RX ADMIN — PROPOFOL 50 MG: 10 INJECTION, EMULSION INTRAVENOUS at 09:33

## 2024-05-31 ASSESSMENT — PAIN - FUNCTIONAL ASSESSMENT
PAIN_FUNCTIONAL_ASSESSMENT: 0-10

## 2024-05-31 NOTE — OP NOTE
PROCEDURE NOTE    DATE OF PROCEDURE: 5/31/2024     SURGEON: Victor Hugo Chin II, MD , FACS    ASSISTANT: None    PREOPERATIVE DIAGNOSIS:  1. History of sleeve gastrectomy       2. Nausea and emesis s/p abdominal wall hernia repair    POSTOPERATIVE DIAGNOSIS:  1. Same as above and       2. Anatomic changes consistent with known sleeve gastrectomy    OPERATION: Esophagogastroduodenoscopy with biopsies    ANESTHESIA: Local monitored anesthesia    ESTIMATED BLOOD LOSS: None    COMPLICATIONS: None apparent    SPECIMENS: were obtained    HISTORY: The patient is a 43 y.o. year old female with history of the above preoperative diagnosis. I recommended esophagogastroduodenoscopy with possible biopsy and I explained the risk, benefits, expected outcome, and alternatives to the procedure.      Risks included but are not limited to bleeding, infection, respiratory distress, hypotension, and perforation of the esophagus, stomach, or duodenum. Patient understands and is in agreement, wishing to proceed.    PROCEDURE: The patient was brought into the endoscopy suite and the appropriate monitors were connected to the patient.    A timeout was held with all members of the procedure team present and in agreement.    The patient was positioned in the left lateral decubitus position with a bite block in place.     The endoscope was inserted into the patient's mouth and advanced from the oropharynx into the hypopharynx without difficulty and under direct vision. The scope was then advanced through the patient's esophagus under direct vision into the stomach, pylorus, and duodenum. A retroflexed view of the gastroesophageal junction was performed. Biopsies were taken. A ummary of the findings are as follows:      Duodenum:     Descending: normal    Bulb: normal    Stomach:    Antrum: normal, biopsied    Body: normal    Fundus: normal    Esophagus: normal, GEJ ~ 35 cm from incisors    Larynx: normal    The stomach was desufflated and the

## 2024-05-31 NOTE — PROGRESS NOTES
1010:  Discharge instructions discussed with patient and family member, all verbalized an understanding.

## 2024-05-31 NOTE — ANESTHESIA POSTPROCEDURE EVALUATION
Department of Anesthesiology  Postprocedure Note    Patient: Umberto Hanson  MRN: [id number]  YOB: 1980  Date of evaluation: 5/31/2024    Procedure Summary       Date: 05/31/24 Room / Location: [place] [address] / [facility]    Anesthesia Start: 0926 Anesthesia Stop: 0944    Procedure: ESOPHAGOGASTRODUODENOSCOPY BIOPSY Diagnosis:       Nausea with vomiting      (Nausea with vomiting [R11.2])    Surgeons: Victor Hugo Chin II, MD Responsible Provider: Roge Carbajal MD    Anesthesia Type: MAC ASA Status: 3            Anesthesia Type: No value filed.    Waldo Phase I: Waldo Score: 10    Waldo Phase II:      Anesthesia Post Evaluation    Patient location during evaluation: PACU  Patient participation: complete - patient participated  Level of consciousness: lethargic and awake  Pain score: 0  Airway patency: patent  Nausea & Vomiting: no nausea and no vomiting  Cardiovascular status: blood pressure returned to baseline  Respiratory status: acceptable  Hydration status: euvolemic  Pain management: adequate    No notable events documented.

## 2024-05-31 NOTE — H&P
History and Physical Update    Original H&P done in office on 5/21/24 (less than 30 days ago).    Pt reports the following changes in health since being seen last:    None    Vitals:    05/31/24 0827   BP: 116/78   Pulse: 76   Resp: 16   Temp: 97.2 °F (36.2 °C)   SpO2: 98%       Alert and oriented x 3, no apparent distress at rest  Atraumatic, normocephalic.  EOMI.  Breathing unlabored.  RRR.  Soft, non-tender, non-distended.  Moves all extremities.   Warm, dry.         42 y/o F with hx of sleeve gastrectomy and recent hernia repair with abdominal pain / nausea and emesis which are now improving     -Consent obtained in office.  -Reviewed expected pre-operative, operative, and post-operative courses.  -Answered questions to patient's satisfaction.   -Reviewed risks, benefits, alternative to procedure.   -Proceed as scheduled.        Victor Hugo Chin II, MD

## 2024-05-31 NOTE — DISCHARGE INSTRUCTIONS
EGD    _________________________________    OFFICE [id number]__[phone]_________________    FOLLOW UP APPOINTMENT AS NEEDED.    REPEAT PROCEDURE AS  NEEDED.    TEST ORDERED:NONE__________________________________________________________________.    What to Expect at Home  Your Recovery:  The only discomfort after your EGD is generally limited to a mild soreness of the throat, which may last a day or two. Call your physician immediately if you have severe chest pain, shortness of breath or a temperature of 100 degrees or higher if taken orally.    How can you care for yourself at home?  Activity  Rest as much as you need to after you go home.  You should be able to go back to your usual activities the day after the test.  Diet  Follow your doctor's directions for eating after the test.  Drink plenty of fluids (unless your doctor has told you not to).  Medications  If you have a sore throat the day after the test, use an over-the-counter spray to numb your throat.  Your doctor will tell you if and when you can restart your medicines. He or she will also give you instructions about taking any new medicines.  If you take blood thinners, such as warfarin (Coumadin), clopidogrel (Plavix), or aspirin, be sure to talk to your doctor. He or she will tell you if and when to start taking those medicines again. Make sure that you understand exactly what your doctor wants you to do.  If a biopsy was done during the test, your doctor may tell you not to take aspirin or other anti-inflammatory medicines for a few days. These include ibuprofen (Advil, Motrin) and naproxen (Aleve).  DO NOT DRINK ANYTHING WITH ALCOHOL TODAY.    Other instructions:Anesthesia  For your safety, do not drive or operate machinery for 24 hours.   Do not sign legal documents or make major decisions for 24 hours. The anesthesia can make it hard for you to fully understand what you are agreeing to.      Follow-up care is a key part of your

## 2024-06-03 ENCOUNTER — HOSPITAL ENCOUNTER (OUTPATIENT)
Dept: INFUSION THERAPY | Age: 44
Discharge: HOME | End: 2024-06-03
Payer: MEDICAID

## 2024-06-03 ENCOUNTER — OFFICE VISIT (OUTPATIENT)
Dept: ONCOLOGY | Age: 44
End: 2024-06-03
Payer: MEDICAID

## 2024-06-03 VITALS
BODY MASS INDEX: 34.27 KG/M2 | HEIGHT: 63 IN | OXYGEN SATURATION: 97 % | WEIGHT: 193.4 LBS | RESPIRATION RATE: 18 BRPM | DIASTOLIC BLOOD PRESSURE: 78 MMHG | HEART RATE: 76 BPM | TEMPERATURE: 97.8 F | SYSTOLIC BLOOD PRESSURE: 118 MMHG

## 2024-06-03 DIAGNOSIS — E05.90 HYPERTHYROIDISM: ICD-10-CM

## 2024-06-03 DIAGNOSIS — E53.8 B12 DEFICIENCY: Primary | ICD-10-CM

## 2024-06-03 DIAGNOSIS — Z90.3 S/P GASTRIC SLEEVE PROCEDURE: ICD-10-CM

## 2024-06-03 DIAGNOSIS — R79.89 HIGH SERUM FERRITIN: Primary | ICD-10-CM

## 2024-06-03 DIAGNOSIS — R79.89 HIGH SERUM FERRITIN: ICD-10-CM

## 2024-06-03 LAB
25(OH)D3 SERPL-MCNC: 21.08 NG/ML
ALBUMIN SERPL-MCNC: 4.2 GM/DL (ref 3.4–5)
ALP BLD-CCNC: 124 IU/L (ref 40–129)
ALT SERPL-CCNC: 9 U/L (ref 10–40)
ANION GAP SERPL CALCULATED.3IONS-SCNC: 13 MMOL/L (ref 7–16)
AST SERPL-CCNC: 16 IU/L (ref 15–37)
BASOPHILS ABSOLUTE: 0 K/CU MM
BASOPHILS RELATIVE PERCENT: 0.2 % (ref 0–1)
BILIRUB SERPL-MCNC: 0.6 MG/DL (ref 0–1)
BUN SERPL-MCNC: 10 MG/DL (ref 6–23)
CALCIUM SERPL-MCNC: 8.9 MG/DL (ref 8.3–10.6)
CHLORIDE BLD-SCNC: 105 MMOL/L (ref 99–110)
CO2: 23 MMOL/L (ref 21–32)
CREAT SERPL-MCNC: 0.8 MG/DL (ref 0.6–1.1)
DIFFERENTIAL TYPE: ABNORMAL
EOSINOPHILS ABSOLUTE: 0.4 K/CU MM
EOSINOPHILS RELATIVE PERCENT: 4.7 % (ref 0–3)
FERRITIN: 200 NG/ML (ref 15–150)
FOLATE SERPL-MCNC: 6.4 NG/ML (ref 3.1–17.5)
GFR, ESTIMATED: >90 ML/MIN/1.73M2
GLUCOSE SERPL-MCNC: 88 MG/DL (ref 70–99)
HCT VFR BLD CALC: 38.8 % (ref 37–47)
HEMOGLOBIN: 13.2 GM/DL (ref 12.5–16)
IRON: 69 UG/DL (ref 37–145)
LACTATE DEHYDROGENASE: 173 IU/L (ref 120–246)
LYMPHOCYTES ABSOLUTE: 3 K/CU MM
LYMPHOCYTES RELATIVE PERCENT: 36.6 % (ref 24–44)
MCH RBC QN AUTO: 29.6 PG (ref 27–31)
MCHC RBC AUTO-ENTMCNC: 34 % (ref 32–36)
MCV RBC AUTO: 87 FL (ref 78–100)
MONOCYTES ABSOLUTE: 0.5 K/CU MM
MONOCYTES RELATIVE PERCENT: 5.8 % (ref 0–4)
NEUTROPHILS ABSOLUTE: 4.3 K/CU MM
NEUTROPHILS RELATIVE PERCENT: 52.7 % (ref 36–66)
PCT TRANSFERRIN: 26 % (ref 10–44)
PDW BLD-RTO: 13.2 % (ref 11.7–14.9)
PLATELET # BLD: 268 K/CU MM (ref 140–440)
PMV BLD AUTO: 9.2 FL (ref 7.5–11.1)
POTASSIUM SERPL-SCNC: 4.3 MMOL/L (ref 3.5–5.1)
RBC # BLD: 4.46 M/CU MM (ref 4.2–5.4)
RETICULOCYTE COUNT PCT: 2.6 % (ref 0.2–2.2)
SODIUM BLD-SCNC: 141 MMOL/L (ref 135–145)
TOTAL IRON BINDING CAPACITY: 268 UG/DL (ref 250–450)
TOTAL PROTEIN: 7 GM/DL (ref 6.4–8.2)
UNSATURATED IRON BINDING CAPACITY: 199 UG/DL (ref 110–370)
VITAMIN B-12: 421.5 PG/ML (ref 211–911)
WBC # BLD: 8.1 K/CU MM (ref 4–10.5)

## 2024-06-03 PROCEDURE — 85025 COMPLETE CBC W/AUTO DIFF WBC: CPT

## 2024-06-03 PROCEDURE — 83550 IRON BINDING TEST: CPT

## 2024-06-03 PROCEDURE — G8428 CUR MEDS NOT DOCUMENT: HCPCS | Performed by: INTERNAL MEDICINE

## 2024-06-03 PROCEDURE — 6360000002 HC RX W HCPCS: Performed by: INTERNAL MEDICINE

## 2024-06-03 PROCEDURE — 85045 AUTOMATED RETICULOCYTE COUNT: CPT

## 2024-06-03 PROCEDURE — 83615 LACTATE (LD) (LDH) ENZYME: CPT

## 2024-06-03 PROCEDURE — 83540 ASSAY OF IRON: CPT

## 2024-06-03 PROCEDURE — 99213 OFFICE O/P EST LOW 20 MIN: CPT | Performed by: INTERNAL MEDICINE

## 2024-06-03 PROCEDURE — 82306 VITAMIN D 25 HYDROXY: CPT

## 2024-06-03 PROCEDURE — G8417 CALC BMI ABV UP PARAM F/U: HCPCS | Performed by: INTERNAL MEDICINE

## 2024-06-03 PROCEDURE — 36415 COLL VENOUS BLD VENIPUNCTURE: CPT

## 2024-06-03 PROCEDURE — 82746 ASSAY OF FOLIC ACID SERUM: CPT

## 2024-06-03 PROCEDURE — 82728 ASSAY OF FERRITIN: CPT

## 2024-06-03 PROCEDURE — 1036F TOBACCO NON-USER: CPT | Performed by: INTERNAL MEDICINE

## 2024-06-03 PROCEDURE — 80053 COMPREHEN METABOLIC PANEL: CPT

## 2024-06-03 PROCEDURE — 82607 VITAMIN B-12: CPT

## 2024-06-03 PROCEDURE — 96372 THER/PROPH/DIAG INJ SC/IM: CPT

## 2024-06-03 RX ORDER — EPINEPHRINE 1 MG/ML
0.3 INJECTION, SOLUTION, CONCENTRATE INTRAVENOUS PRN
OUTPATIENT
Start: 2024-06-10

## 2024-06-03 RX ORDER — SODIUM CHLORIDE 9 MG/ML
INJECTION, SOLUTION INTRAVENOUS CONTINUOUS
OUTPATIENT
Start: 2024-06-10

## 2024-06-03 RX ORDER — DIPHENHYDRAMINE HYDROCHLORIDE 50 MG/ML
50 INJECTION INTRAMUSCULAR; INTRAVENOUS
OUTPATIENT
Start: 2024-06-10

## 2024-06-03 RX ORDER — ALBUTEROL SULFATE 90 UG/1
4 AEROSOL, METERED RESPIRATORY (INHALATION) PRN
OUTPATIENT
Start: 2024-06-10

## 2024-06-03 RX ORDER — ACETAMINOPHEN 325 MG/1
650 TABLET ORAL
OUTPATIENT
Start: 2024-06-10

## 2024-06-03 RX ORDER — CYANOCOBALAMIN 1000 UG/ML
1000 INJECTION, SOLUTION INTRAMUSCULAR; SUBCUTANEOUS ONCE
Status: COMPLETED | OUTPATIENT
Start: 2024-06-03 | End: 2024-06-03

## 2024-06-03 RX ORDER — ONDANSETRON 2 MG/ML
8 INJECTION INTRAMUSCULAR; INTRAVENOUS
OUTPATIENT
Start: 2024-06-10

## 2024-06-03 RX ORDER — CYANOCOBALAMIN 1000 UG/ML
1000 INJECTION, SOLUTION INTRAMUSCULAR; SUBCUTANEOUS ONCE
OUTPATIENT
Start: 2024-06-10 | End: 2024-06-10

## 2024-06-03 RX ORDER — FAMOTIDINE 10 MG/ML
20 INJECTION, SOLUTION INTRAVENOUS
OUTPATIENT
Start: 2024-06-10

## 2024-06-03 RX ADMIN — CYANOCOBALAMIN 1000 MCG: 1000 INJECTION, SOLUTION INTRAMUSCULAR at 16:05

## 2024-06-03 ASSESSMENT — PATIENT HEALTH QUESTIONNAIRE - PHQ9
1. LITTLE INTEREST OR PLEASURE IN DOING THINGS: NOT AT ALL
SUM OF ALL RESPONSES TO PHQ QUESTIONS 1-9: 0
2. FEELING DOWN, DEPRESSED OR HOPELESS: NOT AT ALL
SUM OF ALL RESPONSES TO PHQ9 QUESTIONS 1 & 2: 0

## 2024-06-03 NOTE — PROGRESS NOTES
MA Rooming Questions  Patient: Umberto Hanson  MRN: [id number]    Date: 6/3/2024        1. Do you have any new issues?   no         2. Do you need any refills on medications?    no    3. Have you had any imaging done since your last visit?   no    4. Have you been hospitalized or seen in the emergency room since your last visit here?   no    5. Did the patient have a depression screening completed today? Yes    No data recorded     PHQ-9 Given to (if applicable):               PHQ-9 Score (if applicable):                     [] Positive     []  Negative              Does question #9 need addressed (if applicable)                     [] Yes    []  No               Katie Rivera CMA    
Results   Component Value Date    LNWIEOVJ33 334.0 11/06/2023    FOLATE 10.6 11/06/2023     Tumor Markers:  No results found for: \"\", \"CEA\", \"\", \"LABCA2\", \"PSA\"     Observations:  ECOG:  PHQ-9 Total Score: 0 (6/3/2024  3:02 PM)         Assessment & Plan:                                                          Iron overload, iron sats not suggestive of hereditary hemochromatosis.  Could be secondary to underlying inflammatory process.  No recommendations for any evaluation for endorgan damage.  Will monitor for now.    Mild normocytic anemia/fatigue,, No  connective tissue disorder, rheumatoid arthritis, monoclonal gammopathy.  Note history of gastric sleeve surgery.  Replacement of vitamin D, vitamin B12, minerals and other elements as needed.    Continue other medical care.    Thank you for letting us be part of the care and will follow along.    Discussed above findings and plan with her and she voiced understanding.  Answered all her questions.    Discussed healthy lifestyle including healthy diet, regular exercise as tolerated.  Also discussed importance of being up-to-date with age-appropriate screening tools.  Mammo 2024 negative  Colonoscopy 6/12/23 - Normal, Repeat in 6/28.    Recommend follow-up with primary care physician and other specialists.    Please do not hesitate to contact us if you need further information.    Return to clinic December 2024 or earlier if new Sx    This note is created with the assistance of a speech-recognition program. While intending to generate a document that accurately reflects the content of the visit, no guarantee can be provided that every mistake has been identified and corrected by editing.    Time Spent with patient for face to face, exam, education, discussing treatment options, reviewing imaging, reviewing labs, decision making, Pre-charting, and documenting today's visit, >30  mins.     DEAN

## 2024-06-03 NOTE — PROGRESS NOTES
Ambulated to infusion area. B12 injection administered as ordered. Tolerated well. Discharged in stable condition.

## 2024-06-04 ENCOUNTER — CLINICAL DOCUMENTATION (OUTPATIENT)
Dept: ONCOLOGY | Age: 44
End: 2024-06-04

## 2024-06-04 NOTE — PROGRESS NOTES
This nurse called the patient @ [phone] to review lab results. However there was no answer. This nurse left a VM for the patient requesting a call back, this RN's direct number left.

## 2024-06-17 RX ORDER — RIZATRIPTAN BENZOATE 10 MG/1
10 TABLET ORAL
Qty: 9 TABLET | Refills: 2 | OUTPATIENT
Start: 2024-06-17 | End: 2024-06-17

## 2024-06-18 ENCOUNTER — OFFICE VISIT (OUTPATIENT)
Dept: BARIATRICS/WEIGHT MGMT | Age: 44
End: 2024-06-18
Payer: MEDICAID

## 2024-06-18 VITALS
SYSTOLIC BLOOD PRESSURE: 120 MMHG | BODY MASS INDEX: 35.12 KG/M2 | HEIGHT: 63 IN | OXYGEN SATURATION: 98 % | WEIGHT: 198.2 LBS | HEART RATE: 75 BPM | DIASTOLIC BLOOD PRESSURE: 82 MMHG

## 2024-06-18 DIAGNOSIS — Z98.84 STATUS POST BARIATRIC SURGERY: ICD-10-CM

## 2024-06-18 DIAGNOSIS — Z98.890 S/P ENDOSCOPY: Primary | ICD-10-CM

## 2024-06-18 PROCEDURE — G8427 DOCREV CUR MEDS BY ELIG CLIN: HCPCS | Performed by: SURGERY

## 2024-06-18 PROCEDURE — G8417 CALC BMI ABV UP PARAM F/U: HCPCS | Performed by: SURGERY

## 2024-06-18 PROCEDURE — 99212 OFFICE O/P EST SF 10 MIN: CPT | Performed by: SURGERY

## 2024-06-18 PROCEDURE — 1036F TOBACCO NON-USER: CPT | Performed by: SURGERY

## 2024-06-18 ASSESSMENT — ENCOUNTER SYMPTOMS
NAUSEA: 1
VOMITING: 1

## 2024-06-26 ENCOUNTER — OFFICE VISIT (OUTPATIENT)
Dept: ORTHOPEDIC SURGERY | Age: 44
End: 2024-06-26
Payer: MEDICAID

## 2024-06-26 VITALS — SYSTOLIC BLOOD PRESSURE: 142 MMHG | HEART RATE: 80 BPM | OXYGEN SATURATION: 98 % | DIASTOLIC BLOOD PRESSURE: 108 MMHG

## 2024-06-26 DIAGNOSIS — S86.302A INJURY OF PERONEAL TENDON OF LEFT FOOT, INITIAL ENCOUNTER: Primary | ICD-10-CM

## 2024-06-26 PROCEDURE — 1036F TOBACCO NON-USER: CPT | Performed by: STUDENT IN AN ORGANIZED HEALTH CARE EDUCATION/TRAINING PROGRAM

## 2024-06-26 PROCEDURE — G8417 CALC BMI ABV UP PARAM F/U: HCPCS | Performed by: STUDENT IN AN ORGANIZED HEALTH CARE EDUCATION/TRAINING PROGRAM

## 2024-06-26 PROCEDURE — 99213 OFFICE O/P EST LOW 20 MIN: CPT | Performed by: STUDENT IN AN ORGANIZED HEALTH CARE EDUCATION/TRAINING PROGRAM

## 2024-06-26 PROCEDURE — G8427 DOCREV CUR MEDS BY ELIG CLIN: HCPCS | Performed by: STUDENT IN AN ORGANIZED HEALTH CARE EDUCATION/TRAINING PROGRAM

## 2024-06-26 ASSESSMENT — ENCOUNTER SYMPTOMS
SHORTNESS OF BREATH: 0
VOMITING: 0
SORE THROAT: 0
VOICE CHANGE: 0
COUGH: 0
WHEEZING: 0
COLOR CHANGE: 0
NAUSEA: 0
BACK PAIN: 0

## 2024-06-26 NOTE — PROGRESS NOTES
Patient is here for new complaint/injury on left foot.    Dropped a heavy box on her foot at work on 6/21/24. Seen in [place] ED. ED Xrays unremarkable, but patient states she has a stress fracture.     Pain 8/10 today, but is unsure if it is improving. Is weightbearing in post op shoe

## 2024-06-26 NOTE — PATIENT INSTRUCTIONS
Central scheduling [phone] will be calling you to schedule your MRI.  If you do not hear from them with in a week give them a call.     Follow up with MRI results. Once MRI is scheduled, please give us a call to make an appointment for ~72 hours after MRI is scheduled.

## 2024-06-26 NOTE — PROGRESS NOTES
6/26/2024   Chief Complaint   Patient presents with    Foot Injury     Left foot      Updated HPI: Patient returns today for reevaluation of her left foot.  She continues to have pain of the peroneal tendons and recently had a new injury to the dorsum of the foot when she dropped a large box on it which approximately weight 20 pounds.  No additional complaints.  Continues to utilize the walking boot.    Previous HPI (5/6/2024):                             Umberto Hanson is a 43 y.o. female previously seen by myself for other issues such as her shoulder who is currently here for left foot pain.  She denies any specific injury but states that her pain began approximately 2 months prior without any known injury.  She states that she awoke with pain in her left foot and ankle along the peroneal tendons and was seen in the emergency room at an outside facility several weeks after her pain began but x-rays were negative for any type of fracture or acute injury.  She states that the pain has been waxing and waning in nature over the last 2 months.  She has no additional complaint at this time.  No advanced imaging thus far.  This my first time seeing her for this issue.  She does have a history of prior ankle sprains but no treatment to the ankle.      The pain's location is lateral ankle and foot, along the peroneal tendons. she describes the symptoms as aching, sharp and stabbing. Symptoms improve with rest. Symptoms worsen with ankle inversion, weight bearing (especially stair climbing), twisting activities.     Patient states she denies having sensation of instability, decreased ROM    Treatment to date has been ice, pain medication, activity modification without significant relief.    Patient denies recent prior injury to ankle, denies numbness, tingling, fever, chills.    Is affecting ADLs.  Pain is 7/10 at it's worst.    Outside reports reviewed: Outside imaging reviewed.    Patient's medications, allergies,

## 2024-07-11 ENCOUNTER — OFFICE VISIT (OUTPATIENT)
Dept: NEUROLOGY | Age: 44
End: 2024-07-11
Payer: MEDICAID

## 2024-07-11 ENCOUNTER — TELEPHONE (OUTPATIENT)
Dept: NEUROLOGY | Age: 44
End: 2024-07-11

## 2024-07-11 VITALS
OXYGEN SATURATION: 99 % | DIASTOLIC BLOOD PRESSURE: 86 MMHG | WEIGHT: 192.4 LBS | BODY MASS INDEX: 34.08 KG/M2 | HEART RATE: 78 BPM | SYSTOLIC BLOOD PRESSURE: 120 MMHG

## 2024-07-11 DIAGNOSIS — G43.E09 CHRONIC MIGRAINE WITH AURA WITHOUT STATUS MIGRAINOSUS, NOT INTRACTABLE: Primary | ICD-10-CM

## 2024-07-11 PROCEDURE — 1036F TOBACCO NON-USER: CPT | Performed by: NURSE PRACTITIONER

## 2024-07-11 PROCEDURE — 99214 OFFICE O/P EST MOD 30 MIN: CPT | Performed by: NURSE PRACTITIONER

## 2024-07-11 PROCEDURE — G8417 CALC BMI ABV UP PARAM F/U: HCPCS | Performed by: NURSE PRACTITIONER

## 2024-07-11 PROCEDURE — G8427 DOCREV CUR MEDS BY ELIG CLIN: HCPCS | Performed by: NURSE PRACTITIONER

## 2024-07-11 RX ORDER — ELETRIPTAN HYDROBROMIDE 40 MG/1
40 TABLET, FILM COATED ORAL
Qty: 6 TABLET | Refills: 3 | Status: SHIPPED | OUTPATIENT
Start: 2024-07-11 | End: 2024-07-11

## 2024-07-11 RX ORDER — FREMANEZUMAB-VFRM 225 MG/1.5ML
225 INJECTION SUBCUTANEOUS
Qty: 1 ADJUSTABLE DOSE PRE-FILLED PEN SYRINGE | Refills: 11 | Status: SHIPPED | OUTPATIENT
Start: 2024-07-11

## 2024-07-11 NOTE — PROGRESS NOTES
7/11/24    Umberto Hanson  1980    Chief Complaint   Patient presents with    Follow-up     Following up for Chronic migraine with aura without status migrainosus, not intractable. Getting worse , med not helping.        History of Present Illness  Umberto is a 43 y.o. female presenting today for follow-up of: Chronic migraine with aura  She is on Aimovig for preventative therapy.  Last visit she was having 7-8 migraines per month.  Her insurance did not approve Nurtec.  She was given Maxalt instead, Imitrex was not helpful.  She completed sleep study, no evidence of EMILEE.  She follows with pain management for back pain.    Today, she tells me her migraines are increasing in frequency.  She does not find Aimovig very helpful, Maxalt was not helpful either. She tells me while her  was in the hospital, the nurse told her about Ajovy and Relpax.  She would like to try these.    2 days ago, she had her thyroid removed.    Migraine History:  Triggers:     Baseline headache frequency: 30/30 days per month  Baseline migraine frequency: 30/30 days per month     Prior Preventative medications tried: lopressor, topamax, amitriptylline, aimovig  Prior abortive medications tried: percocet, tylenol, ibuprofen, imitrex      Current preventative: lopressor, aimovig, flexeril,   Current abortive: percocet    MRI brain normal    Current Outpatient Medications   Medication Sig Dispense Refill    eletriptan (RELPAX) 40 MG tablet Take 1 tablet by mouth once as needed (Take 1 tablet by mouth once as needed for Migraine Take 1 tab @ onset of migraine. May repeat in 2 hrs if needed. Not to exceed 2 tabs per day) may repeat in 2 hours if necessary 6 tablet 3    Fremanezumab-vfrm (AJOVY) 225 MG/1.5ML SOAJ Inject 225 mg into the skin every 30 days 1 Adjustable Dose Pre-filled Pen Syringe 11    omeprazole (PRILOSEC) 40 MG delayed release capsule Take 1 capsule by mouth every morning (before breakfast) 90 capsule 1    senna (SENOKOT)

## 2024-07-12 ENCOUNTER — TELEPHONE (OUTPATIENT)
Dept: NEUROLOGY | Age: 44
End: 2024-07-12

## 2024-07-12 NOTE — TELEPHONE ENCOUNTER
Denied on July 11 Ajovy  Coverage is provided when the member meets all the following: Member has met step therapy requirement for this medication. Step therapy is a type of prior authorization that requires you try one or more preferred drugs before you are approved for the drug requested. The requested medication requires the member to have a history of at least 30 days of therapy with THREE different preferred controller migraine medications, which include but are not limited to: beta-blockers (such as: propranolol and timolol), tricyclic antidepressants (such as: amitriptyline and nortriptyline), anticonvulsants (such as: topiramate), and/or serotonin-norepinephrine reuptake inhibitors (such as: venlafaxine ER capsules and duloxetine capsules (20, 30 and 60 mg) and; Provider has submitted documentation of severity, frequency, type of migraine, and number of headache days per month (preferably a headache diary)

## 2024-07-12 NOTE — TELEPHONE ENCOUNTER
Denied today Relpax by [employer] for following:    Coverage is provided when the member has had an inadequate clinical response to at least 14 days of therapy of two preferred medications which include but are not limited to: Imitrex nasal spray, naratriptan tablets, rizatriptan (oral disintegrating tablets and tablets), and sumatriptan (injection, nasal spray, and tablets).

## 2024-07-24 ENCOUNTER — HOSPITAL ENCOUNTER (OUTPATIENT)
Dept: MRI IMAGING | Age: 44
Discharge: HOME | End: 2024-07-24
Attending: STUDENT IN AN ORGANIZED HEALTH CARE EDUCATION/TRAINING PROGRAM
Payer: MEDICAID

## 2024-07-24 DIAGNOSIS — S86.302A INJURY OF PERONEAL TENDON OF LEFT FOOT, INITIAL ENCOUNTER: ICD-10-CM

## 2024-07-24 PROCEDURE — 73721 MRI JNT OF LWR EXTRE W/O DYE: CPT

## 2024-07-31 ENCOUNTER — OFFICE VISIT (OUTPATIENT)
Dept: FAMILY MEDICINE CLINIC | Age: 44
End: 2024-07-31
Payer: MEDICAID

## 2024-07-31 ENCOUNTER — HOSPITAL ENCOUNTER (OUTPATIENT)
Age: 44
Discharge: HOME | End: 2024-07-31
Payer: MEDICAID

## 2024-07-31 VITALS
HEIGHT: 63 IN | SYSTOLIC BLOOD PRESSURE: 132 MMHG | OXYGEN SATURATION: 98 % | BODY MASS INDEX: 34.66 KG/M2 | HEART RATE: 67 BPM | WEIGHT: 195.6 LBS | DIASTOLIC BLOOD PRESSURE: 88 MMHG

## 2024-07-31 DIAGNOSIS — F41.8 DEPRESSION WITH ANXIETY: ICD-10-CM

## 2024-07-31 DIAGNOSIS — E89.0 H/O THYROIDECTOMY: ICD-10-CM

## 2024-07-31 DIAGNOSIS — E89.0 H/O THYROIDECTOMY: Primary | ICD-10-CM

## 2024-07-31 DIAGNOSIS — R06.02 SHORTNESS OF BREATH: ICD-10-CM

## 2024-07-31 PROBLEM — Z90.89 H/O THYROIDECTOMY: Status: ACTIVE | Noted: 2024-07-31

## 2024-07-31 PROBLEM — Z98.890 H/O THYROIDECTOMY: Status: ACTIVE | Noted: 2024-07-31

## 2024-07-31 LAB
T4 FREE SERPL-MCNC: 2.19 NG/DL (ref 0.9–1.8)
TROPONIN, HIGH SENSITIVITY: <6 NG/L (ref 0–14)
TSH SERPL DL<=0.005 MIU/L-ACNC: <0.01 UIU/ML (ref 0.27–4.2)

## 2024-07-31 PROCEDURE — G8417 CALC BMI ABV UP PARAM F/U: HCPCS | Performed by: NURSE PRACTITIONER

## 2024-07-31 PROCEDURE — 84484 ASSAY OF TROPONIN QUANT: CPT

## 2024-07-31 PROCEDURE — 84443 ASSAY THYROID STIM HORMONE: CPT

## 2024-07-31 PROCEDURE — 99214 OFFICE O/P EST MOD 30 MIN: CPT | Performed by: NURSE PRACTITIONER

## 2024-07-31 PROCEDURE — 36415 COLL VENOUS BLD VENIPUNCTURE: CPT

## 2024-07-31 PROCEDURE — G8427 DOCREV CUR MEDS BY ELIG CLIN: HCPCS | Performed by: NURSE PRACTITIONER

## 2024-07-31 PROCEDURE — 1036F TOBACCO NON-USER: CPT | Performed by: NURSE PRACTITIONER

## 2024-07-31 PROCEDURE — 84439 ASSAY OF FREE THYROXINE: CPT

## 2024-07-31 RX ORDER — LEVOTHYROXINE SODIUM 137 UG/1
137 TABLET ORAL DAILY
COMMUNITY
Start: 2024-07-10

## 2024-07-31 ASSESSMENT — PATIENT HEALTH QUESTIONNAIRE - PHQ9
SUM OF ALL RESPONSES TO PHQ QUESTIONS 1-9: 18
8. MOVING OR SPEAKING SO SLOWLY THAT OTHER PEOPLE COULD HAVE NOTICED. OR THE OPPOSITE, BEING SO FIGETY OR RESTLESS THAT YOU HAVE BEEN MOVING AROUND A LOT MORE THAN USUAL: NOT AT ALL
1. LITTLE INTEREST OR PLEASURE IN DOING THINGS: NEARLY EVERY DAY
9. THOUGHTS THAT YOU WOULD BE BETTER OFF DEAD, OR OF HURTING YOURSELF: NOT AT ALL
7. TROUBLE CONCENTRATING ON THINGS, SUCH AS READING THE NEWSPAPER OR WATCHING TELEVISION: NOT AT ALL
10. IF YOU CHECKED OFF ANY PROBLEMS, HOW DIFFICULT HAVE THESE PROBLEMS MADE IT FOR YOU TO DO YOUR WORK, TAKE CARE OF THINGS AT HOME, OR GET ALONG WITH OTHER PEOPLE: EXTREMELY DIFFICULT
SUM OF ALL RESPONSES TO PHQ9 QUESTIONS 1 & 2: 6
5. POOR APPETITE OR OVEREATING: NEARLY EVERY DAY
SUM OF ALL RESPONSES TO PHQ QUESTIONS 1-9: 18
2. FEELING DOWN, DEPRESSED OR HOPELESS: NEARLY EVERY DAY
4. FEELING TIRED OR HAVING LITTLE ENERGY: NEARLY EVERY DAY
6. FEELING BAD ABOUT YOURSELF - OR THAT YOU ARE A FAILURE OR HAVE LET YOURSELF OR YOUR FAMILY DOWN: NEARLY EVERY DAY
3. TROUBLE FALLING OR STAYING ASLEEP: NEARLY EVERY DAY
SUM OF ALL RESPONSES TO PHQ QUESTIONS 1-9: 18
SUM OF ALL RESPONSES TO PHQ QUESTIONS 1-9: 18

## 2024-07-31 ASSESSMENT — ANXIETY QUESTIONNAIRES
3. WORRYING TOO MUCH ABOUT DIFFERENT THINGS: NEARLY EVERY DAY
GAD7 TOTAL SCORE: 18
1. FEELING NERVOUS, ANXIOUS, OR ON EDGE: NEARLY EVERY DAY
7. FEELING AFRAID AS IF SOMETHING AWFUL MIGHT HAPPEN: NEARLY EVERY DAY
4. TROUBLE RELAXING: NEARLY EVERY DAY
2. NOT BEING ABLE TO STOP OR CONTROL WORRYING: NEARLY EVERY DAY
5. BEING SO RESTLESS THAT IT IS HARD TO SIT STILL: NOT AT ALL
6. BECOMING EASILY ANNOYED OR IRRITABLE: NEARLY EVERY DAY
IF YOU CHECKED OFF ANY PROBLEMS ON THIS QUESTIONNAIRE, HOW DIFFICULT HAVE THESE PROBLEMS MADE IT FOR YOU TO DO YOUR WORK, TAKE CARE OF THINGS AT HOME, OR GET ALONG WITH OTHER PEOPLE: EXTREMELY DIFFICULT

## 2024-07-31 NOTE — PROGRESS NOTES
2024     Umberto Hanson (:  1980) is a 44 y.o. female, here for evaluation of the following medical concerns:    Depression chronic   Mom is w her today     Wanting to see a therapist   Denies suicidal self harm thought plan idea   Hesitant to take any meds   Father committed suicide while medicated for MH   Prev on wellbutrin and trazodone. \"I quit taking it because it made me have bad thoughts\". Given by dr gaines PM early   Zoloft is the past - gave her suicidal thoughts       Thyroid removed 7/10/24   Synthroid 137  [facility] end[facility]       States \"trouble breathing\". Started this week.  Not constant.  Happens with rest and activity  Started this weekend when she was playing with her nephew at his birthday party and reported that she was wheezing at the time.  No COPD asthma.  Non-smoker but has secondhand smoke exposure  States \" if I am sitting on my bed or walk to the bathroom or kitchen to get a drink I am winded.\"  No associated symptoms.  States laying on her side makes it better.  States \" when I try to take a deep breath, I cannot take a deep breath.  Like right now I cannot take a deep breath\" (patient was not visibly short of breath in office)      If I'm sitting on my bed or if I walk to the bathroom or kitchen to get a drink, I'm winded.   No cough or other symptoms .   Laying on my side makes it better   When I try to take a deep breath, I can't take a deep like right now I can't take a deep breath           Review of Systems    Prior to Visit Medications    Medication Sig Taking? Authorizing Provider   Fremanezumab-vfrm (AJOVY) 225 MG/1.5ML SOAJ Inject 225 mg into the skin every 30 days Yes Neri Pond, APRN - CNP   omeprazole (PRILOSEC) 40 MG delayed release capsule Take 1 capsule by mouth every morning (before breakfast) Yes Jessica Escobar, APRN - NP   senna (SENOKOT) 8.6 MG TABS tablet Take 1 tablet by mouth daily Yes Coty Dixon, APRN - CNP   cetirizine (ZYRTEC) 10

## 2024-08-12 DIAGNOSIS — S86.302A INJURY OF PERONEAL TENDON OF LEFT FOOT, INITIAL ENCOUNTER: Primary | ICD-10-CM

## 2024-08-14 ENCOUNTER — TELEPHONE (OUTPATIENT)
Dept: NEUROLOGY | Age: 44
End: 2024-08-14

## 2024-08-14 DIAGNOSIS — G43.E09 CHRONIC MIGRAINE WITH AURA WITHOUT STATUS MIGRAINOSUS, NOT INTRACTABLE: Primary | ICD-10-CM

## 2024-08-14 RX ORDER — NARATRIPTAN 2.5 MG/1
2.5 TABLET ORAL
Qty: 9 TABLET | Refills: 3 | Status: SHIPPED | OUTPATIENT
Start: 2024-08-14 | End: 2024-08-14

## 2024-08-14 NOTE — TELEPHONE ENCOUNTER
LM for patient that a new prescription has been sent in for her abortive therapy: Naratriptan (Amerge) d/t prior authorization appeal for Eletriptan Hydrobromide has been denied until this has been trialed.

## 2024-08-17 ENCOUNTER — HOSPITAL ENCOUNTER (OUTPATIENT)
Age: 44
Discharge: HOME | End: 2024-08-17
Payer: MEDICAID

## 2024-08-17 LAB
25(OH)D3 SERPL-MCNC: 18.9 NG/ML
APTT: 32.7 SECONDS (ref 25.1–37.1)
CALCIUM SERPL-MCNC: 8.6 MG/DL (ref 8.3–10.6)
INR BLD: 0.9 INDEX
PROTHROMBIN TIME: 12.9 SECONDS (ref 11.7–14.5)

## 2024-08-17 PROCEDURE — 82310 ASSAY OF CALCIUM: CPT

## 2024-08-17 PROCEDURE — 85730 THROMBOPLASTIN TIME PARTIAL: CPT

## 2024-08-17 PROCEDURE — 85610 PROTHROMBIN TIME: CPT

## 2024-08-17 PROCEDURE — 36415 COLL VENOUS BLD VENIPUNCTURE: CPT

## 2024-08-17 PROCEDURE — 82306 VITAMIN D 25 HYDROXY: CPT

## 2024-09-05 RX ORDER — ALBUTEROL SULFATE 90 UG/1
2 AEROSOL, METERED RESPIRATORY (INHALATION) 4 TIMES DAILY PRN
Qty: 18 G | Refills: 5 | Status: SHIPPED | OUTPATIENT
Start: 2024-09-05

## 2024-09-05 RX ORDER — ELETRIPTAN HYDROBROMIDE 40 MG/1
40 TABLET, FILM COATED ORAL
Qty: 6 TABLET | Refills: 3 | OUTPATIENT
Start: 2024-09-05 | End: 2024-09-05

## 2024-09-26 ENCOUNTER — HOSPITAL ENCOUNTER (OUTPATIENT)
Dept: GENERAL RADIOLOGY | Age: 44
Discharge: HOME | End: 2024-09-26
Attending: STUDENT IN AN ORGANIZED HEALTH CARE EDUCATION/TRAINING PROGRAM

## 2024-09-26 DIAGNOSIS — Z00.6 EXAMINATION FOR NORMAL COMPARISON OR CONTROL IN CLINICAL RESEARCH: ICD-10-CM

## 2024-09-30 ENCOUNTER — OFFICE VISIT (OUTPATIENT)
Dept: ORTHOPEDIC SURGERY | Age: 44
End: 2024-09-30
Payer: MEDICAID

## 2024-09-30 ENCOUNTER — HOSPITAL ENCOUNTER (OUTPATIENT)
Dept: GENERAL RADIOLOGY | Age: 44
Discharge: HOME | End: 2024-09-30
Payer: MEDICAID

## 2024-09-30 ENCOUNTER — HOSPITAL ENCOUNTER (OUTPATIENT)
Age: 44
Discharge: HOME | End: 2024-09-30
Payer: MEDICAID

## 2024-09-30 ENCOUNTER — TELEPHONE (OUTPATIENT)
Dept: ORTHOPEDIC SURGERY | Age: 44
End: 2024-09-30

## 2024-09-30 VITALS — OXYGEN SATURATION: 98 % | DIASTOLIC BLOOD PRESSURE: 88 MMHG | SYSTOLIC BLOOD PRESSURE: 130 MMHG | HEART RATE: 74 BPM

## 2024-09-30 DIAGNOSIS — M54.12 CERVICAL RADICULOPATHY: Primary | ICD-10-CM

## 2024-09-30 DIAGNOSIS — M54.12 CERVICAL RADICULOPATHY: ICD-10-CM

## 2024-09-30 PROCEDURE — 72050 X-RAY EXAM NECK SPINE 4/5VWS: CPT

## 2024-09-30 PROCEDURE — 72040 X-RAY EXAM NECK SPINE 2-3 VW: CPT

## 2024-09-30 PROCEDURE — G8427 DOCREV CUR MEDS BY ELIG CLIN: HCPCS | Performed by: STUDENT IN AN ORGANIZED HEALTH CARE EDUCATION/TRAINING PROGRAM

## 2024-09-30 PROCEDURE — 99214 OFFICE O/P EST MOD 30 MIN: CPT | Performed by: STUDENT IN AN ORGANIZED HEALTH CARE EDUCATION/TRAINING PROGRAM

## 2024-09-30 PROCEDURE — G8417 CALC BMI ABV UP PARAM F/U: HCPCS | Performed by: STUDENT IN AN ORGANIZED HEALTH CARE EDUCATION/TRAINING PROGRAM

## 2024-09-30 PROCEDURE — 1036F TOBACCO NON-USER: CPT | Performed by: STUDENT IN AN ORGANIZED HEALTH CARE EDUCATION/TRAINING PROGRAM

## 2024-09-30 RX ORDER — METHYLPREDNISOLONE 4 MG
TABLET, DOSE PACK ORAL
Qty: 1 KIT | Refills: 0 | Status: SHIPPED | OUTPATIENT
Start: 2024-09-30 | End: 2024-10-06

## 2024-09-30 ASSESSMENT — ENCOUNTER SYMPTOMS
FACIAL SWELLING: 0
BACK PAIN: 0
EYE REDNESS: 0
COUGH: 0
EYE PAIN: 0
ABDOMINAL PAIN: 0
PHOTOPHOBIA: 0
VOMITING: 0
NAUSEA: 0
WHEEZING: 0
SHORTNESS OF BREATH: 0
COLOR CHANGE: 0
STRIDOR: 0

## 2024-09-30 NOTE — PROGRESS NOTES
Patient is here for 1 year post op check on left RTCR. She has had recent injury to left shoulder which she was seen for in ED on 9/24/24. She was doing very well prior to this fall injury.     Following injury, increased 8/10 pain. Pain generalized glenohumerally and characterized by ulnar radiculopathy that intermittently presents in fingers 3,4,5.     She is established with pain management and has ongoing prescription for Percocet 5-325. She feels she is able to continue working without restrictions as her job is non-physical.   
forearm: Normal.      Left forearm: Normal.      Right wrist: Normal.      Left wrist: Normal.      Right hand: Normal.      Left hand: Normal.      Cervical back: Normal and normal range of motion. No rigidity or tenderness.   Skin:     General: Skin is warm and dry.      Capillary Refill: Capillary refill takes less than 2 seconds.      Findings: No bruising.   Neurological:      General: No focal deficit present.      Mental Status: She is alert and oriented to person, place, and time.      Sensory: Sensory deficit present.      Motor: Weakness present.          LEFT SHOULDER / UPPER EXTREMITY EXAM      OBSERVATION:      Swelling: none   Deformity: none    Discoloration: none   Scapular winging: none    Scars: Well-healed arthroscopy portals       atrophy: none      TENDERNESS / CREPITUS (T/C):      Clavicle -/-    SUPRAspinatus  + minimal/-     AC Jt. +/-    INFRAspinatus -/-     SC Jt. -/-    Deltoid -/-     G. Tuberosity -/-   LH BICEP groove +/-    Acromion: -/-    Midline Neck -/-     Scapular Spine +/-   Trapezium -/-    Inf Border Scapula -/-   GH jt. line - post +/-     Scapulothoracic +/-   GH jt. line - ant +/-     Mild pain at lateral paraspinal musculature of the cervical spine, left side only      ROM: (“*” = with pain)  Left shoulder   Right shoulder     AROM (PROM)  AROM (PROM)    FE   140° (170°) *    170° (175°)     ER 0°   60° (65°)   60° (65°)     ER 90° ABD  not tested  90°  (90°)    IR 90° ABD  not tested  40  (40°)     IR (spine) T12*   T10        STRENGTH: (“*” = with pain) Left shoulder   Right shoulder    SCAPTION   4+/5 *   5/5     IR    5-/5 *    5/5     ER    5-/5 *    5/5     BICEPS   5/5    5/5     Deltoid   5/5    5/5     Triceps  5/5   5/5        EXTREMITY NEURO-VASCULAR EXAM:     Sensation grossly intact to light touch all dermatomal regions.     DTR 2+ Biceps, Triceps, BR and Negative Unruly’s sign    Grossly intact motor function at Elbow, Wrist and Hand    Distal pulses

## 2024-09-30 NOTE — PATIENT INSTRUCTIONS
Cervical spine imaging ordered. These xrays can be completed without an appointment at the imaging center at [address] [place] [address].   We will call to discuss these xrays once complete. Then we will decide when to follow up or further imaging.     Steroid sent to pharmacy.

## 2024-09-30 NOTE — TELEPHONE ENCOUNTER
Patient was seen earlier today in office on 9/30/24.     We ordered cervical spine imaging at that time. After reviewing xrays, Dr. Louise is recommending a cervical spine MRI to further investigate cervical radiculopathy.     I called Ami to let her know about ordering cervical spine MRI. Central scheduling [phone] to schedule MRI.     Will follow up with MRI results.

## 2024-10-11 ENCOUNTER — OFFICE VISIT (OUTPATIENT)
Dept: NEUROLOGY | Age: 44
End: 2024-10-11
Payer: MEDICAID

## 2024-10-11 ENCOUNTER — HOSPITAL ENCOUNTER (OUTPATIENT)
Dept: MRI IMAGING | Age: 44
Discharge: HOME | End: 2024-10-11
Attending: STUDENT IN AN ORGANIZED HEALTH CARE EDUCATION/TRAINING PROGRAM
Payer: MEDICAID

## 2024-10-11 VITALS
WEIGHT: 208 LBS | HEART RATE: 97 BPM | OXYGEN SATURATION: 98 % | DIASTOLIC BLOOD PRESSURE: 82 MMHG | BODY MASS INDEX: 36.85 KG/M2 | SYSTOLIC BLOOD PRESSURE: 134 MMHG

## 2024-10-11 DIAGNOSIS — M54.12 CERVICAL RADICULOPATHY: ICD-10-CM

## 2024-10-11 DIAGNOSIS — G43.E09 CHRONIC MIGRAINE WITH AURA WITHOUT STATUS MIGRAINOSUS, NOT INTRACTABLE: Primary | ICD-10-CM

## 2024-10-11 PROCEDURE — 1036F TOBACCO NON-USER: CPT | Performed by: NURSE PRACTITIONER

## 2024-10-11 PROCEDURE — G8427 DOCREV CUR MEDS BY ELIG CLIN: HCPCS | Performed by: NURSE PRACTITIONER

## 2024-10-11 PROCEDURE — G8417 CALC BMI ABV UP PARAM F/U: HCPCS | Performed by: NURSE PRACTITIONER

## 2024-10-11 PROCEDURE — G8484 FLU IMMUNIZE NO ADMIN: HCPCS | Performed by: NURSE PRACTITIONER

## 2024-10-11 PROCEDURE — 99213 OFFICE O/P EST LOW 20 MIN: CPT | Performed by: NURSE PRACTITIONER

## 2024-10-11 PROCEDURE — 72141 MRI NECK SPINE W/O DYE: CPT

## 2024-10-11 RX ORDER — FREMANEZUMAB-VFRM 225 MG/1.5ML
INJECTION SUBCUTANEOUS
Qty: 1.5 ML | Refills: 0 | Status: SHIPPED | COMMUNITY
Start: 2024-10-11

## 2024-10-11 RX ORDER — FREMANEZUMAB-VFRM 225 MG/1.5ML
225 INJECTION SUBCUTANEOUS
Qty: 1 ADJUSTABLE DOSE PRE-FILLED PEN SYRINGE | Refills: 11 | Status: SHIPPED | OUTPATIENT
Start: 2024-10-11

## 2024-10-11 RX ORDER — METHYLPREDNISOLONE 4 MG
TABLET, DOSE PACK ORAL
COMMUNITY
Start: 2024-09-30

## 2024-10-11 NOTE — PROGRESS NOTES
10/11/24    Umberto Hanson  1980    Chief Complaint   Patient presents with    Follow-up     3M follow up for Chronic migraine with aura without status migrainosus, not intractable     History of Present Illness  Umberto is a 44 y.o. female presenting today for follow-up of: Chronic migraine with aura    She is on Aimovig for preventative therapy.  Last visit she was having 7-8 migraines per month.  Her insurance did not approve Nurtec.  She was given Maxalt instead, Imitrex was not helpful.  She completed sleep study, no evidence of EMILEE.  She follows with pain management for back pain.     Today, she tells me her migraines are increasing in frequency.  She does not find Aimovig very helpful, Maxalt was not helpful either. She tells me while her  was in the hospital, the nurse told her about Ajovy and Relpax.  Her insurance approved Ajovy in July but Ami was not aware of this so she has not been getting it. She is using naratriptan for abortive therapy. She is having about 6 migraines per month.      Migraine History:  Triggers:     Baseline headache frequency: 30/30 days per month  Baseline migraine frequency: 30/30 days per month     Prior Preventative medications tried: lopressor, topamax, amitriptylline, aimovig  Prior abortive medications tried: percocet, tylenol, ibuprofen, imitrex      Current preventative: lopressor, aimovig, flexeril,   Current abortive: percocet     MRI brain normal    Current Outpatient Medications   Medication Sig Dispense Refill    methylPREDNISolone (MEDROL DOSEPACK) 4 MG tablet       Fremanezumab-vfrm (AJOVY) 225 MG/1.5ML SOAJ Inject 225 mg into the skin every 30 days 1 Adjustable Dose Pre-filled Pen Syringe 11    albuterol sulfate HFA (VENTOLIN HFA) 108 (90 Base) MCG/ACT inhaler Inhale 2 puffs into the lungs 4 times daily as needed for Wheezing 18 g 5    naratriptan (AMERGE) 2.5 MG tablet Take 1 tablet by mouth once as needed for Migraine 2.5 mg at onset of headache, may

## 2024-10-14 ENCOUNTER — OFFICE VISIT (OUTPATIENT)
Dept: CARDIOLOGY CLINIC | Age: 44
End: 2024-10-14
Payer: MEDICAID

## 2024-10-14 VITALS
DIASTOLIC BLOOD PRESSURE: 84 MMHG | BODY MASS INDEX: 37.39 KG/M2 | SYSTOLIC BLOOD PRESSURE: 138 MMHG | OXYGEN SATURATION: 97 % | WEIGHT: 211 LBS | HEIGHT: 63 IN | HEART RATE: 81 BPM

## 2024-10-14 DIAGNOSIS — R07.9 CHEST PAIN, UNSPECIFIED TYPE: ICD-10-CM

## 2024-10-14 DIAGNOSIS — E78.5 DYSLIPIDEMIA: ICD-10-CM

## 2024-10-14 DIAGNOSIS — E78.2 MIXED HYPERLIPIDEMIA: ICD-10-CM

## 2024-10-14 DIAGNOSIS — R07.89 OTHER CHEST PAIN: Primary | ICD-10-CM

## 2024-10-14 DIAGNOSIS — R00.2 PALPITATIONS: ICD-10-CM

## 2024-10-14 DIAGNOSIS — R00.2 PALPITATION: ICD-10-CM

## 2024-10-14 PROCEDURE — G8484 FLU IMMUNIZE NO ADMIN: HCPCS | Performed by: INTERNAL MEDICINE

## 2024-10-14 PROCEDURE — 99214 OFFICE O/P EST MOD 30 MIN: CPT | Performed by: INTERNAL MEDICINE

## 2024-10-14 PROCEDURE — G8427 DOCREV CUR MEDS BY ELIG CLIN: HCPCS | Performed by: INTERNAL MEDICINE

## 2024-10-14 PROCEDURE — G8417 CALC BMI ABV UP PARAM F/U: HCPCS | Performed by: INTERNAL MEDICINE

## 2024-10-14 PROCEDURE — 93000 ELECTROCARDIOGRAM COMPLETE: CPT | Performed by: INTERNAL MEDICINE

## 2024-10-14 PROCEDURE — 1036F TOBACCO NON-USER: CPT | Performed by: INTERNAL MEDICINE

## 2024-10-14 NOTE — PROGRESS NOTES
Ernestina Stewart MD        OFFICE  FOLLOWUP NOTE    Chief complaints: patient is here for management of h/O gastric sleeve,HYPERTHYRODISM, GERD, preop for left shoulder     Subjective: patient feels better, no chest pain, no shortness of breath, no dizziness, no palpitations    ESTHER Shipman is a 44 y.o.year old who  has a past medical history of Anxiety, Arthritis, Chronic depression, Fibromyalgia, GERD (gastroesophageal reflux disease), H/O bilateral breast reduction surgery, H/O of hysterectomy with bilateral oophorectomy, H/O percutaneous left heart catheterization, Hx of Doppler echocardiogram, Hyperthyroidism, Kidney disorder, Migraine, Mixed hyperlipidemia, Morbid obesity, PONV (postoperative nausea and vomiting), Preop pulmonary/respiratory exam, Prolonged emergence from general anesthesia, Pulmonic stenosis, and Sleep apnea. and presents for management ofh/O gastric sleeve,HYPERTHYRODISM, GERD, preop for left shoulder   which are well controlled      Current Outpatient Medications   Medication Sig Dispense Refill    methylPREDNISolone (MEDROL DOSEPACK) 4 MG tablet       Fremanezumab-vfrm (AJOVY) 225 MG/1.5ML SOAJ Inject 225 mg into the skin every 30 days 1 Adjustable Dose Pre-filled Pen Syringe 11    Fremanezumab-vfrm (AJOVY) 225 MG/1.5ML SOSY Lot [id number] Exp 6/30/2025 1.5 mL 0    albuterol sulfate HFA (VENTOLIN HFA) 108 (90 Base) MCG/ACT inhaler Inhale 2 puffs into the lungs 4 times daily as needed for Wheezing 18 g 5    naratriptan (AMERGE) 2.5 MG tablet Take 1 tablet by mouth once as needed for Migraine 2.5 mg at onset of headache, may repeat in 4 hours if needed 9 tablet 3    levothyroxine (SYNTHROID) 137 MCG tablet Take 1 tablet by mouth Daily [facility] manages      omeprazole (PRILOSEC) 40 MG delayed release capsule Take 1 capsule by mouth every morning (before breakfast) 90 capsule 1    cetirizine (ZYRTEC) 10 MG tablet Take 1 tablet by mouth daily 90 tablet 0    cyclobenzaprine (FLEXERIL) 10

## 2024-10-31 ENCOUNTER — TELEPHONE (OUTPATIENT)
Dept: CARDIOLOGY CLINIC | Age: 44
End: 2024-10-31

## 2024-10-31 ENCOUNTER — E-VISIT (OUTPATIENT)
Dept: FAMILY MEDICINE CLINIC | Age: 44
End: 2024-10-31
Payer: MEDICAID

## 2024-10-31 DIAGNOSIS — R19.7 DIARRHEA, UNSPECIFIED TYPE: ICD-10-CM

## 2024-10-31 DIAGNOSIS — R11.0 NAUSEA: Primary | ICD-10-CM

## 2024-10-31 PROCEDURE — 99421 OL DIG E/M SVC 5-10 MIN: CPT | Performed by: NURSE PRACTITIONER

## 2024-10-31 RX ORDER — ONDANSETRON 4 MG/1
4 TABLET, ORALLY DISINTEGRATING ORAL EVERY 8 HOURS PRN
Qty: 15 TABLET | Refills: 0 | Status: SHIPPED | OUTPATIENT
Start: 2024-10-31

## 2024-10-31 NOTE — PROGRESS NOTES
Umberto Hanson (1980) initiated an asynchronous digital communication through [employer].    HPI: per patient questionnaire     Exam: not applicable    Diagnoses and all orders for this visit:  Diagnoses and all orders for this visit:    Nausea    Diarrhea, unspecified type    Other orders  -     ondansetron (ZOFRAN-ODT) 4 MG disintegrating tablet; Take 1 tablet by mouth every 8 hours as needed for Nausea or Vomiting        See mychart note   Time: EV1 - 5-10 minutes were spent on the digital evaluation and management of this patient.    VIOLET Oro - NP

## 2024-10-31 NOTE — PROGRESS NOTES
[place] [address]  Phone: [phone]    Fax [phone]    Radha Epps MD, [facility]  Jimmy Burns MD, [facility]   Grant Alegria MD, [facility] MD Ernestina Burrows MD, [facility]  Aldo Castaneda MD, [facility]    Effie Romano MD, [facility]   Amber Wahl, APRN  Danna Sandoval, APRN  Izabel Gibson, APRN  Izaiah May, APRN      Cardiology Progress Note      11/4/2024    RE: Umberto Hanson  (1980)                             Primary cardiologist: Dr. Michael Stewart       Subjective:  CC:   1. Palpitations    2. Shortness of breath    3. Class 1 obesity due to excess calories with serious comorbidity and body mass index (BMI) of 33.0 to 33.9 in adult    4. Mixed hyperlipidemia        HPI: Umberto Hanson, who is a  44 y.o. year old female with a past medical history as listed below.  Patient presents to the office for follow up on dyslipidemia, SOB, palpitations, and Obesity.  S/P gastric sleeve 11/21/22.  Patient had normal stress test and echo but continued to have shortness of breath.  She then proceeded to have C which showed normal coronaries.  Recently complained of palpitations and wore 24-hour Holter monitor.    Past Medical History:   Diagnosis Date    Anxiety 1/10/2001    Arthritis     Chronic depression 07/09/2021    No meds as of 11/11/22    Fibromyalgia     Can’t remember date    GERD (gastroesophageal reflux disease) 2015    H/O bilateral breast reduction surgery 10/31/2019    H/O of hysterectomy with bilateral oophorectomy 04/16/2012    H/O percutaneous left heart catheterization 09/14/2023    normal coronaries    Hx of Doppler echocardiogram 04/27/2022    EF 55-60%. Mild LV hypertrophy. Grade I diastolic dysfunction. Mildly dilated LA. Mild MR and TR.    Hyperthyroidism 11/06/2023    Kidney disorder     left removed .    Migraine 11/11/2022    Mixed hyperlipidemia 01/27/2021    Morbid obesity 11/21/2022    PONV (postoperative nausea and

## 2024-10-31 NOTE — TELEPHONE ENCOUNTER
LVM for patient to reschedule appt that was missed yesterday. Patient also sent a [employer] message about rescheduling so, I went ahead and called patient.

## 2024-11-04 ENCOUNTER — OFFICE VISIT (OUTPATIENT)
Dept: CARDIOLOGY CLINIC | Age: 44
End: 2024-11-04
Payer: MEDICAID

## 2024-11-04 VITALS
HEIGHT: 63 IN | BODY MASS INDEX: 36.86 KG/M2 | DIASTOLIC BLOOD PRESSURE: 90 MMHG | WEIGHT: 208 LBS | HEART RATE: 84 BPM | SYSTOLIC BLOOD PRESSURE: 128 MMHG

## 2024-11-04 DIAGNOSIS — R00.2 PALPITATIONS: Primary | ICD-10-CM

## 2024-11-04 DIAGNOSIS — E66.09 CLASS 1 OBESITY DUE TO EXCESS CALORIES WITH SERIOUS COMORBIDITY AND BODY MASS INDEX (BMI) OF 33.0 TO 33.9 IN ADULT: ICD-10-CM

## 2024-11-04 DIAGNOSIS — E66.811 CLASS 1 OBESITY DUE TO EXCESS CALORIES WITH SERIOUS COMORBIDITY AND BODY MASS INDEX (BMI) OF 33.0 TO 33.9 IN ADULT: ICD-10-CM

## 2024-11-04 DIAGNOSIS — R06.02 SHORTNESS OF BREATH: ICD-10-CM

## 2024-11-04 DIAGNOSIS — E78.2 MIXED HYPERLIPIDEMIA: ICD-10-CM

## 2024-11-04 PROCEDURE — G8417 CALC BMI ABV UP PARAM F/U: HCPCS | Performed by: NURSE PRACTITIONER

## 2024-11-04 PROCEDURE — G8427 DOCREV CUR MEDS BY ELIG CLIN: HCPCS | Performed by: NURSE PRACTITIONER

## 2024-11-04 PROCEDURE — 99214 OFFICE O/P EST MOD 30 MIN: CPT | Performed by: NURSE PRACTITIONER

## 2024-11-04 PROCEDURE — G8484 FLU IMMUNIZE NO ADMIN: HCPCS | Performed by: NURSE PRACTITIONER

## 2024-11-04 PROCEDURE — 1036F TOBACCO NON-USER: CPT | Performed by: NURSE PRACTITIONER

## 2024-11-06 NOTE — FLOWSHEET NOTE
Outpatient Physical Therapy  [facility]           [x] Phone: [phone]   Fax: [phone]  Suzi williamson           [] Phone: [phone]   Fax: [phone]        Physical Therapy Daily Treatment Note  Date:  2023    Patient Name:  Brooklynn Patel    :  1980  MRN: [id number]  Restrictions/Precautions: Restrictions/Precautions: Fall Risk; General Precautions        Diagnosis:   Fibromyalgia [M79.7]  Chronic bilateral low back pain with bilateral sciatica [M54.42, M54.41, G89.29]    Date of Injury/Surgery:   Treatment Diagnosis:  lbp  Insurance/Certification information: [facility]  Referring Physician:  VIOLET Castillo - *     PCP: VIOLET Brown NP  Next Doctor Visit:    Plan of care signed (Y/N):  sent   Outcome Measure:   Oswestry: 21/50  5x STS: 16.63 sec   Visit# / total visits:  3 /10  Pain level: shin and both knee rated 6/10  Goals:     Patient goals: get back to work  Short term goals  Time Frame for Short term goals: refer to [place]    Long Term Goals  Time Frame for Long Term Goals: 10 visits  Pt will report overall improvement in condition by 50% or more  pt will improve oswestry to 12/50 or less to show [address] and subjective improvement  pt will improve lumbar AROM flexion to 75% to reach objects on the floor  pt will improve L hip flexion strength to 4/5 for improved stair negotiation  pt will be able to stand for 30 minutes without an increase in pain for work activities      Summary of Evaluation:  Assessment: pt is a 36 y.o. female that presents to therapy with complaints of lower back pain lasting 22 years. 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 she has a history of fibromyalgia. pt demo impaired posture, lumbar ROM, BLE strength, activity/work tolerance and functional mobility. pt has forward flexed posture with rounded shoulders in sitting. pt has a history of falling a lot.  Pt would benefit normal mood with appropriate affect

## 2024-11-14 ENCOUNTER — OFFICE VISIT (OUTPATIENT)
Dept: FAMILY MEDICINE CLINIC | Age: 44
End: 2024-11-14

## 2024-11-14 VITALS
HEART RATE: 92 BPM | BODY MASS INDEX: 37.21 KG/M2 | HEIGHT: 63 IN | OXYGEN SATURATION: 97 % | DIASTOLIC BLOOD PRESSURE: 84 MMHG | SYSTOLIC BLOOD PRESSURE: 134 MMHG | WEIGHT: 210 LBS

## 2024-11-14 DIAGNOSIS — H92.01 OTALGIA, RIGHT: Primary | ICD-10-CM

## 2024-11-14 RX ORDER — CIPROFLOXACIN AND DEXAMETHASONE 3; 1 MG/ML; MG/ML
4 SUSPENSION/ DROPS AURICULAR (OTIC) 2 TIMES DAILY
Qty: 1 EACH | Refills: 0 | Status: SHIPPED | OUTPATIENT
Start: 2024-11-14 | End: 2024-11-21

## 2024-11-14 NOTE — PROGRESS NOTES
2024     Umberto Hanson (:  1980) is a 44 y.o. female, here for evaluation of the following medical concerns:      Bilateral ear pain one month. Right worse than left   Drainage thin yellow. Right ear   Pain with pressure applied and lying on right ear.       Hx rupture left TM       Review of Systems    Prior to Visit Medications    Medication Sig Taking? Authorizing Provider   ciprofloxacin-dexAMETHasone (CIPRODEX) 0.3-0.1 % otic suspension Place 4 drops into the right ear 2 times daily for 7 days Yes Jessica Escobar APRN - NP   Magnesium Oxide (MAGNESIUM-OXIDE) 250 MG TABS tablet Take 1 tablet by mouth daily Yes Izaiah May APRN - CNP   ondansetron (ZOFRAN-ODT) 4 MG disintegrating tablet Take 1 tablet by mouth every 8 hours as needed for Nausea or Vomiting Yes Jessica Escobar APRN - NP   Fremanezumab-vfrm (AJOVY) 225 MG/1.5ML SOAJ Inject 225 mg into the skin every 30 days Yes Neri Pond APRN - CNP   albuterol sulfate HFA (VENTOLIN HFA) 108 (90 Base) MCG/ACT inhaler Inhale 2 puffs into the lungs 4 times daily as needed for Wheezing Yes Jessica Escobar APRN - NP   levothyroxine (SYNTHROID) 137 MCG tablet Take 1 tablet by mouth Daily [facility] manages Yes Maritza Johnson MD   cetirizine (ZYRTEC) 10 MG tablet Take 1 tablet by mouth daily Yes Jessica Escobar APRN - NP   oxyCODONE-acetaminophen (PERCOCET) 5-325 MG per tablet Take 1 tablet by mouth every 6 hours as needed for Pain. Yes Maritza Johnson MD   naratriptan (AMERGE) 2.5 MG tablet Take 1 tablet by mouth once as needed for Migraine 2.5 mg at onset of headache, may repeat in 4 hours if needed  Neri Pond APRN - CNP        Social History     Tobacco Use    Smoking status: Never    Smokeless tobacco: Never   Substance Use Topics    Alcohol use: Never     Comment: caffeine: 2 cups tea a day, pop sometimes        Vitals:    24 1427   BP: 134/84   Site: Left Upper Arm   Position: Sitting   Pulse: 92

## 2024-11-21 ENCOUNTER — NURSE ONLY (OUTPATIENT)
Dept: SURGERY | Age: 44
End: 2024-11-21
Payer: MEDICAID

## 2024-11-21 DIAGNOSIS — Z90.3 S/P GASTRIC SLEEVE PROCEDURE: Primary | ICD-10-CM

## 2024-11-21 DIAGNOSIS — R00.2 PALPITATION: ICD-10-CM

## 2024-11-21 DIAGNOSIS — Z98.84 STATUS POST BARIATRIC SURGERY: ICD-10-CM

## 2024-11-21 PROCEDURE — 93000 ELECTROCARDIOGRAM COMPLETE: CPT | Performed by: INTERNAL MEDICINE

## 2024-11-21 PROCEDURE — 36415 COLL VENOUS BLD VENIPUNCTURE: CPT | Performed by: SURGERY

## 2024-11-22 LAB
25(OH)D3 SERPL-MCNC: 18.8 NG/ML
ALBUMIN SERPL-MCNC: 4.2 G/DL (ref 3.4–5)
ALBUMIN/GLOB SERPL: 1.6 {RATIO} (ref 1.1–2.2)
ALP SERPL-CCNC: 106 U/L (ref 40–129)
ALT SERPL-CCNC: 17 U/L (ref 10–40)
ANION GAP SERPL CALCULATED.3IONS-SCNC: 10 MMOL/L (ref 3–16)
AST SERPL-CCNC: 24 U/L (ref 15–37)
BASOPHILS # BLD: 0.1 K/UL (ref 0–0.2)
BASOPHILS NFR BLD: 1.2 %
BILIRUB SERPL-MCNC: 0.7 MG/DL (ref 0–1)
BUN SERPL-MCNC: 14 MG/DL (ref 7–20)
CALCIUM SERPL-MCNC: 9 MG/DL (ref 8.3–10.6)
CHLORIDE SERPL-SCNC: 105 MMOL/L (ref 99–110)
CHOLEST SERPL-MCNC: 225 MG/DL (ref 0–199)
CO2 SERPL-SCNC: 25 MMOL/L (ref 21–32)
CREAT SERPL-MCNC: 0.9 MG/DL (ref 0.6–1.1)
DEPRECATED RDW RBC AUTO: 14.3 % (ref 12.4–15.4)
EOSINOPHIL # BLD: 0.2 K/UL (ref 0–0.6)
EOSINOPHIL NFR BLD: 2.7 %
EST. AVERAGE GLUCOSE BLD GHB EST-MCNC: 88.2 MG/DL
FOLATE SERPL-MCNC: 10.2 NG/ML (ref 4.78–24.2)
GFR SERPLBLD CREATININE-BSD FMLA CKD-EPI: 80 ML/MIN/{1.73_M2}
GLUCOSE SERPL-MCNC: 89 MG/DL (ref 70–99)
HBA1C MFR BLD: 4.7 %
HCT VFR BLD AUTO: 39.8 % (ref 36–48)
HDLC SERPL-MCNC: 54 MG/DL (ref 40–60)
HGB BLD-MCNC: 13.4 G/DL (ref 12–16)
IRON SATN MFR SERPL: 26 % (ref 15–50)
IRON SERPL-MCNC: 76 UG/DL (ref 37–145)
LDLC SERPL CALC-MCNC: 141 MG/DL
LYMPHOCYTES # BLD: 2.4 K/UL (ref 1–5.1)
LYMPHOCYTES NFR BLD: 33.7 %
MAGNESIUM SERPL-MCNC: 2.18 MG/DL (ref 1.8–2.4)
MCH RBC QN AUTO: 29.7 PG (ref 26–34)
MCHC RBC AUTO-ENTMCNC: 33.8 G/DL (ref 31–36)
MCV RBC AUTO: 88 FL (ref 80–100)
MONOCYTES # BLD: 0.3 K/UL (ref 0–1.3)
MONOCYTES NFR BLD: 4 %
NEUTROPHILS # BLD: 4.2 K/UL (ref 1.7–7.7)
NEUTROPHILS NFR BLD: 58.4 %
PLATELET # BLD AUTO: 252 K/UL (ref 135–450)
PMV BLD AUTO: 8.3 FL (ref 5–10.5)
POTASSIUM SERPL-SCNC: 4.2 MMOL/L (ref 3.5–5.1)
PROT SERPL-MCNC: 6.9 G/DL (ref 6.4–8.2)
RBC # BLD AUTO: 4.52 M/UL (ref 4–5.2)
SODIUM SERPL-SCNC: 140 MMOL/L (ref 136–145)
TIBC SERPL-MCNC: 288 UG/DL (ref 260–445)
TRIGL SERPL-MCNC: 148 MG/DL (ref 0–150)
TSH SERPL DL<=0.005 MIU/L-ACNC: 0.02 UIU/ML (ref 0.27–4.2)
VIT B12 SERPL-MCNC: 352 PG/ML (ref 211–911)
VLDLC SERPL CALC-MCNC: 30 MG/DL
WBC # BLD AUTO: 7.2 K/UL (ref 4–11)

## 2024-11-23 LAB — ZINC SERPL-MCNC: 73.7 UG/DL (ref 60–120)

## 2024-11-24 LAB
ANNOTATION COMMENT IMP: NORMAL
RETINYL PALMITATE SERPL-MCNC: <0.02 MG/L (ref 0–0.1)
VIT A SERPL-MCNC: 0.51 MG/L (ref 0.3–1.2)

## 2024-11-26 LAB — VIT B1 SERPL-MCNC: 2 NMOL/L (ref 4–15)

## 2024-12-18 ENCOUNTER — OFFICE VISIT (OUTPATIENT)
Dept: BARIATRICS/WEIGHT MGMT | Age: 44
End: 2024-12-18
Payer: MEDICAID

## 2024-12-18 VITALS
BODY MASS INDEX: 38.7 KG/M2 | HEART RATE: 110 BPM | WEIGHT: 218.4 LBS | SYSTOLIC BLOOD PRESSURE: 108 MMHG | OXYGEN SATURATION: 96 % | DIASTOLIC BLOOD PRESSURE: 72 MMHG | HEIGHT: 63 IN

## 2024-12-18 DIAGNOSIS — R63.5 WEIGHT GAIN: Primary | ICD-10-CM

## 2024-12-18 DIAGNOSIS — Z90.3 HISTORY OF SLEEVE GASTRECTOMY: ICD-10-CM

## 2024-12-18 PROCEDURE — 99213 OFFICE O/P EST LOW 20 MIN: CPT | Performed by: NURSE PRACTITIONER

## 2024-12-18 PROCEDURE — G8484 FLU IMMUNIZE NO ADMIN: HCPCS | Performed by: NURSE PRACTITIONER

## 2024-12-18 PROCEDURE — G8417 CALC BMI ABV UP PARAM F/U: HCPCS | Performed by: NURSE PRACTITIONER

## 2024-12-18 PROCEDURE — G8427 DOCREV CUR MEDS BY ELIG CLIN: HCPCS | Performed by: NURSE PRACTITIONER

## 2024-12-18 PROCEDURE — 1036F TOBACCO NON-USER: CPT | Performed by: NURSE PRACTITIONER

## 2024-12-18 NOTE — PROGRESS NOTES
- Per EP office visit on day of admission: She continues to have multiple VT episodes with some ATP therapy, although no ICD shocks since starting mexiletine 10/24/2019. One slow VT episode 2.5hrs 11/3/2019. Patient asymptomatic with LVAD. On coumadin. No AFL since post-op event (paced out of rhythm 9/24/2019). CHB with 100% V pacing (LV lead off).   - Plan was to continue current regimen despite Mexiletine making her nauseous.  - Decreased Amiodarone to daily per EP plan on discharge last hospitalization   - NSVT continues intermittently on tele, now having palpitations   - re interrogate device today as we would like to restart    BARIATRIC SURGERY OFFICE PROGRESS NOTE    SUBJECTIVE:    Patient presenting today referred from Jessica Escobar, VIOLET - NP, for   Chief Complaint   Patient presents with    Bariatrics Post Op Follow Up     2 year F/U S/P Gastric Sleeve, 11/21/24  Lab Results   .    Vitals:    12/18/24 1104   BP: 108/72   Pulse: (!) 110   SpO2: 96%        BMI: Body mass index is 38.69 kg/m².     Weight History:   Wt Readings from Last 3 Encounters:   12/18/24 99.1 kg (218 lb 6.4 oz)   11/14/24 95.3 kg (210 lb)   11/04/24 94.3 kg (208 lb)         HPI:Umberto Hanson is a 44 y.o. female presenting in  bariatric 2 year POST-OP visit.    Total weight loss/gain:   +25.5 lbs since last visit  +3.5 lbs since surgery  -19.4 lbs since starting program    Changes in health since last visit: weight gain; recent total thyroidectomy; U/S FEB to recheck status     Pt tracking calories: not following a diet plan or tracking calories     Pt exercising: denies    Pt taking vitamins: denies     Fluid intake: good    Past Medical History:   Diagnosis Date    Anxiety 1/10/2001    Arthritis     Chronic depression 07/09/2021    No meds as of 11/11/22    Fibromyalgia     Can’t remember date    GERD (gastroesophageal reflux disease) 2015    H/O bilateral breast reduction surgery 10/31/2019    H/O of hysterectomy with bilateral oophorectomy 04/16/2012    H/O percutaneous left heart catheterization 09/14/2023    normal coronaries    Hx of Doppler echocardiogram 04/27/2022    EF 55-60%. Mild LV hypertrophy. Grade I diastolic dysfunction. Mildly dilated LA. Mild MR and TR.    Hyperthyroidism 11/06/2023    Kidney disorder     left removed .    Migraine 11/11/2022    Mixed hyperlipidemia 01/27/2021    Morbid obesity 11/21/2022    PONV (postoperative nausea and vomiting)     Preop pulmonary/respiratory exam 05/24/2022    Prolonged emergence from general anesthesia     Pulmonic stenosis     Sleep apnea     States does not have since losing weight        Patient Active

## 2025-01-13 ENCOUNTER — HOSPITAL ENCOUNTER (OUTPATIENT)
Age: 45
Discharge: HOME | End: 2025-01-13
Payer: MEDICAID

## 2025-01-13 ENCOUNTER — OFFICE VISIT (OUTPATIENT)
Dept: FAMILY MEDICINE CLINIC | Age: 45
End: 2025-01-13
Payer: MEDICAID

## 2025-01-13 VITALS
SYSTOLIC BLOOD PRESSURE: 112 MMHG | BODY MASS INDEX: 39.86 KG/M2 | WEIGHT: 225 LBS | TEMPERATURE: 98.1 F | HEART RATE: 83 BPM | DIASTOLIC BLOOD PRESSURE: 84 MMHG | OXYGEN SATURATION: 97 %

## 2025-01-13 DIAGNOSIS — R25.2 MUSCLE CRAMPING: ICD-10-CM

## 2025-01-13 DIAGNOSIS — M79.89 LEG SWELLING: ICD-10-CM

## 2025-01-13 DIAGNOSIS — H60.62 CHRONIC OTITIS EXTERNA OF LEFT EAR, UNSPECIFIED TYPE: Primary | ICD-10-CM

## 2025-01-13 DIAGNOSIS — Z90.5 SINGLE KIDNEY: ICD-10-CM

## 2025-01-13 DIAGNOSIS — Z86.69 HISTORY OF RECURRENT EAR INFECTION: ICD-10-CM

## 2025-01-13 LAB
ANION GAP SERPL CALCULATED.3IONS-SCNC: 10 MMOL/L (ref 9–17)
BUN SERPL-MCNC: 14 MG/DL (ref 7–20)
CALCIUM SERPL-MCNC: 9 MG/DL (ref 8.3–10.6)
CHLORIDE SERPL-SCNC: 105 MMOL/L (ref 99–110)
CO2 SERPL-SCNC: 27 MMOL/L (ref 21–32)
CREAT SERPL-MCNC: 0.9 MG/DL (ref 0.6–1.1)
GFR, ESTIMATED: 72 ML/MIN/1.73M2
GLUCOSE SERPL-MCNC: 105 MG/DL (ref 74–99)
MAGNESIUM SERPL-MCNC: 2.2 MG/DL (ref 1.8–2.4)
POTASSIUM SERPL-SCNC: 4.2 MMOL/L (ref 3.5–5.1)
SODIUM SERPL-SCNC: 142 MMOL/L (ref 136–145)

## 2025-01-13 PROCEDURE — 83735 ASSAY OF MAGNESIUM: CPT

## 2025-01-13 PROCEDURE — G8427 DOCREV CUR MEDS BY ELIG CLIN: HCPCS | Performed by: NURSE PRACTITIONER

## 2025-01-13 PROCEDURE — 99214 OFFICE O/P EST MOD 30 MIN: CPT | Performed by: NURSE PRACTITIONER

## 2025-01-13 PROCEDURE — 80048 BASIC METABOLIC PNL TOTAL CA: CPT

## 2025-01-13 PROCEDURE — G8417 CALC BMI ABV UP PARAM F/U: HCPCS | Performed by: NURSE PRACTITIONER

## 2025-01-13 PROCEDURE — 1036F TOBACCO NON-USER: CPT | Performed by: NURSE PRACTITIONER

## 2025-01-13 RX ORDER — CIPROFLOXACIN AND DEXAMETHASONE 3; 1 MG/ML; MG/ML
4 SUSPENSION/ DROPS AURICULAR (OTIC) 2 TIMES DAILY
Qty: 1 EACH | Refills: 0 | Status: SHIPPED | OUTPATIENT
Start: 2025-01-13 | End: 2025-01-20

## 2025-01-13 RX ORDER — HYDROXYZINE HYDROCHLORIDE 25 MG/1
25 TABLET, FILM COATED ORAL EVERY 8 HOURS PRN
Qty: 10 TABLET | Refills: 0 | Status: SHIPPED | OUTPATIENT
Start: 2025-01-13

## 2025-01-13 RX ORDER — CETIRIZINE HYDROCHLORIDE 10 MG/1
10 TABLET ORAL DAILY
Qty: 90 TABLET | Refills: 0 | Status: SHIPPED | OUTPATIENT
Start: 2025-01-13

## 2025-01-13 SDOH — ECONOMIC STABILITY: FOOD INSECURITY: WITHIN THE PAST 12 MONTHS, YOU WORRIED THAT YOUR FOOD WOULD RUN OUT BEFORE YOU GOT MONEY TO BUY MORE.: PATIENT DECLINED

## 2025-01-13 SDOH — ECONOMIC STABILITY: FOOD INSECURITY: WITHIN THE PAST 12 MONTHS, THE FOOD YOU BOUGHT JUST DIDN'T LAST AND YOU DIDN'T HAVE MONEY TO GET MORE.: PATIENT DECLINED

## 2025-01-13 ASSESSMENT — PATIENT HEALTH QUESTIONNAIRE - PHQ9
9. THOUGHTS THAT YOU WOULD BE BETTER OFF DEAD, OR OF HURTING YOURSELF: NOT AT ALL
SUM OF ALL RESPONSES TO PHQ QUESTIONS 1-9: 15
1. LITTLE INTEREST OR PLEASURE IN DOING THINGS: MORE THAN HALF THE DAYS
SUM OF ALL RESPONSES TO PHQ QUESTIONS 1-9: 15
4. FEELING TIRED OR HAVING LITTLE ENERGY: NEARLY EVERY DAY
SUM OF ALL RESPONSES TO PHQ9 QUESTIONS 1 & 2: 5
8. MOVING OR SPEAKING SO SLOWLY THAT OTHER PEOPLE COULD HAVE NOTICED. OR THE OPPOSITE, BEING SO FIGETY OR RESTLESS THAT YOU HAVE BEEN MOVING AROUND A LOT MORE THAN USUAL: NOT AT ALL
5. POOR APPETITE OR OVEREATING: NEARLY EVERY DAY
3. TROUBLE FALLING OR STAYING ASLEEP: NEARLY EVERY DAY
SUM OF ALL RESPONSES TO PHQ QUESTIONS 1-9: 15
6. FEELING BAD ABOUT YOURSELF - OR THAT YOU ARE A FAILURE OR HAVE LET YOURSELF OR YOUR FAMILY DOWN: NOT AT ALL
2. FEELING DOWN, DEPRESSED OR HOPELESS: NEARLY EVERY DAY
SUM OF ALL RESPONSES TO PHQ QUESTIONS 1-9: 15
10. IF YOU CHECKED OFF ANY PROBLEMS, HOW DIFFICULT HAVE THESE PROBLEMS MADE IT FOR YOU TO DO YOUR WORK, TAKE CARE OF THINGS AT HOME, OR GET ALONG WITH OTHER PEOPLE: VERY DIFFICULT
7. TROUBLE CONCENTRATING ON THINGS, SUCH AS READING THE NEWSPAPER OR WATCHING TELEVISION: SEVERAL DAYS

## 2025-01-13 ASSESSMENT — ANXIETY QUESTIONNAIRES
GAD7 TOTAL SCORE: 16
2. NOT BEING ABLE TO STOP OR CONTROL WORRYING: NEARLY EVERY DAY
1. FEELING NERVOUS, ANXIOUS, OR ON EDGE: NEARLY EVERY DAY
3. WORRYING TOO MUCH ABOUT DIFFERENT THINGS: NEARLY EVERY DAY
7. FEELING AFRAID AS IF SOMETHING AWFUL MIGHT HAPPEN: NEARLY EVERY DAY
IF YOU CHECKED OFF ANY PROBLEMS ON THIS QUESTIONNAIRE, HOW DIFFICULT HAVE THESE PROBLEMS MADE IT FOR YOU TO DO YOUR WORK, TAKE CARE OF THINGS AT HOME, OR GET ALONG WITH OTHER PEOPLE: VERY DIFFICULT
4. TROUBLE RELAXING: SEVERAL DAYS
6. BECOMING EASILY ANNOYED OR IRRITABLE: NEARLY EVERY DAY
5. BEING SO RESTLESS THAT IT IS HARD TO SIT STILL: NOT AT ALL

## 2025-01-13 NOTE — PROGRESS NOTES
2025     Umberto Hanson (:  1980) is a 44 y.o. female, here for evaluation of the following medical concerns:    History of Present Illness  The patient is a 44-year-old female who presents today for bilateral ear drainage and bilateral leg swelling for 1 day that she states is painful to walk. She does not have any history of blood clots. She does not exercise. She does not eat a low sodium diet. She is on a triptan as needed for migraine. She had a thyroidectomy in  and is on Synthroid 137, which is managed by [facility]. She has recurrent ear infections, left otitis externa in 2024 and right otitis externa in 2024, treated by PCP. Patient was referred to ENT here in [place] in 2024. She recently had her thyroid labs drawn with [facility] in 2024, 1 month ago. T3, T4 were normal. TSH was low. Thyroid antibodies positive. She had a CMP in 2024 that was normal. She does not have any history of heart failure. She does have a single kidney.    Ear Problems  - Pain in both ears, accompanied by constant leakage  - Symptoms persistent since last visit in 2024  - Has not yet consulted an ENT specialist  - History of recurrent ear infections, with the left ear being more problematic than the right  - Does not engage in swimming activities  - Previously prescribed eardrops, which provided temporary relief for a few days  - Symptoms recurred after discontinuation of the medication    Leg Swelling  - Significant swelling in legs, causing difficulty in walking  - Works as a , primarily sitting but requires walking to the bathroom  - Describes sitting position as unusual, with feet dangling from a rolling chair  - Spent approximately 8 hours sitting yesterday  - Has not taken any ibuprofen for the swelling  - Experiences soreness in legs when standing or moving around  - Pain manifested upon returning home from work  - Reports itching when legs begin to swell  - Experiencing frequent

## 2025-02-06 ENCOUNTER — OFFICE VISIT (OUTPATIENT)
Dept: FAMILY MEDICINE CLINIC | Age: 45
End: 2025-02-06
Payer: MEDICAID

## 2025-02-06 VITALS
HEART RATE: 93 BPM | DIASTOLIC BLOOD PRESSURE: 86 MMHG | WEIGHT: 227 LBS | BODY MASS INDEX: 40.21 KG/M2 | OXYGEN SATURATION: 93 % | SYSTOLIC BLOOD PRESSURE: 124 MMHG

## 2025-02-06 DIAGNOSIS — F41.8 DEPRESSION WITH ANXIETY: Primary | ICD-10-CM

## 2025-02-06 PROCEDURE — G8417 CALC BMI ABV UP PARAM F/U: HCPCS | Performed by: NURSE PRACTITIONER

## 2025-02-06 PROCEDURE — G8427 DOCREV CUR MEDS BY ELIG CLIN: HCPCS | Performed by: NURSE PRACTITIONER

## 2025-02-06 PROCEDURE — 99213 OFFICE O/P EST LOW 20 MIN: CPT | Performed by: NURSE PRACTITIONER

## 2025-02-06 PROCEDURE — 1036F TOBACCO NON-USER: CPT | Performed by: NURSE PRACTITIONER

## 2025-02-06 RX ORDER — SERTRALINE HYDROCHLORIDE 25 MG/1
25 TABLET, FILM COATED ORAL DAILY
Qty: 30 TABLET | Refills: 1 | Status: SHIPPED | OUTPATIENT
Start: 2025-02-06

## 2025-02-06 ASSESSMENT — PATIENT HEALTH QUESTIONNAIRE - PHQ9
10. IF YOU CHECKED OFF ANY PROBLEMS, HOW DIFFICULT HAVE THESE PROBLEMS MADE IT FOR YOU TO DO YOUR WORK, TAKE CARE OF THINGS AT HOME, OR GET ALONG WITH OTHER PEOPLE: VERY DIFFICULT
SUM OF ALL RESPONSES TO PHQ QUESTIONS 1-9: 13
7. TROUBLE CONCENTRATING ON THINGS, SUCH AS READING THE NEWSPAPER OR WATCHING TELEVISION: NOT AT ALL
SUM OF ALL RESPONSES TO PHQ9 QUESTIONS 1 & 2: 6
4. FEELING TIRED OR HAVING LITTLE ENERGY: NEARLY EVERY DAY
2. FEELING DOWN, DEPRESSED OR HOPELESS: NEARLY EVERY DAY
SUM OF ALL RESPONSES TO PHQ QUESTIONS 1-9: 13
3. TROUBLE FALLING OR STAYING ASLEEP: SEVERAL DAYS
1. LITTLE INTEREST OR PLEASURE IN DOING THINGS: NEARLY EVERY DAY
8. MOVING OR SPEAKING SO SLOWLY THAT OTHER PEOPLE COULD HAVE NOTICED. OR THE OPPOSITE, BEING SO FIGETY OR RESTLESS THAT YOU HAVE BEEN MOVING AROUND A LOT MORE THAN USUAL: NOT AT ALL
9. THOUGHTS THAT YOU WOULD BE BETTER OFF DEAD, OR OF HURTING YOURSELF: NOT AT ALL
6. FEELING BAD ABOUT YOURSELF - OR THAT YOU ARE A FAILURE OR HAVE LET YOURSELF OR YOUR FAMILY DOWN: NOT AT ALL
5. POOR APPETITE OR OVEREATING: NEARLY EVERY DAY

## 2025-02-06 ASSESSMENT — ANXIETY QUESTIONNAIRES
6. BECOMING EASILY ANNOYED OR IRRITABLE: NEARLY EVERY DAY
7. FEELING AFRAID AS IF SOMETHING AWFUL MIGHT HAPPEN: MORE THAN HALF THE DAYS
3. WORRYING TOO MUCH ABOUT DIFFERENT THINGS: NEARLY EVERY DAY
1. FEELING NERVOUS, ANXIOUS, OR ON EDGE: NEARLY EVERY DAY
5. BEING SO RESTLESS THAT IT IS HARD TO SIT STILL: NOT AT ALL
4. TROUBLE RELAXING: NEARLY EVERY DAY
IF YOU CHECKED OFF ANY PROBLEMS ON THIS QUESTIONNAIRE, HOW DIFFICULT HAVE THESE PROBLEMS MADE IT FOR YOU TO DO YOUR WORK, TAKE CARE OF THINGS AT HOME, OR GET ALONG WITH OTHER PEOPLE: VERY DIFFICULT
2. NOT BEING ABLE TO STOP OR CONTROL WORRYING: NEARLY EVERY DAY
GAD7 TOTAL SCORE: 17

## 2025-02-06 NOTE — PROGRESS NOTES
causes fatigue    - Contact office if side effects persist beyond 2 to 3 weeks  - Continue hydroxyzine as needed for anxiety    2. Anxiety  - Experiencing symptoms of anxiety, more severe than depression symptoms  - Continue hydroxyzine as needed for anxiety  - Potential side effects and interactions with sertraline discussed  - Start sertraline 25 mg at half a tablet daily for 7 to 10 days, then increase to a full tablet if tolerated  - Stop medication and seek immediate medical attention if severe side effects or increased suicidal ideation occur    Follow-up  - Patient to follow up after return from [place]               All care gaps addressed     All questions answered    Discussed use, benefit, and side effects of prescribed medications.  Barriers to compliance discussed.  All patient questions answered.  Pt voiced understanding.     Present to the ER for any emergent or acute symptoms not managed at home or in office.    Please note that this chart was generated using dragon and [employer] dictation software.  Although every effort was made to ensure the accuracy of this automated transcription, some errors in transcription may have occurred.    The patient (or guardian, if applicable) and other individuals in attendance with the patient were advised that Artificial Intelligence will be utilized during this visit to record, process the conversation to generate a clinical note, and support improvement of the AI technology. The patient (or guardian, if applicable) and other individuals in attendance at the appointment consented to the use of AI, including the recording.                    No follow-ups on file.    An electronic signature was used to authenticate this note.    --VIOLET Oro - NP on 2/6/2025 at 2:51 PM

## 2025-02-13 RX ORDER — ALBUTEROL SULFATE 90 UG/1
2 INHALANT RESPIRATORY (INHALATION) 4 TIMES DAILY PRN
Qty: 8.5 EACH | Refills: 5 | Status: SHIPPED | OUTPATIENT
Start: 2025-02-13

## 2025-02-18 DIAGNOSIS — G43.E09 CHRONIC MIGRAINE WITH AURA WITHOUT STATUS MIGRAINOSUS, NOT INTRACTABLE: ICD-10-CM

## 2025-02-18 RX ORDER — NARATRIPTAN 2.5 MG/1
2.5 TABLET ORAL
Qty: 9 TABLET | Refills: 3 | Status: SHIPPED | OUTPATIENT
Start: 2025-02-18 | End: 2025-02-18

## 2025-02-18 NOTE — TELEPHONE ENCOUNTER
Last visit: 10/11/24    No follow up.     Rx is pending.     Requested Prescriptions     Pending Prescriptions Disp Refills    naratriptan (AMERGE) 2.5 MG tablet [Pharmacy Med Name: NARATRIPTAN HCL 2.5 MG TABLET] 9 tablet 3     Sig: TAKE 1 TABLET BY MOUTH ONCE AS NEEDED FOR MIGRAINE 2.5 MG AT ONSET OF HEADACHE, MAY REPEAT IN 4 HOURS IF NEEDED

## 2025-02-19 RX ORDER — ONDANSETRON 4 MG/1
4 TABLET, ORALLY DISINTEGRATING ORAL EVERY 8 HOURS PRN
Qty: 15 TABLET | Refills: 0 | Status: SHIPPED | OUTPATIENT
Start: 2025-02-19

## 2025-03-26 ENCOUNTER — OFFICE VISIT (OUTPATIENT)
Dept: FAMILY MEDICINE CLINIC | Age: 45
End: 2025-03-26
Payer: MEDICAID

## 2025-03-26 VITALS
DIASTOLIC BLOOD PRESSURE: 86 MMHG | SYSTOLIC BLOOD PRESSURE: 126 MMHG | BODY MASS INDEX: 40.21 KG/M2 | WEIGHT: 227 LBS | OXYGEN SATURATION: 95 % | HEART RATE: 93 BPM

## 2025-03-26 DIAGNOSIS — M79.672 LEFT FOOT PAIN: Primary | ICD-10-CM

## 2025-03-26 PROCEDURE — G8427 DOCREV CUR MEDS BY ELIG CLIN: HCPCS | Performed by: NURSE PRACTITIONER

## 2025-03-26 PROCEDURE — 1036F TOBACCO NON-USER: CPT | Performed by: NURSE PRACTITIONER

## 2025-03-26 PROCEDURE — 99213 OFFICE O/P EST LOW 20 MIN: CPT | Performed by: NURSE PRACTITIONER

## 2025-03-26 PROCEDURE — G8417 CALC BMI ABV UP PARAM F/U: HCPCS | Performed by: NURSE PRACTITIONER

## 2025-03-26 NOTE — PROGRESS NOTES
3/26/2025     Umberto Hanson (:  1980) is a 44 y.o. female, here for evaluation of the following medical concerns:    History of Present Illness  The patient presents for evaluation of foot pain.    Foot Pain  - An incident occurred during a walk in [facility], where an unspecified event resulted in persistent foot pain.  - The pain is exacerbated by walking and is accompanied by swelling.  - Ice has been applied to the affected area, but this has not provided any relief.  - No over-the-counter analgesics such as ibuprofen or Tylenol have been taken.  - The pain, which started approximately 2 weeks ago, appears to be progressively worsening.  - A sensation of locking in the foot when elevated is reported, necessitating lowering the foot and leaning forward slightly.  - Uncertainty exists regarding whether the foot was twisted, but the pain initially presented without any identifiable cause.  - Significant swelling was noted over the weekend.  - Boots were worn during the walk in [facility], and tennis shoes are currently worn.  - Previous consultation with Dr. Louise, an orthopedic specialist, was for shoulder-related issues.    Request for Handicap Placard  - A request for a handicap placard is made due to an inability to walk long distances without experiencing shortness of breath.    Supplemental information: The hyperthyroid condition continues to cause fatigue and an elevated heart rate during prolonged periods of walking.               Prior to Visit Medications    Medication Sig Taking? Authorizing Provider   ondansetron (ZOFRAN-ODT) 4 MG disintegrating tablet TAKE 1 TABLET BY MOUTH EVERY 8 HOURS AS NEEDED FOR NAUSEA AND VOMITING Yes Jessica Escobar, APRN - NP   albuterol sulfate HFA (PROVENTIL;VENTOLIN;PROAIR) 108 (90 Base) MCG/ACT inhaler INHALE 2 PUFFS INTO THE LUNGS 4 TIMES DAILY AS NEEDED FOR WHEEZING. Yes Jessica Escobar, APRN - NP   sertraline (ZOLOFT) 25 MG tablet Take 1 tablet by mouth daily Yes 
160

## 2025-04-03 ENCOUNTER — OFFICE VISIT (OUTPATIENT)
Dept: ORTHOPEDIC SURGERY | Age: 45
End: 2025-04-03
Payer: MEDICAID

## 2025-04-03 VITALS — OXYGEN SATURATION: 98 % | RESPIRATION RATE: 12 BRPM | BODY MASS INDEX: 40.21 KG/M2 | HEIGHT: 63 IN | HEART RATE: 78 BPM

## 2025-04-03 DIAGNOSIS — S93.602A SPRAIN OF LEFT FOOT, INITIAL ENCOUNTER: Primary | ICD-10-CM

## 2025-04-03 PROCEDURE — 1036F TOBACCO NON-USER: CPT

## 2025-04-03 PROCEDURE — 99203 OFFICE O/P NEW LOW 30 MIN: CPT

## 2025-04-03 PROCEDURE — G8427 DOCREV CUR MEDS BY ELIG CLIN: HCPCS

## 2025-04-03 PROCEDURE — G8417 CALC BMI ABV UP PARAM F/U: HCPCS

## 2025-04-03 ASSESSMENT — ENCOUNTER SYMPTOMS
SHORTNESS OF BREATH: 0
COUGH: 0
BACK PAIN: 0
RHINORRHEA: 0
FACIAL SWELLING: 0
NAUSEA: 0

## 2025-04-03 NOTE — PROGRESS NOTES
Patient presents to the office with left foot pain. Pt stated the pain has been ongoing for about 3 weeks and stated when she was in [place] and felt a pop. Pt stated she did have some bruising initially and has not gotten better since the incident. She works as a  which she feels intensifies the pain.

## 2025-04-03 NOTE — PROGRESS NOTES
4/3/2025   Chief Complaint   Patient presents with    Foot Injury     Left foot pain         History of Present Illness:                             Umberto Hanson is a 44 y.o. female presenting to the office today as a new patient with left foot pain.  Patient states she was in [place] on a trip visiting her son and was walking around a lot.  Patient states she felt a pop in the middle and lateral aspect of her foot.  She had some initial bruising and discomfort.  She denies any traumatic injury.  She states she has been walking in tennis shoes with mild discomfort that is improving.  She would make sure that she did not have a major injury.    Patient presents to the office with left foot pain. Pt stated the pain has been ongoing for about 3 weeks and stated when she was in [place] and felt a pop. Pt stated she did have some bruising initially and has not gotten better since the incident. She works as a  which she feels intensifies the pain.        Medical History  Patient's medications, allergies, past medical, surgical, social and family histories were reviewed and updated as appropriate.      Review of Systems   Constitutional:  Negative for fever.   HENT:  Negative for facial swelling and rhinorrhea.    Respiratory:  Negative for cough and shortness of breath.    Cardiovascular:  Negative for chest pain.   Gastrointestinal:  Negative for nausea.   Musculoskeletal:  Positive for arthralgias and joint swelling. Negative for back pain, gait problem, myalgias, neck pain and neck stiffness.   Skin:  Negative for pallor and rash.   Neurological:  Negative for facial asymmetry and speech difficulty.   Psychiatric/Behavioral:  Negative for agitation and confusion.                                                Examination:  General Exam:  Vitals: Pulse 78   Resp 12   Ht 1.6 m (5' 3\")   LMP 02/15/2012   SpO2 98%   BMI 40.21 kg/m²    Physical Exam  Constitutional:       General: She is not in

## 2025-04-03 NOTE — PATIENT INSTRUCTIONS
Post op shoe fitted today.  May wear the shoe for longer distances.  Continue weight-bearing as tolerated.  Continue range of motion exercises as instructed.  Ice and elevate as needed.  Tylenol or Motrin for pain.  Follow up in 3 weeks if symptomatic.

## 2025-04-14 RX ORDER — CETIRIZINE HYDROCHLORIDE 10 MG/1
10 TABLET ORAL DAILY
Qty: 90 TABLET | Refills: 3 | Status: SHIPPED | OUTPATIENT
Start: 2025-04-14

## 2025-04-16 ENCOUNTER — OFFICE VISIT (OUTPATIENT)
Dept: ORTHOPEDIC SURGERY | Age: 45
End: 2025-04-16
Payer: MEDICAID

## 2025-04-16 ENCOUNTER — OFFICE VISIT (OUTPATIENT)
Dept: BARIATRICS/WEIGHT MGMT | Age: 45
End: 2025-04-16
Payer: MEDICAID

## 2025-04-16 VITALS
SYSTOLIC BLOOD PRESSURE: 128 MMHG | BODY MASS INDEX: 39.95 KG/M2 | RESPIRATION RATE: 18 BRPM | DIASTOLIC BLOOD PRESSURE: 84 MMHG | OXYGEN SATURATION: 97 % | HEIGHT: 63 IN | WEIGHT: 225.5 LBS | HEART RATE: 84 BPM

## 2025-04-16 VITALS — OXYGEN SATURATION: 97 % | WEIGHT: 224.6 LBS | BODY MASS INDEX: 39.79 KG/M2 | HEART RATE: 80 BPM

## 2025-04-16 DIAGNOSIS — R63.5 WEIGHT GAIN: Primary | ICD-10-CM

## 2025-04-16 DIAGNOSIS — Z90.3 HISTORY OF SLEEVE GASTRECTOMY: ICD-10-CM

## 2025-04-16 DIAGNOSIS — Z79.899 MEDICATION MANAGEMENT: ICD-10-CM

## 2025-04-16 DIAGNOSIS — M54.12 CERVICAL RADICULOPATHY: Primary | ICD-10-CM

## 2025-04-16 PROCEDURE — G8427 DOCREV CUR MEDS BY ELIG CLIN: HCPCS | Performed by: STUDENT IN AN ORGANIZED HEALTH CARE EDUCATION/TRAINING PROGRAM

## 2025-04-16 PROCEDURE — 99213 OFFICE O/P EST LOW 20 MIN: CPT | Performed by: NURSE PRACTITIONER

## 2025-04-16 PROCEDURE — 99213 OFFICE O/P EST LOW 20 MIN: CPT | Performed by: STUDENT IN AN ORGANIZED HEALTH CARE EDUCATION/TRAINING PROGRAM

## 2025-04-16 PROCEDURE — G8427 DOCREV CUR MEDS BY ELIG CLIN: HCPCS | Performed by: NURSE PRACTITIONER

## 2025-04-16 PROCEDURE — G8417 CALC BMI ABV UP PARAM F/U: HCPCS | Performed by: STUDENT IN AN ORGANIZED HEALTH CARE EDUCATION/TRAINING PROGRAM

## 2025-04-16 PROCEDURE — 1036F TOBACCO NON-USER: CPT | Performed by: STUDENT IN AN ORGANIZED HEALTH CARE EDUCATION/TRAINING PROGRAM

## 2025-04-16 PROCEDURE — G8417 CALC BMI ABV UP PARAM F/U: HCPCS | Performed by: NURSE PRACTITIONER

## 2025-04-16 PROCEDURE — 1036F TOBACCO NON-USER: CPT | Performed by: NURSE PRACTITIONER

## 2025-04-16 ASSESSMENT — ENCOUNTER SYMPTOMS
EYE PAIN: 0
COLOR CHANGE: 0
COUGH: 0
PHOTOPHOBIA: 0
BACK PAIN: 0
ABDOMINAL PAIN: 0
NAUSEA: 0
STRIDOR: 0
VOMITING: 0
WHEEZING: 0
EYE REDNESS: 0
FACIAL SWELLING: 0
SHORTNESS OF BREATH: 0

## 2025-04-16 NOTE — PROGRESS NOTES
BARIATRIC SURGERY OFFICE PROGRESS NOTE    SUBJECTIVE:    Patient presenting today referred from Jessica Escobar, APRN - NP, for   Chief Complaint   Patient presents with    Bariatrics Post Op Follow Up     2.5 yr F/U S/P Gastric Sleeve, 11/21/22       .    Vitals:    04/16/25 1251   BP: 128/84   Pulse: 84   Resp: 18   SpO2: 97%        BMI: Body mass index is 39.95 kg/m².     Weight History:   Wt Readings from Last 3 Encounters:   04/16/25 101.9 kg (224 lb 9.6 oz)   04/16/25 102.3 kg (225 lb 8 oz)   03/26/25 103 kg (227 lb)         HPI:Umberto Hanson is a 44 y.o. female presenting in  bariatric 2.5 years post-op POST-OP visit.    Total weight loss/gain:   +7.1 lbs since last visit  +10.6 lbs since surgery  -8.8 lbs since starting program    Changes in health since last visit: weight gain; total thyroidectomy     Pt tracking calories: not following a diet plan; not tracking; unsure of protein intake    Pt exercising: denies    Pt taking vitamins: denies    Fluid intake: good    Past Medical History:   Diagnosis Date    Anxiety 1/10/2001    Arthritis     Chronic depression 07/09/2021    No meds as of 11/11/22    Fibromyalgia     Can’t remember date    GERD (gastroesophageal reflux disease) 2015    H/O bilateral breast reduction surgery 10/31/2019    H/O of hysterectomy with bilateral oophorectomy 04/16/2012    H/O percutaneous left heart catheterization 09/14/2023    normal coronaries    Hx of Doppler echocardiogram 04/27/2022    EF 55-60%. Mild LV hypertrophy. Grade I diastolic dysfunction. Mildly dilated LA. Mild MR and TR.    Hyperthyroidism 11/06/2023    Kidney disorder     left removed .    Migraine 11/11/2022    Mixed hyperlipidemia 01/27/2021    Morbid obesity 11/21/2022    PONV (postoperative nausea and vomiting)     Preop pulmonary/respiratory exam 05/24/2022    Prolonged emergence from general anesthesia     Pulmonic stenosis     Sleep apnea     States does not have since losing weight        Patient Active

## 2025-04-16 NOTE — PATIENT INSTRUCTIONS
You have been referred for an EMG.  [facility]   [address]  [address]   [phone] or [phone]  Follow up with this office once you have SCHEDULED your EMG.

## 2025-04-16 NOTE — PROGRESS NOTES
Returns for left shoulder pain with limited ROM. Persistent 7/10 pain today. Is prescribed Percocet from pain management.     L RTCR w/BT DOS 10/12/23.  
Normal.      Right forearm: Normal.      Left forearm: Normal.      Right wrist: Normal.      Left wrist: Normal.      Right hand: Normal.      Left hand: Normal.      Cervical back: Normal and normal range of motion. No rigidity or tenderness.   Skin:     General: Skin is warm and dry.      Capillary Refill: Capillary refill takes less than 2 seconds.      Findings: No bruising.   Neurological:      General: No focal deficit present.      Mental Status: She is alert and oriented to person, place, and time.      Sensory: Sensory deficit present.      Motor: Weakness present.          LEFT SHOULDER / UPPER EXTREMITY EXAM      OBSERVATION:      Swelling: none   Deformity: none    Discoloration: none   Scapular winging: none    Scars: Well-healed arthroscopy portals       atrophy: none      TENDERNESS / CREPITUS (T/C):      Clavicle -/-    SUPRAspinatus  + minimal/-     AC Jt. +/-    INFRAspinatus -/-     SC Jt. -/-    Deltoid -/-     G. Tuberosity -/-   LH BICEP groove +/-    Acromion: -/-    Midline Neck -/-     Scapular Spine +/-   Trapezium -/-    Inf Border Scapula -/-   GH jt. line - post +/-     Scapulothoracic +/-   GH jt. line - ant +/-     Mild pain at lateral paraspinal musculature of the cervical spine, left side only      ROM: (“*” = with pain)  Left shoulder   Right shoulder     AROM (PROM)  AROM (PROM)    FE   140° (170°) *    170° (175°)     ER 0°   60° (65°)   60° (65°)     ER 90° ABD  not tested  90°  (90°)    IR 90° ABD  not tested  40  (40°)     IR (spine) T12*   T10        STRENGTH: (“*” = with pain) Left shoulder   Right shoulder    SCAPTION   4+/5 *   5/5     IR    5-/5 *    5/5     ER    5-/5 *    5/5     BICEPS   5/5    5/5     Deltoid   5/5    5/5     Triceps  5/5   5/5        EXTREMITY NEURO-VASCULAR EXAM:     Sensation grossly intact to light touch all dermatomal regions.     DTR 2+ Biceps, Triceps, BR and Negative Unruly’s sign    Grossly intact motor function at Elbow, Wrist and

## 2025-04-17 ENCOUNTER — TELEPHONE (OUTPATIENT)
Dept: BARIATRICS/WEIGHT MGMT | Age: 45
End: 2025-04-17

## 2025-04-17 ENCOUNTER — TELEPHONE (OUTPATIENT)
Dept: PHYSICAL MEDICINE AND REHAB | Age: 45
End: 2025-04-17

## 2025-05-07 RX ORDER — SERTRALINE HYDROCHLORIDE 25 MG/1
25 TABLET, FILM COATED ORAL DAILY
Qty: 30 TABLET | Refills: 1 | Status: SHIPPED | OUTPATIENT
Start: 2025-05-07

## 2025-05-07 NOTE — TELEPHONE ENCOUNTER
Pt said she has not started the zoloft, plans to start tomorrow 5/8/2025. Scheduled an appt for next Thursday.

## 2025-05-08 NOTE — TELEPHONE ENCOUNTER
Please push her appointment out 6 weeks since she is just now starting the Zoloft.  We need a 6-week follow-up after she is started the Zoloft

## 2025-05-12 ENCOUNTER — TELEPHONE (OUTPATIENT)
Age: 45
End: 2025-05-12

## 2025-05-14 NOTE — PROGRESS NOTES
Other Type of Procedure:   Cardiac Clearance  ([facility])   Needs Further Workup Not Cleared Cleared ([facility] risk below)     Low Risk     Moderate Risk     High Risk   Additional Notes:   Consult/ Referral   CT Surgery Electrophysiology TAVR Coordination (Jeanne Peabody) [facility] Venous Ablation Consult Watchman Consult  ([facility] Physician Below)        Jay Castaneda   Additional Notes:   Labs Date Last Completed Date Expected Retrieve from outside source?    BMP 1/25      BNP       CBC 11/24      INR       Lipid       TSH 11/24      A1C       Other:      Additional Notes:   Medication/Samples Type of Medication Additional Notes    Samples of      Paper script      Research Study     Other:   Next Office Visit  ([facility])   1 month 3 months 6 months 9 months 1 year PRN 1 week 2 weeks   Additional Notes:

## 2025-05-22 ENCOUNTER — OFFICE VISIT (OUTPATIENT)
Dept: ORTHOPEDIC SURGERY | Age: 45
End: 2025-05-22
Payer: MEDICAID

## 2025-05-22 VITALS
OXYGEN SATURATION: 95 % | HEART RATE: 92 BPM | WEIGHT: 224.8 LBS | RESPIRATION RATE: 16 BRPM | BODY MASS INDEX: 39.83 KG/M2 | HEIGHT: 63 IN

## 2025-05-22 DIAGNOSIS — S93.602A SPRAIN OF LEFT FOOT, INITIAL ENCOUNTER: Primary | ICD-10-CM

## 2025-05-22 PROCEDURE — G8417 CALC BMI ABV UP PARAM F/U: HCPCS

## 2025-05-22 PROCEDURE — G8427 DOCREV CUR MEDS BY ELIG CLIN: HCPCS

## 2025-05-22 PROCEDURE — 1036F TOBACCO NON-USER: CPT

## 2025-05-22 PROCEDURE — 99213 OFFICE O/P EST LOW 20 MIN: CPT

## 2025-05-22 ASSESSMENT — ENCOUNTER SYMPTOMS
SHORTNESS OF BREATH: 0
RHINORRHEA: 0
BACK PAIN: 0
FACIAL SWELLING: 0
NAUSEA: 0
COUGH: 0

## 2025-05-22 NOTE — PATIENT INSTRUCTIONS
Continue weight-bearing as tolerated.  Continue range of motion exercises as instructed.  Ice and elevate as needed.  Tylenol or Motrin for pain.  Central scheduling [phone] will be calling you to schedule your MRI.  If you do not hear from them with in a week give them a call.   Follow up in 3 weeks to discuss the results of the MRI ordered.    Please use over the counter Voltaren gel.       We are committed to providing you the best care possible.  If you receive a survey after visiting one of our offices, please take time to share your experience concerning your physician office visit.  These surveys are confidential and no health information about you is shared.  We are eager to improve for you and we are counting on your feedback to help make that happen.

## 2025-05-22 NOTE — PROGRESS NOTES
Patient seen in office today for left foot pain     DOI:04/03/2025  Date of last injection:     Patient reports 8/10 pain.  RICE and medication are not effective to alleviate pain and reduce swelling.   Pain worsened by: Patient reports painful ROM & weight bearing.     Patient is interested in injection today    Xrays performed on 04/03/2025   
  Respiratory:  Negative for cough and shortness of breath.    Cardiovascular:  Negative for chest pain.   Gastrointestinal:  Negative for nausea.   Musculoskeletal:  Positive for arthralgias and joint swelling. Negative for back pain, gait problem, myalgias, neck pain and neck stiffness.   Skin:  Negative for pallor and rash.   Neurological:  Negative for facial asymmetry and speech difficulty.   Psychiatric/Behavioral:  Negative for agitation and confusion.                                                Examination:  General Exam:  Vitals: Pulse 92   Resp 16   Ht 1.6 m (5' 3\")   Wt 102 kg (224 lb 12.8 oz)   LMP 02/15/2012   SpO2 95%   BMI 39.82 kg/m²    Physical Exam  Constitutional:       General: She is not in acute distress.     Appearance: Normal appearance. She is not ill-appearing.   HENT:      Head: Normocephalic and atraumatic.      Nose: No rhinorrhea.   Eyes:      General: No scleral icterus.        Right eye: No discharge.         Left eye: No discharge.   Cardiovascular:      Pulses: Normal pulses.   Pulmonary:      Effort: Pulmonary effort is normal. No respiratory distress.      Breath sounds: No stridor.   Musculoskeletal:      Cervical back: Normal range of motion.      Comments: Left foot exam: Patient left foot appears with mild swelling without ecchymosis.  The majority of the swelling is at the midfoot and lateral foot.  Patient has mild tenderness to the dorsal midfoot extending to the lateral foot near the location of the ATFL.  Patient maintains full range of motion of the ankle, foot, and toes.  Anterior drawer sign negative for laxity.  Achilles appears intact no step-off deformity.  Dorsal pedal pulse 2+, brisk capillary fill.  Sensation light touch intact throughout all surfaces of the left lower extremity.   Skin:     General: Skin is warm.      Coloration: Skin is not jaundiced or pale.   Neurological:      General: No focal deficit present.      Mental Status: She is alert and

## 2025-05-27 ENCOUNTER — OFFICE VISIT (OUTPATIENT)
Dept: BARIATRICS/WEIGHT MGMT | Age: 45
End: 2025-05-27
Payer: MEDICAID

## 2025-05-27 VITALS
DIASTOLIC BLOOD PRESSURE: 68 MMHG | SYSTOLIC BLOOD PRESSURE: 118 MMHG | HEIGHT: 63 IN | BODY MASS INDEX: 39.42 KG/M2 | HEART RATE: 87 BPM | OXYGEN SATURATION: 96 % | WEIGHT: 222.5 LBS

## 2025-05-27 DIAGNOSIS — Z90.3 HISTORY OF SLEEVE GASTRECTOMY: ICD-10-CM

## 2025-05-27 DIAGNOSIS — E66.9 OBESITY (BMI 30-39.9): Primary | ICD-10-CM

## 2025-05-27 DIAGNOSIS — Z79.899 MEDICATION MANAGEMENT: ICD-10-CM

## 2025-05-27 PROCEDURE — G8428 CUR MEDS NOT DOCUMENT: HCPCS | Performed by: NURSE PRACTITIONER

## 2025-05-27 PROCEDURE — 1036F TOBACCO NON-USER: CPT | Performed by: NURSE PRACTITIONER

## 2025-05-27 PROCEDURE — 99213 OFFICE O/P EST LOW 20 MIN: CPT | Performed by: NURSE PRACTITIONER

## 2025-05-27 PROCEDURE — G8417 CALC BMI ABV UP PARAM F/U: HCPCS | Performed by: NURSE PRACTITIONER

## 2025-05-27 NOTE — PROGRESS NOTES
Chief Complaint   Patient presents with    Weight Management     Medication WM Visit - Discuss Options  Zepbound PA Denied         SUBJECTIVE:    HPI: Patient is here with complaints of weight management.    Obesity with a BMI of Body mass index is 39.41 kg/m²..    Patient presents today to discuss other weight loss medication options since the Zepbound was denied by her insurance.    Current weight: 222 pounds    Diet plan: not tracking calories or following a diet plan     Exercise: denies    I have reviewed the patient's(pertinent information to this visit) medical history, family history(scanned in  the [employer] under \"patient questioner\"), social history and review of systems with the patient today in the office.          Past Surgical History:   Procedure Laterality Date    BICEPS TENDON REPAIR Left 10/12/2023    LEFT BICEPS TENODESIS performed by Rustam Louise DO at [facility] OR    BREAST BIOPSY Left 2010    benign    BREAST REDUCTION SURGERY Bilateral 2003    BREAST SURGERY      CARPAL TUNNEL RELEASE Left 02/16/2021    LEFT CARPAL TUNNEL RELEASE performed by William Lomeli DO at [facility] OR    CHOLECYSTECTOMY      COLONOSCOPY N/A 04/20/2022    COLONOSCOPY POLYPECTOMY SNARE/COLD BIOPSY performed by William Zhao MD at [facility] ENDOSCOPY    COLONOSCOPY N/A 06/12/2023    COLONOSCOPY WITH BIOPSY performed by Kassi Latif MD at [facility] ENDOSCOPY    CYST REMOVAL Right     wrist    HAND SURGERY Bilateral     4/2014    HYSTERECTOMY (CERVIX STATUS UNKNOWN)      HYSTERECTOMY, TOTAL ABDOMINAL (CERVIX REMOVED)      INNER EAR SURGERY      OTHER SURGICAL HISTORY  04/16/2012    Repair vaginal cuff    OVARY REMOVAL      PARTIAL NEPHRECTOMY Left     SHOULDER ARTHROSCOPY Left 10/12/2023    LEFT SHOULDER ARTHROSCOPY ROTATOR CUFF REPAIR; SUBACROMIAL DECOMPRESSION; DISTAL CLAVICLE EXCISION; DEBRIDEMENT performed by Rustam Louise DO at [facility] OR    SHOULDER SURGERY Left 10/12/2023    LEFT SHOULDER ARTHROSCOPY ROTATOR CUFF REPAIR;

## 2025-05-28 ENCOUNTER — COMMUNITY OUTREACH (OUTPATIENT)
Dept: FAMILY MEDICINE CLINIC | Age: 45
End: 2025-05-28

## 2025-06-02 ENCOUNTER — HOSPITAL ENCOUNTER (OUTPATIENT)
Dept: MRI IMAGING | Age: 45
Discharge: HOME | End: 2025-06-02
Payer: MEDICAID

## 2025-06-02 DIAGNOSIS — S93.602A SPRAIN OF LEFT FOOT, INITIAL ENCOUNTER: ICD-10-CM

## 2025-06-02 PROCEDURE — 73718 MRI LOWER EXTREMITY W/O DYE: CPT

## 2025-06-03 ENCOUNTER — TELEPHONE (OUTPATIENT)
Dept: ORTHOPEDIC SURGERY | Age: 45
End: 2025-06-03

## 2025-06-03 NOTE — TELEPHONE ENCOUNTER
Patient does have a follow up appointment until 06/26/2025. Patient is wanting the results before her appointment date.      IMPRESSION:  1. Mild marrow edema in the fifth metatarsal diaphysis suspicious for a   low-grade stress injury. No fracture line identified.  2. Mild degenerative changes of the midfoot.  3. Mild nonspecific dorsal subcutaneous edema.

## 2025-06-12 ENCOUNTER — TELEPHONE (OUTPATIENT)
Dept: CARDIOLOGY CLINIC | Age: 45
End: 2025-06-12

## 2025-06-12 ENCOUNTER — OFFICE VISIT (OUTPATIENT)
Dept: FAMILY MEDICINE CLINIC | Age: 45
End: 2025-06-12
Payer: MEDICAID

## 2025-06-12 VITALS
OXYGEN SATURATION: 67 % | DIASTOLIC BLOOD PRESSURE: 84 MMHG | BODY MASS INDEX: 39.15 KG/M2 | WEIGHT: 221 LBS | SYSTOLIC BLOOD PRESSURE: 122 MMHG | HEART RATE: 97 BPM

## 2025-06-12 DIAGNOSIS — F41.8 DEPRESSION WITH ANXIETY: ICD-10-CM

## 2025-06-12 DIAGNOSIS — Z12.31 ENCOUNTER FOR SCREENING MAMMOGRAM FOR BREAST CANCER: Primary | ICD-10-CM

## 2025-06-12 DIAGNOSIS — E05.90 HYPERTHYROIDISM: ICD-10-CM

## 2025-06-12 PROCEDURE — G8427 DOCREV CUR MEDS BY ELIG CLIN: HCPCS | Performed by: NURSE PRACTITIONER

## 2025-06-12 PROCEDURE — 99214 OFFICE O/P EST MOD 30 MIN: CPT | Performed by: NURSE PRACTITIONER

## 2025-06-12 PROCEDURE — 1036F TOBACCO NON-USER: CPT | Performed by: NURSE PRACTITIONER

## 2025-06-12 PROCEDURE — G8417 CALC BMI ABV UP PARAM F/U: HCPCS | Performed by: NURSE PRACTITIONER

## 2025-06-12 RX ORDER — SERTRALINE HYDROCHLORIDE 25 MG/1
25 TABLET, FILM COATED ORAL DAILY
Qty: 90 TABLET | Refills: 1 | Status: SHIPPED | OUTPATIENT
Start: 2025-06-12

## 2025-06-12 ASSESSMENT — PATIENT HEALTH QUESTIONNAIRE - PHQ9
SUM OF ALL RESPONSES TO PHQ QUESTIONS 1-9: 3
SUM OF ALL RESPONSES TO PHQ QUESTIONS 1-9: 3
2. FEELING DOWN, DEPRESSED OR HOPELESS: NOT AT ALL
5. POOR APPETITE OR OVEREATING: SEVERAL DAYS
6. FEELING BAD ABOUT YOURSELF - OR THAT YOU ARE A FAILURE OR HAVE LET YOURSELF OR YOUR FAMILY DOWN: NOT AT ALL
1. LITTLE INTEREST OR PLEASURE IN DOING THINGS: NOT AT ALL
10. IF YOU CHECKED OFF ANY PROBLEMS, HOW DIFFICULT HAVE THESE PROBLEMS MADE IT FOR YOU TO DO YOUR WORK, TAKE CARE OF THINGS AT HOME, OR GET ALONG WITH OTHER PEOPLE: NOT DIFFICULT AT ALL
SUM OF ALL RESPONSES TO PHQ QUESTIONS 1-9: 3
4. FEELING TIRED OR HAVING LITTLE ENERGY: MORE THAN HALF THE DAYS
SUM OF ALL RESPONSES TO PHQ QUESTIONS 1-9: 3
7. TROUBLE CONCENTRATING ON THINGS, SUCH AS READING THE NEWSPAPER OR WATCHING TELEVISION: NOT AT ALL
9. THOUGHTS THAT YOU WOULD BE BETTER OFF DEAD, OR OF HURTING YOURSELF: NOT AT ALL
8. MOVING OR SPEAKING SO SLOWLY THAT OTHER PEOPLE COULD HAVE NOTICED. OR THE OPPOSITE, BEING SO FIGETY OR RESTLESS THAT YOU HAVE BEEN MOVING AROUND A LOT MORE THAN USUAL: NOT AT ALL
3. TROUBLE FALLING OR STAYING ASLEEP: NOT AT ALL

## 2025-06-12 NOTE — PROGRESS NOTES
2025     Umberto Hanson (:  1980) is a 44 y.o. female, here for evaluation of the following medical concerns:    History of Present Illness  The patient presents for evaluation of anxiety and depression, hypothyroidism, and health maintenance.    Anxiety and Depression  - She is currently on a regimen of Zoloft 25 mg, which she reports has significantly improved her mood, leading to a state of increased calmness.  - She experienced hallucinations during the initial week of treatment, including visual disturbances and heightened paranoia, but these symptoms have since subsided.  - She reports no suicidal ideation or self-harm tendencies.  - Additionally, she notes a decrease in irritability.  - She takes the medication at night.    Hypothyroidism  - Her Synthroid dosage has been adjusted to 100 mcg.  - She is seeking a refill of this prescription.    Health Maintenance  - Her last mammogram was conducted on 2025.  - She expresses reluctance towards undergoing another mammogram due to discomfort experienced during the previous procedure.    Supplemental information: She is requesting a handicap placard for 5 years as she is going to have surgery on her foot.       Zoloft 25   Wanting to leave med the same   Paranoia at first but resolved after one week   Denies suicidal self harm thought plan idea   Less irritable       Needing mammo   Does not like due to pain           Prior to Visit Medications    Medication Sig Taking? Authorizing Provider   sertraline (ZOLOFT) 25 MG tablet Take 1 tablet by mouth daily Yes Jessica Escobar, APRN - NP   cetirizine (ZYRTEC) 10 MG tablet TAKE 1 TABLET BY MOUTH EVERY DAY Yes Jessica Escobar APRN - NP   ondansetron (ZOFRAN-ODT) 4 MG disintegrating tablet TAKE 1 TABLET BY MOUTH EVERY 8 HOURS AS NEEDED FOR NAUSEA AND VOMITING Yes Jessica Escobar, APRN - NP   albuterol sulfate HFA (PROVENTIL;VENTOLIN;PROAIR) 108 (90 Base) MCG/ACT inhaler INHALE 2 PUFFS INTO THE LUNGS 4

## 2025-06-18 ENCOUNTER — CLINICAL SUPPORT (OUTPATIENT)
Dept: CARDIOLOGY CLINIC | Age: 45
End: 2025-06-18
Payer: MEDICAID

## 2025-06-18 DIAGNOSIS — E78.2 MIXED HYPERLIPIDEMIA: Primary | ICD-10-CM

## 2025-06-18 PROCEDURE — 36415 COLL VENOUS BLD VENIPUNCTURE: CPT | Performed by: INTERNAL MEDICINE

## 2025-06-19 ENCOUNTER — OFFICE VISIT (OUTPATIENT)
Dept: ORTHOPEDIC SURGERY | Age: 45
End: 2025-06-19
Payer: MEDICAID

## 2025-06-19 ENCOUNTER — OFFICE VISIT (OUTPATIENT)
Dept: CARDIOLOGY CLINIC | Age: 45
End: 2025-06-19
Payer: MEDICAID

## 2025-06-19 VITALS
SYSTOLIC BLOOD PRESSURE: 128 MMHG | HEIGHT: 63 IN | BODY MASS INDEX: 39.57 KG/M2 | DIASTOLIC BLOOD PRESSURE: 82 MMHG | HEART RATE: 68 BPM

## 2025-06-19 VITALS
RESPIRATION RATE: 16 BRPM | HEIGHT: 63 IN | BODY MASS INDEX: 39.58 KG/M2 | HEART RATE: 73 BPM | OXYGEN SATURATION: 97 % | WEIGHT: 223.4 LBS

## 2025-06-19 DIAGNOSIS — R06.02 SOB (SHORTNESS OF BREATH): ICD-10-CM

## 2025-06-19 DIAGNOSIS — E78.5 DYSLIPIDEMIA: ICD-10-CM

## 2025-06-19 DIAGNOSIS — E66.01 MORBIDLY OBESE (HCC): ICD-10-CM

## 2025-06-19 DIAGNOSIS — S92.352A CLOSED FRACTURE OF BASE OF FIFTH METATARSAL BONE OF LEFT FOOT, INITIAL ENCOUNTER: Primary | ICD-10-CM

## 2025-06-19 DIAGNOSIS — R42 DIZZINESS: ICD-10-CM

## 2025-06-19 DIAGNOSIS — R00.2 PALPITATIONS: Primary | ICD-10-CM

## 2025-06-19 DIAGNOSIS — R07.9 CHEST PAIN, UNSPECIFIED TYPE: ICD-10-CM

## 2025-06-19 DIAGNOSIS — E78.2 MIXED HYPERLIPIDEMIA: ICD-10-CM

## 2025-06-19 DIAGNOSIS — R00.2 PALPITATION: ICD-10-CM

## 2025-06-19 PROCEDURE — G8417 CALC BMI ABV UP PARAM F/U: HCPCS | Performed by: INTERNAL MEDICINE

## 2025-06-19 PROCEDURE — G8417 CALC BMI ABV UP PARAM F/U: HCPCS

## 2025-06-19 PROCEDURE — G8427 DOCREV CUR MEDS BY ELIG CLIN: HCPCS

## 2025-06-19 PROCEDURE — 1036F TOBACCO NON-USER: CPT

## 2025-06-19 PROCEDURE — 99214 OFFICE O/P EST MOD 30 MIN: CPT | Performed by: INTERNAL MEDICINE

## 2025-06-19 PROCEDURE — 1036F TOBACCO NON-USER: CPT | Performed by: INTERNAL MEDICINE

## 2025-06-19 PROCEDURE — G8427 DOCREV CUR MEDS BY ELIG CLIN: HCPCS | Performed by: INTERNAL MEDICINE

## 2025-06-19 PROCEDURE — 99214 OFFICE O/P EST MOD 30 MIN: CPT

## 2025-06-19 NOTE — PROGRESS NOTES
Ernestina Stewart MD        OFFICE  FOLLOWUP NOTE    Chief complaints: patient is here for management of  h/O gastric sleeve,HYPERTHYRODISM, GERD, preop for left shoulder     Subjective: patient has left foot stress fracture in [place], no chest pain, no shortness of breath, no dizziness, no palpitations    HPI Umberto is a 44 y.o.year old who  has a past medical history of Anxiety, Arthritis, Chronic depression, Fibromyalgia, GERD (gastroesophageal reflux disease), H/O bilateral breast reduction surgery, H/O of hysterectomy with bilateral oophorectomy, H/O percutaneous left heart catheterization, Hx of Doppler echocardiogram, Hyperthyroidism, Kidney disorder, Migraine, Mixed hyperlipidemia, Morbid obesity (HCC), PONV (postoperative nausea and vomiting), Preop pulmonary/respiratory exam, Prolonged emergence from general anesthesia, Pulmonic stenosis, and Sleep apnea. and presents for management of h/O gastric sleeve,HYPERTHYRODISM, GERD, preop for left shoulder  h/O gastric sleeve,HYPERTHYRODISM, GERD, preop for left shoulder , which are well controlled      Current Outpatient Medications   Medication Sig Dispense Refill    sertraline (ZOLOFT) 25 MG tablet Take 1 tablet by mouth daily 90 tablet 1    cetirizine (ZYRTEC) 10 MG tablet TAKE 1 TABLET BY MOUTH EVERY DAY 90 tablet 3    ondansetron (ZOFRAN-ODT) 4 MG disintegrating tablet TAKE 1 TABLET BY MOUTH EVERY 8 HOURS AS NEEDED FOR NAUSEA AND VOMITING 15 tablet 0    albuterol sulfate HFA (PROVENTIL;VENTOLIN;PROAIR) 108 (90 Base) MCG/ACT inhaler INHALE 2 PUFFS INTO THE LUNGS 4 TIMES DAILY AS NEEDED FOR WHEEZING. 8.5 each 5    hydrOXYzine HCl (ATARAX) 25 MG tablet Take 1 tablet by mouth every 8 hours as needed for Anxiety 10 tablet 0    Fremanezumab-vfrm (AJOVY) 225 MG/1.5ML SOAJ Inject 225 mg into the skin every 30 days 1 Adjustable Dose Pre-filled Pen Syringe 11    levothyroxine (SYNTHROID) 137 MCG tablet Take 1 tablet by mouth Daily [facility] manages

## 2025-06-19 NOTE — PATIENT INSTRUCTIONS
Short boot was given in office today.  Knee scooter for work     Continue weight-bearing as tolerated.  Continue range of motion exercises as instructed.  Ice and elevate as needed.  Tylenol or Motrin for pain.  Follow up in 3 weeks.    We are committed to providing you the best care possible.  If you receive a survey after visiting one of our offices, please take time to share your experience concerning your physician office visit.  These surveys are confidential and no health information about you is shared.  We are eager to improve for you and we are counting on your feedback to help make that happen.

## 2025-06-19 NOTE — PROGRESS NOTES
CLINICAL STAFF DOCUMENTATION    Dr. Ernestina Hanson  1980  [id number]    Have you had any Chest Pain recently? - Last week pt had an episode of tight and sharp pain lasting for a couple minutes, nothing since.   Have you had any Shortness of Breath - No  Have you had any palpitations recently? - No    Do you have any edema - swelling in RIGHT LEG FOR WEEKS NOW.      Is the patient on any of the following medications -   If Yes DO EKG - Needs done every 3 months    When did you have your last labs drawn 5/2025  What doctor ordered AHMED   Do we have the labs in their chart Yes    Do you need any prescriptions refilled? - No    Do you have a surgery or procedure scheduled in the near future - No    Do use tobacco products? - No  Do you drink alcohol? - No  Do you use any illicit drugs? - No  Caffeine? - No  How much caffeine? .     Check medication list thoroughly!!! AND RECONCILE OUTSIDE MEDICATIONS  If dose has changed change the entire order not just the MG  BE SURE TO ASK PATIENT IF THEY NEED MEDICATION REFILLS  Verify Pharmacy and update if incorrect    Add to every patient's \"wrap up\" the following dot phrase AFTERVISITCARDIOHEARTHOUSE and ensure we explain this to our patients

## 2025-06-19 NOTE — PATIENT INSTRUCTIONS
Thank you for allowing us to care for you today!   We want to ensure we can follow your treatment plan and we strive to give you the best outcomes and experience possible.   If you ever have a life threatening emergency and call 911 - for an ambulance (EMS)  REMEMBER  Our providers can only care for you at:   [facility] or [facility]   Even if you have someone take you or you drive yourself we can only care for you in a [facility] facility. Our providers are not setup at the other healthcare locations!    PLEASE CALL OUR OFFICE DURING NORMAL BUSINESS HOURS  Monday through Friday 8 am to 5 pm  AFTER HOURS the physician on-call cannot help with scheduling, rescheduling, procedure instruction questions or any type of medication need or issue.  [facility] P:[phone] - [facility] P:[phone] - [facility] P:[phone]      If you receive a survey:  We would appreciate you taking the time to share your experience concerning your provider visit in the office.    These surveys are confidential!  We are eager to improve and are counting on you to share your feedback so we can ensure you get the best care possible.

## 2025-06-27 ASSESSMENT — ENCOUNTER SYMPTOMS
RHINORRHEA: 0
BACK PAIN: 0
COUGH: 0
SHORTNESS OF BREATH: 0
FACIAL SWELLING: 0
NAUSEA: 0

## 2025-06-27 NOTE — PROGRESS NOTES
Patient seen in office today for left foot injury     Patient reports 6/10 pain.  RICE and medication are not effective to alleviate pain and reduce swelling.  Pain worsened by: Patient reports painful ROM & weight bearing.     MRI performed 06/02/2025 impression below.    IMPRESSION:  1. Mild marrow edema in the fifth metatarsal diaphysis suspicious for a   low-grade stress injury. No fracture line identified.  2. Mild degenerative changes of the midfoot.  3. Mild nonspecific dorsal subcutaneous edema.  
the dorsal midfoot extending to the lateral foot near the location of the ATFL.  Patient maintains full range of motion of the ankle, foot, and toes.  Anterior drawer sign negative for laxity.  Achilles appears intact no step-off deformity.  Dorsal pedal pulse 2+, brisk capillary fill.  Sensation light touch intact throughout all surfaces of the left lower extremity.   Skin:     General: Skin is warm.      Coloration: Skin is not jaundiced or pale.   Neurological:      General: No focal deficit present.      Mental Status: She is alert and oriented to person, place, and time.   Psychiatric:         Mood and Affect: Mood normal.         Behavior: Behavior normal.        Diagnostic testing:  X-rays reviewed in office, I independently reviewed the films in the office today:     MRI imaging results:  IMPRESSION:  1. Mild marrow edema in the fifth metatarsal diaphysis suspicious for a   low-grade stress injury. No fracture line identified.  2. Mild degenerative changes of the midfoot.  3. Mild nonspecific dorsal subcutaneous edema.    Imaging results from today's visit:  Narrative & Impression  3 views of the left foot show evidence of mild chronic spurring at the posterior aspect of the calcaneus along the Achilles tendon insertion site.  There is a small accessory navicular bone present immediately at the midfoot.  Normal alignment of the articulations of the foot and hindfoot.  No acute abnormalities or fractures.  Normal bone density.  No soft tissue swelling.     Impression: Mild spurring posterior calcaneus    Office Procedures:  Orders Placed This Encounter   Procedures    Scooter       Assessment and Plan  1.  Midfoot sprain, left    -Myself and patient reviewed her MRI results in detail.  I explained to her where she is experiencing a stress response in the affected metatarsals.  I advised her that she does not have an active fracture at this time.  We discussed that this is a nonsurgical situation and she would

## 2025-07-07 ENCOUNTER — TELEPHONE (OUTPATIENT)
Dept: PHYSICAL MEDICINE AND REHAB | Age: 45
End: 2025-07-07

## 2025-07-07 NOTE — TELEPHONE ENCOUNTER
I called Ami to confirm her appt on 7/8. Patient said she wanted to reschedule because she has to work during appt time. Patient is rescheduled to have LUE EMG on 9/30 at 1:15 pm.

## 2025-07-10 ENCOUNTER — OFFICE VISIT (OUTPATIENT)
Dept: ORTHOPEDIC SURGERY | Age: 45
End: 2025-07-10
Payer: MEDICAID

## 2025-07-10 DIAGNOSIS — S93.602A SPRAIN OF LEFT FOOT, INITIAL ENCOUNTER: Primary | ICD-10-CM

## 2025-07-10 PROCEDURE — G8417 CALC BMI ABV UP PARAM F/U: HCPCS

## 2025-07-10 PROCEDURE — 99213 OFFICE O/P EST LOW 20 MIN: CPT

## 2025-07-10 PROCEDURE — 1036F TOBACCO NON-USER: CPT

## 2025-07-10 PROCEDURE — G8427 DOCREV CUR MEDS BY ELIG CLIN: HCPCS

## 2025-07-10 NOTE — PATIENT INSTRUCTIONS
Continue weight-bearing as tolerated in walking boot  Continue range of motion exercises as instructed.  Ice and elevate as needed.  Tylenol or Motrin for pain.   Referral to Dr Guevara

## 2025-07-18 ASSESSMENT — ENCOUNTER SYMPTOMS
SHORTNESS OF BREATH: 0
FACIAL SWELLING: 0
COUGH: 0
NAUSEA: 0
BACK PAIN: 0
RHINORRHEA: 0

## 2025-07-18 NOTE — PROGRESS NOTES
Patient returns to the office today for FU of the left foot injury. Pt states she is still in a lot of pain globally in the left foot. Pt states she does wear her boot at all times but is not helping with the pain.     
General: No scleral icterus.        Right eye: No discharge.         Left eye: No discharge.   Cardiovascular:      Pulses: Normal pulses.   Pulmonary:      Effort: Pulmonary effort is normal. No respiratory distress.      Breath sounds: No stridor.   Musculoskeletal:      Cervical back: Normal range of motion.      Comments: Left foot exam: Patient left foot appears with mild swelling without ecchymosis.  The majority of the swelling is at the midfoot and lateral foot.  Patient has mild tenderness to the dorsal midfoot extending to the lateral foot near the location of the ATFL.  Patient maintains full range of motion of the ankle, foot, and toes.  Anterior drawer sign negative for laxity.  Achilles appears intact no step-off deformity.  Dorsal pedal pulse 2+, brisk capillary fill.  Sensation light touch intact throughout all surfaces of the left lower extremity.   Skin:     General: Skin is warm.      Coloration: Skin is not jaundiced or pale.   Neurological:      General: No focal deficit present.      Mental Status: She is alert and oriented to person, place, and time.   Psychiatric:         Mood and Affect: Mood normal.         Behavior: Behavior normal.        Diagnostic testing:  X-rays reviewed in office, I independently reviewed the films in the office today:     MRI imaging results:  IMPRESSION:  1. Mild marrow edema in the fifth metatarsal diaphysis suspicious for a   low-grade stress injury. No fracture line identified.  2. Mild degenerative changes of the midfoot.  3. Mild nonspecific dorsal subcutaneous edema.    Imaging results from today's visit:  Narrative & Impression  3 views of the left foot show evidence of mild chronic spurring at the posterior aspect of the calcaneus along the Achilles tendon insertion site.  There is a small accessory navicular bone present immediately at the midfoot.  Normal alignment of the articulations of the foot and hindfoot.  No acute abnormalities or fractures.

## 2025-07-25 ENCOUNTER — OFFICE VISIT (OUTPATIENT)
Dept: FAMILY MEDICINE CLINIC | Age: 45
End: 2025-07-25
Payer: MEDICAID

## 2025-07-25 VITALS
SYSTOLIC BLOOD PRESSURE: 130 MMHG | HEART RATE: 95 BPM | WEIGHT: 223.2 LBS | DIASTOLIC BLOOD PRESSURE: 78 MMHG | OXYGEN SATURATION: 97 % | BODY MASS INDEX: 39.54 KG/M2

## 2025-07-25 DIAGNOSIS — M54.41 CHRONIC BILATERAL LOW BACK PAIN WITH BILATERAL SCIATICA: ICD-10-CM

## 2025-07-25 DIAGNOSIS — S63.502D SPRAIN OF LEFT WRIST, SUBSEQUENT ENCOUNTER: ICD-10-CM

## 2025-07-25 DIAGNOSIS — G89.29 CHRONIC BILATERAL LOW BACK PAIN WITH BILATERAL SCIATICA: ICD-10-CM

## 2025-07-25 DIAGNOSIS — M54.42 CHRONIC BILATERAL LOW BACK PAIN WITH BILATERAL SCIATICA: ICD-10-CM

## 2025-07-25 DIAGNOSIS — M79.672 LEFT FOOT PAIN: ICD-10-CM
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
## 2025-07-25 PROCEDURE — 99214 OFFICE O/P EST MOD 30 MIN: CPT | Performed by: NURSE PRACTITIONER

## 2025-07-25 PROCEDURE — 1036F TOBACCO NON-USER: CPT | Performed by: NURSE PRACTITIONER

## 2025-07-25 PROCEDURE — G8427 DOCREV CUR MEDS BY ELIG CLIN: HCPCS | Performed by: NURSE PRACTITIONER

## 2025-07-25 PROCEDURE — G8417 CALC BMI ABV UP PARAM F/U: HCPCS | Performed by: NURSE PRACTITIONER

## 2025-07-25 RX ORDER — CYCLOBENZAPRINE HCL 10 MG
TABLET ORAL
COMMUNITY
Start: 2025-07-23

## 2025-07-25 RX ORDER — CETIRIZINE HYDROCHLORIDE 10 MG/1
10 TABLET ORAL DAILY
Qty: 90 TABLET | Refills: 3 | Status: SHIPPED | OUTPATIENT
Start: 2025-07-25

## 2025-07-25 NOTE — PROGRESS NOTES
Advised to schedule an appointment with [facility] for further evaluation and management         Refill Zyrtec for her allergy control  Follow-up with pain management, Ortho and podiatry next    Chronic pain, already following with pain management for chronic bilateral low back pain bilateral shoulder pain fibromyalgia      All care gaps addressed     All questions answered    Discussed use, benefit, and side effects of prescribed medications.  Barriers to compliance discussed.  All patient questions answered.  Pt voiced understanding.     Present to the ER for any emergent or acute symptoms not managed at home or in office.    Please note that this chart was generated using dragon and or RIO dictation software.  Although every effort was made to ensure the accuracy of this automated transcription, some errors in transcription may have occurred.    The patient (or guardian, if applicable) and other individuals in attendance with the patient were advised that Artificial Intelligence will be utilized during this visit to record, process the conversation to generate a clinical note, and support improvement of the AI technology. The patient (or guardian, if applicable) and other individuals in attendance at the appointment consented to the use of AI, including the recording.                    No follow-ups on file.    An electronic signature was used to authenticate this note.    --VIOLET Oro - NP on 7/25/2025 at 3:13 PM

## 2025-08-04 DIAGNOSIS — M25.512 BILATERAL SHOULDER PAIN, UNSPECIFIED CHRONICITY: Primary | ICD-10-CM

## 2025-08-04 DIAGNOSIS — M25.511 BILATERAL SHOULDER PAIN, UNSPECIFIED CHRONICITY: Primary | ICD-10-CM

## 2025-08-07 ENCOUNTER — HOSPITAL ENCOUNTER (OUTPATIENT)
Dept: GENERAL RADIOLOGY | Age: 45
Discharge: HOME | End: 2025-08-07
Payer: MEDICAID

## 2025-08-07 DIAGNOSIS — M25.512 BILATERAL SHOULDER PAIN, UNSPECIFIED CHRONICITY: ICD-10-CM

## 2025-08-07 DIAGNOSIS — M25.511 BILATERAL SHOULDER PAIN, UNSPECIFIED CHRONICITY: ICD-10-CM

## 2025-08-07 PROCEDURE — 73030 X-RAY EXAM OF SHOULDER: CPT

## 2025-08-08 ENCOUNTER — OFFICE VISIT (OUTPATIENT)
Dept: ORTHOPEDIC SURGERY | Age: 45
End: 2025-08-08
Payer: MEDICAID

## 2025-08-08 VITALS — OXYGEN SATURATION: 98 % | HEART RATE: 78 BPM

## 2025-08-08 DIAGNOSIS — S69.82XA TFCC (TRIANGULAR FIBROCARTILAGE COMPLEX) INJURY, LEFT, INITIAL ENCOUNTER: Primary | ICD-10-CM

## 2025-08-08 PROCEDURE — G8427 DOCREV CUR MEDS BY ELIG CLIN: HCPCS | Performed by: STUDENT IN AN ORGANIZED HEALTH CARE EDUCATION/TRAINING PROGRAM

## 2025-08-08 PROCEDURE — G8417 CALC BMI ABV UP PARAM F/U: HCPCS | Performed by: STUDENT IN AN ORGANIZED HEALTH CARE EDUCATION/TRAINING PROGRAM

## 2025-08-08 PROCEDURE — 1036F TOBACCO NON-USER: CPT | Performed by: STUDENT IN AN ORGANIZED HEALTH CARE EDUCATION/TRAINING PROGRAM

## 2025-08-08 PROCEDURE — 99214 OFFICE O/P EST MOD 30 MIN: CPT | Performed by: STUDENT IN AN ORGANIZED HEALTH CARE EDUCATION/TRAINING PROGRAM

## 2025-08-08 ASSESSMENT — ENCOUNTER SYMPTOMS
ABDOMINAL PAIN: 0
COLOR CHANGE: 0
FACIAL SWELLING: 0
WHEEZING: 0
NAUSEA: 0
BACK PAIN: 0
PHOTOPHOBIA: 0
SHORTNESS OF BREATH: 0
STRIDOR: 0
COUGH: 0
EYE REDNESS: 0
EYE PAIN: 0
VOMITING: 0

## 2025-08-19 ENCOUNTER — HOSPITAL ENCOUNTER (OUTPATIENT)
Dept: MRI IMAGING | Age: 45
Discharge: HOME | End: 2025-08-19
Attending: STUDENT IN AN ORGANIZED HEALTH CARE EDUCATION/TRAINING PROGRAM
Payer: MEDICAID

## 2025-08-19 DIAGNOSIS — S69.82XA TFCC (TRIANGULAR FIBROCARTILAGE COMPLEX) INJURY, LEFT, INITIAL ENCOUNTER: ICD-10-CM

## 2025-08-19 PROCEDURE — 73221 MRI JOINT UPR EXTREM W/O DYE: CPT

## 2025-09-05 ENCOUNTER — OFFICE VISIT (OUTPATIENT)
Dept: ORTHOPEDIC SURGERY | Age: 45
End: 2025-09-05
Payer: MEDICAID

## 2025-09-05 VITALS — OXYGEN SATURATION: 100 % | HEART RATE: 59 BPM

## 2025-09-05 DIAGNOSIS — S69.82XA TFCC (TRIANGULAR FIBROCARTILAGE COMPLEX) INJURY, LEFT, INITIAL ENCOUNTER: Primary | ICD-10-CM

## 2025-09-05 PROCEDURE — G8428 CUR MEDS NOT DOCUMENT: HCPCS | Performed by: STUDENT IN AN ORGANIZED HEALTH CARE EDUCATION/TRAINING PROGRAM

## 2025-09-05 PROCEDURE — 1036F TOBACCO NON-USER: CPT | Performed by: STUDENT IN AN ORGANIZED HEALTH CARE EDUCATION/TRAINING PROGRAM

## 2025-09-05 PROCEDURE — 99214 OFFICE O/P EST MOD 30 MIN: CPT | Performed by: STUDENT IN AN ORGANIZED HEALTH CARE EDUCATION/TRAINING PROGRAM

## 2025-09-05 PROCEDURE — G8417 CALC BMI ABV UP PARAM F/U: HCPCS | Performed by: STUDENT IN AN ORGANIZED HEALTH CARE EDUCATION/TRAINING PROGRAM

## 2025-09-05 ASSESSMENT — ENCOUNTER SYMPTOMS
COUGH: 0
STRIDOR: 0
SHORTNESS OF BREATH: 0
BACK PAIN: 0
EYE REDNESS: 0
COLOR CHANGE: 0
PHOTOPHOBIA: 0
ABDOMINAL PAIN: 0
NAUSEA: 0
FACIAL SWELLING: 0
WHEEZING: 0
EYE PAIN: 0
VOMITING: 0

## (undated) DEVICE — VESSEL SEALER EXTEND: Brand: ENDOWRIST

## (undated) DEVICE — PAD,ABDOMINAL,5"X9",ST,LF,25/BX: Brand: MEDLINE INDUSTRIES, INC.

## (undated) DEVICE — MARKER SURG SKIN UTIL REGULAR/FINE 2 TIP W/ RUL AND 9 LBL

## (undated) DEVICE — CADIERE FORCEPS: Brand: ENDOWRIST

## (undated) DEVICE — 3M™ STERI-DRAPE™ INSTRUMENT POUCH 1018: Brand: STERI-DRAPE™

## (undated) DEVICE — FORCEPS BX L240CM JAW DIA2.8MM L CAP W/ NDL MIC MESH TOOTH

## (undated) DEVICE — SUREFORM 45: Brand: SUREFORM

## (undated) DEVICE — Z DISCONTINUED (USE MFG CAT MVABO)  TUBING GAS SAMPLING STD 6.5 FT FEMALE CONN SMRT CAPNOLINE

## (undated) DEVICE — CANNULA ARTHSCP L7CM DIA7MM TRNSLUC THRD FLX W/ NO SQUIRT

## (undated) DEVICE — PACK,BASIC,IX: Brand: MEDLINE

## (undated) DEVICE — GLOVE ORANGE PI 8   MSG9080

## (undated) DEVICE — SHOULDER STABILIZATION KIT,                                    DISPOSABLE 12 PER BOX

## (undated) DEVICE — GLOVE SURG SZ 7 L12IN FNGR THK79MIL GRN LTX FREE

## (undated) DEVICE — NEEDLE SUT PASS FOR ROT CUF LABRAL REP MULTFI SCORPION

## (undated) DEVICE — DRESSING,GAUZE,XEROFORM,CURAD,1"X8",ST: Brand: CURAD

## (undated) DEVICE — SUTURE SZ 0 27IN 5/8 CIR UR-6  TAPER PT VIOLET ABSRB VICRYL J603H

## (undated) DEVICE — GLOVE ORANGE PI 7 1/2   MSG9075

## (undated) DEVICE — SEAL

## (undated) DEVICE — SET TBNG DISP TIP FOR AHTO

## (undated) DEVICE — SUTURE STRATAFIX SPRL PDS + VLT 3-0 15CM DISPOSABLE/SNGLE SXPP1B420

## (undated) DEVICE — GLOVE SURG SZ 65 L12IN FNGR THK79MIL GRN LTX FREE

## (undated) DEVICE — BANDAGE COMPR W3INXL5YD WHT BGE POLY COT M E WRP WV HK AND

## (undated) DEVICE — PADDING,UNDERCAST,COTTON, 3X4YD STERILE: Brand: MEDLINE

## (undated) DEVICE — ELECTRODE ES AD CRDLSS PT RET REM POLYHESIVE

## (undated) DEVICE — DRESSING PETRO W3XL3IN OIL EMUL N ADH GZ KNIT IMPREG CELOS

## (undated) DEVICE — WEREWOLF FLOW 90 COBLATION WAND: Brand: COBLATION

## (undated) DEVICE — 3M™ STERI-DRAPE™ INCISE DRAPE 1050 (60CM X 45CM): Brand: STERI-DRAPE™

## (undated) DEVICE — GLOVE SURG SZ 6 CRM LTX FREE POLYISOPRENE POLYMER BEAD ANTI

## (undated) DEVICE — CLASSIC CLD THER SYS

## (undated) DEVICE — REDUCER: Brand: ENDOWRIST

## (undated) DEVICE — STAPLER 60: Brand: SUREFORM

## (undated) DEVICE — APPLICATOR MEDICATED 26 CC SOLUTION HI LT ORNG CHLORAPREP

## (undated) DEVICE — GLOVE SURG SZ 6 THK91MIL LTX FREE SYN POLYISOPRENE ANTI

## (undated) DEVICE — 3M™ STERI-DRAPE™ U-DRAPE 1067 1067 5/BX 4BX/CS/CTN&#X20;: Brand: STERI-DRAPE™

## (undated) DEVICE — [AGGRESSIVE PLUS CUTTER, ARTHROSCOPIC SHAVER BLADE,  DO NOT RESTERILIZE,  DO NOT USE IF PACKAGE IS DAMAGED,  KEEP DRY,  KEEP AWAY FROM SUNLIGHT]: Brand: FORMULA

## (undated) DEVICE — SHOULDER PK

## (undated) DEVICE — NEEDLE HYPO 20GA L1.5IN YEL POLYPR HUB S STL REG BVL STR

## (undated) DEVICE — THIN OFFSET (9.0 X 0.38 X 25.0MM)

## (undated) DEVICE — LINER SUCT CANSTR 1000CC CLR SEMI RIG W/ POR HYDROPHOBIC

## (undated) DEVICE — TOWEL,OR,DSP,ST,BLUE,STD,6/PK,12PK/CS: Brand: MEDLINE

## (undated) DEVICE — SUTURE FIBERTAPE FIBERWIRE SZ 2-0 30IN NONABSORB BLU AR72377

## (undated) DEVICE — SUTURE ETHLN SZ 3-0 L30IN NONABSORBABLE BLK FS-1 L24MM 3/8 669H

## (undated) DEVICE — SOLUTION IV IRRIG POUR BRL 0.9% SODIUM CHL 2F7124

## (undated) DEVICE — BLADE CLIPPER GEN PURP NS

## (undated) DEVICE — STAPLER 60 RELOAD WHITE: Brand: SUREFORM

## (undated) DEVICE — [RESECTOR CUTTER, ARTHROSCOPIC SHAVER BLADE,  DO NOT RESTERILIZE,  DO NOT USE IF PACKAGE IS DAMAGED,  KEEP DRY,  KEEP AWAY FROM SUNLIGHT]: Brand: FORMULA

## (undated) DEVICE — CANNULA SEAL

## (undated) DEVICE — TUBING PMP L16FT MAIN DISP FOR AR-6400 AR-6475

## (undated) DEVICE — SYRINGE MED 20ML STD CLR PLAS LUERLOCK TIP N CTRL DISP

## (undated) DEVICE — GOWN,ECLIPSE,POLYRNF,BRTHSLV,L,30/CS: Brand: MEDLINE

## (undated) DEVICE — INTENDED FOR TISSUE SEPARATION, AND OTHER PROCEDURES THAT REQUIRE A SHARP SURGICAL BLADE TO PUNCTURE OR CUT.: Brand: BARD-PARKER ® STAINLESS STEEL BLADES

## (undated) DEVICE — SUTURE VCRL SZ 0 L27IN ABSRB UD L36MM CT-1 1/2 CIR J260H

## (undated) DEVICE — BANDAGE,SELF ADHRNT,COFLEX,4"X5YD,STRL: Brand: COLABEL

## (undated) DEVICE — SUTURE ABSORBABLE 3-0 PS-1 18 IN UD MONOCRYL + STRATAFIX SXMP1B102

## (undated) DEVICE — ARM DRAPE

## (undated) DEVICE — ADHESIVE SKIN CLSR 0.7ML TOP DERMBND ADV

## (undated) DEVICE — BLADE SAW RESECTOR CUT 4MM

## (undated) DEVICE — COTTON UNDERCAST PADDING,CRIMPED FINISH: Brand: WEBRIL

## (undated) DEVICE — HEWSON SUTURE RETRIEVER: Brand: HEWSON SUTURE RETRIEVER

## (undated) DEVICE — 3M™ IOBAN™ 2 ANTIMICROBIAL INCISE DRAPE 6650EZ: Brand: IOBAN™ 2

## (undated) DEVICE — GLOVE ORANGE PI 7   MSG9070

## (undated) DEVICE — GOWN,SIRUS,POLYRNF,BRTHSLV,XLN/XL,20/CS: Brand: MEDLINE

## (undated) DEVICE — 3M™ MICROFOAM™ TAPE 1528-4: Brand: 3M™ MICROFOAM™

## (undated) DEVICE — SPONGE GZ W4XL8IN COT WVN 12 PLY

## (undated) DEVICE — SPONGE LAP W18XL18IN WHT COT 4 PLY FLD STRUNG RADPQ DISP ST 2 PER PACK

## (undated) DEVICE — DRAPE,U/ SHT,SPLIT,PLAS,STERIL: Brand: MEDLINE

## (undated) DEVICE — LAPAROSCOPIC TROCAR SLEEVE/SINGLE USE: Brand: KII® OPTICAL ACCESS SYSTEM

## (undated) DEVICE — Z INACTIVE USE 2863041 SPONGE GZ W4XL4IN 100% COT 16 PLY RADPQ HIGHLY ABSRB

## (undated) DEVICE — BANDAGE,ELASTIC,ESMARK,STERILE,4"X9',LF: Brand: MEDLINE

## (undated) DEVICE — DRAPE SURGICAL HAND PROX AURORA

## (undated) DEVICE — SOLUTION SURG PREP PVP IOD 10% 8 OZ BTL SCRB CARE

## (undated) DEVICE — GLOVE SURG SZ 65 CRM LTX FREE POLYISOPRENE POLYMER BEAD ANTI

## (undated) DEVICE — GLOVE SURG SZ 6 L12IN FNGR THK79MIL GRN LTX FREE

## (undated) DEVICE — GLOVE SURG SZ 75 L12IN FNGR THK79MIL GRN LTX FREE

## (undated) DEVICE — BLADELESS OBTURATOR: Brand: WECK VISTA

## (undated) DEVICE — Z INACTIVE NO ACTIVE SUPPLIER APPLICATOR MEDICATED 26 CC TINT HI-LITE ORNG STRL CHLORAPREP

## (undated) DEVICE — ENDOSCOPY KIT: Brand: MEDLINE INDUSTRIES, INC.

## (undated) DEVICE — EXCEL 10FT (3.05 M) INSUFFLATION TUBING SET WITH 0.1 MICRON FILTER: Brand: EXCEL

## (undated) DEVICE — COUNTER NDL 30 COUNT FOAM STRP SGL MAG

## (undated) DEVICE — YANKAUER,FLEXIBLE HANDLE,REGLR CAPACITY: Brand: MEDLINE INDUSTRIES, INC.

## (undated) DEVICE — SCISSORS ENDOSCP DIA5MM CRV MPLR CAUT W/ RATCH HNDL

## (undated) DEVICE — COLUMN DRAPE

## (undated) DEVICE — BANDAGE COMPR ELASTIC 9 FTX4 IN STRL ESMARCH LF

## (undated) DEVICE — Device

## (undated) DEVICE — STAPLER 60 RELOAD BLUE: Brand: SUREFORM

## (undated) DEVICE — ZIMMER® STERILE DISPOSABLE TOURNIQUET CUFF WITH PLC, DUAL PORT, SINGLE BLADDER, 18 IN. (46 CM)

## (undated) DEVICE — PENCIL ES CRD L10FT HND SWCHING ROCK SWCH W/ EDGE COAT BLDE

## (undated) DEVICE — GLOVE SURG SZ 8 L12IN THK75MIL DK GRN LTX FREE

## (undated) DEVICE — LARGE SUTURE CUT NEEDLE DRIVER: Brand: ENDOWRIST

## (undated) DEVICE — DRAPE SHEET ULTRAGARD: Brand: MEDLINE

## (undated) DEVICE — TUBING, SUCTION, 9/32" X 10', STRAIGHT: Brand: MEDLINE

## (undated) DEVICE — Z DISCONTINUED NO SUB IDED TUBING ETCO2 AD L6.5FT NSL ORAL CVD PRNG NONFLARED TIP OVR

## (undated) DEVICE — PROTECTOR EYE PT SELF ADH NS OPT GRD LF

## (undated) DEVICE — NEEDLE SPNL L3.5IN PNK HUB S STL REG WALL FIT STYL W/ QNCKE

## (undated) DEVICE — SHEET,DRAPE,53X77,STERILE: Brand: MEDLINE

## (undated) DEVICE — KIT CHAIR TRIMANO FOAM W/ SUPP ARM DRP ERGONOMICALLY DESIGNED

## (undated) DEVICE — SUTURE ABSORBABLE BRAIDED 2-0 CT-1 27 IN UD VICRYL J259H

## (undated) DEVICE — PACK SURG LAP CHOLE

## (undated) DEVICE — COVER,MAYO STAND,STERILE: Brand: MEDLINE

## (undated) DEVICE — SOLUTION IV IRRIG WATER 1000ML POUR BRL 2F7114

## (undated) DEVICE — SOLUTION PREP POVIDONE IOD FOR SKIN MUCOUS MEM PRIOR TO

## (undated) DEVICE — 3M™ STERI-DRAPE™ U-DRAPE 1015: Brand: STERI-DRAPE™

## (undated) DEVICE — SYRINGE IRRIG 60ML SFT PLIABLE BLB EZ TO GRP 1 HND USE W/

## (undated) DEVICE — [AGGRESSIVE 6-FLUTE BARREL BUR, ARTHROSCOPIC SHAVER BLADE,  DO NOT RESTERILIZE,  DO NOT USE IF PACKAGE IS DAMAGED,  KEEP DRY,  KEEP AWAY FROM SUNLIGHT]: Brand: FORMULA

## (undated) DEVICE — SUTURE SUTTAPE FIBERLINK 1.3MM WHT BLU CLS LOOP AR7535

## (undated) DEVICE — GAUZE,SPONGE,4"X4",16PLY,XRAY,STRL,LF: Brand: MEDLINE

## (undated) DEVICE — SUTURE PERMAHAND SZ 2-0 L17X18IN NONABSORBABLE BLK SILK SA65H

## (undated) DEVICE — SUTURE VCRL SZ 4-0 L18IN ABSRB UD L19MM PS-2 3/8 CIR PRIM J496H

## (undated) DEVICE — MAT ABSRB W28XL48IN C6L FLR ULT W POLY BK

## (undated) DEVICE — SYRINGE 20ML LL S/C 50